# Patient Record
Sex: FEMALE | Race: WHITE | NOT HISPANIC OR LATINO | Employment: OTHER | URBAN - METROPOLITAN AREA
[De-identification: names, ages, dates, MRNs, and addresses within clinical notes are randomized per-mention and may not be internally consistent; named-entity substitution may affect disease eponyms.]

---

## 2017-03-14 ENCOUNTER — COMMUNICATION - HEALTHEAST (OUTPATIENT)
Dept: INTERNAL MEDICINE | Facility: CLINIC | Age: 77
End: 2017-03-14

## 2017-04-10 ENCOUNTER — COMMUNICATION - HEALTHEAST (OUTPATIENT)
Dept: INTERNAL MEDICINE | Facility: CLINIC | Age: 77
End: 2017-04-10

## 2017-04-10 DIAGNOSIS — Z51.81 MEDICATION MONITORING ENCOUNTER: ICD-10-CM

## 2017-05-12 ENCOUNTER — OFFICE VISIT - HEALTHEAST (OUTPATIENT)
Dept: INTERNAL MEDICINE | Facility: CLINIC | Age: 77
End: 2017-05-12

## 2017-05-12 DIAGNOSIS — Z00.00 HEALTHCARE MAINTENANCE: ICD-10-CM

## 2017-05-12 DIAGNOSIS — E11.9 TYPE 2 DIABETES MELLITUS (H): ICD-10-CM

## 2017-05-12 DIAGNOSIS — Z12.31 VISIT FOR SCREENING MAMMOGRAM: ICD-10-CM

## 2017-05-12 DIAGNOSIS — Z51.81 MEDICATION MONITORING ENCOUNTER: ICD-10-CM

## 2017-05-12 DIAGNOSIS — E78.5 HYPERLIPIDEMIA: ICD-10-CM

## 2017-05-12 LAB
CHOLEST SERPL-MCNC: 164 MG/DL
FASTING STATUS PATIENT QL REPORTED: YES
HBA1C MFR BLD: 6.6 % (ref 3.5–6)
HDLC SERPL-MCNC: 54 MG/DL
LDLC SERPL CALC-MCNC: 75 MG/DL
TRIGL SERPL-MCNC: 174 MG/DL

## 2017-05-12 ASSESSMENT — MIFFLIN-ST. JEOR: SCORE: 1328.83

## 2017-05-13 ENCOUNTER — COMMUNICATION - HEALTHEAST (OUTPATIENT)
Dept: INTERNAL MEDICINE | Facility: CLINIC | Age: 77
End: 2017-05-13

## 2017-05-15 ENCOUNTER — COMMUNICATION - HEALTHEAST (OUTPATIENT)
Dept: INTERNAL MEDICINE | Facility: CLINIC | Age: 77
End: 2017-05-15

## 2017-07-09 ENCOUNTER — COMMUNICATION - HEALTHEAST (OUTPATIENT)
Dept: INTERNAL MEDICINE | Facility: CLINIC | Age: 77
End: 2017-07-09

## 2017-07-09 DIAGNOSIS — I10 HYPERTENSION, ESSENTIAL: ICD-10-CM

## 2017-07-26 ENCOUNTER — COMMUNICATION - HEALTHEAST (OUTPATIENT)
Dept: INTERNAL MEDICINE | Facility: CLINIC | Age: 77
End: 2017-07-26

## 2017-07-26 DIAGNOSIS — I10 ESSENTIAL HYPERTENSION WITH GOAL BLOOD PRESSURE LESS THAN 140/90: ICD-10-CM

## 2017-08-31 ENCOUNTER — COMMUNICATION - HEALTHEAST (OUTPATIENT)
Dept: INTERNAL MEDICINE | Facility: CLINIC | Age: 77
End: 2017-08-31

## 2017-08-31 DIAGNOSIS — E78.5 HYPERLIPIDEMIA: ICD-10-CM

## 2017-08-31 DIAGNOSIS — I10 HYPERTENSION: ICD-10-CM

## 2017-09-15 ENCOUNTER — COMMUNICATION - HEALTHEAST (OUTPATIENT)
Dept: SCHEDULING | Facility: CLINIC | Age: 77
End: 2017-09-15

## 2017-09-18 ENCOUNTER — OFFICE VISIT - HEALTHEAST (OUTPATIENT)
Dept: INTERNAL MEDICINE | Facility: CLINIC | Age: 77
End: 2017-09-18

## 2017-09-18 DIAGNOSIS — Z23 NEED FOR INFLUENZA VACCINATION: ICD-10-CM

## 2017-09-18 DIAGNOSIS — E83.52 HYPERCALCEMIA: ICD-10-CM

## 2017-09-18 DIAGNOSIS — E11.9 TYPE 2 DIABETES MELLITUS (H): ICD-10-CM

## 2017-09-18 LAB — HBA1C MFR BLD: 6.6 % (ref 3.5–6)

## 2017-09-18 ASSESSMENT — MIFFLIN-ST. JEOR: SCORE: 1321.58

## 2017-09-24 ENCOUNTER — COMMUNICATION - HEALTHEAST (OUTPATIENT)
Dept: INTERNAL MEDICINE | Facility: CLINIC | Age: 77
End: 2017-09-24

## 2017-09-24 DIAGNOSIS — I10 ESSENTIAL HYPERTENSION WITH GOAL BLOOD PRESSURE LESS THAN 140/90: ICD-10-CM

## 2017-09-24 DIAGNOSIS — E11.9 TYPE 2 DIABETES MELLITUS (H): ICD-10-CM

## 2017-09-27 ENCOUNTER — COMMUNICATION - HEALTHEAST (OUTPATIENT)
Dept: INTERNAL MEDICINE | Facility: CLINIC | Age: 77
End: 2017-09-27

## 2017-11-30 ENCOUNTER — RECORDS - HEALTHEAST (OUTPATIENT)
Dept: ADMINISTRATIVE | Facility: OTHER | Age: 77
End: 2017-11-30

## 2018-02-15 ENCOUNTER — OFFICE VISIT - HEALTHEAST (OUTPATIENT)
Dept: INTERNAL MEDICINE | Facility: CLINIC | Age: 78
End: 2018-02-15

## 2018-02-15 DIAGNOSIS — E11.9 TYPE 2 DIABETES MELLITUS (H): ICD-10-CM

## 2018-02-15 DIAGNOSIS — I10 ESSENTIAL HYPERTENSION WITH GOAL BLOOD PRESSURE LESS THAN 140/90: ICD-10-CM

## 2018-02-15 DIAGNOSIS — E79.0 HYPERURICEMIA: ICD-10-CM

## 2018-02-15 DIAGNOSIS — R00.2 PALPITATIONS: ICD-10-CM

## 2018-02-15 DIAGNOSIS — E78.2 MIXED HYPERLIPIDEMIA: ICD-10-CM

## 2018-02-15 DIAGNOSIS — E21.0 HYPERPARATHYROIDISM, PRIMARY (H): ICD-10-CM

## 2018-02-15 DIAGNOSIS — Z51.81 ENCOUNTER FOR MEDICATION MONITORING: ICD-10-CM

## 2018-02-15 LAB
ANION GAP SERPL CALCULATED.3IONS-SCNC: 10 MMOL/L (ref 5–18)
AST SERPL W P-5'-P-CCNC: 15 U/L (ref 0–40)
BUN SERPL-MCNC: 32 MG/DL (ref 8–28)
CALCIUM SERPL-MCNC: 10.6 MG/DL (ref 8.5–10.5)
CHLORIDE BLD-SCNC: 99 MMOL/L (ref 98–107)
CHOLEST SERPL-MCNC: 139 MG/DL
CO2 SERPL-SCNC: 29 MMOL/L (ref 22–31)
CREAT SERPL-MCNC: 1.28 MG/DL (ref 0.6–1.1)
FASTING STATUS PATIENT QL REPORTED: YES
GFR SERPL CREATININE-BSD FRML MDRD: 40 ML/MIN/1.73M2
GLUCOSE BLD-MCNC: 149 MG/DL (ref 70–125)
HBA1C MFR BLD: 6.4 % (ref 3.5–6)
HDLC SERPL-MCNC: 48 MG/DL
LDLC SERPL CALC-MCNC: 62 MG/DL
POTASSIUM BLD-SCNC: 4.3 MMOL/L (ref 3.5–5)
PTH-INTACT SERPL-MCNC: 106 PG/ML (ref 10–86)
SODIUM SERPL-SCNC: 138 MMOL/L (ref 136–145)
TRIGL SERPL-MCNC: 146 MG/DL
URATE SERPL-MCNC: 3.3 MG/DL (ref 2–7.5)

## 2018-02-15 ASSESSMENT — MIFFLIN-ST. JEOR: SCORE: 1274.4

## 2018-02-16 ENCOUNTER — HOSPITAL ENCOUNTER (OUTPATIENT)
Dept: CARDIOLOGY | Facility: CLINIC | Age: 78
Discharge: HOME | End: 2018-02-16
Attending: INTERNAL MEDICINE

## 2018-02-16 DIAGNOSIS — R00.2 PALPITATIONS: ICD-10-CM

## 2018-02-16 LAB — 25(OH)D3 SERPL-MCNC: 39.9 NG/ML (ref 30–80)

## 2018-02-22 ENCOUNTER — RECORDS - HEALTHEAST (OUTPATIENT)
Dept: BONE DENSITY | Facility: CLINIC | Age: 78
End: 2018-02-22

## 2018-02-22 ENCOUNTER — RECORDS - HEALTHEAST (OUTPATIENT)
Dept: MAMMOGRAPHY | Facility: CLINIC | Age: 78
End: 2018-02-22

## 2018-02-22 ENCOUNTER — RECORDS - HEALTHEAST (OUTPATIENT)
Dept: ADMINISTRATIVE | Facility: OTHER | Age: 78
End: 2018-02-22

## 2018-02-22 DIAGNOSIS — Z12.31 ENCOUNTER FOR SCREENING MAMMOGRAM FOR MALIGNANT NEOPLASM OF BREAST: ICD-10-CM

## 2018-02-22 DIAGNOSIS — E21.0 PRIMARY HYPERPARATHYROIDISM (H): ICD-10-CM

## 2018-02-27 ENCOUNTER — RECORDS - HEALTHEAST (OUTPATIENT)
Dept: ADMINISTRATIVE | Facility: OTHER | Age: 78
End: 2018-02-27

## 2018-02-27 ENCOUNTER — AMBULATORY - HEALTHEAST (OUTPATIENT)
Dept: INTERNAL MEDICINE | Facility: CLINIC | Age: 78
End: 2018-02-27

## 2018-02-27 ENCOUNTER — COMMUNICATION - HEALTHEAST (OUTPATIENT)
Dept: INTERNAL MEDICINE | Facility: CLINIC | Age: 78
End: 2018-02-27

## 2018-02-27 DIAGNOSIS — I45.9 HEART BLOCK: ICD-10-CM

## 2018-03-01 ENCOUNTER — SURGERY - HEALTHEAST (OUTPATIENT)
Dept: CARDIOLOGY | Facility: CLINIC | Age: 78
End: 2018-03-01

## 2018-03-01 ENCOUNTER — AMBULATORY - HEALTHEAST (OUTPATIENT)
Dept: CARDIOLOGY | Facility: CLINIC | Age: 78
End: 2018-03-01

## 2018-03-01 ENCOUNTER — OFFICE VISIT - HEALTHEAST (OUTPATIENT)
Dept: CARDIOLOGY | Facility: CLINIC | Age: 78
End: 2018-03-01

## 2018-03-01 DIAGNOSIS — I48.0 PAROXYSMAL ATRIAL FIBRILLATION (H): ICD-10-CM

## 2018-03-01 DIAGNOSIS — I49.5 TACHY-BRADY SYNDROME (H): ICD-10-CM

## 2018-03-01 DIAGNOSIS — R00.1 BRADYCARDIA: ICD-10-CM

## 2018-03-01 DIAGNOSIS — I10 ESSENTIAL HYPERTENSION: ICD-10-CM

## 2018-03-01 DIAGNOSIS — E78.5 DYSLIPIDEMIA: ICD-10-CM

## 2018-03-01 LAB — TSH SERPL DL<=0.005 MIU/L-ACNC: 5.24 UIU/ML (ref 0.3–5)

## 2018-03-01 ASSESSMENT — MIFFLIN-ST. JEOR: SCORE: 1285.29

## 2018-03-02 ENCOUNTER — SURGERY - HEALTHEAST (OUTPATIENT)
Dept: CARDIOLOGY | Facility: CLINIC | Age: 78
End: 2018-03-02

## 2018-03-02 ENCOUNTER — AMBULATORY - HEALTHEAST (OUTPATIENT)
Dept: CARDIOLOGY | Facility: CLINIC | Age: 78
End: 2018-03-02

## 2018-03-02 DIAGNOSIS — I48.91 ATRIAL FIBRILLATION (H): ICD-10-CM

## 2018-03-05 ENCOUNTER — AMBULATORY - HEALTHEAST (OUTPATIENT)
Dept: CARDIOLOGY | Facility: CLINIC | Age: 78
End: 2018-03-05

## 2018-03-05 DIAGNOSIS — I48.0 PAF (PAROXYSMAL ATRIAL FIBRILLATION) (H): ICD-10-CM

## 2018-03-05 DIAGNOSIS — I49.5 TACHY-BRADY SYNDROME (H): ICD-10-CM

## 2018-03-05 LAB
ATRIAL RATE - MUSE: 70 BPM
DIASTOLIC BLOOD PRESSURE - MUSE: NORMAL MMHG
INTERNATIONAL NORMALIZATION RATIO, POC - HISTORICAL: 2.1
INTERPRETATION ECG - MUSE: NORMAL
P AXIS - MUSE: 40 DEGREES
PR INTERVAL - MUSE: 102 MS
QRS DURATION - MUSE: 80 MS
QT - MUSE: 388 MS
QTC - MUSE: 419 MS
R AXIS - MUSE: 70 DEGREES
SYSTOLIC BLOOD PRESSURE - MUSE: NORMAL MMHG
T AXIS - MUSE: -2 DEGREES
VENTRICULAR RATE- MUSE: 70 BPM

## 2018-03-06 ENCOUNTER — COMMUNICATION - HEALTHEAST (OUTPATIENT)
Dept: CARDIOLOGY | Facility: CLINIC | Age: 78
End: 2018-03-06

## 2018-03-12 ENCOUNTER — AMBULATORY - HEALTHEAST (OUTPATIENT)
Dept: CARDIOLOGY | Facility: CLINIC | Age: 78
End: 2018-03-12

## 2018-03-12 DIAGNOSIS — I48.91 ATRIAL FIBRILLATION (H): ICD-10-CM

## 2018-03-12 DIAGNOSIS — Z95.0 CARDIAC PACEMAKER IN SITU: ICD-10-CM

## 2018-03-12 LAB
HCC DEVICE COMMENTS: NORMAL
INTERNATIONAL NORMALIZATION RATIO, POC - HISTORICAL: 6.3

## 2018-03-12 ASSESSMENT — MIFFLIN-ST. JEOR: SCORE: 1262.61

## 2018-03-19 ENCOUNTER — AMBULATORY - HEALTHEAST (OUTPATIENT)
Dept: CARDIOLOGY | Facility: CLINIC | Age: 78
End: 2018-03-19

## 2018-03-19 DIAGNOSIS — I48.91 ATRIAL FIBRILLATION (H): ICD-10-CM

## 2018-03-19 LAB — INTERNATIONAL NORMALIZATION RATIO, POC - HISTORICAL: 2.4

## 2018-04-02 ENCOUNTER — AMBULATORY - HEALTHEAST (OUTPATIENT)
Dept: CARDIOLOGY | Facility: CLINIC | Age: 78
End: 2018-04-02

## 2018-04-02 DIAGNOSIS — I48.91 ATRIAL FIBRILLATION (H): ICD-10-CM

## 2018-04-02 LAB — INTERNATIONAL NORMALIZATION RATIO, POC - HISTORICAL: 2.9

## 2018-04-05 ENCOUNTER — AMBULATORY - HEALTHEAST (OUTPATIENT)
Dept: CARDIOLOGY | Facility: CLINIC | Age: 78
End: 2018-04-05

## 2018-04-05 DIAGNOSIS — I49.5 SSS (SICK SINUS SYNDROME) (H): ICD-10-CM

## 2018-04-05 DIAGNOSIS — I48.0 PAROXYSMAL ATRIAL FIBRILLATION (H): ICD-10-CM

## 2018-04-05 DIAGNOSIS — Z95.0 CARDIAC PACEMAKER IN SITU: ICD-10-CM

## 2018-04-05 DIAGNOSIS — I45.5 SINUS PAUSE: ICD-10-CM

## 2018-04-05 DIAGNOSIS — I35.0 MILD AORTIC STENOSIS: ICD-10-CM

## 2018-04-05 DIAGNOSIS — I10 ESSENTIAL HYPERTENSION WITH GOAL BLOOD PRESSURE LESS THAN 140/90: ICD-10-CM

## 2018-04-05 LAB — HCC DEVICE COMMENTS: NORMAL

## 2018-04-05 ASSESSMENT — MIFFLIN-ST. JEOR: SCORE: 1285.74

## 2018-04-13 ENCOUNTER — COMMUNICATION - HEALTHEAST (OUTPATIENT)
Dept: INTERNAL MEDICINE | Facility: CLINIC | Age: 78
End: 2018-04-13

## 2018-04-13 DIAGNOSIS — Z51.81 MEDICATION MONITORING ENCOUNTER: ICD-10-CM

## 2018-04-14 ENCOUNTER — COMMUNICATION - HEALTHEAST (OUTPATIENT)
Dept: INTERNAL MEDICINE | Facility: CLINIC | Age: 78
End: 2018-04-14

## 2018-04-14 DIAGNOSIS — I10 HYPERTENSION, ESSENTIAL: ICD-10-CM

## 2018-04-16 ENCOUNTER — AMBULATORY - HEALTHEAST (OUTPATIENT)
Dept: CARDIOLOGY | Facility: CLINIC | Age: 78
End: 2018-04-16

## 2018-04-16 DIAGNOSIS — I48.0 PAROXYSMAL ATRIAL FIBRILLATION (H): ICD-10-CM

## 2018-04-16 LAB — INTERNATIONAL NORMALIZATION RATIO, POC - HISTORICAL: 2.6

## 2018-04-22 ENCOUNTER — COMMUNICATION - HEALTHEAST (OUTPATIENT)
Dept: INTERNAL MEDICINE | Facility: CLINIC | Age: 78
End: 2018-04-22

## 2018-04-22 DIAGNOSIS — I10 ESSENTIAL HYPERTENSION WITH GOAL BLOOD PRESSURE LESS THAN 140/90: ICD-10-CM

## 2018-05-14 ENCOUNTER — AMBULATORY - HEALTHEAST (OUTPATIENT)
Dept: CARDIOLOGY | Facility: CLINIC | Age: 78
End: 2018-05-14

## 2018-05-14 DIAGNOSIS — I48.0 PAROXYSMAL ATRIAL FIBRILLATION (H): ICD-10-CM

## 2018-05-14 LAB — INTERNATIONAL NORMALIZATION RATIO, POC - HISTORICAL: 2.9

## 2018-06-11 ENCOUNTER — AMBULATORY - HEALTHEAST (OUTPATIENT)
Dept: CARDIOLOGY | Facility: CLINIC | Age: 78
End: 2018-06-11

## 2018-06-11 DIAGNOSIS — I48.0 PAROXYSMAL ATRIAL FIBRILLATION (H): ICD-10-CM

## 2018-06-11 LAB — INTERNATIONAL NORMALIZATION RATIO, POC - HISTORICAL: 2.6

## 2018-06-12 ENCOUNTER — COMMUNICATION - HEALTHEAST (OUTPATIENT)
Dept: INTERNAL MEDICINE | Facility: CLINIC | Age: 78
End: 2018-06-12

## 2018-06-12 DIAGNOSIS — E78.5 HYPERLIPIDEMIA: ICD-10-CM

## 2018-06-13 ENCOUNTER — COMMUNICATION - HEALTHEAST (OUTPATIENT)
Dept: INTERNAL MEDICINE | Facility: CLINIC | Age: 78
End: 2018-06-13

## 2018-06-13 DIAGNOSIS — I10 HYPERTENSION: ICD-10-CM

## 2018-06-20 ENCOUNTER — COMMUNICATION - HEALTHEAST (OUTPATIENT)
Dept: INTERNAL MEDICINE | Facility: CLINIC | Age: 78
End: 2018-06-20

## 2018-07-02 ENCOUNTER — AMBULATORY - HEALTHEAST (OUTPATIENT)
Dept: CARDIOLOGY | Facility: CLINIC | Age: 78
End: 2018-07-02

## 2018-07-02 DIAGNOSIS — Z95.0 CARDIAC PACEMAKER IN SITU: ICD-10-CM

## 2018-07-02 LAB
HCC DEVICE COMMENTS: NORMAL
HCC DEVICE IMPLANTING PROVIDER: NORMAL
HCC DEVICE MANUFACTURE: NORMAL
HCC DEVICE MODEL: NORMAL
HCC DEVICE SERIAL NUMBER: NORMAL
HCC DEVICE TYPE: NORMAL

## 2018-07-16 ENCOUNTER — AMBULATORY - HEALTHEAST (OUTPATIENT)
Dept: CARDIOLOGY | Facility: CLINIC | Age: 78
End: 2018-07-16

## 2018-07-16 ENCOUNTER — OFFICE VISIT - HEALTHEAST (OUTPATIENT)
Dept: INTERNAL MEDICINE | Facility: CLINIC | Age: 78
End: 2018-07-16

## 2018-07-16 ENCOUNTER — RECORDS - HEALTHEAST (OUTPATIENT)
Dept: GENERAL RADIOLOGY | Facility: CLINIC | Age: 78
End: 2018-07-16

## 2018-07-16 DIAGNOSIS — R53.83 FATIGUE: ICD-10-CM

## 2018-07-16 DIAGNOSIS — M54.50 LOW BACK PAIN: ICD-10-CM

## 2018-07-16 DIAGNOSIS — I48.0 PAROXYSMAL ATRIAL FIBRILLATION (H): ICD-10-CM

## 2018-07-16 DIAGNOSIS — E21.0 PRIMARY HYPERPARATHYROIDISM (H): ICD-10-CM

## 2018-07-16 DIAGNOSIS — M54.50 BILATERAL LOW BACK PAIN WITHOUT SCIATICA: ICD-10-CM

## 2018-07-16 LAB
ANION GAP SERPL CALCULATED.3IONS-SCNC: 12 MMOL/L (ref 5–18)
BUN SERPL-MCNC: 30 MG/DL (ref 8–28)
CALCIUM SERPL-MCNC: 10.8 MG/DL (ref 8.5–10.5)
CHLORIDE BLD-SCNC: 103 MMOL/L (ref 98–107)
CO2 SERPL-SCNC: 26 MMOL/L (ref 22–31)
CREAT SERPL-MCNC: 1.12 MG/DL (ref 0.6–1.1)
GFR SERPL CREATININE-BSD FRML MDRD: 47 ML/MIN/1.73M2
GLUCOSE BLD-MCNC: 120 MG/DL (ref 70–125)
HBA1C MFR BLD: 6.5 % (ref 3.5–6)
INTERNATIONAL NORMALIZATION RATIO, POC - HISTORICAL: 2.3
POTASSIUM BLD-SCNC: 4.2 MMOL/L (ref 3.5–5)
PTH-INTACT SERPL-MCNC: 174 PG/ML (ref 10–86)
SODIUM SERPL-SCNC: 141 MMOL/L (ref 136–145)
TSH SERPL DL<=0.005 MIU/L-ACNC: 3.57 UIU/ML (ref 0.3–5)

## 2018-07-17 LAB — 25(OH)D3 SERPL-MCNC: 37.1 NG/ML (ref 30–80)

## 2018-07-24 ENCOUNTER — COMMUNICATION - HEALTHEAST (OUTPATIENT)
Dept: INTERNAL MEDICINE | Facility: CLINIC | Age: 78
End: 2018-07-24

## 2018-07-24 ENCOUNTER — COMMUNICATION - HEALTHEAST (OUTPATIENT)
Dept: CARDIOLOGY | Facility: CLINIC | Age: 78
End: 2018-07-24

## 2018-07-31 ENCOUNTER — OFFICE VISIT - HEALTHEAST (OUTPATIENT)
Dept: PHYSICAL THERAPY | Facility: REHABILITATION | Age: 78
End: 2018-07-31

## 2018-07-31 DIAGNOSIS — I10 ESSENTIAL HYPERTENSION WITH GOAL BLOOD PRESSURE LESS THAN 140/90: ICD-10-CM

## 2018-07-31 DIAGNOSIS — E11.9 TYPE 2 DIABETES MELLITUS (H): ICD-10-CM

## 2018-07-31 DIAGNOSIS — M62.81 MUSCLE WEAKNESS (GENERALIZED): ICD-10-CM

## 2018-07-31 DIAGNOSIS — I48.0 PAROXYSMAL ATRIAL FIBRILLATION (H): ICD-10-CM

## 2018-07-31 DIAGNOSIS — G89.29 CHRONIC BILATERAL LOW BACK PAIN WITHOUT SCIATICA: ICD-10-CM

## 2018-07-31 DIAGNOSIS — M54.50 CHRONIC BILATERAL LOW BACK PAIN WITHOUT SCIATICA: ICD-10-CM

## 2018-08-13 ENCOUNTER — AMBULATORY - HEALTHEAST (OUTPATIENT)
Dept: CARDIOLOGY | Facility: CLINIC | Age: 78
End: 2018-08-13

## 2018-08-13 DIAGNOSIS — I48.0 PAROXYSMAL ATRIAL FIBRILLATION (H): ICD-10-CM

## 2018-08-13 LAB — INTERNATIONAL NORMALIZATION RATIO, POC - HISTORICAL: 3.3

## 2018-08-14 ENCOUNTER — OFFICE VISIT - HEALTHEAST (OUTPATIENT)
Dept: PHYSICAL THERAPY | Facility: REHABILITATION | Age: 78
End: 2018-08-14

## 2018-08-14 DIAGNOSIS — Z95.0 CARDIAC PACEMAKER IN SITU: ICD-10-CM

## 2018-08-14 DIAGNOSIS — I10 ESSENTIAL HYPERTENSION WITH GOAL BLOOD PRESSURE LESS THAN 140/90: ICD-10-CM

## 2018-08-14 DIAGNOSIS — I48.0 PAROXYSMAL ATRIAL FIBRILLATION (H): ICD-10-CM

## 2018-08-14 DIAGNOSIS — M54.50 CHRONIC BILATERAL LOW BACK PAIN WITHOUT SCIATICA: ICD-10-CM

## 2018-08-14 DIAGNOSIS — G89.29 CHRONIC BILATERAL LOW BACK PAIN WITHOUT SCIATICA: ICD-10-CM

## 2018-08-14 DIAGNOSIS — M62.81 MUSCLE WEAKNESS (GENERALIZED): ICD-10-CM

## 2018-08-14 DIAGNOSIS — E11.9 TYPE 2 DIABETES MELLITUS (H): ICD-10-CM

## 2018-08-21 ENCOUNTER — OFFICE VISIT - HEALTHEAST (OUTPATIENT)
Dept: PHYSICAL THERAPY | Facility: REHABILITATION | Age: 78
End: 2018-08-21

## 2018-08-21 DIAGNOSIS — I10 ESSENTIAL HYPERTENSION WITH GOAL BLOOD PRESSURE LESS THAN 140/90: ICD-10-CM

## 2018-08-21 DIAGNOSIS — G89.29 CHRONIC BILATERAL LOW BACK PAIN WITHOUT SCIATICA: ICD-10-CM

## 2018-08-21 DIAGNOSIS — I48.0 PAROXYSMAL ATRIAL FIBRILLATION (H): ICD-10-CM

## 2018-08-21 DIAGNOSIS — M54.50 CHRONIC BILATERAL LOW BACK PAIN WITHOUT SCIATICA: ICD-10-CM

## 2018-08-21 DIAGNOSIS — Z95.0 CARDIAC PACEMAKER IN SITU: ICD-10-CM

## 2018-08-21 DIAGNOSIS — M62.81 MUSCLE WEAKNESS (GENERALIZED): ICD-10-CM

## 2018-08-21 DIAGNOSIS — E11.9 TYPE 2 DIABETES MELLITUS (H): ICD-10-CM

## 2018-08-28 ENCOUNTER — OFFICE VISIT - HEALTHEAST (OUTPATIENT)
Dept: PHYSICAL THERAPY | Facility: REHABILITATION | Age: 78
End: 2018-08-28

## 2018-08-28 DIAGNOSIS — I10 ESSENTIAL HYPERTENSION WITH GOAL BLOOD PRESSURE LESS THAN 140/90: ICD-10-CM

## 2018-08-28 DIAGNOSIS — Z95.0 CARDIAC PACEMAKER IN SITU: ICD-10-CM

## 2018-08-28 DIAGNOSIS — M62.81 MUSCLE WEAKNESS (GENERALIZED): ICD-10-CM

## 2018-08-28 DIAGNOSIS — I48.0 PAROXYSMAL ATRIAL FIBRILLATION (H): ICD-10-CM

## 2018-08-28 DIAGNOSIS — G89.29 CHRONIC BILATERAL LOW BACK PAIN WITHOUT SCIATICA: ICD-10-CM

## 2018-08-28 DIAGNOSIS — M54.50 CHRONIC BILATERAL LOW BACK PAIN WITHOUT SCIATICA: ICD-10-CM

## 2018-09-10 ENCOUNTER — AMBULATORY - HEALTHEAST (OUTPATIENT)
Dept: CARDIOLOGY | Facility: CLINIC | Age: 78
End: 2018-09-10

## 2018-09-10 DIAGNOSIS — I48.0 PAROXYSMAL ATRIAL FIBRILLATION (H): ICD-10-CM

## 2018-09-10 LAB — INTERNATIONAL NORMALIZATION RATIO, POC - HISTORICAL: 2.3

## 2018-09-12 ENCOUNTER — OFFICE VISIT - HEALTHEAST (OUTPATIENT)
Dept: PHYSICAL THERAPY | Facility: REHABILITATION | Age: 78
End: 2018-09-12

## 2018-09-12 DIAGNOSIS — I48.0 PAROXYSMAL ATRIAL FIBRILLATION (H): ICD-10-CM

## 2018-09-12 DIAGNOSIS — Z95.0 CARDIAC PACEMAKER IN SITU: ICD-10-CM

## 2018-09-12 DIAGNOSIS — M54.50 CHRONIC BILATERAL LOW BACK PAIN WITHOUT SCIATICA: ICD-10-CM

## 2018-09-12 DIAGNOSIS — G89.29 CHRONIC BILATERAL LOW BACK PAIN WITHOUT SCIATICA: ICD-10-CM

## 2018-09-12 DIAGNOSIS — E11.9 TYPE 2 DIABETES MELLITUS (H): ICD-10-CM

## 2018-09-12 DIAGNOSIS — I10 ESSENTIAL HYPERTENSION WITH GOAL BLOOD PRESSURE LESS THAN 140/90: ICD-10-CM

## 2018-09-12 DIAGNOSIS — M62.81 MUSCLE WEAKNESS (GENERALIZED): ICD-10-CM

## 2018-10-08 ENCOUNTER — AMBULATORY - HEALTHEAST (OUTPATIENT)
Dept: CARDIOLOGY | Facility: CLINIC | Age: 78
End: 2018-10-08

## 2018-10-08 DIAGNOSIS — Z95.0 CARDIAC PACEMAKER IN SITU: ICD-10-CM

## 2018-10-08 DIAGNOSIS — I48.0 PAROXYSMAL ATRIAL FIBRILLATION (H): ICD-10-CM

## 2018-10-08 LAB
HCC DEVICE COMMENTS: NORMAL
HCC DEVICE IMPLANTING PROVIDER: NORMAL
HCC DEVICE MANUFACTURE: NORMAL
HCC DEVICE MODEL: NORMAL
HCC DEVICE SERIAL NUMBER: NORMAL
HCC DEVICE TYPE: NORMAL
INTERNATIONAL NORMALIZATION RATIO, POC - HISTORICAL: 2.6

## 2018-10-16 ENCOUNTER — COMMUNICATION - HEALTHEAST (OUTPATIENT)
Dept: INTERNAL MEDICINE | Facility: CLINIC | Age: 78
End: 2018-10-16

## 2018-10-16 DIAGNOSIS — I10 HYPERTENSION, ESSENTIAL: ICD-10-CM

## 2018-10-20 ENCOUNTER — COMMUNICATION - HEALTHEAST (OUTPATIENT)
Dept: INTERNAL MEDICINE | Facility: CLINIC | Age: 78
End: 2018-10-20

## 2018-10-20 DIAGNOSIS — E11.9 TYPE 2 DIABETES MELLITUS (H): ICD-10-CM

## 2018-11-05 ENCOUNTER — AMBULATORY - HEALTHEAST (OUTPATIENT)
Dept: CARDIOLOGY | Facility: CLINIC | Age: 78
End: 2018-11-05

## 2018-11-05 DIAGNOSIS — I48.0 PAROXYSMAL ATRIAL FIBRILLATION (H): ICD-10-CM

## 2018-11-05 LAB — INTERNATIONAL NORMALIZATION RATIO, POC - HISTORICAL: 4

## 2018-11-12 ENCOUNTER — AMBULATORY - HEALTHEAST (OUTPATIENT)
Dept: CARDIOLOGY | Facility: CLINIC | Age: 78
End: 2018-11-12

## 2018-11-12 DIAGNOSIS — I48.0 PAROXYSMAL ATRIAL FIBRILLATION (H): ICD-10-CM

## 2018-11-12 LAB — INTERNATIONAL NORMALIZATION RATIO, POC - HISTORICAL: 2.2

## 2018-11-16 ENCOUNTER — RECORDS - HEALTHEAST (OUTPATIENT)
Dept: ADMINISTRATIVE | Facility: OTHER | Age: 78
End: 2018-11-16

## 2018-12-10 ENCOUNTER — AMBULATORY - HEALTHEAST (OUTPATIENT)
Dept: CARDIOLOGY | Facility: CLINIC | Age: 78
End: 2018-12-10

## 2018-12-10 DIAGNOSIS — I48.0 PAROXYSMAL ATRIAL FIBRILLATION (H): ICD-10-CM

## 2018-12-10 LAB — INTERNATIONAL NORMALIZATION RATIO, POC - HISTORICAL: 1.9

## 2018-12-11 ENCOUNTER — COMMUNICATION - HEALTHEAST (OUTPATIENT)
Dept: INTERNAL MEDICINE | Facility: CLINIC | Age: 78
End: 2018-12-11

## 2018-12-11 DIAGNOSIS — I10 HYPERTENSION: ICD-10-CM

## 2018-12-11 DIAGNOSIS — E78.5 HYPERLIPIDEMIA: ICD-10-CM

## 2018-12-22 ENCOUNTER — COMMUNICATION - HEALTHEAST (OUTPATIENT)
Dept: INTERNAL MEDICINE | Facility: CLINIC | Age: 78
End: 2018-12-22

## 2018-12-22 DIAGNOSIS — I10 ESSENTIAL HYPERTENSION WITH GOAL BLOOD PRESSURE LESS THAN 140/90: ICD-10-CM

## 2019-01-07 ENCOUNTER — AMBULATORY - HEALTHEAST (OUTPATIENT)
Dept: CARDIOLOGY | Facility: CLINIC | Age: 79
End: 2019-01-07

## 2019-01-07 ENCOUNTER — OFFICE VISIT - HEALTHEAST (OUTPATIENT)
Dept: INTERNAL MEDICINE | Facility: CLINIC | Age: 79
End: 2019-01-07

## 2019-01-07 DIAGNOSIS — I48.0 PAROXYSMAL ATRIAL FIBRILLATION (H): ICD-10-CM

## 2019-01-07 DIAGNOSIS — Z23 FLU VACCINE NEED: ICD-10-CM

## 2019-01-07 DIAGNOSIS — Z51.81 MEDICATION MONITORING ENCOUNTER: ICD-10-CM

## 2019-01-07 DIAGNOSIS — M10.9 ACUTE GOUT, UNSPECIFIED CAUSE, UNSPECIFIED SITE: ICD-10-CM

## 2019-01-07 DIAGNOSIS — E11.9 TYPE 2 DIABETES MELLITUS (H): ICD-10-CM

## 2019-01-07 DIAGNOSIS — Z12.31 VISIT FOR SCREENING MAMMOGRAM: ICD-10-CM

## 2019-01-07 DIAGNOSIS — E21.0 PRIMARY HYPERPARATHYROIDISM (H): ICD-10-CM

## 2019-01-07 DIAGNOSIS — I10 ESSENTIAL HYPERTENSION WITH GOAL BLOOD PRESSURE LESS THAN 140/90: ICD-10-CM

## 2019-01-07 DIAGNOSIS — E78.00 HYPERCHOLESTEROLEMIA: ICD-10-CM

## 2019-01-07 DIAGNOSIS — E66.01 SEVERE OBESITY WITH BODY MASS INDEX (BMI) OF 35.0 TO 39.9 WITH COMORBIDITY (H): ICD-10-CM

## 2019-01-07 LAB
ALBUMIN SERPL-MCNC: 3.9 G/DL (ref 3.5–5)
ALP SERPL-CCNC: 97 U/L (ref 45–120)
ALT SERPL W P-5'-P-CCNC: 24 U/L (ref 0–45)
ANION GAP SERPL CALCULATED.3IONS-SCNC: 15 MMOL/L (ref 5–18)
AST SERPL W P-5'-P-CCNC: 26 U/L (ref 0–40)
BILIRUB SERPL-MCNC: 0.7 MG/DL (ref 0–1)
BUN SERPL-MCNC: 30 MG/DL (ref 8–28)
CALCIUM SERPL-MCNC: 10.6 MG/DL (ref 8.5–10.5)
CHLORIDE BLD-SCNC: 101 MMOL/L (ref 98–107)
CHOLEST SERPL-MCNC: 170 MG/DL
CO2 SERPL-SCNC: 23 MMOL/L (ref 22–31)
CREAT SERPL-MCNC: 1.2 MG/DL (ref 0.6–1.1)
CREAT UR-MCNC: 79.5 MG/DL
ERYTHROCYTE [DISTWIDTH] IN BLOOD BY AUTOMATED COUNT: 11.8 % (ref 11–14.5)
FASTING STATUS PATIENT QL REPORTED: NO
GFR SERPL CREATININE-BSD FRML MDRD: 43 ML/MIN/1.73M2
GLUCOSE BLD-MCNC: 106 MG/DL (ref 70–125)
HBA1C MFR BLD: 6.7 % (ref 3.5–6)
HCT VFR BLD AUTO: 42.7 % (ref 35–47)
HDLC SERPL-MCNC: 58 MG/DL
HGB BLD-MCNC: 14.2 G/DL (ref 12–16)
INTERNATIONAL NORMALIZATION RATIO, POC - HISTORICAL: 2.2
LDLC SERPL CALC-MCNC: 74 MG/DL
MCH RBC QN AUTO: 30.8 PG (ref 27–34)
MCHC RBC AUTO-ENTMCNC: 33.3 G/DL (ref 32–36)
MCV RBC AUTO: 93 FL (ref 80–100)
MICROALBUMIN UR-MCNC: 1.07 MG/DL (ref 0–1.99)
MICROALBUMIN/CREAT UR: 13.5 MG/G
PLATELET # BLD AUTO: 205 THOU/UL (ref 140–440)
PMV BLD AUTO: 8.8 FL (ref 7–10)
POTASSIUM BLD-SCNC: 4.8 MMOL/L (ref 3.5–5)
PROT SERPL-MCNC: 7.2 G/DL (ref 6–8)
PTH-INTACT SERPL-MCNC: 145 PG/ML (ref 10–86)
RBC # BLD AUTO: 4.62 MILL/UL (ref 3.8–5.4)
SODIUM SERPL-SCNC: 139 MMOL/L (ref 136–145)
TRIGL SERPL-MCNC: 192 MG/DL
URATE SERPL-MCNC: 3.6 MG/DL (ref 2–7.5)
WBC: 9.5 THOU/UL (ref 4–11)

## 2019-01-08 LAB — 25(OH)D3 SERPL-MCNC: 37.4 NG/ML (ref 30–80)

## 2019-01-15 ENCOUNTER — AMBULATORY - HEALTHEAST (OUTPATIENT)
Dept: CARDIOLOGY | Facility: CLINIC | Age: 79
End: 2019-01-15

## 2019-01-15 DIAGNOSIS — Z95.0 CARDIAC PACEMAKER IN SITU: ICD-10-CM

## 2019-01-22 ENCOUNTER — COMMUNICATION - HEALTHEAST (OUTPATIENT)
Dept: CARDIOLOGY | Facility: CLINIC | Age: 79
End: 2019-01-22

## 2019-01-22 DIAGNOSIS — I48.0 PAROXYSMAL ATRIAL FIBRILLATION (H): ICD-10-CM

## 2019-02-11 ENCOUNTER — AMBULATORY - HEALTHEAST (OUTPATIENT)
Dept: CARDIOLOGY | Facility: CLINIC | Age: 79
End: 2019-02-11

## 2019-02-11 DIAGNOSIS — I48.0 PAROXYSMAL ATRIAL FIBRILLATION (H): ICD-10-CM

## 2019-02-11 LAB — INTERNATIONAL NORMALIZATION RATIO, POC - HISTORICAL: 4.5

## 2019-02-18 ENCOUNTER — AMBULATORY - HEALTHEAST (OUTPATIENT)
Dept: CARDIOLOGY | Facility: CLINIC | Age: 79
End: 2019-02-18

## 2019-02-18 DIAGNOSIS — I48.0 PAROXYSMAL ATRIAL FIBRILLATION (H): ICD-10-CM

## 2019-02-18 LAB — INTERNATIONAL NORMALIZATION RATIO, POC - HISTORICAL: 2.2

## 2019-03-18 ENCOUNTER — AMBULATORY - HEALTHEAST (OUTPATIENT)
Dept: CARDIOLOGY | Facility: CLINIC | Age: 79
End: 2019-03-18

## 2019-03-18 DIAGNOSIS — I48.0 PAROXYSMAL ATRIAL FIBRILLATION (H): ICD-10-CM

## 2019-03-18 LAB — INTERNATIONAL NORMALIZATION RATIO, POC - HISTORICAL: 3.4

## 2019-04-01 ENCOUNTER — AMBULATORY - HEALTHEAST (OUTPATIENT)
Dept: CARDIOLOGY | Facility: CLINIC | Age: 79
End: 2019-04-01

## 2019-04-01 DIAGNOSIS — I48.0 PAROXYSMAL ATRIAL FIBRILLATION (H): ICD-10-CM

## 2019-04-01 LAB — INTERNATIONAL NORMALIZATION RATIO, POC - HISTORICAL: 2.1

## 2019-04-11 ENCOUNTER — COMMUNICATION - HEALTHEAST (OUTPATIENT)
Dept: INTERNAL MEDICINE | Facility: CLINIC | Age: 79
End: 2019-04-11

## 2019-04-11 DIAGNOSIS — Z51.81 MEDICATION MONITORING ENCOUNTER: ICD-10-CM

## 2019-04-15 ENCOUNTER — AMBULATORY - HEALTHEAST (OUTPATIENT)
Dept: CARDIOLOGY | Facility: CLINIC | Age: 79
End: 2019-04-15

## 2019-04-15 DIAGNOSIS — Z79.01 LONG TERM CURRENT USE OF ANTICOAGULANT THERAPY: ICD-10-CM

## 2019-04-15 DIAGNOSIS — I48.0 PAROXYSMAL ATRIAL FIBRILLATION (H): ICD-10-CM

## 2019-04-15 LAB — POC INR - HE - HISTORICAL: 3.9 (ref 0.9–1.1)

## 2019-04-29 ENCOUNTER — AMBULATORY - HEALTHEAST (OUTPATIENT)
Dept: CARDIOLOGY | Facility: CLINIC | Age: 79
End: 2019-04-29

## 2019-04-29 DIAGNOSIS — I48.0 PAROXYSMAL ATRIAL FIBRILLATION (H): ICD-10-CM

## 2019-04-29 LAB — INTERNATIONAL NORMALIZATION RATIO, POC - HISTORICAL: 2.8

## 2019-05-09 ENCOUNTER — COMMUNICATION - HEALTHEAST (OUTPATIENT)
Dept: INTERNAL MEDICINE | Facility: CLINIC | Age: 79
End: 2019-05-09

## 2019-05-21 ENCOUNTER — COMMUNICATION - HEALTHEAST (OUTPATIENT)
Dept: ANTICOAGULATION | Facility: CLINIC | Age: 79
End: 2019-05-21

## 2019-05-21 DIAGNOSIS — I48.0 PAROXYSMAL ATRIAL FIBRILLATION (H): ICD-10-CM

## 2019-05-22 ENCOUNTER — AMBULATORY - HEALTHEAST (OUTPATIENT)
Dept: CARDIOLOGY | Facility: CLINIC | Age: 79
End: 2019-05-22

## 2019-05-22 ENCOUNTER — OFFICE VISIT - HEALTHEAST (OUTPATIENT)
Dept: CARDIOLOGY | Facility: CLINIC | Age: 79
End: 2019-05-22

## 2019-05-22 DIAGNOSIS — I10 ESSENTIAL HYPERTENSION: ICD-10-CM

## 2019-05-22 DIAGNOSIS — I48.0 PAROXYSMAL ATRIAL FIBRILLATION (H): ICD-10-CM

## 2019-05-22 DIAGNOSIS — Z95.0 CARDIAC PACEMAKER IN SITU: ICD-10-CM

## 2019-05-22 ASSESSMENT — MIFFLIN-ST. JEOR: SCORE: 1281.43

## 2019-05-29 ENCOUNTER — COMMUNICATION - HEALTHEAST (OUTPATIENT)
Dept: ANTICOAGULATION | Facility: CLINIC | Age: 79
End: 2019-05-29

## 2019-05-29 ENCOUNTER — AMBULATORY - HEALTHEAST (OUTPATIENT)
Dept: LAB | Facility: CLINIC | Age: 79
End: 2019-05-29

## 2019-05-29 DIAGNOSIS — I48.0 PAROXYSMAL ATRIAL FIBRILLATION (H): ICD-10-CM

## 2019-05-29 LAB — INR PPP: 2.8 (ref 0.9–1.1)

## 2019-06-24 ENCOUNTER — AMBULATORY - HEALTHEAST (OUTPATIENT)
Dept: LAB | Facility: CLINIC | Age: 79
End: 2019-06-24

## 2019-06-24 ENCOUNTER — COMMUNICATION - HEALTHEAST (OUTPATIENT)
Dept: ANTICOAGULATION | Facility: CLINIC | Age: 79
End: 2019-06-24

## 2019-06-24 DIAGNOSIS — I48.0 PAROXYSMAL ATRIAL FIBRILLATION (H): ICD-10-CM

## 2019-06-24 LAB — INR PPP: 3 (ref 0.9–1.1)

## 2019-06-26 ENCOUNTER — COMMUNICATION - HEALTHEAST (OUTPATIENT)
Dept: CARDIOLOGY | Facility: CLINIC | Age: 79
End: 2019-06-26

## 2019-06-26 DIAGNOSIS — I48.0 PAROXYSMAL ATRIAL FIBRILLATION (H): ICD-10-CM

## 2019-07-22 ENCOUNTER — COMMUNICATION - HEALTHEAST (OUTPATIENT)
Dept: ANTICOAGULATION | Facility: CLINIC | Age: 79
End: 2019-07-22

## 2019-07-22 ENCOUNTER — RECORDS - HEALTHEAST (OUTPATIENT)
Dept: MAMMOGRAPHY | Facility: CLINIC | Age: 79
End: 2019-07-22

## 2019-07-22 ENCOUNTER — OFFICE VISIT - HEALTHEAST (OUTPATIENT)
Dept: INTERNAL MEDICINE | Facility: CLINIC | Age: 79
End: 2019-07-22

## 2019-07-22 DIAGNOSIS — I48.0 PAROXYSMAL ATRIAL FIBRILLATION (H): ICD-10-CM

## 2019-07-22 DIAGNOSIS — E78.5 HYPERLIPIDEMIA: ICD-10-CM

## 2019-07-22 DIAGNOSIS — I10 ESSENTIAL HYPERTENSION WITH GOAL BLOOD PRESSURE LESS THAN 140/90: ICD-10-CM

## 2019-07-22 DIAGNOSIS — Z12.31 ENCOUNTER FOR SCREENING MAMMOGRAM FOR MALIGNANT NEOPLASM OF BREAST: ICD-10-CM

## 2019-07-22 DIAGNOSIS — I10 HYPERTENSION: ICD-10-CM

## 2019-07-22 DIAGNOSIS — E78.2 MIXED HYPERLIPIDEMIA: ICD-10-CM

## 2019-07-22 DIAGNOSIS — E21.0 PRIMARY HYPERPARATHYROIDISM (H): ICD-10-CM

## 2019-07-22 DIAGNOSIS — I10 HYPERTENSION, ESSENTIAL: ICD-10-CM

## 2019-07-22 DIAGNOSIS — E11.9 TYPE 2 DIABETES MELLITUS (H): ICD-10-CM

## 2019-07-22 LAB
CREAT UR-MCNC: 147.9 MG/DL
HBA1C MFR BLD: 6.9 % (ref 3.5–6)
INR PPP: 2.4 (ref 0.9–1.1)
MICROALBUMIN UR-MCNC: 2.13 MG/DL (ref 0–1.99)
MICROALBUMIN/CREAT UR: 14.4 MG/G

## 2019-07-23 LAB
ALBUMIN SERPL-MCNC: 3.7 G/DL (ref 3.5–5)
ALP SERPL-CCNC: 109 U/L (ref 45–120)
ALT SERPL W P-5'-P-CCNC: 20 U/L (ref 0–45)
ANION GAP SERPL CALCULATED.3IONS-SCNC: 11 MMOL/L (ref 5–18)
AST SERPL W P-5'-P-CCNC: 17 U/L (ref 0–40)
BILIRUB SERPL-MCNC: 0.6 MG/DL (ref 0–1)
BUN SERPL-MCNC: 28 MG/DL (ref 8–28)
CALCIUM SERPL-MCNC: 10 MG/DL (ref 8.5–10.5)
CHLORIDE BLD-SCNC: 101 MMOL/L (ref 98–107)
CHOLEST SERPL-MCNC: 149 MG/DL
CO2 SERPL-SCNC: 26 MMOL/L (ref 22–31)
CREAT SERPL-MCNC: 1.08 MG/DL (ref 0.6–1.1)
FASTING STATUS PATIENT QL REPORTED: NO
GFR SERPL CREATININE-BSD FRML MDRD: 49 ML/MIN/1.73M2
GLUCOSE BLD-MCNC: 130 MG/DL (ref 70–125)
HDLC SERPL-MCNC: 55 MG/DL
LDLC SERPL CALC-MCNC: 70 MG/DL
POTASSIUM BLD-SCNC: 4.6 MMOL/L (ref 3.5–5)
PROT SERPL-MCNC: 6.6 G/DL (ref 6–8)
PTH-INTACT SERPL-MCNC: 182 PG/ML (ref 10–86)
SODIUM SERPL-SCNC: 138 MMOL/L (ref 136–145)
TRIGL SERPL-MCNC: 122 MG/DL

## 2019-07-24 ENCOUNTER — COMMUNICATION - HEALTHEAST (OUTPATIENT)
Dept: INTERNAL MEDICINE | Facility: CLINIC | Age: 79
End: 2019-07-24

## 2019-07-29 ENCOUNTER — COMMUNICATION - HEALTHEAST (OUTPATIENT)
Dept: CARDIOLOGY | Facility: CLINIC | Age: 79
End: 2019-07-29

## 2019-07-29 DIAGNOSIS — I48.0 PAROXYSMAL ATRIAL FIBRILLATION (H): ICD-10-CM

## 2019-08-06 ENCOUNTER — COMMUNICATION - HEALTHEAST (OUTPATIENT)
Dept: INTERNAL MEDICINE | Facility: CLINIC | Age: 79
End: 2019-08-06

## 2019-08-06 DIAGNOSIS — I10 ESSENTIAL HYPERTENSION, BENIGN: ICD-10-CM

## 2019-08-20 ENCOUNTER — AMBULATORY - HEALTHEAST (OUTPATIENT)
Dept: LAB | Facility: CLINIC | Age: 79
End: 2019-08-20

## 2019-08-20 ENCOUNTER — COMMUNICATION - HEALTHEAST (OUTPATIENT)
Dept: ANTICOAGULATION | Facility: CLINIC | Age: 79
End: 2019-08-20

## 2019-08-20 ENCOUNTER — AMBULATORY - HEALTHEAST (OUTPATIENT)
Dept: CARDIOLOGY | Facility: CLINIC | Age: 79
End: 2019-08-20

## 2019-08-20 DIAGNOSIS — I48.0 PAROXYSMAL ATRIAL FIBRILLATION (H): ICD-10-CM

## 2019-08-20 DIAGNOSIS — Z95.0 CARDIAC PACEMAKER IN SITU: ICD-10-CM

## 2019-08-20 LAB
HCC DEVICE COMMENTS: NORMAL
HCC DEVICE IMPLANTING PROVIDER: NORMAL
HCC DEVICE MANUFACTURE: NORMAL
HCC DEVICE MODEL: NORMAL
HCC DEVICE SERIAL NUMBER: NORMAL
HCC DEVICE TYPE: NORMAL
INR PPP: 3.8 (ref 0.9–1.1)

## 2019-09-09 ENCOUNTER — AMBULATORY - HEALTHEAST (OUTPATIENT)
Dept: LAB | Facility: CLINIC | Age: 79
End: 2019-09-09

## 2019-09-09 ENCOUNTER — COMMUNICATION - HEALTHEAST (OUTPATIENT)
Dept: ANTICOAGULATION | Facility: CLINIC | Age: 79
End: 2019-09-09

## 2019-09-09 DIAGNOSIS — I48.0 PAROXYSMAL ATRIAL FIBRILLATION (H): ICD-10-CM

## 2019-09-09 LAB — INR PPP: 2.7 (ref 0.9–1.1)

## 2019-10-07 ENCOUNTER — AMBULATORY - HEALTHEAST (OUTPATIENT)
Dept: LAB | Facility: CLINIC | Age: 79
End: 2019-10-07

## 2019-10-07 ENCOUNTER — COMMUNICATION - HEALTHEAST (OUTPATIENT)
Dept: ANTICOAGULATION | Facility: CLINIC | Age: 79
End: 2019-10-07

## 2019-10-07 DIAGNOSIS — I48.0 PAROXYSMAL ATRIAL FIBRILLATION (H): ICD-10-CM

## 2019-10-07 LAB — INR PPP: 2.9 (ref 0.9–1.1)

## 2019-11-04 ENCOUNTER — COMMUNICATION - HEALTHEAST (OUTPATIENT)
Dept: ANTICOAGULATION | Facility: CLINIC | Age: 79
End: 2019-11-04

## 2019-11-04 ENCOUNTER — AMBULATORY - HEALTHEAST (OUTPATIENT)
Dept: NURSING | Facility: CLINIC | Age: 79
End: 2019-11-04

## 2019-11-04 ENCOUNTER — AMBULATORY - HEALTHEAST (OUTPATIENT)
Dept: LAB | Facility: CLINIC | Age: 79
End: 2019-11-04

## 2019-11-04 DIAGNOSIS — I48.0 PAROXYSMAL ATRIAL FIBRILLATION (H): ICD-10-CM

## 2019-11-04 DIAGNOSIS — Z23 NEED FOR IMMUNIZATION AGAINST INFLUENZA: ICD-10-CM

## 2019-11-04 LAB — INR PPP: 2.8 (ref 0.9–1.1)

## 2019-11-20 ENCOUNTER — AMBULATORY - HEALTHEAST (OUTPATIENT)
Dept: CARDIOLOGY | Facility: CLINIC | Age: 79
End: 2019-11-20

## 2019-11-20 DIAGNOSIS — I48.0 PAROXYSMAL ATRIAL FIBRILLATION (H): ICD-10-CM

## 2019-11-20 DIAGNOSIS — Z95.0 CARDIAC PACEMAKER IN SITU: ICD-10-CM

## 2019-11-27 ENCOUNTER — COMMUNICATION - HEALTHEAST (OUTPATIENT)
Dept: ANTICOAGULATION | Facility: CLINIC | Age: 79
End: 2019-11-27

## 2019-11-27 DIAGNOSIS — I48.0 PAROXYSMAL ATRIAL FIBRILLATION (H): ICD-10-CM

## 2019-12-02 ENCOUNTER — AMBULATORY - HEALTHEAST (OUTPATIENT)
Dept: LAB | Facility: CLINIC | Age: 79
End: 2019-12-02

## 2019-12-02 ENCOUNTER — COMMUNICATION - HEALTHEAST (OUTPATIENT)
Dept: ANTICOAGULATION | Facility: CLINIC | Age: 79
End: 2019-12-02

## 2019-12-02 DIAGNOSIS — I48.0 PAROXYSMAL ATRIAL FIBRILLATION (H): ICD-10-CM

## 2019-12-02 LAB — INR PPP: 3.2 (ref 0.9–1.1)

## 2019-12-16 ENCOUNTER — AMBULATORY - HEALTHEAST (OUTPATIENT)
Dept: LAB | Facility: CLINIC | Age: 79
End: 2019-12-16

## 2019-12-16 ENCOUNTER — COMMUNICATION - HEALTHEAST (OUTPATIENT)
Dept: ANTICOAGULATION | Facility: CLINIC | Age: 79
End: 2019-12-16

## 2019-12-16 DIAGNOSIS — I48.0 PAROXYSMAL ATRIAL FIBRILLATION (H): ICD-10-CM

## 2019-12-16 LAB — INR PPP: 2.3 (ref 0.9–1.1)

## 2020-01-07 ENCOUNTER — COMMUNICATION - HEALTHEAST (OUTPATIENT)
Dept: CARDIOLOGY | Facility: CLINIC | Age: 80
End: 2020-01-07

## 2020-01-07 ENCOUNTER — COMMUNICATION - HEALTHEAST (OUTPATIENT)
Dept: ANTICOAGULATION | Facility: CLINIC | Age: 80
End: 2020-01-07

## 2020-01-07 ENCOUNTER — AMBULATORY - HEALTHEAST (OUTPATIENT)
Dept: LAB | Facility: CLINIC | Age: 80
End: 2020-01-07

## 2020-01-07 DIAGNOSIS — I48.0 PAROXYSMAL ATRIAL FIBRILLATION (H): ICD-10-CM

## 2020-01-07 LAB — INR PPP: 2.8 (ref 0.9–1.1)

## 2020-01-12 ENCOUNTER — COMMUNICATION - HEALTHEAST (OUTPATIENT)
Dept: INTERNAL MEDICINE | Facility: CLINIC | Age: 80
End: 2020-01-12

## 2020-01-12 DIAGNOSIS — Z51.81 MEDICATION MONITORING ENCOUNTER: ICD-10-CM

## 2020-02-03 ENCOUNTER — COMMUNICATION - HEALTHEAST (OUTPATIENT)
Dept: ANTICOAGULATION | Facility: CLINIC | Age: 80
End: 2020-02-03

## 2020-02-03 ENCOUNTER — AMBULATORY - HEALTHEAST (OUTPATIENT)
Dept: LAB | Facility: CLINIC | Age: 80
End: 2020-02-03

## 2020-02-03 DIAGNOSIS — I48.0 PAROXYSMAL ATRIAL FIBRILLATION (H): ICD-10-CM

## 2020-02-03 LAB — INR PPP: 2.7 (ref 0.9–1.1)

## 2020-02-09 ENCOUNTER — COMMUNICATION - HEALTHEAST (OUTPATIENT)
Dept: CARDIOLOGY | Facility: CLINIC | Age: 80
End: 2020-02-09

## 2020-02-09 DIAGNOSIS — I48.0 PAROXYSMAL ATRIAL FIBRILLATION (H): ICD-10-CM

## 2020-02-20 ENCOUNTER — AMBULATORY - HEALTHEAST (OUTPATIENT)
Dept: CARDIOLOGY | Facility: CLINIC | Age: 80
End: 2020-02-20

## 2020-02-20 DIAGNOSIS — I48.0 PAROXYSMAL ATRIAL FIBRILLATION (H): ICD-10-CM

## 2020-02-20 DIAGNOSIS — Z95.0 CARDIAC PACEMAKER IN SITU: ICD-10-CM

## 2020-03-02 ENCOUNTER — COMMUNICATION - HEALTHEAST (OUTPATIENT)
Dept: ANTICOAGULATION | Facility: CLINIC | Age: 80
End: 2020-03-02

## 2020-03-02 ENCOUNTER — AMBULATORY - HEALTHEAST (OUTPATIENT)
Dept: LAB | Facility: CLINIC | Age: 80
End: 2020-03-02

## 2020-03-02 DIAGNOSIS — I48.0 PAROXYSMAL ATRIAL FIBRILLATION (H): ICD-10-CM

## 2020-03-02 LAB — INR PPP: 3.9 (ref 0.9–1.1)

## 2020-03-16 ENCOUNTER — COMMUNICATION - HEALTHEAST (OUTPATIENT)
Dept: ANTICOAGULATION | Facility: CLINIC | Age: 80
End: 2020-03-16

## 2020-03-16 ENCOUNTER — AMBULATORY - HEALTHEAST (OUTPATIENT)
Dept: LAB | Facility: CLINIC | Age: 80
End: 2020-03-16

## 2020-03-16 DIAGNOSIS — I48.0 PAROXYSMAL ATRIAL FIBRILLATION (H): ICD-10-CM

## 2020-03-16 LAB — INR PPP: 3.5 (ref 0.9–1.1)

## 2020-03-30 ENCOUNTER — COMMUNICATION - HEALTHEAST (OUTPATIENT)
Dept: ANTICOAGULATION | Facility: CLINIC | Age: 80
End: 2020-03-30

## 2020-03-30 ENCOUNTER — AMBULATORY - HEALTHEAST (OUTPATIENT)
Dept: LAB | Facility: CLINIC | Age: 80
End: 2020-03-30

## 2020-03-30 DIAGNOSIS — I48.0 PAROXYSMAL ATRIAL FIBRILLATION (H): ICD-10-CM

## 2020-03-30 LAB — INR PPP: 2.2 (ref 0.9–1.1)

## 2020-04-13 ENCOUNTER — COMMUNICATION - HEALTHEAST (OUTPATIENT)
Dept: ANTICOAGULATION | Facility: CLINIC | Age: 80
End: 2020-04-13

## 2020-04-13 ENCOUNTER — AMBULATORY - HEALTHEAST (OUTPATIENT)
Dept: LAB | Facility: CLINIC | Age: 80
End: 2020-04-13

## 2020-04-13 DIAGNOSIS — I48.0 PAROXYSMAL ATRIAL FIBRILLATION (H): ICD-10-CM

## 2020-04-13 LAB — INR PPP: 3.1 (ref 0.9–1.1)

## 2020-04-21 ENCOUNTER — COMMUNICATION - HEALTHEAST (OUTPATIENT)
Dept: INTERNAL MEDICINE | Facility: CLINIC | Age: 80
End: 2020-04-21

## 2020-04-27 ENCOUNTER — OFFICE VISIT - HEALTHEAST (OUTPATIENT)
Dept: INTERNAL MEDICINE | Facility: CLINIC | Age: 80
End: 2020-04-27

## 2020-04-27 DIAGNOSIS — E11.9 TYPE 2 DIABETES MELLITUS WITHOUT COMPLICATION, WITHOUT LONG-TERM CURRENT USE OF INSULIN (H): ICD-10-CM

## 2020-04-27 DIAGNOSIS — I10 ESSENTIAL HYPERTENSION, BENIGN: ICD-10-CM

## 2020-04-27 DIAGNOSIS — E11.9 TYPE 2 DIABETES MELLITUS (H): ICD-10-CM

## 2020-04-27 DIAGNOSIS — E21.0 PRIMARY HYPERPARATHYROIDISM (H): ICD-10-CM

## 2020-04-27 DIAGNOSIS — M17.12 PRIMARY OSTEOARTHRITIS OF LEFT KNEE: ICD-10-CM

## 2020-04-27 DIAGNOSIS — R60.0 PERIPHERAL EDEMA: ICD-10-CM

## 2020-04-27 DIAGNOSIS — N95.1 MENOPAUSAL SYNDROME (HOT FLASHES): ICD-10-CM

## 2020-04-28 ENCOUNTER — OFFICE VISIT - HEALTHEAST (OUTPATIENT)
Dept: CARDIOLOGY | Facility: CLINIC | Age: 80
End: 2020-04-28

## 2020-04-28 DIAGNOSIS — I35.0 NONRHEUMATIC AORTIC VALVE STENOSIS: ICD-10-CM

## 2020-04-28 DIAGNOSIS — I48.0 PAROXYSMAL ATRIAL FIBRILLATION (H): ICD-10-CM

## 2020-04-28 DIAGNOSIS — E78.5 DYSLIPIDEMIA: ICD-10-CM

## 2020-04-28 ASSESSMENT — MIFFLIN-ST. JEOR: SCORE: 1270.08

## 2020-05-04 ENCOUNTER — COMMUNICATION - HEALTHEAST (OUTPATIENT)
Dept: ANTICOAGULATION | Facility: CLINIC | Age: 80
End: 2020-05-04

## 2020-05-04 ENCOUNTER — AMBULATORY - HEALTHEAST (OUTPATIENT)
Dept: NURSING | Facility: CLINIC | Age: 80
End: 2020-05-04

## 2020-05-04 ENCOUNTER — AMBULATORY - HEALTHEAST (OUTPATIENT)
Dept: LAB | Facility: CLINIC | Age: 80
End: 2020-05-04

## 2020-05-04 DIAGNOSIS — I10 ESSENTIAL HYPERTENSION WITH GOAL BLOOD PRESSURE LESS THAN 140/90: ICD-10-CM

## 2020-05-04 DIAGNOSIS — E21.0 PRIMARY HYPERPARATHYROIDISM (H): ICD-10-CM

## 2020-05-04 DIAGNOSIS — I48.0 PAROXYSMAL ATRIAL FIBRILLATION (H): ICD-10-CM

## 2020-05-04 DIAGNOSIS — R60.0 PERIPHERAL EDEMA: ICD-10-CM

## 2020-05-04 DIAGNOSIS — E11.9 TYPE 2 DIABETES MELLITUS WITHOUT COMPLICATION, WITHOUT LONG-TERM CURRENT USE OF INSULIN (H): ICD-10-CM

## 2020-05-04 LAB
ANION GAP SERPL CALCULATED.3IONS-SCNC: 12 MMOL/L (ref 5–18)
BUN SERPL-MCNC: 22 MG/DL (ref 8–28)
CALCIUM SERPL-MCNC: 10.6 MG/DL (ref 8.5–10.5)
CHLORIDE BLD-SCNC: 100 MMOL/L (ref 98–107)
CO2 SERPL-SCNC: 29 MMOL/L (ref 22–31)
CREAT SERPL-MCNC: 1.2 MG/DL (ref 0.6–1.1)
D DIMER PPP FEU-MCNC: 0.49 FEU UG/ML
GFR SERPL CREATININE-BSD FRML MDRD: 43 ML/MIN/1.73M2
GLUCOSE BLD-MCNC: 164 MG/DL (ref 70–125)
HBA1C MFR BLD: 7 % (ref 3.5–6)
INR PPP: 3.2 (ref 0.9–1.1)
POTASSIUM BLD-SCNC: 4.7 MMOL/L (ref 3.5–5)
PTH-INTACT SERPL-MCNC: 176 PG/ML (ref 10–86)
SODIUM SERPL-SCNC: 141 MMOL/L (ref 136–145)

## 2020-05-05 ENCOUNTER — COMMUNICATION - HEALTHEAST (OUTPATIENT)
Dept: INTERNAL MEDICINE | Facility: CLINIC | Age: 80
End: 2020-05-05

## 2020-05-05 DIAGNOSIS — E11.9 TYPE 2 DIABETES MELLITUS (H): ICD-10-CM

## 2020-05-18 ENCOUNTER — AMBULATORY - HEALTHEAST (OUTPATIENT)
Dept: LAB | Facility: CLINIC | Age: 80
End: 2020-05-18

## 2020-05-18 ENCOUNTER — COMMUNICATION - HEALTHEAST (OUTPATIENT)
Dept: ANTICOAGULATION | Facility: CLINIC | Age: 80
End: 2020-05-18

## 2020-05-18 DIAGNOSIS — I48.0 PAROXYSMAL ATRIAL FIBRILLATION (H): ICD-10-CM

## 2020-05-18 LAB — INR PPP: 2.9 (ref 0.9–1.1)

## 2020-06-01 ENCOUNTER — AMBULATORY - HEALTHEAST (OUTPATIENT)
Dept: LAB | Facility: CLINIC | Age: 80
End: 2020-06-01

## 2020-06-01 ENCOUNTER — COMMUNICATION - HEALTHEAST (OUTPATIENT)
Dept: ANTICOAGULATION | Facility: CLINIC | Age: 80
End: 2020-06-01

## 2020-06-01 DIAGNOSIS — I48.0 PAROXYSMAL ATRIAL FIBRILLATION (H): ICD-10-CM

## 2020-06-01 LAB — INR PPP: 2.5 (ref 0.9–1.1)

## 2020-06-29 ENCOUNTER — AMBULATORY - HEALTHEAST (OUTPATIENT)
Dept: NURSING | Facility: CLINIC | Age: 80
End: 2020-06-29

## 2020-06-29 ENCOUNTER — AMBULATORY - HEALTHEAST (OUTPATIENT)
Dept: LAB | Facility: CLINIC | Age: 80
End: 2020-06-29

## 2020-06-29 ENCOUNTER — COMMUNICATION - HEALTHEAST (OUTPATIENT)
Dept: ANTICOAGULATION | Facility: CLINIC | Age: 80
End: 2020-06-29

## 2020-06-29 DIAGNOSIS — I48.0 PAROXYSMAL ATRIAL FIBRILLATION (H): ICD-10-CM

## 2020-06-29 DIAGNOSIS — I10 ESSENTIAL HYPERTENSION WITH GOAL BLOOD PRESSURE LESS THAN 140/90: ICD-10-CM

## 2020-06-29 LAB — INR PPP: 1.5 (ref 0.9–1.1)

## 2020-07-02 ENCOUNTER — AMBULATORY - HEALTHEAST (OUTPATIENT)
Dept: CARDIOLOGY | Facility: CLINIC | Age: 80
End: 2020-07-02

## 2020-07-02 DIAGNOSIS — I49.5 SSS (SICK SINUS SYNDROME) (H): ICD-10-CM

## 2020-07-02 DIAGNOSIS — Z95.0 CARDIAC PACEMAKER IN SITU: ICD-10-CM

## 2020-07-08 ENCOUNTER — COMMUNICATION - HEALTHEAST (OUTPATIENT)
Dept: ANTICOAGULATION | Facility: CLINIC | Age: 80
End: 2020-07-08

## 2020-07-08 ENCOUNTER — AMBULATORY - HEALTHEAST (OUTPATIENT)
Dept: LAB | Facility: CLINIC | Age: 80
End: 2020-07-08

## 2020-07-08 DIAGNOSIS — I48.0 PAROXYSMAL ATRIAL FIBRILLATION (H): ICD-10-CM

## 2020-07-08 LAB
HCC DEVICE COMMENTS: NORMAL
HCC DEVICE IMPLANTING PROVIDER: NORMAL
HCC DEVICE MANUFACTURE: NORMAL
HCC DEVICE MODEL: NORMAL
HCC DEVICE SERIAL NUMBER: NORMAL
HCC DEVICE TYPE: NORMAL
INR PPP: 2.8 (ref 0.9–1.1)

## 2020-07-13 ENCOUNTER — COMMUNICATION - HEALTHEAST (OUTPATIENT)
Dept: INTERNAL MEDICINE | Facility: CLINIC | Age: 80
End: 2020-07-13

## 2020-07-13 DIAGNOSIS — Z51.81 MEDICATION MONITORING ENCOUNTER: ICD-10-CM

## 2020-07-14 ENCOUNTER — COMMUNICATION - HEALTHEAST (OUTPATIENT)
Dept: CARDIOLOGY | Facility: CLINIC | Age: 80
End: 2020-07-14

## 2020-07-14 DIAGNOSIS — I48.0 PAROXYSMAL ATRIAL FIBRILLATION (H): ICD-10-CM

## 2020-07-14 RX ORDER — SOTALOL HYDROCHLORIDE 80 MG/1
80 TABLET ORAL DAILY
Qty: 90 TABLET | Refills: 1 | Status: SHIPPED | OUTPATIENT
Start: 2020-07-14 | End: 2021-07-06

## 2020-07-14 RX ORDER — ALLOPURINOL 300 MG/1
TABLET ORAL
Qty: 90 TABLET | Refills: 3 | Status: SHIPPED | OUTPATIENT
Start: 2020-07-14 | End: 2021-07-20

## 2020-07-20 ENCOUNTER — COMMUNICATION - HEALTHEAST (OUTPATIENT)
Dept: ANTICOAGULATION | Facility: CLINIC | Age: 80
End: 2020-07-20

## 2020-07-20 ENCOUNTER — AMBULATORY - HEALTHEAST (OUTPATIENT)
Dept: LAB | Facility: CLINIC | Age: 80
End: 2020-07-20

## 2020-07-20 DIAGNOSIS — I48.0 PAROXYSMAL ATRIAL FIBRILLATION (H): ICD-10-CM

## 2020-07-20 LAB — INR PPP: 2.1 (ref 0.9–1.1)

## 2020-07-21 ENCOUNTER — COMMUNICATION - HEALTHEAST (OUTPATIENT)
Dept: INTERNAL MEDICINE | Facility: CLINIC | Age: 80
End: 2020-07-21

## 2020-07-21 DIAGNOSIS — I10 ESSENTIAL HYPERTENSION WITH GOAL BLOOD PRESSURE LESS THAN 140/90: ICD-10-CM

## 2020-08-04 ENCOUNTER — COMMUNICATION - HEALTHEAST (OUTPATIENT)
Dept: INTERNAL MEDICINE | Facility: CLINIC | Age: 80
End: 2020-08-04

## 2020-08-04 DIAGNOSIS — I10 ESSENTIAL HYPERTENSION, BENIGN: ICD-10-CM

## 2020-08-10 ENCOUNTER — AMBULATORY - HEALTHEAST (OUTPATIENT)
Dept: LAB | Facility: CLINIC | Age: 80
End: 2020-08-10

## 2020-08-10 ENCOUNTER — COMMUNICATION - HEALTHEAST (OUTPATIENT)
Dept: ANTICOAGULATION | Facility: CLINIC | Age: 80
End: 2020-08-10

## 2020-08-10 DIAGNOSIS — I48.0 PAROXYSMAL ATRIAL FIBRILLATION (H): ICD-10-CM

## 2020-08-10 LAB — INR PPP: 2.3 (ref 0.9–1.1)

## 2020-09-07 ENCOUNTER — COMMUNICATION - HEALTHEAST (OUTPATIENT)
Dept: INTERNAL MEDICINE | Facility: CLINIC | Age: 80
End: 2020-09-07

## 2020-09-07 DIAGNOSIS — E78.2 MIXED HYPERLIPIDEMIA: ICD-10-CM

## 2020-09-07 DIAGNOSIS — E78.5 HYPERLIPIDEMIA: ICD-10-CM

## 2020-09-08 ENCOUNTER — COMMUNICATION - HEALTHEAST (OUTPATIENT)
Dept: CARDIOLOGY | Facility: CLINIC | Age: 80
End: 2020-09-08

## 2020-09-08 DIAGNOSIS — I48.0 PAROXYSMAL ATRIAL FIBRILLATION (H): ICD-10-CM

## 2020-09-11 ENCOUNTER — COMMUNICATION - HEALTHEAST (OUTPATIENT)
Dept: ANTICOAGULATION | Facility: CLINIC | Age: 80
End: 2020-09-11

## 2020-09-11 ENCOUNTER — OFFICE VISIT - HEALTHEAST (OUTPATIENT)
Dept: INTERNAL MEDICINE | Facility: CLINIC | Age: 80
End: 2020-09-11

## 2020-09-11 DIAGNOSIS — I35.0 MILD AORTIC STENOSIS: ICD-10-CM

## 2020-09-11 DIAGNOSIS — E79.0 HYPERURICEMIA: ICD-10-CM

## 2020-09-11 DIAGNOSIS — I48.0 PAROXYSMAL ATRIAL FIBRILLATION (H): ICD-10-CM

## 2020-09-11 DIAGNOSIS — Z12.31 VISIT FOR SCREENING MAMMOGRAM: ICD-10-CM

## 2020-09-11 DIAGNOSIS — Z51.81 ENCOUNTER FOR THERAPEUTIC DRUG MONITORING: ICD-10-CM

## 2020-09-11 DIAGNOSIS — E11.9 TYPE 2 DIABETES MELLITUS (H): ICD-10-CM

## 2020-09-11 DIAGNOSIS — Z95.0 CARDIAC PACEMAKER IN SITU: ICD-10-CM

## 2020-09-11 DIAGNOSIS — E66.01 SEVERE OBESITY WITH BODY MASS INDEX (BMI) OF 35.0 TO 39.9 WITH COMORBIDITY (H): ICD-10-CM

## 2020-09-11 DIAGNOSIS — E21.0 PRIMARY HYPERPARATHYROIDISM (H): ICD-10-CM

## 2020-09-11 DIAGNOSIS — I10 HYPERTENSION: ICD-10-CM

## 2020-09-11 DIAGNOSIS — I10 ESSENTIAL HYPERTENSION WITH GOAL BLOOD PRESSURE LESS THAN 140/90: ICD-10-CM

## 2020-09-11 DIAGNOSIS — E11.65 TYPE 2 DIABETES MELLITUS WITH HYPERGLYCEMIA, WITHOUT LONG-TERM CURRENT USE OF INSULIN (H): ICD-10-CM

## 2020-09-11 DIAGNOSIS — Z00.00 MEDICARE ANNUAL WELLNESS VISIT, SUBSEQUENT: ICD-10-CM

## 2020-09-11 LAB
ALBUMIN SERPL-MCNC: 3.8 G/DL (ref 3.5–5)
ALBUMIN UR-MCNC: NEGATIVE MG/DL
ALP SERPL-CCNC: 96 U/L (ref 45–120)
ALT SERPL W P-5'-P-CCNC: 16 U/L (ref 0–45)
ANION GAP SERPL CALCULATED.3IONS-SCNC: 10 MMOL/L (ref 5–18)
APPEARANCE UR: CLEAR
AST SERPL W P-5'-P-CCNC: 15 U/L (ref 0–40)
BILIRUB SERPL-MCNC: 0.6 MG/DL (ref 0–1)
BILIRUB UR QL STRIP: NEGATIVE
BUN SERPL-MCNC: 26 MG/DL (ref 8–28)
CALCIUM SERPL-MCNC: 10.6 MG/DL (ref 8.5–10.5)
CHLORIDE BLD-SCNC: 100 MMOL/L (ref 98–107)
CHOLEST SERPL-MCNC: 163 MG/DL
CO2 SERPL-SCNC: 28 MMOL/L (ref 22–31)
COLOR UR AUTO: YELLOW
CREAT SERPL-MCNC: 0.98 MG/DL (ref 0.6–1.1)
CREAT UR-MCNC: 28.4 MG/DL
ERYTHROCYTE [DISTWIDTH] IN BLOOD BY AUTOMATED COUNT: 12.4 % (ref 11–14.5)
FASTING STATUS PATIENT QL REPORTED: NO
GFR SERPL CREATININE-BSD FRML MDRD: 54 ML/MIN/1.73M2
GLUCOSE BLD-MCNC: 130 MG/DL (ref 70–125)
GLUCOSE UR STRIP-MCNC: NEGATIVE MG/DL
HBA1C MFR BLD: 7 %
HCT VFR BLD AUTO: 43 % (ref 35–47)
HDLC SERPL-MCNC: 58 MG/DL
HGB BLD-MCNC: 14.3 G/DL (ref 12–16)
HGB UR QL STRIP: NEGATIVE
INR PPP: 3 (ref 0.9–1.1)
KETONES UR STRIP-MCNC: NEGATIVE MG/DL
LDLC SERPL CALC-MCNC: 67 MG/DL
LEUKOCYTE ESTERASE UR QL STRIP: NEGATIVE
MCH RBC QN AUTO: 31.8 PG (ref 27–34)
MCHC RBC AUTO-ENTMCNC: 33.2 G/DL (ref 32–36)
MCV RBC AUTO: 96 FL (ref 80–100)
MICROALBUMIN UR-MCNC: 1.45 MG/DL (ref 0–1.99)
MICROALBUMIN/CREAT UR: 51.1 MG/G
NITRATE UR QL: NEGATIVE
PH UR STRIP: 5 [PH] (ref 5–8)
PLATELET # BLD AUTO: 208 THOU/UL (ref 140–440)
PMV BLD AUTO: 9.2 FL (ref 7–10)
POTASSIUM BLD-SCNC: 4.4 MMOL/L (ref 3.5–5)
PROT SERPL-MCNC: 7.3 G/DL (ref 6–8)
PTH-INTACT SERPL-MCNC: 143 PG/ML (ref 10–86)
RBC # BLD AUTO: 4.49 MILL/UL (ref 3.8–5.4)
SODIUM SERPL-SCNC: 138 MMOL/L (ref 136–145)
SP GR UR STRIP: 1.01 (ref 1–1.03)
TRIGL SERPL-MCNC: 191 MG/DL
URATE SERPL-MCNC: 3.2 MG/DL (ref 2–7.5)
UROBILINOGEN UR STRIP-ACNC: NORMAL
WBC: 9.3 THOU/UL (ref 4–11)

## 2020-09-11 RX ORDER — LOSARTAN POTASSIUM 100 MG/1
100 TABLET ORAL DAILY
Qty: 90 TABLET | Refills: 3 | Status: SHIPPED | OUTPATIENT
Start: 2020-09-11 | End: 2021-09-13

## 2020-09-11 RX ORDER — GLUCOSAMINE HCL/CHONDROITIN SU 500-400 MG
1 CAPSULE ORAL DAILY
Qty: 100 STRIP | Refills: 3 | Status: SHIPPED | OUTPATIENT
Start: 2020-09-11

## 2020-09-11 ASSESSMENT — MIFFLIN-ST. JEOR: SCORE: 1263.29

## 2020-09-14 LAB
25(OH)D3 SERPL-MCNC: 37.6 NG/ML (ref 30–80)
25(OH)D3 SERPL-MCNC: 37.6 NG/ML (ref 30–80)

## 2020-10-05 ENCOUNTER — AMBULATORY - HEALTHEAST (OUTPATIENT)
Dept: CARDIOLOGY | Facility: CLINIC | Age: 80
End: 2020-10-05

## 2020-10-05 DIAGNOSIS — I49.5 SSS (SICK SINUS SYNDROME) (H): ICD-10-CM

## 2020-10-05 DIAGNOSIS — Z95.0 CARDIAC PACEMAKER IN SITU: ICD-10-CM

## 2020-10-12 ENCOUNTER — RECORDS - HEALTHEAST (OUTPATIENT)
Dept: ANTICOAGULATION | Facility: CLINIC | Age: 80
End: 2020-10-12

## 2020-10-12 ENCOUNTER — AMBULATORY - HEALTHEAST (OUTPATIENT)
Dept: LAB | Facility: CLINIC | Age: 80
End: 2020-10-12

## 2020-10-12 DIAGNOSIS — I48.0 PAROXYSMAL ATRIAL FIBRILLATION (H): ICD-10-CM

## 2020-10-12 LAB — INR PPP: 3.4 (ref 0.9–1.1)

## 2020-10-26 ENCOUNTER — AMBULATORY - HEALTHEAST (OUTPATIENT)
Dept: LAB | Facility: CLINIC | Age: 80
End: 2020-10-26

## 2020-10-26 ENCOUNTER — COMMUNICATION - HEALTHEAST (OUTPATIENT)
Dept: ANTICOAGULATION | Facility: CLINIC | Age: 80
End: 2020-10-26

## 2020-10-26 DIAGNOSIS — I48.0 PAROXYSMAL ATRIAL FIBRILLATION (H): ICD-10-CM

## 2020-10-26 LAB — INR PPP: 3.2 (ref 0.9–1.1)

## 2020-11-09 ENCOUNTER — COMMUNICATION - HEALTHEAST (OUTPATIENT)
Dept: ANTICOAGULATION | Facility: CLINIC | Age: 80
End: 2020-11-09

## 2020-11-09 ENCOUNTER — AMBULATORY - HEALTHEAST (OUTPATIENT)
Dept: LAB | Facility: CLINIC | Age: 80
End: 2020-11-09

## 2020-11-09 DIAGNOSIS — I48.0 PAROXYSMAL ATRIAL FIBRILLATION (H): ICD-10-CM

## 2020-11-09 LAB — INR PPP: 2.3 (ref 0.9–1.1)

## 2020-11-16 ENCOUNTER — COMMUNICATION - HEALTHEAST (OUTPATIENT)
Dept: ANTICOAGULATION | Facility: CLINIC | Age: 80
End: 2020-11-16

## 2020-11-23 ENCOUNTER — COMMUNICATION - HEALTHEAST (OUTPATIENT)
Dept: ANTICOAGULATION | Facility: CLINIC | Age: 80
End: 2020-11-23

## 2020-11-23 ENCOUNTER — AMBULATORY - HEALTHEAST (OUTPATIENT)
Dept: LAB | Facility: CLINIC | Age: 80
End: 2020-11-23

## 2020-11-23 DIAGNOSIS — I48.0 PAROXYSMAL ATRIAL FIBRILLATION (H): ICD-10-CM

## 2020-11-23 LAB — INR PPP: 2.1 (ref 0.9–1.1)

## 2020-12-18 ENCOUNTER — COMMUNICATION - HEALTHEAST (OUTPATIENT)
Dept: LAB | Facility: CLINIC | Age: 80
End: 2020-12-18

## 2020-12-18 DIAGNOSIS — I48.0 PAROXYSMAL ATRIAL FIBRILLATION (H): ICD-10-CM

## 2020-12-21 ENCOUNTER — AMBULATORY - HEALTHEAST (OUTPATIENT)
Dept: NURSING | Facility: CLINIC | Age: 80
End: 2020-12-21

## 2020-12-21 ENCOUNTER — COMMUNICATION - HEALTHEAST (OUTPATIENT)
Dept: ANTICOAGULATION | Facility: CLINIC | Age: 80
End: 2020-12-21

## 2020-12-21 ENCOUNTER — AMBULATORY - HEALTHEAST (OUTPATIENT)
Dept: LAB | Facility: CLINIC | Age: 80
End: 2020-12-21

## 2020-12-21 DIAGNOSIS — I10 ESSENTIAL HYPERTENSION WITH GOAL BLOOD PRESSURE LESS THAN 140/90: ICD-10-CM

## 2020-12-21 DIAGNOSIS — I48.0 PAROXYSMAL ATRIAL FIBRILLATION (H): ICD-10-CM

## 2020-12-21 LAB — INR PPP: 3.4 (ref 0.9–1.1)

## 2021-01-04 ENCOUNTER — AMBULATORY - HEALTHEAST (OUTPATIENT)
Dept: LAB | Facility: CLINIC | Age: 81
End: 2021-01-04

## 2021-01-04 ENCOUNTER — COMMUNICATION - HEALTHEAST (OUTPATIENT)
Dept: ANTICOAGULATION | Facility: CLINIC | Age: 81
End: 2021-01-04

## 2021-01-04 DIAGNOSIS — I48.0 PAROXYSMAL ATRIAL FIBRILLATION (H): ICD-10-CM

## 2021-01-04 LAB — INR PPP: 2.4 (ref 0.9–1.1)

## 2021-01-05 ENCOUNTER — AMBULATORY - HEALTHEAST (OUTPATIENT)
Dept: CARDIOLOGY | Facility: CLINIC | Age: 81
End: 2021-01-05

## 2021-01-05 DIAGNOSIS — I49.5 SSS (SICK SINUS SYNDROME) (H): ICD-10-CM

## 2021-01-05 DIAGNOSIS — Z95.0 CARDIAC PACEMAKER IN SITU: ICD-10-CM

## 2021-02-01 ENCOUNTER — AMBULATORY - HEALTHEAST (OUTPATIENT)
Dept: LAB | Facility: CLINIC | Age: 81
End: 2021-02-01

## 2021-02-01 ENCOUNTER — COMMUNICATION - HEALTHEAST (OUTPATIENT)
Dept: ANTICOAGULATION | Facility: CLINIC | Age: 81
End: 2021-02-01

## 2021-02-01 DIAGNOSIS — I48.0 PAROXYSMAL ATRIAL FIBRILLATION (H): ICD-10-CM

## 2021-02-01 LAB — INR PPP: 3.2 (ref 0.9–1.1)

## 2021-02-15 ENCOUNTER — AMBULATORY - HEALTHEAST (OUTPATIENT)
Dept: LAB | Facility: CLINIC | Age: 81
End: 2021-02-15

## 2021-02-15 ENCOUNTER — COMMUNICATION - HEALTHEAST (OUTPATIENT)
Dept: ANTICOAGULATION | Facility: CLINIC | Age: 81
End: 2021-02-15

## 2021-02-15 DIAGNOSIS — I48.0 PAROXYSMAL ATRIAL FIBRILLATION (H): ICD-10-CM

## 2021-02-15 LAB — INR PPP: 2.4 (ref 0.9–1.1)

## 2021-02-16 ENCOUNTER — AMBULATORY - HEALTHEAST (OUTPATIENT)
Dept: NURSING | Facility: CLINIC | Age: 81
End: 2021-02-16

## 2021-03-01 ENCOUNTER — AMBULATORY - HEALTHEAST (OUTPATIENT)
Dept: LAB | Facility: CLINIC | Age: 81
End: 2021-03-01

## 2021-03-01 ENCOUNTER — COMMUNICATION - HEALTHEAST (OUTPATIENT)
Dept: ANTICOAGULATION | Facility: CLINIC | Age: 81
End: 2021-03-01

## 2021-03-01 DIAGNOSIS — I48.0 PAROXYSMAL ATRIAL FIBRILLATION (H): ICD-10-CM

## 2021-03-01 LAB — INR PPP: 1.9 (ref 0.9–1.1)

## 2021-03-09 ENCOUNTER — AMBULATORY - HEALTHEAST (OUTPATIENT)
Dept: NURSING | Facility: CLINIC | Age: 81
End: 2021-03-09

## 2021-03-09 ENCOUNTER — OFFICE VISIT - HEALTHEAST (OUTPATIENT)
Dept: INTERNAL MEDICINE | Facility: CLINIC | Age: 81
End: 2021-03-09

## 2021-03-09 DIAGNOSIS — E78.5 HYPERLIPIDEMIA: ICD-10-CM

## 2021-03-09 DIAGNOSIS — R60.0 PERIPHERAL EDEMA: ICD-10-CM

## 2021-03-09 DIAGNOSIS — N18.31 STAGE 3A CHRONIC KIDNEY DISEASE (H): ICD-10-CM

## 2021-03-09 DIAGNOSIS — E66.01 SEVERE OBESITY WITH BODY MASS INDEX (BMI) OF 35.0 TO 39.9 WITH COMORBIDITY (H): ICD-10-CM

## 2021-03-09 DIAGNOSIS — E21.0 HYPERPARATHYROIDISM, PRIMARY (H): ICD-10-CM

## 2021-03-09 DIAGNOSIS — E78.2 MIXED HYPERLIPIDEMIA: ICD-10-CM

## 2021-03-09 DIAGNOSIS — E11.65 TYPE 2 DIABETES MELLITUS WITH HYPERGLYCEMIA, WITHOUT LONG-TERM CURRENT USE OF INSULIN (H): ICD-10-CM

## 2021-03-09 DIAGNOSIS — R06.02 SHORTNESS OF BREATH: ICD-10-CM

## 2021-03-09 DIAGNOSIS — I10 ESSENTIAL HYPERTENSION WITH GOAL BLOOD PRESSURE LESS THAN 140/90: ICD-10-CM

## 2021-03-09 DIAGNOSIS — I48.0 PAROXYSMAL ATRIAL FIBRILLATION (H): ICD-10-CM

## 2021-03-09 LAB
ERYTHROCYTE [DISTWIDTH] IN BLOOD BY AUTOMATED COUNT: 14.7 % (ref 11–14.5)
HBA1C MFR BLD: 6.9 %
HCT VFR BLD AUTO: 40.4 % (ref 35–47)
HGB BLD-MCNC: 13.4 G/DL (ref 12–16)
MCH RBC QN AUTO: 31.2 PG (ref 27–34)
MCHC RBC AUTO-ENTMCNC: 33.2 G/DL (ref 32–36)
MCV RBC AUTO: 94 FL (ref 80–100)
PLATELET # BLD AUTO: 230 THOU/UL (ref 140–440)
PMV BLD AUTO: 11.6 FL (ref 7–10)
RBC # BLD AUTO: 4.3 MILL/UL (ref 3.8–5.4)
WBC: 12.2 THOU/UL (ref 4–11)

## 2021-03-09 RX ORDER — ATORVASTATIN CALCIUM 10 MG/1
10 TABLET, FILM COATED ORAL DAILY
Qty: 90 TABLET | Refills: 1 | Status: SHIPPED | OUTPATIENT
Start: 2021-03-09 | End: 2021-09-13

## 2021-03-10 LAB
ALBUMIN SERPL-MCNC: 3.9 G/DL (ref 3.5–5)
ANION GAP SERPL CALCULATED.3IONS-SCNC: 12 MMOL/L (ref 5–18)
BNP SERPL-MCNC: 49 PG/ML (ref 0–163)
BUN SERPL-MCNC: 30 MG/DL (ref 8–28)
CALCIUM SERPL-MCNC: 10 MG/DL (ref 8.5–10.5)
CHLORIDE BLD-SCNC: 100 MMOL/L (ref 98–107)
CO2 SERPL-SCNC: 27 MMOL/L (ref 22–31)
CREAT SERPL-MCNC: 1.25 MG/DL (ref 0.6–1.1)
GFR SERPL CREATININE-BSD FRML MDRD: 41 ML/MIN/1.73M2
GLUCOSE BLD-MCNC: 128 MG/DL (ref 70–125)
POTASSIUM BLD-SCNC: 4.2 MMOL/L (ref 3.5–5)
PTH-INTACT SERPL-MCNC: 149 PG/ML (ref 10–86)
SODIUM SERPL-SCNC: 139 MMOL/L (ref 136–145)

## 2021-03-15 ENCOUNTER — AMBULATORY - HEALTHEAST (OUTPATIENT)
Dept: NURSING | Facility: CLINIC | Age: 81
End: 2021-03-15

## 2021-03-15 ENCOUNTER — COMMUNICATION - HEALTHEAST (OUTPATIENT)
Dept: ANTICOAGULATION | Facility: CLINIC | Age: 81
End: 2021-03-15

## 2021-03-15 ENCOUNTER — AMBULATORY - HEALTHEAST (OUTPATIENT)
Dept: LAB | Facility: CLINIC | Age: 81
End: 2021-03-15

## 2021-03-15 DIAGNOSIS — I48.0 PAROXYSMAL ATRIAL FIBRILLATION (H): ICD-10-CM

## 2021-03-15 LAB — INR PPP: 2.2 (ref 0.9–1.1)

## 2021-03-26 ENCOUNTER — COMMUNICATION - HEALTHEAST (OUTPATIENT)
Dept: CARDIOLOGY | Facility: CLINIC | Age: 81
End: 2021-03-26

## 2021-03-26 DIAGNOSIS — I48.0 PAROXYSMAL ATRIAL FIBRILLATION (H): ICD-10-CM

## 2021-03-26 RX ORDER — WARFARIN SODIUM 2.5 MG/1
TABLET ORAL
Qty: 90 TABLET | Refills: 1 | Status: SHIPPED | OUTPATIENT
Start: 2021-03-26 | End: 2021-10-29

## 2021-03-29 ENCOUNTER — AMBULATORY - HEALTHEAST (OUTPATIENT)
Dept: LAB | Facility: CLINIC | Age: 81
End: 2021-03-29

## 2021-03-29 ENCOUNTER — COMMUNICATION - HEALTHEAST (OUTPATIENT)
Dept: ANTICOAGULATION | Facility: CLINIC | Age: 81
End: 2021-03-29

## 2021-03-29 DIAGNOSIS — I48.0 PAROXYSMAL ATRIAL FIBRILLATION (H): ICD-10-CM

## 2021-03-29 LAB — INR PPP: 2.7 (ref 0.9–1.1)

## 2021-03-30 ENCOUNTER — COMMUNICATION - HEALTHEAST (OUTPATIENT)
Dept: INTERNAL MEDICINE | Facility: CLINIC | Age: 81
End: 2021-03-30

## 2021-03-30 DIAGNOSIS — I48.0 PAROXYSMAL ATRIAL FIBRILLATION (H): ICD-10-CM

## 2021-04-04 ENCOUNTER — COMMUNICATION - HEALTHEAST (OUTPATIENT)
Dept: INTERNAL MEDICINE | Facility: CLINIC | Age: 81
End: 2021-04-04

## 2021-04-04 DIAGNOSIS — I10 ESSENTIAL HYPERTENSION WITH GOAL BLOOD PRESSURE LESS THAN 140/90: ICD-10-CM

## 2021-04-12 ENCOUNTER — AMBULATORY - HEALTHEAST (OUTPATIENT)
Dept: CARDIOLOGY | Facility: CLINIC | Age: 81
End: 2021-04-12

## 2021-04-12 ENCOUNTER — OFFICE VISIT - HEALTHEAST (OUTPATIENT)
Dept: CARDIOLOGY | Facility: CLINIC | Age: 81
End: 2021-04-12

## 2021-04-12 DIAGNOSIS — I35.0 NONRHEUMATIC AORTIC VALVE STENOSIS: ICD-10-CM

## 2021-04-12 DIAGNOSIS — E78.5 DYSLIPIDEMIA: ICD-10-CM

## 2021-04-12 DIAGNOSIS — I49.5 SSS (SICK SINUS SYNDROME) (H): ICD-10-CM

## 2021-04-12 DIAGNOSIS — I48.0 PAROXYSMAL ATRIAL FIBRILLATION (H): ICD-10-CM

## 2021-04-12 DIAGNOSIS — Z95.0 CARDIAC PACEMAKER IN SITU: ICD-10-CM

## 2021-04-12 ASSESSMENT — MIFFLIN-ST. JEOR: SCORE: 1245.14

## 2021-04-22 ENCOUNTER — RECORDS - HEALTHEAST (OUTPATIENT)
Dept: MAMMOGRAPHY | Facility: CLINIC | Age: 81
End: 2021-04-22

## 2021-04-22 ENCOUNTER — COMMUNICATION - HEALTHEAST (OUTPATIENT)
Dept: INTERNAL MEDICINE | Facility: CLINIC | Age: 81
End: 2021-04-22

## 2021-04-22 ENCOUNTER — RECORDS - HEALTHEAST (OUTPATIENT)
Dept: BONE DENSITY | Facility: CLINIC | Age: 81
End: 2021-04-22

## 2021-04-22 ENCOUNTER — RECORDS - HEALTHEAST (OUTPATIENT)
Dept: ADMINISTRATIVE | Facility: OTHER | Age: 81
End: 2021-04-22

## 2021-04-22 DIAGNOSIS — N95.1 MENOPAUSAL AND FEMALE CLIMACTERIC STATES: ICD-10-CM

## 2021-04-22 DIAGNOSIS — E21.0 PRIMARY HYPERPARATHYROIDISM (H): ICD-10-CM

## 2021-04-22 DIAGNOSIS — I10 ESSENTIAL HYPERTENSION WITH GOAL BLOOD PRESSURE LESS THAN 140/90: ICD-10-CM

## 2021-04-22 DIAGNOSIS — Z12.31 ENCOUNTER FOR SCREENING MAMMOGRAM FOR MALIGNANT NEOPLASM OF BREAST: ICD-10-CM

## 2021-04-22 RX ORDER — FUROSEMIDE 20 MG
40 TABLET ORAL DAILY
Qty: 180 TABLET | Refills: 3 | Status: SHIPPED | OUTPATIENT
Start: 2021-04-22 | End: 2021-10-29 | Stop reason: DRUGHIGH

## 2021-04-26 ENCOUNTER — COMMUNICATION - HEALTHEAST (OUTPATIENT)
Dept: ANTICOAGULATION | Facility: CLINIC | Age: 81
End: 2021-04-26

## 2021-04-26 ENCOUNTER — COMMUNICATION - HEALTHEAST (OUTPATIENT)
Dept: INTERNAL MEDICINE | Facility: CLINIC | Age: 81
End: 2021-04-26

## 2021-04-26 ENCOUNTER — AMBULATORY - HEALTHEAST (OUTPATIENT)
Dept: LAB | Facility: CLINIC | Age: 81
End: 2021-04-26

## 2021-04-26 DIAGNOSIS — I48.0 PAROXYSMAL ATRIAL FIBRILLATION (H): ICD-10-CM

## 2021-04-26 LAB — INR PPP: 1.8 (ref 0.9–1.1)

## 2021-04-28 ENCOUNTER — HOSPITAL ENCOUNTER (OUTPATIENT)
Dept: MAMMOGRAPHY | Facility: CLINIC | Age: 81
Discharge: HOME | End: 2021-04-28
Attending: INTERNAL MEDICINE

## 2021-04-28 DIAGNOSIS — N64.89 BREAST ASYMMETRY: ICD-10-CM

## 2021-04-29 ENCOUNTER — HOSPITAL ENCOUNTER (OUTPATIENT)
Dept: CARDIOLOGY | Facility: CLINIC | Age: 81
Discharge: HOME | End: 2021-04-29
Attending: INTERNAL MEDICINE

## 2021-04-29 ENCOUNTER — COMMUNICATION - HEALTHEAST (OUTPATIENT)
Dept: INTERNAL MEDICINE | Facility: CLINIC | Age: 81
End: 2021-04-29

## 2021-04-29 ENCOUNTER — COMMUNICATION - HEALTHEAST (OUTPATIENT)
Dept: MAMMOGRAPHY | Facility: CLINIC | Age: 81
End: 2021-04-29

## 2021-04-29 DIAGNOSIS — I35.0 NONRHEUMATIC AORTIC VALVE STENOSIS: ICD-10-CM

## 2021-04-29 DIAGNOSIS — I48.0 PAROXYSMAL ATRIAL FIBRILLATION (H): ICD-10-CM

## 2021-04-29 LAB
AORTIC VALVE MEAN VELOCITY: 102 CM/S
ASCENDING AORTA: 3.2 CM
AV DIMENSIONLESS INDEX VTI: 0.6
AV MEAN GRADIENT: 5 MMHG
AV PEAK GRADIENT: 8 MMHG
AV VALVE AREA: 1.9 CM2
AV VELOCITY RATIO: 0.7
BSA FOR ECHO PROCEDURE: 1.91 M2
CV BLOOD PRESSURE: ABNORMAL MMHG
CV ECHO HEIGHT: 61 IN
CV ECHO WEIGHT: 188 LBS
DOP CALC AO PEAK VEL: 141 CM/S
DOP CALC AO VTI: 30.8 CM
DOP CALC LVOT AREA: 3.14 CM2
DOP CALC LVOT DIAMETER: 2 CM
DOP CALC LVOT PEAK VEL: 91.9 CM/S
DOP CALC LVOT STROKE VOLUME: 57.1 CM3
DOP CALC MV VTI: 24 CM
DOP CALCLVOT PEAK VEL VTI: 18.2 CM
EJECTION FRACTION: 65 % (ref 55–75)
FRACTIONAL SHORTENING: 30.3 % (ref 28–44)
INTERVENTRICULAR SEPTUM IN END DIASTOLE: 1.1 CM (ref 0.6–0.9)
IVS/PW RATIO: 0.7
LA AREA 1: 21.2 CM2
LA AREA 2: 18.3 CM2
LEFT ATRIUM LENGTH: 5.26 CM
LEFT ATRIUM SIZE: 3 CM
LEFT ATRIUM VOLUME INDEX: 32.8 ML/M2
LEFT ATRIUM VOLUME: 62.7 ML
LEFT VENTRICLE CARDIAC INDEX: 2.1 L/MIN/M2
LEFT VENTRICLE CARDIAC OUTPUT: 4 L/MIN
LEFT VENTRICLE DIASTOLIC VOLUME INDEX: 26.7 CM3/M2 (ref 29–61)
LEFT VENTRICLE DIASTOLIC VOLUME: 51 CM3 (ref 46–106)
LEFT VENTRICLE HEART RATE: 70 BPM
LEFT VENTRICLE MASS INDEX: 74.1 G/M2
LEFT VENTRICLE SYSTOLIC VOLUME INDEX: 9.4 CM3/M2 (ref 8–24)
LEFT VENTRICLE SYSTOLIC VOLUME: 18 CM3 (ref 14–42)
LEFT VENTRICULAR INTERNAL DIMENSION IN DIASTOLE: 3.3 CM (ref 3.8–5.2)
LEFT VENTRICULAR INTERNAL DIMENSION IN SYSTOLE: 2.3 CM (ref 2.2–3.5)
LEFT VENTRICULAR MASS: 141.6 G
LEFT VENTRICULAR OUTFLOW TRACT MEAN GRADIENT: 2 MMHG
LEFT VENTRICULAR OUTFLOW TRACT MEAN VELOCITY: 61 CM/S
LEFT VENTRICULAR OUTFLOW TRACT PEAK GRADIENT: 3 MMHG
LEFT VENTRICULAR POSTERIOR WALL IN END DIASTOLE: 1.5 CM (ref 0.6–0.9)
LV STROKE VOLUME INDEX: 29.9 ML/M2
MITRAL VALVE E/A RATIO: 0.6
MITRAL VALVE MEAN INFLOW VELOCITY: 62.8 CM/S
MITRAL VALVE PEAK VELOCITY: 111 CM/S
MV AREA VTI: 2.38 CM2
MV AVERAGE E/E' RATIO: 13.6 CM/S
MV DECELERATION TIME: 292 MS
MV E'TISSUE VEL-LAT: 5.17 CM/S
MV E'TISSUE VEL-MED: 4.09 CM/S
MV LATERAL E/E' RATIO: 12.2
MV MEAN GRADIENT: 2 MMHG
MV MEDIAL E/E' RATIO: 15.4
MV PEAK A VELOCITY: 106 CM/S
MV PEAK E VELOCITY: 63.1 CM/S
MV PEAK GRADIENT: 4.9 MMHG
MV VALVE AREA BY CONTINUITY EQUATION: 2.4 CM2
NUC REST DIASTOLIC VOLUME INDEX: 3008 LBS
NUC REST SYSTOLIC VOLUME INDEX: 61 IN
PV ACCELERATION TIME: 120 MS
TRICUSPID REGURGITATION PEAK PRESSURE GRADIENT: 15.1 MMHG
TRICUSPID VALVE ANULAR PLANE SYSTOLIC EXCURSION: 1.1 CM
TRICUSPID VALVE PEAK REGURGITANT VELOCITY: 194 CM/S

## 2021-04-29 ASSESSMENT — MIFFLIN-ST. JEOR: SCORE: 1250.14

## 2021-05-03 ENCOUNTER — AMBULATORY - HEALTHEAST (OUTPATIENT)
Dept: LAB | Facility: CLINIC | Age: 81
End: 2021-05-03

## 2021-05-03 ENCOUNTER — COMMUNICATION - HEALTHEAST (OUTPATIENT)
Dept: ANTICOAGULATION | Facility: CLINIC | Age: 81
End: 2021-05-03

## 2021-05-03 ENCOUNTER — HOSPITAL ENCOUNTER (OUTPATIENT)
Dept: MAMMOGRAPHY | Facility: CLINIC | Age: 81
Discharge: HOME | End: 2021-05-03
Attending: INTERNAL MEDICINE

## 2021-05-03 DIAGNOSIS — R92.8 OTHER ABNORMAL AND INCONCLUSIVE FINDINGS ON DIAGNOSTIC IMAGING OF BREAST: ICD-10-CM

## 2021-05-03 DIAGNOSIS — I48.0 PAROXYSMAL ATRIAL FIBRILLATION (H): ICD-10-CM

## 2021-05-03 DIAGNOSIS — N64.89 BREAST ASYMMETRY: ICD-10-CM

## 2021-05-03 DIAGNOSIS — N63.10 BREAST MASS, RIGHT: ICD-10-CM

## 2021-05-03 LAB — INR PPP: 1.5 (ref 0.9–1.1)

## 2021-05-06 ENCOUNTER — COMMUNICATION - HEALTHEAST (OUTPATIENT)
Dept: ONCOLOGY | Facility: HOSPITAL | Age: 81
End: 2021-05-06

## 2021-05-06 LAB
LAB AP CHARGES (HE HISTORICAL CONVERSION): ABNORMAL
PATH REPORT.COMMENTS IMP SPEC: ABNORMAL
PATH REPORT.COMMENTS IMP SPEC: ABNORMAL
PATH REPORT.FINAL DX SPEC: ABNORMAL
PATH REPORT.GROSS SPEC: ABNORMAL
PATH REPORT.MICROSCOPIC SPEC OTHER STN: ABNORMAL
PATH REPORT.MICROSCOPIC SPEC OTHER STN: ABNORMAL
PATH REPORT.RELEVANT HX SPEC: ABNORMAL
RESULT FLAG (HE HISTORICAL CONVERSION): ABNORMAL

## 2021-05-07 ENCOUNTER — COMMUNICATION - HEALTHEAST (OUTPATIENT)
Dept: INTERNAL MEDICINE | Facility: CLINIC | Age: 81
End: 2021-05-07

## 2021-05-07 DIAGNOSIS — I10 ESSENTIAL HYPERTENSION, BENIGN: ICD-10-CM

## 2021-05-07 RX ORDER — CLONIDINE HYDROCHLORIDE 0.1 MG/1
TABLET ORAL
Qty: 180 TABLET | Refills: 3 | Status: SHIPPED | OUTPATIENT
Start: 2021-05-07 | End: 2022-05-04

## 2021-05-10 ENCOUNTER — COMMUNICATION - HEALTHEAST (OUTPATIENT)
Dept: ANTICOAGULATION | Facility: CLINIC | Age: 81
End: 2021-05-10

## 2021-05-10 ENCOUNTER — AMBULATORY - HEALTHEAST (OUTPATIENT)
Dept: LAB | Facility: CLINIC | Age: 81
End: 2021-05-10

## 2021-05-10 DIAGNOSIS — I48.0 PAROXYSMAL ATRIAL FIBRILLATION (H): ICD-10-CM

## 2021-05-10 LAB — INR PPP: 2.2 (ref 0.9–1.1)

## 2021-05-18 ENCOUNTER — COMMUNICATION - HEALTHEAST (OUTPATIENT)
Dept: INTERNAL MEDICINE | Facility: CLINIC | Age: 81
End: 2021-05-18

## 2021-05-18 ENCOUNTER — HOSPITAL ENCOUNTER (OUTPATIENT)
Dept: SURGERY | Facility: CLINIC | Age: 81
Discharge: HOME | End: 2021-05-18
Attending: SPECIALIST

## 2021-05-18 ENCOUNTER — AMBULATORY - HEALTHEAST (OUTPATIENT)
Dept: SURGERY | Facility: AMBULATORY SURGERY CENTER | Age: 81
End: 2021-05-18

## 2021-05-18 ENCOUNTER — COMMUNICATION - HEALTHEAST (OUTPATIENT)
Dept: SURGERY | Facility: CLINIC | Age: 81
End: 2021-05-18

## 2021-05-18 DIAGNOSIS — Z11.59 ENCOUNTER FOR SCREENING FOR OTHER VIRAL DISEASES: ICD-10-CM

## 2021-05-18 DIAGNOSIS — C50.311 MALIGNANT NEOPLASM OF LOWER-INNER QUADRANT OF RIGHT BREAST OF FEMALE, ESTROGEN RECEPTOR POSITIVE (H): ICD-10-CM

## 2021-05-18 DIAGNOSIS — Z17.0 MALIGNANT NEOPLASM OF LOWER-INNER QUADRANT OF RIGHT BREAST OF FEMALE, ESTROGEN RECEPTOR POSITIVE (H): ICD-10-CM

## 2021-05-18 ASSESSMENT — MIFFLIN-ST. JEOR: SCORE: 1250.14

## 2021-05-21 ENCOUNTER — RECORDS - HEALTHEAST (OUTPATIENT)
Dept: ADMINISTRATIVE | Facility: OTHER | Age: 81
End: 2021-05-21

## 2021-05-21 ASSESSMENT — MIFFLIN-ST. JEOR: SCORE: 1245.14

## 2021-05-24 ENCOUNTER — RECORDS - HEALTHEAST (OUTPATIENT)
Dept: ADMINISTRATIVE | Facility: CLINIC | Age: 81
End: 2021-05-24

## 2021-05-24 ENCOUNTER — AMBULATORY - HEALTHEAST (OUTPATIENT)
Dept: LAB | Facility: CLINIC | Age: 81
End: 2021-05-24

## 2021-05-24 ENCOUNTER — OFFICE VISIT - HEALTHEAST (OUTPATIENT)
Dept: INTERNAL MEDICINE | Facility: CLINIC | Age: 81
End: 2021-05-24

## 2021-05-24 DIAGNOSIS — Z95.0 CARDIAC PACEMAKER IN SITU: ICD-10-CM

## 2021-05-24 DIAGNOSIS — I48.0 PAROXYSMAL ATRIAL FIBRILLATION (H): ICD-10-CM

## 2021-05-24 DIAGNOSIS — C50.311 MALIGNANT NEOPLASM OF LOWER-INNER QUADRANT OF RIGHT BREAST OF FEMALE, ESTROGEN RECEPTOR POSITIVE (H): ICD-10-CM

## 2021-05-24 DIAGNOSIS — N18.31 STAGE 3A CHRONIC KIDNEY DISEASE (H): ICD-10-CM

## 2021-05-24 DIAGNOSIS — Z17.0 MALIGNANT NEOPLASM OF LOWER-INNER QUADRANT OF RIGHT BREAST OF FEMALE, ESTROGEN RECEPTOR POSITIVE (H): ICD-10-CM

## 2021-05-24 DIAGNOSIS — I10 ESSENTIAL HYPERTENSION, BENIGN: ICD-10-CM

## 2021-05-24 DIAGNOSIS — E11.65 TYPE 2 DIABETES MELLITUS WITH HYPERGLYCEMIA, WITHOUT LONG-TERM CURRENT USE OF INSULIN (H): ICD-10-CM

## 2021-05-24 DIAGNOSIS — Z01.818 PRE-OP EXAM: ICD-10-CM

## 2021-05-24 DIAGNOSIS — Z11.59 ENCOUNTER FOR SCREENING FOR OTHER VIRAL DISEASES: ICD-10-CM

## 2021-05-24 DIAGNOSIS — E21.0 PRIMARY HYPERPARATHYROIDISM (H): ICD-10-CM

## 2021-05-24 LAB
ANION GAP SERPL CALCULATED.3IONS-SCNC: 12 MMOL/L (ref 5–18)
BUN SERPL-MCNC: 24 MG/DL (ref 8–28)
CALCIUM SERPL-MCNC: 10 MG/DL (ref 8.5–10.5)
CHLORIDE BLD-SCNC: 99 MMOL/L (ref 98–107)
CO2 SERPL-SCNC: 29 MMOL/L (ref 22–31)
CREAT SERPL-MCNC: 1.14 MG/DL (ref 0.6–1.1)
ERYTHROCYTE [DISTWIDTH] IN BLOOD BY AUTOMATED COUNT: 14.7 % (ref 11–14.5)
GFR SERPL CREATININE-BSD FRML MDRD: 46 ML/MIN/1.73M2
GLUCOSE BLD-MCNC: 228 MG/DL (ref 70–125)
HCT VFR BLD AUTO: 40.5 % (ref 35–47)
HGB BLD-MCNC: 13.2 G/DL (ref 12–16)
MCH RBC QN AUTO: 31.3 PG (ref 27–34)
MCHC RBC AUTO-ENTMCNC: 32.6 G/DL (ref 32–36)
MCV RBC AUTO: 96 FL (ref 80–100)
PLATELET # BLD AUTO: 225 THOU/UL (ref 140–440)
PMV BLD AUTO: 11.1 FL (ref 7–10)
POTASSIUM BLD-SCNC: 4.7 MMOL/L (ref 3.5–5)
RBC # BLD AUTO: 4.22 MILL/UL (ref 3.8–5.4)
SODIUM SERPL-SCNC: 140 MMOL/L (ref 136–145)
WBC: 8.7 THOU/UL (ref 4–11)

## 2021-05-24 RX ORDER — AMLODIPINE BESYLATE 2.5 MG/1
2.5 TABLET ORAL DAILY
Qty: 90 TABLET | Refills: 3 | Status: SHIPPED | OUTPATIENT
Start: 2021-05-24 | End: 2022-05-20

## 2021-05-24 ASSESSMENT — MIFFLIN-ST. JEOR: SCORE: 1255.06

## 2021-05-25 ENCOUNTER — COMMUNICATION - HEALTHEAST (OUTPATIENT)
Dept: INTERNAL MEDICINE | Facility: CLINIC | Age: 81
End: 2021-05-25

## 2021-05-25 ENCOUNTER — ANESTHESIA - HEALTHEAST (OUTPATIENT)
Dept: SURGERY | Facility: AMBULATORY SURGERY CENTER | Age: 81
End: 2021-05-25

## 2021-05-25 LAB
ATRIAL RATE - MUSE: 70 BPM
DIASTOLIC BLOOD PRESSURE - MUSE: NORMAL
INTERPRETATION ECG - MUSE: NORMAL
P AXIS - MUSE: 32 DEGREES
PR INTERVAL - MUSE: 200 MS
QRS DURATION - MUSE: 70 MS
QT - MUSE: 394 MS
QTC - MUSE: 425 MS
R AXIS - MUSE: 46 DEGREES
SARS-COV-2 PCR COMMENT: NORMAL
SARS-COV-2 RNA SPEC QL NAA+PROBE: NEGATIVE
SARS-COV-2 VIRUS SPECIMEN SOURCE: NORMAL
SYSTOLIC BLOOD PRESSURE - MUSE: NORMAL
T AXIS - MUSE: -4 DEGREES
VENTRICULAR RATE- MUSE: 70 BPM

## 2021-05-26 ENCOUNTER — HOSPITAL ENCOUNTER (OUTPATIENT)
Dept: MAMMOGRAPHY | Facility: CLINIC | Age: 81
Discharge: HOME | End: 2021-05-26
Attending: SPECIALIST

## 2021-05-26 ENCOUNTER — SURGERY - HEALTHEAST (OUTPATIENT)
Dept: SURGERY | Facility: AMBULATORY SURGERY CENTER | Age: 81
End: 2021-05-26
Payer: MEDICARE

## 2021-05-26 ENCOUNTER — RECORDS - HEALTHEAST (OUTPATIENT)
Dept: ADMINISTRATIVE | Facility: OTHER | Age: 81
End: 2021-05-26

## 2021-05-26 ENCOUNTER — COMMUNICATION - HEALTHEAST (OUTPATIENT)
Dept: SCHEDULING | Facility: CLINIC | Age: 81
End: 2021-05-26

## 2021-05-26 ENCOUNTER — RECORDS - HEALTHEAST (OUTPATIENT)
Dept: ADMINISTRATIVE | Facility: CLINIC | Age: 81
End: 2021-05-26

## 2021-05-26 DIAGNOSIS — C50.311 MALIGNANT NEOPLASM OF LOWER-INNER QUADRANT OF RIGHT BREAST OF FEMALE, ESTROGEN RECEPTOR POSITIVE (H): ICD-10-CM

## 2021-05-26 DIAGNOSIS — Z17.0 MALIGNANT NEOPLASM OF LOWER-INNER QUADRANT OF RIGHT BREAST OF FEMALE, ESTROGEN RECEPTOR POSITIVE (H): ICD-10-CM

## 2021-05-26 ASSESSMENT — MIFFLIN-ST. JEOR: SCORE: 1245.14

## 2021-05-27 VITALS — BODY MASS INDEX: 35.5 KG/M2 | HEIGHT: 61 IN | WEIGHT: 188 LBS

## 2021-05-27 NOTE — TELEPHONE ENCOUNTER
Refill Approved    Rx renewed per Medication Renewal Policy. Medication was last renewed on 4/14/18.    Alison Ely, [facility] Triage/Med Refill 4/12/2019     Requested Prescriptions   Pending Prescriptions Disp Refills     allopurinol (ZYLOPRIM) 300 MG tablet [Pharmacy Med Name: Allopurinol Oral Tablet 300 MG] 90 tablet 2     Sig: take 1 tablet by mouth once daily       Allopurinol/Febuxostat Refill Protocol  Passed - 4/11/2019  7:01 AM        Passed - LFT or AST or ALT in last 12 months     Albumin   Date Value Ref Range Status   01/07/2019 3.9 3.5 - 5.0 g/dL Final     Bilirubin, Total   Date Value Ref Range Status   01/07/2019 0.7 0.0 - 1.0 mg/dL Final     Alkaline Phosphatase   Date Value Ref Range Status   01/07/2019 97 45 - 120 U/L Final     AST   Date Value Ref Range Status   01/07/2019 26 0 - 40 U/L Final     ALT   Date Value Ref Range Status   01/07/2019 24 0 - 45 U/L Final     Protein, Total   Date Value Ref Range Status   01/07/2019 7.2 6.0 - 8.0 g/dL Final                Passed - Visit with PCP or prescribing provider visit in past 12 months     Last office visit with prescriber/PCP: 1/7/2019 Freddy Lubin MD OR same dept: 1/7/2019 Freddy Lubin MD OR same specialty: 1/7/2019 Freddy Lubin MD  Last physical: Visit date not found Last MTM visit: Visit date not found   Next visit within 3 mo: Visit date not found  Next physical within 3 mo: Visit date not found  Prescriber OR PCP: Freddy Lubin MD  Last diagnosis associated with med order: 1. Medication monitoring encounter  - allopurinol (ZYLOPRIM) 300 MG tablet [Pharmacy Med Name: Allopurinol Oral Tablet 300 MG]; take 1 tablet by mouth once daily  Dispense: 90 tablet; Refill: 2    If protocol passes may refill for 12 months if within 3 months of last provider visit (or a total of 15 months).

## 2021-05-27 NOTE — PROGRESS NOTES
Assessment:     1. Long term current use of anticoagulant therapy  POCT INR   2. Paroxysmal atrial fibrillation (H)  POCT INR     Frida is here Pre-Cardioversion? No  Past Medical History:   Diagnosis Date     Arrhythmia     Paroxysmal Atrial fibrillation     Diabetes mellitus (H)      Hypercalcemia      Hyperlipidemia      Hypertension      Obesity        Plan:     Written dosage and follow-up instructions were provided in the After Visit Summary  Anticoagulation Episode Summary     Current INR goal:   2.0-3.0   TTR:   71.6 % (1.1 y)   Next INR check:   4/29/2019   INR from last check:   3.90! (4/15/2019)   Weekly max warfarin dose:      Target end date:      INR check location:      Preferred lab:      Send INR reminders to:    [facility] SUPPORT POOL    Indications    Paroxysmal atrial fibrillation (H) [I48.0]           Comments:   PAF         Anticoagulation Care Providers     Provider Role Specialty Phone number    Nahun Mina MD Referring Cardiology [phone]          Discussed the following:  Written instructions provided   Frida displays a  good warfarin knowledge base. There were   no barriers to education  Megan Pedraza        Subjective/Objective:      ANTICOAGULATION MANAGEMENT    Frida Can is  is established on warfarin  INR Therapeutic Range: 2-3    Megan Pedraza RN

## 2021-05-27 NOTE — PROGRESS NOTES
INR 2.1 After talking with pt and discussing history of greens/salads and medication change. Pt will  continue  with current diet and dosing of Warfarin.  Continue with moderation of Vit K and green leafy vegetables. Cautioned to call with increase bruising or bleeding. Reminded to call with medication change especially antibiotic. Call with any questions or concerns or any up coming procedures. Cautioned about using Herbal medication.

## 2021-05-27 NOTE — PATIENT INSTRUCTIONS - HE
INR 2.1 Continue current management dosing of Warfarin. Continue  diet of moderate Vitamin K intake. Discussed with pt the need to call with questions or concerns or any change in medication especially herbal medication or OTC. Call with increased bleeding or bruising or any upcoming procedures.

## 2021-05-28 NOTE — PATIENT INSTRUCTIONS - HE
INR 2.8 continue 1.25 mg Monday and 2.5 mg all other days. After talking with pt and discussing history of greens/salads and medication change. Pt will  continue  with current diet and dosing of Warfarin.  Continue with moderation of Vit K and green leafy vegetables. Cautioned to call with increase bruising or bleeding. Reminded to call with medication change especially antibiotic. Call with any questions or concerns or any up coming procedures. Cautioned about using Herbal medication.

## 2021-05-29 ENCOUNTER — RECORDS - HEALTHEAST (OUTPATIENT)
Dept: ADMINISTRATIVE | Facility: CLINIC | Age: 81
End: 2021-05-29

## 2021-05-29 NOTE — PROGRESS NOTES
In clinic device check with Device RN and Dr. Fagan.  Please see link for full device report.  Patient was informed of results and next follow up during today's visit.

## 2021-05-29 NOTE — TELEPHONE ENCOUNTER
Anticoagulation Transition of Care    Frida Can was referred to transition management of warfarin from [facility] to [facility].    Warfarin indication: Atrial Fibrillation and TIA   Current INR goal 2.0-3.0   Date of last primary care office visit 1/7/19     If you are agreeable, [facility] will assume warfarin management for your patient with you as the listed referring provider.     Please confirm agreement by routing back your response. No further action is necessary (referral orders,etc).    Reyna Radford RN

## 2021-05-29 NOTE — TELEPHONE ENCOUNTER
ANTICOAGULATION  MANAGEMENT    Assessment     Today's INR result of 2.8 is Therapeutic (goal INR of 2.0-3.0)        Warfarin taken as previously instructed    No new diet changes affecting INR    No new medication/supplements affecting INR    Continues to tolerate warfarin with no reported s/s of bleeding or thromboembolism     Previous INR was Therapeutic    Plan:     Spoke with Frida regarding INR result and instructed:     Warfarin Dosing Instructions:  Continue current warfarin dose 1.25 mg daily on mon; and 2.5 mg daily rest of week  (0 % change)    Instructed patient to follow up no later than: one month      Frida verbalizes understanding and agrees to warfarin dosing plan.    Instructed to call the [facility] for any changes, questions or concerns. (#[phone])   ?   Reyna Radford RN    Subjective/Objective:      Frida Can, a 78 y.o. female is on warfarin.     Frida reports:     Home warfarin dose: as updated on anticoagulation calendar per template     Missed doses: No     Medication changes:  No     S/S of bleeding or thromboembolism:  No     New Injury or illness:  No     Changes in diet or alcohol consumption:  No     Upcoming surgery, procedure or cardioversion:  No    Anticoagulation Episode Summary     Current INR goal:   2.0-3.0   TTR:   71.9 % (1.2 y)   Next INR check:   6/26/2019   INR from last check:   2.80 (5/29/2019)   Weekly max warfarin dose:      Target end date:      INR check location:      Preferred lab:      Send INR reminders to:   N[facility]    Indications    Paroxysmal atrial fibrillation (H) [I48.0]           Comments:   PAF         Anticoagulation Care Providers     Provider Role Specialty Phone number    Freddy Lubin MD  Internal Medicine [phone]

## 2021-05-29 NOTE — TELEPHONE ENCOUNTER
ANTICOAGULATION  MANAGEMENT    Assessment     Today's INR result of 3.0 is Therapeutic (goal INR of 2.0-3.0)        Warfarin taken as previously instructed    No new diet changes affecting INR    No new medication/supplements affecting INR    Continues to tolerate warfarin with no reported s/s of bleeding or thromboembolism     Previous INR was Therapeutic    Plan:     Spoke with Frida regarding INR result and instructed:     Warfarin Dosing Instructions:  Continue current warfarin dose 1.25 mg daily on mon; and 2.5 mg daily rest of week  (0 % change)    Instructed patient to follow up no later than: one month      Frida verbalizes understanding and agrees to warfarin dosing plan.    Instructed to call the [facility] for any changes, questions or concerns. (#[phone])   ?   Reyna Radford RN    Subjective/Objective:      Frida Can, a 78 y.o. female is on warfarin.     Frida reports:     Home warfarin dose: as updated on anticoagulation calendar per template     Missed doses: No     Medication changes:  No     S/S of bleeding or thromboembolism:  No     New Injury or illness:  No     Changes in diet or alcohol consumption:  No     Upcoming surgery, procedure or cardioversion:  No    Anticoagulation Episode Summary     Current INR goal:   2.0-3.0   TTR:   73.5 % (1.3 y)   Next INR check:   7/22/2019   INR from last check:   3.00 (6/24/2019)   Weekly max warfarin dose:      Target end date:      INR check location:      Preferred lab:      Send INR reminders to:   NICK [facility]    Indications    Paroxysmal atrial fibrillation (H) [I48.0]           Comments:   PAF         Anticoagulation Care Providers     Provider Role Specialty Phone number    Freddy Lubin MD  Internal Medicine [phone]

## 2021-05-30 NOTE — TELEPHONE ENCOUNTER
ANTICOAGULATION  MANAGEMENT    Assessment     Today's INR result of 2.4 is Therapeutic (goal INR of 2.0-3.0)        Warfarin taken as previously instructed    No new diet changes affecting INR    No new medication/supplements affecting INR    Continues to tolerate warfarin with no reported s/s of bleeding or thromboembolism     Previous INR was Therapeutic    Plan:     Spoke with Frida regarding INR result and instructed:     Warfarin Dosing Instructions:  Continue current warfarin dose 1.25 mg daily on mon; and 2.5 mg daily rest of week  (0 % change)    Instructed patient to follow up no later than: one month      Frida verbalizes understanding and agrees to warfarin dosing plan.    Instructed to call the [facility] for any changes, questions or concerns. (#[phone])   ?   Reyna Radford RN    Subjective/Objective:      Frida Can, a 78 y.o. female is on warfarin.     Frida reports:     Home warfarin dose: as updated on anticoagulation calendar per template     Missed doses: No     Medication changes:  No     S/S of bleeding or thromboembolism:  No     New Injury or illness:  No     Changes in diet or alcohol consumption:  No     Upcoming surgery, procedure or cardioversion:  No    Anticoagulation Episode Summary     Current INR goal:   2.0-3.0   TTR:   75.0 % (1.4 y)   Next INR check:   8/19/2019   INR from last check:   2.40 (7/22/2019)   Weekly max warfarin dose:      Target end date:      INR check location:      Preferred lab:      Send INR reminders to:   NICK [facility]    Indications    Paroxysmal atrial fibrillation (H) [I48.0]           Comments:   PAF         Anticoagulation Care Providers     Provider Role Specialty Phone number    Freddy Lubin MD  Internal Medicine [phone]

## 2021-05-30 NOTE — PROGRESS NOTES
ASSESSMENT:  1. Type 2 diabetes mellitus (H)  , Takes metformin 500 mg daily.  Diabetic control is good with hemoglobin A1c of 6.9 and a blood sugar of 130  - Microalbumin, Random Urine  - Glycosylated Hemoglobin A1c  - metFORMIN (GLUCOPHAGE-XR) 500 MG 24 hr tablet; Take 1 tablet (500 mg total) by mouth daily with breakfast.  Dispense: 90 tablet; Refill: 3  - Comprehensive Metabolic Panel    2. Hypertension, essential  Treated with losartan 100 mg daily, clonidine 0.1 mg at bedtime, amlodipine 10 mg daily, and furosemide 20 mg daily.  Blood pressure is in a good range  - amLODIPine (NORVASC) 10 MG tablet; Take 1 tablet (10 mg total) by mouth daily.  Dispense: 90 tablet; Refill: 3    3.  Right lateral knee pain:  She has no joint effusion and no obvious ligamentous instability.  Symptoms have been present for 2 weeks and are improving.  I would continue conservative management for now.  If symptoms remain troublesome, and knee x-ray and further evaluation would be pointed.    4.  Postprandial abdominal bloating:  She notes some right upper quadrant discomfort.  Liver enzymes will be checked.  If symptoms become more troublesome, right upper quadrant ultrasound would be advised    6. Primary hyperparathyroidism (H)  She has had a mild elevation in calcium in the past along with elevated parathyroid hormone consistent with mild primary hyperparathyroidism.  Bone density from last year revealed a lowest T score of -1.4 in the hip.  She would prefer to avoid surgical intervention if possible.  Labs will be rechecked.  Bone density should be rechecked in February 2020  - Parathyroid Hormone Intact  - Comprehensive Metabolic Panel    7. Mixed hyperlipidemia  She remains on atorvastatin.  Lipid cascade will be checked  - atorvastatin (LIPITOR) 10 MG tablet; Take 1 tablet (10 mg total) by mouth daily.  Dispense: 90 tablet; Refill: 3  - Lipid Cascade    8. Paroxysmal atrial fibrillation (H)  She has a pacemaker for  tachybradycardia syndrome.  She is on sotalol and currently has a regular rhythm.  She remains on warfarin as an anticoagulant  - INR    9.  Obesity  Weight loss and regular exercise were encouraged    PLAN:  Patient Instructions   1.  I will notify you of test  results.    2.  Shingrix advised.    3.  Same medications    4.  See in 4 to 6 months    5.  Check bone density February 2020  Orders Placed This Encounter   Procedures     Microalbumin, Random Urine     Glycosylated Hemoglobin A1c     Parathyroid Hormone Intact     Comprehensive Metabolic Panel     Lipid Cascade     Order Specific Question:   Fasting is required?     Answer:   Unknown     Medications Discontinued During This Encounter   Medication Reason     amLODIPine (NORVASC) 10 MG tablet Reorder     atorvastatin (LIPITOR) 10 MG tablet Reorder     cloNIDine HCl (CATAPRES) 0.1 MG tablet Reorder     furosemide (LASIX) 20 MG tablet      losartan (COZAAR) 100 MG tablet Reorder     metFORMIN (GLUCOPHAGE-XR) 500 MG 24 hr tablet Reorder       Return in about 6 months (around 1/22/2020) for Annual physical.      ASSESSED PROBLEMS:  1. Type 2 diabetes mellitus (H)  Microalbumin, Random Urine    Glycosylated Hemoglobin A1c    metFORMIN (GLUCOPHAGE-XR) 500 MG 24 hr tablet    Comprehensive Metabolic Panel   2. Hypertension, essential  amLODIPine (NORVASC) 10 MG tablet   3. Hyperlipidemia  atorvastatin (LIPITOR) 10 MG tablet   4. Essential hypertension with goal blood pressure less than 140/90  cloNIDine HCl (CATAPRES) 0.1 MG tablet    furosemide (LASIX) 20 MG tablet   5. Hypertension  losartan (COZAAR) 100 MG tablet   6. Primary hyperparathyroidism (H)  Parathyroid Hormone Intact    Comprehensive Metabolic Panel   7. Mixed hyperlipidemia  atorvastatin (LIPITOR) 10 MG tablet    Lipid Cascade   8. Paroxysmal atrial fibrillation (H)  INR       CHIEF COMPLAINT:  Chief Complaint   Patient presents with     Diabetes     Due for A1C and micro , pend the order      Medication Refill       HISTORY OF PRESENT ILLNESS:  Frida is a 78 y.o. female presenting to the clinic today for follow-up of type 2 diabetes, essential hypertension, primary hyperparathyroidism, and other concerns.  Overall, she has done well since last clinic visit.  She has noted some discomfort in the lateral aspect of the right knee for 2 weeks.  There is no locking or buckling and she has not noted swelling.  She feels this is improving.  She also complains of episodic bloating after eating and discomfort in the right upper quadrant.  This is not troubling her currently.    She has a pacemaker for tachybradycardia syndrome.  She is on sotalol due to a history of paroxysmal atrial fibrillation.  She has not noted any symptomatic palpitations.  She remains on warfarin as an anticoagulant    Blood pressure has been under good control when checked outside of clinic.  She denies any adverse effects from current medications.    She is on metformin 500 mg daily for type 2 diabetes.  Hemoglobin A1c today is good at 6.9    REVIEW OF SYSTEMS:   All other systems are negative.    PFSH:  Retired.  Lives independently.  Fairly sedentary    TOBACCO USE:  Social History     Tobacco Use   Smoking Status Never Smoker   Smokeless Tobacco Never Used       VITALS:  Vitals:    07/22/19 1530 07/22/19 1556   BP: 126/60 136/60   Patient Site: Left Arm    Patient Position: Sitting    Cuff Size: Adult Large    Pulse: 70    SpO2: 97%      Wt Readings from Last 3 Encounters:   05/22/19 191 lb 6.4 oz (86.8 kg)   01/07/19 190 lb 3.2 oz (86.3 kg)   07/16/18 195 lb 9.6 oz (88.7 kg)     There is no height or weight on file to calculate BMI.    PHYSICAL EXAM:  Constitutional:   Reveals an alert, pleasant, obese woman with an appropriate affect. Does not appear acutely ill.  Thinning hair.   vitals: per nursing notes.  Repeat BP by MD: 136/60  HEENT: Thinning hair, atraumatic.   Oropharynx:   Mouth and throat clear, no thrush or  exudate.  Neck:  Supple, no carotid bruits or adenopathy.  Back:  No spine or CVA pain.  Thorax: Pacer left upper chest.   Lungs: Clear to A&P without rales or wheezes.  Respiratory effort normal.  Cardiac:   Regular rate and rhythm, normal S1, S2, rate about 80. I-II/VI systolic ejection murmur aortic area.   Abdomen:  Soft, obese, no mass or tenderness.  Extremities:   No peripheral edema.  Full range of motion of the right knee.  No ligamentous instability or joint effusion.  No erythema.  Minor tenderness to touch laterally  Skin: Clear.   Neuro: No gross focal deficits. Detailed exam not done  Psychiatric:  Memory intact, mood appropriate.    ADDITIONAL HISTORY SUMMARIZED (2): None.  DECISION TO OBTAIN EXTRA INFORMATION (1): None.   RADIOLOGY TESTS (1): None.  LABS (1): None.  MEDICINE TESTS (1): None.  INDEPENDENT REVIEW (2 each): None.     The visit lasted a total of 25 minutes face to face with the patient. Over 50% of the time was spent counseling and educating the patient about management of type 2 diabetes, primary hyperparathyroidism, and essential hypertension..    Dragon dictation was used for this note.  Speech recognition errors are a possibility.    MEDICATIONS:  Current Outpatient Medications   Medication Sig Dispense Refill     allopurinol (ZYLOPRIM) 300 MG tablet take 1 tablet by mouth once daily 90 tablet 2     blood glucose test strips Use 1 each As Directed daily. 100 each 11     cholecalciferol, vitamin D3, 1,000 unit tablet Take 1,000 Units by mouth daily.       magnesium oxide (MAG-OX) 400 mg tablet Take 400 mg by mouth daily.        peg 400-propylene glycol (SYSTANE) 0.4-0.3 % Drop Place 1 Drop into both eyes 2 times daily if needed for Dry Eyes. Indications: DRY EYE       sotalol (BETAPACE) 80 MG tablet Take 1 tablet (80 mg total) by mouth daily. 90 tablet 1     warfarin (COUMADIN/JANTOVEN) 2.5 MG tablet Take 2.5 mg by mouth daily. Adjust dose based on INR results as directed. 90 tablet 0      amLODIPine (NORVASC) 10 MG tablet Take 1 tablet (10 mg total) by mouth daily. 90 tablet 3     atorvastatin (LIPITOR) 10 MG tablet Take 1 tablet (10 mg total) by mouth daily. 90 tablet 3     cloNIDine HCl (CATAPRES) 0.1 MG tablet Take 1 tablet (0.1 mg total) by mouth at bedtime. 90 tablet 3     furosemide (LASIX) 20 MG tablet Take 1 tablet (20 mg total) by mouth daily. 90 tablet 3     losartan (COZAAR) 100 MG tablet Take 1 tablet (100 mg total) by mouth daily. 90 tablet 3     metFORMIN (GLUCOPHAGE-XR) 500 MG 24 hr tablet Take 1 tablet (500 mg total) by mouth daily with breakfast. 90 tablet 3     No current facility-administered medications for this visit.        Total data points:3

## 2021-05-30 NOTE — PATIENT INSTRUCTIONS - HE
1.  I will notify you of test  results.    2.  Shingrix advised.    3.  Same medications    4.  See in 4 to 6 months

## 2021-05-31 VITALS — HEIGHT: 62 IN | BODY MASS INDEX: 37.17 KG/M2 | WEIGHT: 202 LBS

## 2021-05-31 VITALS — WEIGHT: 203.6 LBS | BODY MASS INDEX: 37.47 KG/M2 | HEIGHT: 62 IN

## 2021-05-31 NOTE — TELEPHONE ENCOUNTER
ANTICOAGULATION  MANAGEMENT    Assessment     Today's INR result of 3.8 is Supratherapeutic (goal INR of 2.0-3.0)        Warfarin taken as previously instructed    Decreased greens/vitamin K intake may be affecting INR- will get back to normal greens.     No new medication/supplements affecting INR    Continues to tolerate warfarin with no reported s/s of bleeding or thromboembolism     Previous INR was Therapeutic    Plan:     Spoke with Frida regarding INR result and instructed:     Warfarin Dosing Instructions:  Take one time lower dose of 1.25 mg today only then continue current warfarin dose    1.25 mg every Mon; 2.5 mg all other days         Instructed patient to follow up no later than: 2 weeks.     Education provided: importance of consistent vitamin K intake and importance of taking warfarin as instructed    Frida verbalizes understanding and agrees to warfarin dosing plan.    Instructed to call the [facility] for any changes, questions or concerns. (#[phone])   ?   Jesús Eli RN    Subjective/Objective:      Frida Can, a 78 y.o. female is on warfarin.     Frida reports:     Home warfarin dose: verbally confirmed home dose with pt and updated on anticoagulation calendar     Missed doses: No     Medication changes:  No     S/S of bleeding or thromboembolism:  No     New Injury or illness:  No     Changes in diet or alcohol consumption:  Yes: had less greens     Upcoming surgery, procedure or cardioversion:  No    Anticoagulation Episode Summary     Current INR goal:   2.0-3.0   TTR:   73.2 % (1.4 y)   Next INR check:   9/3/2019   INR from last check:   3.80! (8/20/2019)   Weekly max warfarin dose:      Target end date:      INR check location:      Preferred lab:      Send INR reminders to:   NICK [facility]    Indications    Paroxysmal atrial fibrillation (H) [I48.0]           Comments:   PAF         Anticoagulation Care Providers     Provider Role Specialty Phone number    Amee,  Freddy JAIN MD  Internal Medicine [phone]

## 2021-05-31 NOTE — TELEPHONE ENCOUNTER
RN cannot approve Refill Request    RN can NOT refill this medication discontinued on 7/16/2018 by Freddy Lubin MD for the following reason: Dose adjustment. Last office visit: 7/22/2019 Freddy Lubin MD Last Physical: Visit date not found Last MTM visit: Visit date not found Last visit same specialty: 7/22/2019 Freddy Lubin MD.  Next visit within 3 mo: Visit date not found  Next physical within 3 mo: Visit date not found      [facility] Triage/Med Refill 8/7/2019    Requested Prescriptions   Pending Prescriptions Disp Refills     cloNIDine HCl (CATAPRES) 0.1 MG tablet [Pharmacy Med Name: cloNIDine HCl Oral Tablet 0.1 MG] 180 tablet 2     Sig: take 1 tablet by mouth twice daily.       Clonidine/Hydralazine Refill Protocol Passed - 8/6/2019 11:15 AM        Passed - PCP or prescribing provider visit in past 12 months       Last office visit with prescriber/PCP: 7/22/2019 Freddy Lubin MD OR same dept: 7/22/2019 Freddy Lubin MD OR same specialty: 7/22/2019 Freddy Lubin MD  Last physical: Visit date not found Last MTM visit: Visit date not found   Next visit within 3 mo: Visit date not found  Next physical within 3 mo: Visit date not found  Prescriber OR PCP: Freddy Lubin MD  Last diagnosis associated with med order: There are no diagnoses linked to this encounter.  If protocol passes may refill for 12 months if within 3 months of last provider visit (or a total of 15 months).             Passed - Blood pressure filed in past 12 months     BP Readings from Last 1 Encounters:   07/22/19 136/60

## 2021-06-01 ENCOUNTER — HOSPITAL ENCOUNTER (OUTPATIENT)
Dept: SURGERY | Facility: CLINIC | Age: 81
Discharge: HOME | End: 2021-06-01
Attending: SPECIALIST

## 2021-06-01 ENCOUNTER — COMMUNICATION - HEALTHEAST (OUTPATIENT)
Dept: ONCOLOGY | Facility: CLINIC | Age: 81
End: 2021-06-01

## 2021-06-01 VITALS — BODY MASS INDEX: 36.36 KG/M2 | WEIGHT: 195.6 LBS

## 2021-06-01 VITALS — WEIGHT: 194 LBS | BODY MASS INDEX: 35.7 KG/M2 | HEIGHT: 62 IN

## 2021-06-01 VITALS — BODY MASS INDEX: 35.26 KG/M2 | HEIGHT: 62 IN | WEIGHT: 191.6 LBS

## 2021-06-01 VITALS — BODY MASS INDEX: 35.72 KG/M2 | WEIGHT: 194.1 LBS | HEIGHT: 62 IN

## 2021-06-01 VITALS — HEIGHT: 62 IN | BODY MASS INDEX: 34.78 KG/M2 | WEIGHT: 189 LBS

## 2021-06-01 VITALS — HEIGHT: 61 IN | BODY MASS INDEX: 36.66 KG/M2

## 2021-06-01 DIAGNOSIS — C50.311 MALIGNANT NEOPLASM OF LOWER-INNER QUADRANT OF RIGHT BREAST OF FEMALE, ESTROGEN RECEPTOR POSITIVE (H): ICD-10-CM

## 2021-06-01 DIAGNOSIS — Z48.89 POSTOPERATIVE VISIT: ICD-10-CM

## 2021-06-01 DIAGNOSIS — Z17.0 MALIGNANT NEOPLASM OF LOWER-INNER QUADRANT OF RIGHT BREAST OF FEMALE, ESTROGEN RECEPTOR POSITIVE (H): ICD-10-CM

## 2021-06-01 NOTE — TELEPHONE ENCOUNTER
ANTICOAGULATION  MANAGEMENT    Assessment     Today's INR result of 2.7 is Therapeutic (goal INR of 2.0-3.0)        Warfarin taken as previously instructed    No new diet changes affecting INR    No new medication/supplements affecting INR    Continues to tolerate warfarin with no reported s/s of bleeding or thromboembolism     Previous INR was Therapeutic    Plan:     Spoke with Frida regarding INR result and instructed:     Warfarin Dosing Instructions:  Continue current warfarin dose 1.25 mg daily on mon; and 2.5 mg daily rest of week  (0 % change)    Instructed patient to follow up no later than: two weeks, Frida states she will come no sooner than one month this time      Instructed to call the [facility] for any changes, questions or concerns. (#[phone])   ?   Reyna Radford RN    Subjective/Objective:      Frida Can, a 78 y.o. female is on warfarin.     Frida reports:     Home warfarin dose: verbally confirmed home dose with Frida and updated on anticoagulation calendar     Missed doses: No     Medication changes:  No     S/S of bleeding or thromboembolism:  No     New Injury or illness:  No     Changes in diet or alcohol consumption:  No     Upcoming surgery, procedure or cardioversion:  No    Anticoagulation Episode Summary     Current INR goal:   2.0-3.0   TTR:   63.6 %   Next INR check:   10/7/2019   INR from last check:   2.70 (9/9/2019)   Weekly max warfarin dose:      Target end date:      INR check location:      Preferred lab:      Send INR reminders to:   NICK MIDWAY    Indications    Paroxysmal atrial fibrillation (H) [I48.0]           Comments:   PAF         Anticoagulation Care Providers     Provider Role Specialty Phone number    Freddy Lubin MD  Internal Medicine [phone]

## 2021-06-02 ENCOUNTER — AMBULATORY - HEALTHEAST (OUTPATIENT)
Dept: LAB | Facility: CLINIC | Age: 81
End: 2021-06-02

## 2021-06-02 ENCOUNTER — COMMUNICATION - HEALTHEAST (OUTPATIENT)
Dept: ANTICOAGULATION | Facility: CLINIC | Age: 81
End: 2021-06-02

## 2021-06-02 ENCOUNTER — COMMUNICATION - HEALTHEAST (OUTPATIENT)
Dept: ONCOLOGY | Facility: CLINIC | Age: 81
End: 2021-06-02

## 2021-06-02 VITALS — WEIGHT: 190.2 LBS | BODY MASS INDEX: 35.36 KG/M2

## 2021-06-02 DIAGNOSIS — I48.0 PAROXYSMAL ATRIAL FIBRILLATION (H): ICD-10-CM

## 2021-06-02 LAB
INR PPP: 2.1 (ref 0.9–1.1)
INR PPP: 2.4 (ref 0.9–1.1)

## 2021-06-02 NOTE — TELEPHONE ENCOUNTER
ANTICOAGULATION  MANAGEMENT    Assessment     Today's INR result of 2.9 is Therapeutic (goal INR of 2.0-3.0)        Warfarin taken as previously instructed    No new diet changes affecting INR    No new medication/supplements affecting INR    Continues to tolerate warfarin with no reported s/s of bleeding or thromboembolism     Previous INR was Therapeutic    Plan:     Spoke with Frida regarding INR result and instructed:     Warfarin Dosing Instructions:  Continue current warfarin dose 1.25 mg daily on mon; and 2.5 mg daily rest of week  (0 % change)    Instructed patient to follow up no later than: one month      Frida verbalizes understanding and agrees to warfarin dosing plan.    Instructed to call the [facility] for any changes, questions or concerns. (#[phone])   ?   Reyan Radford RN    Subjective/Objective:      Frida Can, a 79 y.o. female is on warfarin.     Frida reports:     Home warfarin dose: as updated on anticoagulation calendar per template     Missed doses: No     Medication changes:  No     S/S of bleeding or thromboembolism:  No     New Injury or illness:  No     Changes in diet or alcohol consumption:  No     Upcoming surgery, procedure or cardioversion:  No    Anticoagulation Episode Summary     Current INR goal:   2.0-3.0   TTR:   63.6 %   Next INR check:   11/4/2019   INR from last check:   2.90 (10/7/2019)   Weekly max warfarin dose:      Target end date:      INR check location:      Preferred lab:      Send INR reminders to:   NICK MIDWAY    Indications    Paroxysmal atrial fibrillation (H) [I48.0]           Comments:   PAF         Anticoagulation Care Providers     Provider Role Specialty Phone number    Freddy Lubin MD  Internal Medicine [phone]

## 2021-06-03 VITALS — BODY MASS INDEX: 35.22 KG/M2 | WEIGHT: 191.4 LBS | HEIGHT: 62 IN

## 2021-06-03 NOTE — TELEPHONE ENCOUNTER
ANTICOAGULATION  MANAGEMENT    Assessment     Today's INR result of 3.2 is Supratherapeutic (goal INR of 2.0-3.0)        Warfarin taken as previously instructed    No new diet changes affecting INR    No new medication/supplements affecting INR    Continues to tolerate warfarin with no reported s/s of bleeding or thromboembolism     Previous INR was Therapeutic    Plan:     Spoke with Frida regarding INR result and instructed:     Warfarin Dosing Instructions:  skip today then continue current warfarin dose 1.25 mg daily on mon; and 2.5 mg daily rest of week     Instructed patient to follow up no later than: two weeks      Frida verbalizes understanding and agrees to warfarin dosing plan.    Instructed to call the [facility] for any changes, questions or concerns. (#[phone])   ?   Reyna Radford RN    Subjective/Objective:      Frida PANTERA Pamella, a 79 y.o. female is on warfarin.     Frida reports:     Home warfarin dose: as updated on anticoagulation calendar per template     Missed doses: No     Medication changes:  No     S/S of bleeding or thromboembolism:  No     New Injury or illness:  No     Changes in diet or alcohol consumption:  No     Upcoming surgery, procedure or cardioversion:  No    Anticoagulation Episode Summary     Current INR goal:   2.0-3.0   TTR:   66.7 % (1 y)   Next INR check:   12/16/2019   INR from last check:   3.20! (12/2/2019)   Weekly max warfarin dose:      Target end date:      INR check location:      Preferred lab:      Send INR reminders to:   NICK MIDWAY    Indications    Paroxysmal atrial fibrillation (H) [I48.0]           Comments:   PAF         Anticoagulation Care Providers     Provider Role Specialty Phone number    Freddy Lubin MD  Internal Medicine [phone]

## 2021-06-03 NOTE — TELEPHONE ENCOUNTER
ANTICOAGULATION  MANAGEMENT    Assessment     Today's INR result of 2.8 is Therapeutic (goal INR of 2.0-3.0)        Warfarin taken as previously instructed    No new diet changes affecting INR    No new medication/supplements affecting INR    Continues to tolerate warfarin with no reported s/s of bleeding or thromboembolism     Previous INR was Therapeutic    Plan:     Spoke with Frida regarding INR result and instructed:     Warfarin Dosing Instructions:  Continue current warfarin dose 1.25 mg daily on mon; and 5 mg daily rest of week  (0 % change)    Instructed patient to follow up no later than: one month    Frida verbalizes understanding and agrees to warfarin dosing plan.    Instructed to call the [facility] for any changes, questions or concerns. (#[phone])   ?   Reyna Radford RN    Subjective/Objective:      Frida Can, a 79 y.o. female is on warfarin.     Frida reports:     Home warfarin dose: as updated on anticoagulation calendar per template     Missed doses: No     Medication changes:  No     S/S of bleeding or thromboembolism:  No     New Injury or illness:  No     Changes in diet or alcohol consumption:  No     Upcoming surgery, procedure or cardioversion:  No    Anticoagulation Episode Summary     Current INR goal:   2.0-3.0   TTR:   68.8 %   Next INR check:   12/2/2019   INR from last check:   2.80 (11/4/2019)   Weekly max warfarin dose:      Target end date:      INR check location:      Preferred lab:      Send INR reminders to:   NICK MIDWAY    Indications    Paroxysmal atrial fibrillation (H) [I48.0]           Comments:   PAF         Anticoagulation Care Providers     Provider Role Specialty Phone number    Freddy Lubin MD  Internal Medicine [phone]

## 2021-06-03 NOTE — TELEPHONE ENCOUNTER
Anticoagulation Annual Referral Renewal Review    Frida Can's chart reviewed for annual renewal of referral to anticoagulation monitoring.        Criteria for anticoagulation nurse and/or pharmacist renewal met   Warfarin indication: Atrial Fibrillation Yes, per indication   Current with INR monitoring/compliant Yes Yes   Date of last office visit 7/22/19 Yes, had office visit within last year   Time in Therapeutic Range (TTR) new Yes, TTR > 60%       Frida Can met all criteria for anticoagulation management program initiated renewal.  New INR standing orders and anticoagulation referral renewal placed.      Reyna Radford RN  5:28 PM

## 2021-06-04 NOTE — TELEPHONE ENCOUNTER
ANTICOAGULATION  MANAGEMENT    Assessment     Today's INR result of 2.3 is Therapeutic (goal INR of 2.0-3.0)        Warfarin taken as previously instructed    No new diet changes affecting INR    No new medication/supplements affecting INR    Continues to tolerate warfarin with no reported s/s of bleeding or thromboembolism     Previous INR was Supratherapeutic    Plan:     Spoke with Frida regarding INR result and instructed:     Warfarin Dosing Instructions:  Continue current warfarin dose 1.25 mg daily on mon; and 2.5 mg daily rest of week      Instructed patient to follow up no later than: two weeks      Frida verbalizes understanding and agrees to warfarin dosing plan.    Instructed to call the [facility] for any changes, questions or concerns. (#[phone])   ?   Reyna Radford RN    Subjective/Objective:      Frida PANTERA Pamella, a 79 y.o. female is on warfarin.     Frida reports:     Home warfarin dose: as updated on anticoagulation calendar per template     Missed doses: No     Medication changes:  No     S/S of bleeding or thromboembolism:  No     New Injury or illness:  No     Changes in diet or alcohol consumption:  No     Upcoming surgery, procedure or cardioversion:  No    Anticoagulation Episode Summary     Current INR goal:   2.0-3.0   TTR:   69.7 % (1 y)   Next INR check:   12/30/2019   INR from last check:   2.30 (12/16/2019)   Weekly max warfarin dose:      Target end date:      INR check location:      Preferred lab:      Send INR reminders to:   NICK MIDWAY    Indications    Paroxysmal atrial fibrillation (H) [I48.0]           Comments:   PAF         Anticoagulation Care Providers     Provider Role Specialty Phone number    Freddy Lubin MD  Internal Medicine [phone]

## 2021-06-05 VITALS — HEIGHT: 61 IN | WEIGHT: 188 LBS | BODY MASS INDEX: 35.5 KG/M2

## 2021-06-05 NOTE — TELEPHONE ENCOUNTER
Refill Approved    Rx renewed per Medication Renewal Policy. Medication was last renewed on 4/12/19.    Alison Ely, [facility] Triage/Med Refill 1/14/2020     Requested Prescriptions   Pending Prescriptions Disp Refills     allopurinol (ZYLOPRIM) 300 MG tablet [Pharmacy Med Name: Allopurinol Oral Tablet 300 MG] 90 tablet 1     Sig: Take 1 tablet by mouth once daily.       Allopurinol/Febuxostat Refill Protocol  Passed - 1/12/2020  5:37 PM        Passed - LFT or AST or ALT in last 12 months     Albumin   Date Value Ref Range Status   07/22/2019 3.7 3.5 - 5.0 g/dL Final     Bilirubin, Total   Date Value Ref Range Status   07/22/2019 0.6 0.0 - 1.0 mg/dL Final     Alkaline Phosphatase   Date Value Ref Range Status   07/22/2019 109 45 - 120 U/L Final     AST   Date Value Ref Range Status   07/22/2019 17 0 - 40 U/L Final     ALT   Date Value Ref Range Status   07/22/2019 20 0 - 45 U/L Final     Protein, Total   Date Value Ref Range Status   07/22/2019 6.6 6.0 - 8.0 g/dL Final                Passed - Visit with PCP or prescribing provider visit in past 12 months     Last office visit with prescriber/PCP: 7/22/2019 Freddy Lubin MD OR same dept: 7/22/2019 Freddy Lubin MD OR same specialty: 7/22/2019 Freddy Lubin MD  Last physical: Visit date not found Last MTM visit: Visit date not found   Next visit within 3 mo: Visit date not found  Next physical within 3 mo: Visit date not found  Prescriber OR PCP: Freddy Lubin MD  Last diagnosis associated with med order: 1. Medication monitoring encounter  - allopurinol (ZYLOPRIM) 300 MG tablet [Pharmacy Med Name: Allopurinol Oral Tablet 300 MG]; take 1 tablet by mouth once daily  Dispense: 90 tablet; Refill: 1    If protocol passes may refill for 12 months if within 3 months of last provider visit (or a total of 15 months).

## 2021-06-05 NOTE — TELEPHONE ENCOUNTER
ANTICOAGULATION  MANAGEMENT    Assessment     Today's INR result of 2.7 is Therapeutic (goal INR of 2.0-3.0)        Warfarin taken as previously instructed    No new diet changes affecting INR    No new medication/supplements affecting INR    Continues to tolerate warfarin with no reported s/s of bleeding or thromboembolism     Previous INR was Therapeutic    Plan:     Spoke with Frida regarding INR result and instructed:     Warfarin Dosing Instructions:  Continue current warfarin dose 1.25 mg daily on mon; and 2.5 mg daily rest of week  (0 % change)    Instructed patient to follow up no later than: one month    Frida verbalizes understanding and agrees to warfarin dosing plan.    Instructed to call the [facility] for any changes, questions or concerns. (#[phone])   ?   Reyna Radford RN    Subjective/Objective:      Frida Can, a 79 y.o. female is on warfarin.     Frida reports:     Home warfarin dose: as updated on anticoagulation calendar per template     Missed doses: No     Medication changes:  No     S/S of bleeding or thromboembolism:  No     New Injury or illness:  No     Changes in diet or alcohol consumption:  No     Upcoming surgery, procedure or cardioversion:  No`       Anticoagulation Episode Summary     Current INR goal:   2.0-3.0   TTR:   74.7 % (1 y)   Next INR check:   3/2/2020   INR from last check:   2.70 (2/3/2020)   Weekly max warfarin dose:      Target end date:      INR check location:      Preferred lab:      Send INR reminders to:   NICK MIDWAY    Indications    Paroxysmal atrial fibrillation (H) [I48.0]           Comments:   PAF         Anticoagulation Care Providers     Provider Role Specialty Phone number    Freddy Lubin MD  Internal Medicine [phone]

## 2021-06-05 NOTE — TELEPHONE ENCOUNTER
ANTICOAGULATION  MANAGEMENT    Assessment     Today's INR result of 2.8 is Therapeutic (goal INR of 2.0-3.0)        Warfarin taken as previously instructed    No new diet changes affecting INR    No new medication/supplements affecting INR    Continues to tolerate warfarin with no reported s/s of bleeding or thromboembolism     Previous INR was Therapeutic    Plan:     Spoke with Frida regarding INR result and instructed:     Warfarin Dosing Instructions:  Continue current warfarin dose 1.25 mg daily on mon; and 2.5 mg daily rest of week  (0 % change)    Instructed patient to follow up no later than: one month      Frida verbalizes understanding and agrees to warfarin dosing plan.    Instructed to call the [facility] for any changes, questions or concerns. (#[phone])   ?   Reyna Radford RN    Subjective/Objective:      Frida Can, a 79 y.o. female is on warfarin.     Frida reports:     Home warfarin dose: as updated on anticoagulation calendar per template     Missed doses: No     Medication changes:  No     S/S of bleeding or thromboembolism:  No     New Injury or illness:  No     Changes in diet or alcohol consumption:  No     Upcoming surgery, procedure or cardioversion:  No    Anticoagulation Episode Summary     Current INR goal:   2.0-3.0   TTR:   70.6 % (1 y)   Next INR check:   2/4/2020   INR from last check:   2.80 (1/7/2020)   Weekly max warfarin dose:      Target end date:      INR check location:      Preferred lab:      Send INR reminders to:   NICK MIDWAY    Indications    Paroxysmal atrial fibrillation (H) [I48.0]           Comments:   PAF         Anticoagulation Care Providers     Provider Role Specialty Phone number    Freddy Lubin MD  Internal Medicine [phone]

## 2021-06-06 NOTE — TELEPHONE ENCOUNTER
ANTICOAGULATION  MANAGEMENT    Assessment     Today's INR result of 3.9 is Supratherapeutic (goal INR of 2.0-3.0)        Warfarin taken as previously instructed    Decreased greens/vitamin K intake may be affecting INR will add back in    No new medication/supplements affecting INR    Continues to tolerate warfarin with no reported s/s of bleeding or thromboembolism     Previous INR was Therapeutic    Plan:     Spoke with Frida regarding INR result and instructed:     Warfarin Dosing Instructions:  skip today, add a salad this week, then continue current warfarin dose 1.25 mg daily on mon; and 2.5 mg daily rest of week  (0 % change)    Instructed patient to follow up no later than: two weeks      Frida verbalizes understanding and agrees to warfarin dosing plan.    Instructed to call the [facility] for any changes, questions or concerns. (#[phone])   ?   Reyna Radford RN    Subjective/Objective:      Frida Can, a 79 y.o. female is on warfarin.     Frida reports:     Home warfarin dose: as updated on anticoagulation calendar per template     Missed doses: No     Medication changes:  No     S/S of bleeding or thromboembolism:  No     New Injury or illness:  No     Changes in diet or alcohol consumption:  No     Upcoming surgery, procedure or cardioversion:  No    Anticoagulation Episode Summary     Current INR goal:   2.0-3.0   TTR:   72.3 % (1 y)   Next INR check:   3/2/2020   INR from last check:   3.90! (3/2/2020)   Weekly max warfarin dose:      Target end date:      INR check location:      Preferred lab:      Send INR reminders to:   NICK MIDWAY    Indications    Paroxysmal atrial fibrillation (H) [I48.0]           Comments:   PAF         Anticoagulation Care Providers     Provider Role Specialty Phone number    Freddy Lubin MD  Internal Medicine [phone]

## 2021-06-07 ENCOUNTER — COMMUNICATION - HEALTHEAST (OUTPATIENT)
Dept: INTERNAL MEDICINE | Facility: CLINIC | Age: 81
End: 2021-06-07

## 2021-06-07 DIAGNOSIS — E11.9 TYPE 2 DIABETES MELLITUS (H): ICD-10-CM

## 2021-06-07 NOTE — PROGRESS NOTES
I met with Frida Can at the request of Dr. Galicia to recheck her blood pressure.  Blood pressure medications on the MAR were reviewed with patient.    Patient has taken all medications as per usual regimen: Yes  Patient reports tolerating them without any issues or concerns: Yes    Vitals:    05/04/20 1114   BP: 136/66       Blood pressure was taken, previous encounter was reviewed, patient was instructed to continue current plan.

## 2021-06-07 NOTE — PROGRESS NOTES
Frida: Diabetic control is fair, but not optimal with hemoglobin A1c of 7.0 and a blood sugar of 164.  I would advise increasing the metformin to two 500 mg tabs daily with food.  The calcium and parathyroid hormone levels are elevated consistent with primary hyperparathyroidism.  I feel that this can be monitored without further treatment at the present time.  Other labs all are good.  Labs should be rechecked at the time of your annual wellness visit.    Freddy Lubin MD

## 2021-06-07 NOTE — TELEPHONE ENCOUNTER
ANTICOAGULATION  MANAGEMENT    Assessment     Today's INR result of 3.2 is Supratherapeutic (goal INR of 2.0-3.0)        Warfarin taken as previously instructed    No new diet changes affecting INR    No new medication/supplements affecting INR    Continues to tolerate warfarin with no reported s/s of bleeding or thromboembolism     Previous INR was Supratherapeutic    Plan:     Spoke with Frida regarding INR result and instructed:     Warfarin Dosing Instructions:  Change warfarin dose to 1.25 mg daily on mon/thu/sat; and 2.5 mg daily rest of week  (0 % change)    Instructed patient to follow up no later than: two weeks    Frida verbalizes understanding and agrees to warfarin dosing plan.    Instructed to call the [facility] for any changes, questions or concerns. (#[phone])   ?   eRyna Radford RN    Subjective/Objective:      Frida Can, a 79 y.o. female is on warfarin.     Frida reports:     Home warfarin dose: as updated on anticoagulation calendar per template     Missed doses: No     Medication changes:  No     S/S of bleeding or thromboembolism:  No     New Injury or illness:  No     Changes in diet or alcohol consumption:  No     Upcoming surgery, procedure or cardioversion:  No    Anticoagulation Episode Summary     Current INR goal:   2.0-3.0   TTR:   69.6 % (1 y)   Next INR check:   5/18/2020   INR from last check:   3.20! (5/4/2020)   Weekly max warfarin dose:      Target end date:      INR check location:      Preferred lab:      Send INR reminders to:   NICK MIDWAY    Indications    Paroxysmal atrial fibrillation (H) [I48.0]           Comments:   PAF         Anticoagulation Care Providers     Provider Role Specialty Phone number    Freddy Lubin MD  Internal Medicine [phone]

## 2021-06-07 NOTE — PATIENT INSTRUCTIONS - HE
1.  Check fasting labs including d-dimer, basic metabolic panel, hemoglobin A1c, and parathyroid hormone level.    2.  Decrease amlodipine to 5 mg daily.  Note effect on peripheral edema.  Report blood pressure readings in 2 weeks    3.  Schedule DEXA scan when able.  Mammogram is due after 7/22    4.  Use Tylenol for knee pain along with OTC topical measures.  If knee pain gets worse, a clinic visit with x-ray and possible steroid injection would be considered    5.  Annual wellness visit in 3 months or as needed

## 2021-06-07 NOTE — TELEPHONE ENCOUNTER
ANTICOAGULATION  MANAGEMENT    Assessment     Today's INR result of 3.1 is Supratherapeutic (goal INR of 2.0-3.0)        Warfarin taken as previously instructed    Decreased greens/vitamin K intake may be affecting INR will resume usual amount    No new medication/supplements affecting INR    Continues to tolerate warfarin with no reported s/s of bleeding or thromboembolism     Previous INR was Therapeutic    Plan:     Spoke with Frida regarding INR result and instructed:     Warfarin Dosing Instructions:  Continue current warfarin dose 1.25 mg daily on mon/thu; and 2.5 mg daily rest of week  (0 % change)    Instructed patient to follow up no later than: 2 weeks, Frida agrees to 3 weeks        Frida verbalizes understanding and agrees to warfarin dosing plan.    Instructed to call the [facility] for any changes, questions or concerns. (#[phone])   ?   Reyna Radford RN    Subjective/Objective:      Frida Can, a 79 y.o. female is on warfarin.     Frida reports:     Home warfarin dose: as updated on anticoagulation calendar per template     Missed doses: No     Medication changes:  No     S/S of bleeding or thromboembolism:  No     New Injury or illness:  No     Changes in diet or alcohol consumption:  No     Upcoming surgery, procedure or cardioversion:  No    Anticoagulation Episode Summary     Current INR goal:   2.0-3.0   TTR:   71.9 % (1 y)   Next INR check:   4/27/2020   INR from last check:   3.10! (4/13/2020)   Weekly max warfarin dose:      Target end date:      INR check location:      Preferred lab:      Send INR reminders to:   NICK MIDWAY    Indications    Paroxysmal atrial fibrillation (H) [I48.0]           Comments:   PAF         Anticoagulation Care Providers     Provider Role Specialty Phone number    Freddy Lubin MD  Internal Medicine [phone]

## 2021-06-07 NOTE — PROGRESS NOTES
Patient would like the video invitation sent by: Send to e-mail at: [email or web address]      ASSESSMENT:  1. Type 2 diabetes mellitus without complication, without long-term current use of insulin (H)  Hemoglobin A1c was 6.9 on 7/22/2019.  She is on metformin without adverse effects.  She does not check blood sugars at home.  The hemoglobin A1c should be rechecked  - Glycosylated Hemoglobin A1c; Future  - Basic Metabolic Panel; Future    2. Primary hyperparathyroidism (H)  She has had consistent elevations in the parathyroid hormone level.  Her last calcium was is actually in the normal range at 10.0.  She would prefer to avoid surgical intervention if possible.  Her lowest T score was -1.4 on bone density in 2018.  This should be rechecked  - DXA Bone Density Scan; Future  - Basic Metabolic Panel; Future  - Parathyroid Hormone Intact; Future    3. Menopausal syndrome (hot flashes)  Recheck of bone density is advised after Covid 19 concerns diminished  - DXA Bone Density Scan; Future    4.  Peripheral edema  She notes swelling of the legs left greater than right.  She is on amlodipine 10 mg daily.  I suspect edema is more in the left due to her osteoarthritis of the knee.  However, screening with a d-dimer for deep venous thrombosis would be appropriate.  Cutting back on amlodipine would be advised.  She has been taking furosemide 20 mg daily.  I would prefer not to increase the dose unless necessary    5.  Left knee pain  Presumably related to osteoarthritis.  She reports this actually has improved over the past week.  Since she is on warfarin has edema, and significant hypertension, she should not use NSAIDs.  She was advised to use try OTC topical therapy.  If discomfort remains troublesome, and evaluation in clinic would be advised.  She should have an x-ray at that time with a steroid injection considered    6.  Paroxysmal atrial fibrillation:  She is on sotalol.  She has not had any recent symptomatic  palpitations.  She does have a pacemaker.  She is on warfarin as an anticoagulant    Preventive Health Care: DEXA scan is advised.  Other issues will be reviewed at her annual wellness visit    PLAN:  1.  Decrease amlodipine to 5 mg daily.  Note effect on peripheral edema.  Report blood pressure readings in several weeks    2.  Check hemoglobin A1c, basic metabolic panel, parathyroid hormone level, and d-dimer.  She gets labs done at the [facility]    3.  If d-dimer returns abnormal, she would need a venous ultrasound of the left leg    4.  Tylenol and OTC topical treatment for left knee pain    5.  Annual wellness visit in about 3 months    6.  Screening mammogram is due after 7/22      CHIEF COMPLAINT:  Chief Complaint   Patient presents with     Knee Pain     Foot Swelling       HISTORY OF PRESENT ILLNESS:  Frida is a 79 y.o. female calling in to the clinic today due to concerns about left knee pain.  This has troubled her for a number of weeks, discomfort actually has improved since she set up the appointment knee pain that bothers her with walking and bending.  There is no erythema or marked swelling of the knee.    She also notes swelling of the feet left leg greater than right.  This also has been present for about a month with minor edema earlier.  She is on furosemide 20 mg daily.  She is on chronic anticoagulation with warfarin    She takes clonidine 0.1 mg twice daily, furosemide 20 mg daily, losartan 100 mg daily, and amlodipine 10 mg daily for hypertension.  She has a wrist blood pressure cuff but has not used it recently after developing erratic readings    She has a history of primary hyperparathyroidism.  Last calcium was in the normal range at 10.0.  She would prefer to avoid surgical intervention if possible.  T score was -1.4 on DEXA scan in 2018.    REVIEW OF SYSTEMS:   She reports that she otherwise has been feeling well.  All other systems are negative.    PFSH:  Lives independently  "in the [place] area    TOBACCO USE:  Social History     Tobacco Use   Smoking Status Never Smoker   Smokeless Tobacco Never Used       VITALS:  Stated Blood Pressure Not checked recently  Stated Pulse not check  Stated Weight not checked    PHYSICAL EXAM:  (observations via audio).  Miguel Angel was not working for the exam  Alert pleasant talkative woman who seems in good spirits      ADDITIONAL HISTORY SUMMARIZED (2): Old records reviewed  CARE EVERYWHERE/ EXTRA INFORMATION (1):   RADIOLOGY TESTS (1): Previous mammogram and DEXA scans were reviewed.  She did not have a previous left knee x-ray  LABS (1): Labs from 7/19 were reviewed.  New orders were entered  CARDIOLOGY/MEDICINE TESTS (1): Echocardiogram from 2018 was reviewed  INDEPENDENT REVIEW DEXA was reviewed    Total data points:5    Video Start time:  10:00  Video End time:  10:24    The visit lasted a total of 24 minutes     CA intake time  4:00 minutes      The patient has been notified of following:     \"This video visit will be conducted via a call between you and your physician/provider. We have found that certain health care needs can be provided without the need for an in-person physical exam.  This service lets us provide the care you need with a video conversation.  If a prescription is necessary we can send it directly to your pharmacy.  If lab work is needed we can place an order for that and you can then stop by our lab to have the test done at a later time.    Video visits are billed at different rates depending on your insurance coverage. Please reach out to your insurance provider with any questions.    If during the course of the call the physician/provider feels a video visit is not appropriate, you will not be charged for this service.\"    Patient has given verbal consent to a Video visit? Yes    Patient would like to receive their AVS by AVS Preference: Barb.        Video-Visit Details    Type of service:  Video Visit.  (But video portion of " visit did not work).    Originating Location (pt. Location): Home    Distant Location (provider location):  [facility] INTERNAL MEDICINE     Mode of Communication:  Video Conference [facility]    Freddy Lubin MD

## 2021-06-07 NOTE — PROGRESS NOTES
ROS was done with the patient over the phone and is positive for:    HEART: Bilateral foot swelling.    MUSCLES/JOINTS: Left knee joint pain.    All other ROS are WNL.

## 2021-06-08 RX ORDER — METFORMIN HCL 500 MG
1000 TABLET, EXTENDED RELEASE 24 HR ORAL
Qty: 180 TABLET | Refills: 1 | Status: SHIPPED | OUTPATIENT
Start: 2021-06-08 | End: 2022-02-07

## 2021-06-08 NOTE — TELEPHONE ENCOUNTER
ANTICOAGULATION  MANAGEMENT    Assessment     Today's INR result of 2.9 is Therapeutic (goal INR of 2.0-3.0)        Warfarin taken as previously instructed    No new diet changes affecting INR    No new medication/supplements affecting INR    Continues to tolerate warfarin with no reported s/s of bleeding or thromboembolism     Previous INR was Supratherapeutic    Plan:     Spoke with Frida regarding INR result and instructed:     Warfarin Dosing Instructions:  Continue current warfarin dose 1.25 mg daily on mon/thu/sat; and 2.5 mg daily rest of week  (0 % change)    Instructed patient to follow up no later than: June 1      Frida verbalizes understanding and agrees to warfarin dosing plan.    Instructed to call the [facility] for any changes, questions or concerns. (#[phone])   ?   Reyna Radford RN    Subjective/Objective:      Frida Can, a 79 y.o. female is on warfarin.     Frida reports:     Home warfarin dose: verbally confirmed home dose with Frida and updated on anticoagulation calendar     Missed doses: No     Medication changes:  No     S/S of bleeding or thromboembolism:  No     New Injury or illness:  No     Changes in diet or alcohol consumption:  No     Upcoming surgery, procedure or cardioversion:  No    Anticoagulation Episode Summary     Current INR goal:   2.0-3.0   TTR:   67.1 % (1 y)   Next INR check:   6/1/2020   INR from last check:   2.90 (5/18/2020)   Weekly max warfarin dose:      Target end date:      INR check location:      Preferred lab:      Send INR reminders to:   NICK MIDWAY    Indications    Paroxysmal atrial fibrillation (H) [I48.0]           Comments:   PAF         Anticoagulation Care Providers     Provider Role Specialty Phone number    Freddy Lubin MD  Internal Medicine [phone]

## 2021-06-08 NOTE — TELEPHONE ENCOUNTER
ANTICOAGULATION  MANAGEMENT    Assessment     Today's INR result of 2.5 is Therapeutic (goal INR of 2.0-3.0)        Warfarin taken as previously instructed    No new diet changes affecting INR    No new medication/supplements affecting INR    Continues to tolerate warfarin with no reported s/s of bleeding or thromboembolism     Previous INR was Therapeutic    Plan:     Spoke with Frida regarding INR result and instructed:     Warfarin Dosing Instructions:  Continue current warfarin dose 1.25 mg daily on Mondays, Thursdays and Saturdays; and 2.5 mg daily rest of week  (0 % change)    Instructed patient to follow up no later than: one month    Education provided: importance of therapeutic range, importance of following up for INR monitoring at instructed interval and importance of taking warfarin as instructed    Frida verbalizes understanding and agrees to warfarin dosing plan.    Instructed to call the [facility] for any changes, questions or concerns. (#[phone])   ?   Cherri Cisneros RN    Subjective/Objective:      Frida MOTT Pamella, a 79 y.o. female is on warfarin.     Frida reports:     Home warfarin dose: verbally confirmed home dose with Frida and updated on anticoagulation calendar     Missed doses: No     Medication changes:  No     S/S of bleeding or thromboembolism:  No     New Injury or illness:  No     Changes in diet or alcohol consumption:  No     Upcoming surgery, procedure or cardioversion:  No    Anticoagulation Episode Summary     Current INR goal:   2.0-3.0   TTR:   67.1 % (1 y)   Next INR check:   6/29/2020   INR from last check:   2.50 (6/1/2020)   Weekly max warfarin dose:      Target end date:      INR check location:      Preferred lab:      Send INR reminders to:   NICK MIDWAY    Indications    Paroxysmal atrial fibrillation (H) [I48.0]           Comments:   PAF         Anticoagulation Care Providers     Provider Role Specialty Phone number    Freddy Lubin MD  Internal  Medicine [phone]

## 2021-06-09 NOTE — PROGRESS NOTES
I met with Frida Can at the request of Self to recheck her blood pressure.  Blood pressure medications on the MAR were reviewed with patient.    Patient has taken all medications as per usual regimen: Yes  Patient reports tolerating them without any issues or concerns: Yes    Vitals:    06/29/20 1134   BP: 134/62       Blood pressure was taken, previous encounter was reviewed, patient was instructed to continue current plan.

## 2021-06-09 NOTE — TELEPHONE ENCOUNTER
ANTICOAGULATION  MANAGEMENT    Assessment     Today's INR result of 1.5 is Subtherapeutic (goal INR of 2.0-3.0)        Warfarin taken as previously instructed    No new diet changes affecting INR    No new medication/supplements affecting INR    Continues to tolerate warfarin with no reported s/s of bleeding or thromboembolism     Previous INR was Therapeutic    Plan:     Spoke with Frida regarding INR result and instructed:     Warfarin Dosing Instructions:  2.5 mg tonight to boost then change warfarin dose to 1.25 mg daily on thu; and 2.5 mg daily rest of week  (9 % change)    Instructed patient to follow up no later than: one week      Frida verbalizes understanding and agrees to warfarin dosing plan.    Instructed to call the [facility] for any changes, questions or concerns. (#[phone])   ?   Reyna Radford RN    Subjective/Objective:      Frida Can, a 79 y.o. female is on warfarin.     Frida reports:     Home warfarin dose: as updated on anticoagulation calendar per template     Missed doses: No     Medication changes:  No     S/S of bleeding or thromboembolism:  No     New Injury or illness:  No     Changes in diet or alcohol consumption:  No     Upcoming surgery, procedure or cardioversion:  No    Anticoagulation Episode Summary     Current INR goal:   2.0-3.0   TTR:   63.2 % (1 y)   Next INR check:   7/13/2020   INR from last check:   1.50! (6/29/2020)   Weekly max warfarin dose:      Target end date:      INR check location:      Preferred lab:      Send INR reminders to:   NICK MIDWAY    Indications    Paroxysmal atrial fibrillation (H) [I48.0]           Comments:   PAF         Anticoagulation Care Providers     Provider Role Specialty Phone number    Freddy Lubin MD  Internal Medicine [phone]

## 2021-06-09 NOTE — TELEPHONE ENCOUNTER
Due for labs    Rx renewed per Medication Renewal Policy. Medication was last renewed on 1/14/20.    Alison Ely, [facility] Triage/Med Refill 7/14/2020     Requested Prescriptions   Pending Prescriptions Disp Refills     allopurinoL (ZYLOPRIM) 300 MG tablet [Pharmacy Med Name: Allopurinol Oral Tablet 300 MG] 90 tablet 0     Sig: Take 1 tablet by mouth once daily.       Allopurinol/Febuxostat Refill Protocol  Passed - 7/13/2020 11:20 AM        Passed - LFT or AST or ALT in last 12 months     Albumin   Date Value Ref Range Status   07/22/2019 3.7 3.5 - 5.0 g/dL Final     Bilirubin, Total   Date Value Ref Range Status   07/22/2019 0.6 0.0 - 1.0 mg/dL Final     Alkaline Phosphatase   Date Value Ref Range Status   07/22/2019 109 45 - 120 U/L Final     AST   Date Value Ref Range Status   07/22/2019 17 0 - 40 U/L Final     ALT   Date Value Ref Range Status   07/22/2019 20 0 - 45 U/L Final     Protein, Total   Date Value Ref Range Status   07/22/2019 6.6 6.0 - 8.0 g/dL Final                Passed - Visit with PCP or prescribing provider visit in past 12 months     Last office visit with prescriber/PCP: 7/22/2019 Freddy Lubin MD OR same dept: 7/22/2019 Freddy Lubin MD OR same specialty: 7/22/2019 Freddy Lubin MD  Last physical: Visit date not found Last MTM visit: Visit date not found   Next visit within 3 mo: Visit date not found  Next physical within 3 mo: Visit date not found  Prescriber OR PCP: Freddy Lubin MD  Last diagnosis associated with med order: 1. Medication monitoring encounter  - allopurinoL (ZYLOPRIM) 300 MG tablet [Pharmacy Med Name: Allopurinol Oral Tablet 300 MG]; Take 1 tablet by mouth once daily.   Dispense: 90 tablet; Refill: 0    If protocol passes may refill for 12 months if within 3 months of last provider visit (or a total of 15 months).

## 2021-06-09 NOTE — TELEPHONE ENCOUNTER
ANTICOAGULATION  MANAGEMENT    Assessment     Today's INR result of 2.8 is Therapeutic (goal INR of 2.0-3.0)        Warfarin taken as previously instructed    No new diet changes affecting INR    No new medication/supplements affecting INR    Continues to tolerate warfarin with no reported s/s of bleeding or thromboembolism     Previous INR was Therapeutic    Plan:     Spoke with Frida regarding INR result and instructed:     Warfarin Dosing Instructions:  Continue current warfarin dose 1.25 mg daily on mon/thu; and 2.5 mg daily rest of week  (0 % change)    Instructed patient to follow up no later than: two weeks    Frida verbalizes understanding and agrees to warfarin dosing plan.    Instructed to call the [facility] for any changes, questions or concerns. (#[phone])   ?   Reyna Radford RN    Subjective/Objective:      Frida Can, a 79 y.o. female is on warfarin.     Frida reports:     Home warfarin dose: as updated on anticoagulation calendar per template     Missed doses: No     Medication changes:  No     S/S of bleeding or thromboembolism:  No     New Injury or illness:  No     Changes in diet or alcohol consumption:  No     Upcoming surgery, procedure or cardioversion:  No    Anticoagulation Episode Summary     Current INR goal:   2.0-3.0   TTR:   62.3 % (1 y)   Next INR check:   7/20/2020   INR from last check:   2.80 (7/8/2020)   Weekly max warfarin dose:      Target end date:      INR check location:      Preferred lab:      Send INR reminders to:   [facility]    Indications    Paroxysmal atrial fibrillation (H) [I48.0]           Comments:   PAF         Anticoagulation Care Providers     Provider Role Specialty Phone number    Freddy Lubin MD  Internal Medicine [phone]

## 2021-06-09 NOTE — TELEPHONE ENCOUNTER
ANTICOAGULATION  MANAGEMENT    Assessment     Today's INR result of 2.1 is Therapeutic (goal INR of 2.0-3.0)        More warfarin taken than instructed which may be affecting INR confirmed    No new diet changes affecting INR    No new medication/supplements affecting INR    Continues to tolerate warfarin with no reported s/s of bleeding or thromboembolism     Previous INR was Therapeutic    Plan:     Spoke with Frida regarding INR result and instructed:     Warfarin Dosing Instructions:  Continue current warfarin dose 1.25 mg daily on thu; and 2.5 mg daily rest of week     Instructed patient to follow up no later than: two weeks with ov      Frida verbalizes understanding and agrees to warfarin dosing plan.    Instructed to call the [facility] for any changes, questions or concerns. (#[phone])   ?   Reyna Radford RN    Subjective/Objective:      Frida PANTERA Pamella, a 79 y.o. female is on warfarin.     Frida reports:     Home warfarin dose: as updated on anticoagulation calendar per template     Missed doses: No     Medication changes:  No     S/S of bleeding or thromboembolism:  No     New Injury or illness:  No     Changes in diet or alcohol consumption:  No     Upcoming surgery, procedure or cardioversion:  No    Anticoagulation Episode Summary     Current INR goal:   2.0-3.0   TTR:   62.3 % (1 y)   Next INR check:   8/4/2020   INR from last check:   2.10 (7/20/2020)   Weekly max warfarin dose:      Target end date:      INR check location:      Preferred lab:      Send INR reminders to:   NICK MIDWAY    Indications    Paroxysmal atrial fibrillation (H) [I48.0]           Comments:   PAF         Anticoagulation Care Providers     Provider Role Specialty Phone number    Freddy Lubin MD  Internal Medicine [phone]

## 2021-06-09 NOTE — TELEPHONE ENCOUNTER
Refill Approved    Rx renewed per Medication Renewal Policy. Medication was last renewed on 7/2/19.    Alison Ely, [facility] Triage/Med Refill 7/22/2020     Requested Prescriptions   Pending Prescriptions Disp Refills     furosemide (LASIX) 20 MG tablet [Pharmacy Med Name: Furosemide Oral Tablet 20 MG] 90 tablet 0     Sig: Take 1 tablet (20 mg total) by mouth daily.       Diuretics/Combination Diuretics Refill Protocol  Passed - 7/21/2020  9:48 AM        Passed - Visit with PCP or prescribing provider visit in past 12 months     Last office visit with prescriber/PCP: 7/22/2019 Freddy Lubin MD OR same dept: 7/22/2019 Freddy Lubin MD OR same specialty: 7/22/2019 Freddy Lubin MD  Last physical: Visit date not found Last MTM visit: Visit date not found   Next visit within 3 mo: Visit date not found  Next physical within 3 mo: Visit date not found  Prescriber OR PCP: Freddy Lubin MD  Last diagnosis associated with med order: 1. Essential hypertension with goal blood pressure less than 140/90  - furosemide (LASIX) 20 MG tablet [Pharmacy Med Name: Furosemide Oral Tablet 20 MG]; Take 1 tablet (20 mg total) by mouth daily.  Dispense: 90 tablet; Refill: 0    If protocol passes may refill for 12 months if within 3 months of last provider visit (or a total of 15 months).             Passed - Serum Potassium in past 12 months      Lab Results   Component Value Date    Potassium 4.7 05/04/2020             Passed - Serum Sodium in past 12 months      Lab Results   Component Value Date    Sodium 141 05/04/2020             Passed - Blood pressure on file in past 12 months     BP Readings from Last 1 Encounters:   06/29/20 134/62             Passed - Serum Creatinine in past 12 months      Creatinine   Date Value Ref Range Status   05/04/2020 1.20 (H) 0.60 - 1.10 mg/dL Final

## 2021-06-10 NOTE — PROGRESS NOTES
ASSESSMENT:  1.  Type 2 diabetes mellitus: She reports that blood sugars have been excellent ranging slightly over 100 in the a.m.  She denies any adverse effects from metformin.  Hemoglobin A1c returned excellent at 6.6.  2.  Hypertension: Blood pressure control is good on the current regimen.  3.  Obesity: Weight loss and regular exercise were encouraged  4.  Health maintenance  5.  Hyperlipidemia: On Lipitor 10 mg daily.  Lipid profile will be checked  6.  Hyperuricemia: On allopurinol 300 mg daily.  No problems with gout.  Last uric acid level was in the low normal range.  PLAN:  1.  Labs as outlined.  She will be notified of test results.  2.  Mammogram is due.  3.  Pneumo-23 booster today.  Tdap is due.  She should check her insurance to see if it is more cost effective to get at her pharmacy than here.  4.  Clinic follow-up in 6 months for diabetic recheck    Orders Placed This Encounter   Procedures     Microalbumin, Random Urine     Glycosylated Hemoglobin A1c     JIC RED     Medications Discontinued During This Encounter   Medication Reason     triamcinolone (KENALOG) 0.1 % cream Therapy completed       No Follow-up on file.       ASSESSED PROBLEMS:  1. Type 2 diabetes mellitus  Microalbumin, Random Urine    Glycosylated Hemoglobin A1c    JIC RED       CHIEF COMPLAINT:  Chief Complaint   Patient presents with     Diabetes     needs A1C     Hypertension     review blood pressures       HISTORY OF PRESENT ILLNESS:  Frida is a 76 y.o. female presenting to the clinic today for follow up for diabetes and hypertension.     Type 2 Diabetes: She continues on metformin 500mg twice daily with meals. Her most recent A1c was 7.7 on 9/19/16. She denies numbness or tingling in her feet.     Hypertension: She continues on amlodipine 10mg, clonadine 0.2mg, losartan 100mg, and furosemide 20mg daily. She does check her blood pressure at home and reports an average of 140 systolic. She denies ankle swelling. Her BP today  "in the clinic is 132/50.     Health Maintenance: Her last mammogram was 9/22/15 and was normal. She is due for Tdap.     REVIEW OF SYSTEMS:   She complains of dry skin on her feet. She is having no problems with any of her medications. She had an eye exam in January. All other systems are negative.    PFSH:  She does not have any plans for the summer.    TOBACCO USE:  History   Smoking Status     Never Smoker   Smokeless Tobacco     Not on file       VITALS:  Vitals:    05/12/17 1010   BP: 132/50   Patient Site: Left Arm   Patient Position: Sitting   Cuff Size: Adult Large   Pulse: 60   Weight: 203 lb 9.6 oz (92.4 kg)   Height: 5' 1.5\" (1.562 m)     Wt Readings from Last 3 Encounters:   05/12/17 203 lb 9.6 oz (92.4 kg)   10/10/16 216 lb 11.2 oz (98.3 kg)   10/03/16 215 lb 14.4 oz (97.9 kg)     Body mass index is 37.85 kg/(m^2).    PHYSICAL EXAM:  Constitutional:   Reveals an alert pleasant moderately obese woman. Affect appropriate. Does not seem acutely ill. Vitals: per nursing notes.  HEENT:  Thinning hair. Atraumatic.   Neck:  Supple, no carotid bruits or adenopathy.  Back:  No spine or CVA pain.  Thorax:  No bony deformities.  Lungs: Clear to A&P without rales or wheezes.  Respiratory effort normal.  Cardiac:  Regular rhythm. Normal S1, S2 with 2/6 systolic murmur left sternal border.   Abdomen:  Obese with active bowel sounds. No bruits, mass, or tenderness.  Extremities: Trace ankle edema. Pulses in the feet intact.  No foot ulcer.   Neuro: No stocking neuropathy. Diabetic foot exam done.     ADDITIONAL HISTORY SUMMARIZED (2): None.  DECISION TO OBTAIN EXTRA INFORMATION (1): HECTOR for Care Everywhere obtained.   RADIOLOGY TESTS (1): Mammogram ordered.  LABS (1): Labs ordered. Past labs reviewed.   MEDICINE TESTS (1): None.  INDEPENDENT REVIEW (2 each): None.     The visit lasted a total of 12 minutes face to face with the patient. Over 50% of the time was spent counseling and educating the patient about " diabetes.    I, Francesco Lofton, am scribing for and in the presence of, Dr. Lubin.    I, Dr. Lubin, personally performed the services described in this documentation, as scribed by Francesco Lofton in my presence, and it is both accurate and complete.    Dragon dictation was used for this note.  Speech recognition errors are a possibility.    MEDICATIONS:  Current Outpatient Prescriptions   Medication Sig Dispense Refill     allopurinol (ZYLOPRIM) 300 MG tablet TAKE 1 TABLET BY MOUTH DAILY 90 tablet 3     amLODIPine (NORVASC) 10 MG tablet TAKE 1 TABLET (10 MG TOTAL) BY MOUTH DAILY. 90 tablet 3     aspirin 81 MG EC tablet Take 81 mg by mouth daily.       atorvastatin (LIPITOR) 10 MG tablet TAKE 1 TABLET BY MOUTH EVERY DAY 90 tablet 3     blood glucose test strips Use 1 each As Directed daily. 100 each 11     cholecalciferol, vitamin D3, 1,000 unit tablet Take 1,000 Units by mouth daily.       cloNIDine HCl (CATAPRES) 0.1 MG tablet Take 1 tablet (0.1 mg total) by mouth 2 (two) times a day. 60 tablet 11     furosemide (LASIX) 20 MG tablet Take 1 tablet (20 mg total) by mouth daily. 30 tablet 11     losartan (COZAAR) 100 MG tablet TAKE 1 TABLET (100 MG TOTAL) BY MOUTH DAILY. 90 tablet 3     magnesium oxide (MAG-OX) 400 mg tablet Take 400 mg by mouth daily.       metFORMIN (GLUCOPHAGE) 500 MG tablet Take 1 tablet (500 mg total) by mouth 2 (two) times a day with meals. 60 tablet 11     peg 400-propylene glycol (SYSTANE) 0.4-0.3 % Drop Place 1 Drop into both eyes 2 times daily if needed for Dry Eyes. Indications: DRY EYE       No current facility-administered medications for this visit.        Total data points: 3

## 2021-06-10 NOTE — TELEPHONE ENCOUNTER
Refill Approved    Rx renewed per Medication Renewal Policy. Medication was last renewed on 8/8/19  Last OV:  4/27/20    Tali Mauro, [facility] Connection Triage/Med Refill 8/4/2020     Requested Prescriptions   Pending Prescriptions Disp Refills     cloNIDine HCL (CATAPRES) 0.1 MG tablet [Pharmacy Med Name: cloNIDine HCl Oral Tablet 0.1 MG] 180 tablet 2     Sig: Take 1 tabelt by mouth twice daily.       Clonidine/Hydralazine Refill Protocol Passed - 8/4/2020 10:28 AM        Passed - PCP or prescribing provider visit in past 12 months       Last office visit with prescriber/PCP: 7/22/2019 Freddy Lubin MD OR same dept: Visit date not found OR same specialty: 7/22/2019 Freddy Lubin MD  Last physical: Visit date not found Last MTM visit: Visit date not found   Next visit within 3 mo: Visit date not found  Next physical within 3 mo: Visit date not found  Prescriber OR PCP: Freddy Lubin MD  Last diagnosis associated with med order: 1. Essential hypertension, benign  - cloNIDine HCL (CATAPRES) 0.1 MG tablet [Pharmacy Med Name: cloNIDine HCl Oral Tablet 0.1 MG]; Take 1 tabelt by mouth twice daily.   Dispense: 180 tablet; Refill: 0    If protocol passes may refill for 12 months if within 3 months of last provider visit (or a total of 15 months).             Passed - Blood pressure filed in past 12 months     BP Readings from Last 1 Encounters:   06/29/20 134/62

## 2021-06-10 NOTE — TELEPHONE ENCOUNTER
ANTICOAGULATION  MANAGEMENT    Assessment     Today's INR result of 2.3 is Therapeutic (goal INR of 2.0-3.0)        Warfarin taken as previously instructed    No new diet changes affecting INR    No new medication/supplements affecting INR    Continues to tolerate warfarin with no reported s/s of bleeding or thromboembolism     Previous INR was Therapeutic    Plan:     Spoke on phone with Frida regarding INR result and instructed:     Warfarin Dosing Instructions:  Continue current warfarin dose 1.25 mg daily on thu; and 2.5 mg daily rest of week  (0 % change)    Instructed patient to follow up no later than: one month      Frida verbalizes understanding and agrees to warfarin dosing plan.    Instructed to call the [facility] for any changes, questions or concerns. (#[phone])   ?   Reyna Radford RN    Subjective/Objective:      Frida Can, a 79 y.o. female is on warfarin.     Frida reports:     Home warfarin dose: as updated on anticoagulation calendar per template     Missed doses: No     Medication changes:  No     S/S of bleeding or thromboembolism:  No     New Injury or illness:  No     Changes in diet or alcohol consumption:  No     Upcoming surgery, procedure or cardioversion:  No    Anticoagulation Episode Summary     Current INR goal:   2.0-3.0   TTR:   64.2 % (1 y)   Next INR check:   9/11/2020   INR from last check:   2.30 (8/10/2020)   Weekly max warfarin dose:      Target end date:      INR check location:      Preferred lab:      Send INR reminders to:   [facility]    Indications    Paroxysmal atrial fibrillation (H) [I48.0]           Comments:   PAF         Anticoagulation Care Providers     Provider Role Specialty Phone number    Freddy Lubin MD  Internal Medicine [phone]

## 2021-06-11 NOTE — PROGRESS NOTES
Frida: Diabetic control is acceptable with a hemoglobin A1c of 7.0 and a blood sugar of 130.  Parathyroid hormone value remains high at 143 consistent with primary hyperparathyroidism.  The calcium is minimally elevated to 10.6.  I feel we may continue with monitoring.  Your cholesterol is good with total value of 163 and an LDL fraction is 67.  Continue the atorvastatin.  Triglycerides are only accurately checked fasting.  Your vitamin D level is in the desired range.  The potassium, hemoglobin, liver and kidney tests are good.  It was nice to see you.    Freddy Lubin MD

## 2021-06-11 NOTE — TELEPHONE ENCOUNTER
Refill Approved    Rx renewed per Medication Renewal Policy. Medication was last renewed on 7/22/19.    Virtual Visit 4/28/20    Stephanie Davidson, Care Connection Triage/Med Refill 9/7/2020     Requested Prescriptions   Pending Prescriptions Disp Refills     atorvastatin (LIPITOR) 10 MG tablet [Pharmacy Med Name: Atorvastatin Calcium Oral Tablet 10 MG] 90 tablet 0     Sig: Take 1 tablet (10 mg total) by mouth daily.       Statins Refill Protocol (Hmg CoA Reductase Inhibitors) Passed - 9/7/2020 10:52 AM        Passed - PCP or prescribing provider visit in past 12 months      Last office visit with prescriber/PCP: 7/22/2019 Freddy Lubin MD OR same dept: Visit date not found OR same specialty: 7/22/2019 Freddy Lubin MD  Last physical: Visit date not found Last MTM visit: Visit date not found   Next visit within 3 mo: Visit date not found  Next physical within 3 mo: Visit date not found  Prescriber OR PCP: Freddy Lubin MD  Last diagnosis associated with med order: 1. Hyperlipidemia  - atorvastatin (LIPITOR) 10 MG tablet [Pharmacy Med Name: Atorvastatin Calcium Oral Tablet 10 MG]; Take 1 tablet (10 mg total) by mouth daily.  Dispense: 90 tablet; Refill: 0    2. Mixed hyperlipidemia  - atorvastatin (LIPITOR) 10 MG tablet [Pharmacy Med Name: Atorvastatin Calcium Oral Tablet 10 MG]; Take 1 tablet (10 mg total) by mouth daily.  Dispense: 90 tablet; Refill: 0    If protocol passes may refill for 12 months if within 3 months of last provider visit (or a total of 15 months).

## 2021-06-11 NOTE — PATIENT INSTRUCTIONS - HE
1.  Flu shot today    2.  Mammogram    3.  DXA scan in future when [facility] machine is open    4.  I will notify you of test results    5.  See in three to six months depending on test results

## 2021-06-11 NOTE — PROGRESS NOTES
Assessment and Plan:       1. Medicare annual wellness visit, subsequent  Frida lives independently.  She has 5 adult children.  She scores 3 on mini cog, but does not have obvious cognitive deficits.  Her health risk assessment was reviewed.  She is fairly sedentary.  She is independent in activities of daily living.  She does have a healthcare directive.    2. Paroxysmal atrial fibrillation (H)  She is on warfarin as an anticoagulant, and sotalol as an antiarrhythmic.  She denies symptomatic palpitations.  - INR    3. Cardiac pacemaker, dual, in situ  Pacemaker was placed in 2018 due to sick sinus syndrome.  It has been functioning normally.    4. Type 2 diabetes mellitus with hyperglycemia, without long-term current use of insulin (H)  She has on metformin 1000 mg daily.  She tolerates this well.  Hemoglobin A1c is stable at 7.0.  - Comprehensive Metabolic Panel  - Glycosylated Hemoglobin A1c  - Microalbumin, Random Urine  - Lipid Cascade  - blood glucose test strips; Use 1 each As Directed daily.  Dispense: 100 strip; Refill: 3    5. Primary hyperparathyroidism (H)  This has been managed with monitoring rather than surgical intervention.  Lowest T score was -1.4 in the right femoral neck in 2018.  Follow-up bone density study is advised this year.  - Parathyroid Hormone Intact  - Comprehensive Metabolic Panel  - Vitamin D, Total (25-Hydroxy)    6. Essential hypertension with goal blood pressure less than 140/90  She is on a quite aggressive medical regimen including losartan 100 mg daily, clonidine 0.1 mg twice daily, furosemide 20 mg daily, and amlodipine 5 mg daily.  Amlodipine was decreased from 10 to 5 mg daily last spring due to edema.  She has minor edema on the left lower leg, but overall edema is much improved on the lower amlodipine dose.   blood pressure has been good on the current regimen  - Comprehensive Metabolic Panel  - Urinalysis-UC if Indicated    7. Severe obesity with body mass index (BMI) of  35.0 to 39.9 with comorbidity (H)  Weight loss and increased exercise were encouraged    8. Mild aortic stenosis  Echocardiogram from 3/18 revealed mild aortic stenosis.  She has a very minimal heart murmur at this time.  I feel we could wait on rechecking the echocardiogram.    9. Encounter for therapeutic drug monitoring  Monitoring labs are checked as outlined  - Comprehensive Metabolic Panel  - HM2(CBC w/o Differential)    10. Visit for screening mammogram  Screening mammogram is encouraged  - Mammo Screening Bilateral; Future      12. Hyperuricemia  On allopurinol 300 mg daily.  Uric acid level will be checked.  She has not had any symptoms suggestive of gout.  - Uric Acid    Plan:  1.  Labs as ordered.  She will be notified of test results.    2.  Schedule DEXA scan when the mid [place] site is open.     3.  Schedule mammogram.    4.  Flu shot today.    5.  Follow-up in 3 to 6 months for diabetic recheck.    6.  The patient's current medical problems were reviewed.    The following high BMI interventions were performed this visit: encouragement to exercise and weight monitoring  The following health maintenance schedule was reviewed with the patient and provided in printed form in the after visit summary:   Health Maintenance   Topic Date Due     HEPATITIS B VACCINES (2 of 3 - Risk 3-dose series) 09/14/2005     ZOSTER VACCINES (2 of 3) 04/16/2008     DIABETIC FOOT EXAM  09/18/2018     DIABETIC EYE EXAM  11/16/2019     ADVANCE CARE PLANNING  01/09/2020     DXA SCAN  02/22/2020     LIPID  07/22/2020     MICROALBUMIN  07/22/2020     MAMMOGRAM  07/22/2020     A1C  03/11/2021     BMP  05/04/2021     MEDICARE ANNUAL WELLNESS VISIT  09/11/2021     FALL RISK ASSESSMENT  09/11/2021     COLORECTAL CANCER SCREENING  08/10/2026     TD 18+ HE  09/07/2027     PNEUMOCOCCAL IMMUNIZATION 65+ LOW/MEDIUM RISK  Completed     INFLUENZA VACCINE RULE BASED  Completed        Subjective:   Chief Complaint: Frida Can is an 79  y.o. female here for an Annual Wellness visit.   HPI: 79-year-old white female seen for an annual wellness visit.  Frida lives independently in her own home in [place].  She largely stays home due to COVID-19 concerns.  She does take occasional trips shopping and to the library.  She scores 3 on mini cog, but has no obvious cognitive deficits.  Her health risk assessment was reviewed.  She is quite sedentary.  She is independent in activities of daily living and does have an advanced directive.    She has a history of type 2 diabetes treated with metformin 1000 mg daily.  She has tolerated this well without diarrhea.  Hemoglobin A1c today is stable at 7.0.    She is treated with amlodipine, losartan, clonidine, and furosemide for hypertension.  Blood pressure has been good.    She is on atorvastatin for hyper cholesterolemia, and allopurinol for hyperuricemia.    She has a history of primary hyperparathyroidism.  She is asymptomatic with this.  Lowest T score was -1.4 on bone density testing in 2018.  This has been managed with observation rather than surgical intervention    She remains on warfarin due to a history of paroxysmal atrial fibrillation.  She is on sotalol for this and has not noted recent symptomatic palpitations.    She has a pacemaker due to sick sinus syndrome.    Review of Systems:    Please see above.  The rest of the review of systems are negative for all systems.    Patient Care Team:  Freddy Lubin MD as PCP - General  Freddy Lubin MD as Assigned PCP   Nahun Mina MD, cardiology  Patient Active Problem List   Diagnosis     Hyperplastic Polyp Of The Large Intestine     Diverticulosis     Osteopenia     Hypercholesterolemia     Gout     Transient Ischemic Attack     Essential hypertension with goal blood pressure less than 140/90     Severe obesity with body mass index (BMI) of 35.0 to 39.9 with comorbidity (H)     Type 2 diabetes mellitus (H)     Primary hyperparathyroidism (H)      SSS (sick sinus syndrome) (H)     Paroxysmal atrial fibrillation (H)     Tachy-irvin syndrome (H)     Sinus pause     Cardiac pacemaker, dual, in situ     Mild aortic stenosis     Past Medical History:   Diagnosis Date     Arrhythmia     Paroxysmal Atrial fibrillation     Diabetes mellitus (H)      Hypercalcemia      Hyperlipidemia      Hypertension      Obesity       Past Surgical History:   Procedure Laterality Date     CATARACT EXTRACTION, BILATERAL       EP PACEMAKER INSERT N/A 3/2/2018    Procedure: EP Pacemaker Insertion;  Surgeon: Nahun Mina MD;  Location: [facility];  Service:      GA REMOVE TONSILS/ADENOIDS,<13 Y/O      Description: Tonsillectomy With Adenoidectomy;  Recorded: 09/16/2008;     GA REVISE MEDIAN N/CARPAL TUNNEL SURG      Description: Neuroplasty Decompression Median Nerve At Carpal Tunnel;  Recorded: 11/01/2009;  Comments: Right- 1/99; Left- 4/99      Family History   Problem Relation Age of Onset     No Medical Problems Mother      No Medical Problems Father      No Medical Problems Sister      No Medical Problems Daughter      No Medical Problems Maternal Grandmother      No Medical Problems Maternal Grandfather      No Medical Problems Paternal Grandmother      No Medical Problems Paternal Grandfather      No Medical Problems Maternal Aunt      No Medical Problems Paternal Aunt       Social History     Socioeconomic History     Marital status:      Spouse name: Not on file     Number of children: Not on file     Years of education: Not on file     Highest education level: Not on file   Occupational History     Not on file   Social Needs     Financial resource strain: Not on file     Food insecurity     Worry: Not on file     Inability: Not on file     Transportation needs     Medical: Not on file     Non-medical: Not on file   Tobacco Use     Smoking status: Never Smoker     Smokeless tobacco: Never Used   Substance and Sexual Activity     Alcohol use: No     Drug  use: No     Sexual activity: Not on file   Lifestyle     Physical activity     Days per week: Not on file     Minutes per session: Not on file     Stress: Not on file   Relationships     Social connections     Talks on phone: Not on file     Gets together: Not on file     Attends Confucianism service: Not on file     Active member of club or organization: Not on file     Attends meetings of clubs or organizations: Not on file     Relationship status: Not on file     Intimate partner violence     Fear of current or ex partner: Not on file     Emotionally abused: Not on file     Physically abused: Not on file     Forced sexual activity: Not on file   Other Topics Concern     Not on file   Social History Narrative     Not on file      Current Outpatient Medications   Medication Sig Dispense Refill     allopurinoL (ZYLOPRIM) 300 MG tablet Take 1 tablet by mouth once daily.   90 tablet 3     amLODIPine (NORVASC) 5 MG tablet Take 1 tablet (5 mg total) by mouth daily. 30 tablet 11     atorvastatin (LIPITOR) 10 MG tablet Take 1 tablet (10 mg total) by mouth daily. 90 tablet 1     blood glucose test strips Use 1 each As Directed daily. 100 strip 3     cholecalciferol, vitamin D3, 1,000 unit tablet Take 1,000 Units by mouth daily.       cloNIDine HCL (CATAPRES) 0.1 MG tablet Take 1 tabelt by mouth twice daily.  180 tablet 2     furosemide (LASIX) 20 MG tablet Take 1 tablet (20 mg total) by mouth daily. 90 tablet 2     magnesium oxide (MAG-OX) 400 mg tablet Take 400 mg by mouth daily.        metFORMIN (GLUCOPHAGE-XR) 500 MG 24 hr tablet Take 2 tablets (1,000 mg total) by mouth daily with breakfast. 180 tablet 3     peg 400-propylene glycol (SYSTANE) 0.4-0.3 % Drop Place 1 Drop into both eyes 2 times daily if needed for Dry Eyes. Indications: DRY EYE       sotaloL (BETAPACE) 80 MG tablet Take 1 tablet (80 mg total) by mouth daily. 90 tablet 1     warfarin ANTICOAGULANT (COUMADIN/JANTOVEN) 2.5 MG tablet Take 1/2-1 tablet (1.25-2.5  "mg) by mouth daily. Adjust dose based on INR results as directed.   90 tablet 1     losartan (COZAAR) 100 MG tablet Take 1 tablet (100 mg total) by mouth daily. 90 tablet 3     No current facility-administered medications for this visit.       Objective:   Vital Signs:   Visit Vitals  /66 (Patient Site: Right Arm, Patient Position: Sitting, Cuff Size: Adult Regular)   Pulse 88   Ht 5' 1\" (1.549 m)   Wt 192 lb (87.1 kg)   SpO2 96%   BMI 36.28 kg/m           VisionScreening:  No exam data present     PHYSICAL EXAM  /66 (Patient Site: Right Arm, Patient Position: Sitting, Cuff Size: Adult Regular)   Pulse 88   Ht 5' 1\" (1.549 m)   Wt 192 lb (87.1 kg)   SpO2 96%   BMI 36.28 kg/m      General Appearance:  Alert, cooperative, no distress, appears stated age   Head:  Normocephalic, without obvious abnormality, atraumatic.  Thinning hair   Eyes:  PERRL, conjunctiva/corneas clear, EOM's intact   Ears:  Normal TM's and external ear canals, both ears   Nose: Nares normal, septum midline,mucosa normal, no drainage    Throat: Lips, mucosa, and tongue normal; teeth and gums normal   Neck: Supple, symmetrical, trachea midline, no adenopathy;  thyroid: not enlarged, symmetric, no tenderness/mass/nodules; no carotid bruit or JVD   Back:   Symmetric, no curvature, ROM normal, no CVA tenderness   Lungs:   Clear to auscultation bilaterally, respirations unlabored   Breasts:  No breast masses, tenderness, asymmetry, or nipple discharge.  Thorax: Pacer left chest   Heart:  Regular rate and rhythm, S1 and S2 normal, 1/6 systolic ejection murmur at aortic area. No rub, or gallop   Abdomen:   Soft, non-tender, bowel sounds active all four quadrants,  no masses, no organomegaly.  Moderately obese   Genitalia:  Not examined   Pelvic: Deferred   Extremities:  Trace ankle edema.  No foot ulcers.  Pulses intact   Skin: Skin color, texture, turgor normal, no rashes or lesions   Lymph nodes: Cervical, supraclavicular, and axillary " nodes normal   Neurologic: No dysarthria or aphasia.  Cranial nerves, motor, sensory exams intact.  No evidence for stocking neuropathy on diabetic foot exam.         Assessment Results 9/11/2020   Activities of Daily Living No help needed   Instrumental Activities of Daily Living No help needed   Mini Cog Total Score 3   Some recent data might be hidden     A Mini-Cog score of 0-2 suggests the possibility of dementia, score of 3-5 suggests no dementia        Identified Health Risks:

## 2021-06-11 NOTE — TELEPHONE ENCOUNTER
ANTICOAGULATION  MANAGEMENT    Assessment     Today's INR result of 3.0 is Therapeutic (goal INR of 2.0-3.0)        Warfarin taken as previously instructed    No new diet changes affecting INR    No new medication/supplements affecting INR    Continues to tolerate warfarin with no reported s/s of bleeding or thromboembolism     Previous INR was Therapeutic    Plan:     Spoke on phone with Frida regarding INR result and instructed:     Warfarin Dosing Instructions:  Continue current warfarin dose 1.25 mg daily on thu; and 2.5 mg daily rest of week      Instructed patient to follow up no later than: one month    Frida verbalizes understanding and agrees to warfarin dosing plan.    Instructed to call the [facility] for any changes, questions or concerns. (#[phone])   ?   Reyna Radford RN    Subjective/Objective:      Frida Can, a 79 y.o. female is on warfarin.     Frida reports:     Home warfarin dose: as updated on anticoagulation calendar per template     Missed doses: No     Medication changes:  No     S/S of bleeding or thromboembolism:  No     New Injury or illness:  No     Changes in diet or alcohol consumption:  No     Upcoming surgery, procedure or cardioversion:  No    Anticoagulation Episode Summary     Current INR goal:   2.0-3.0   TTR:   70.8 % (1 y)   Next INR check:   10/12/2020   INR from last check:   3.00 (9/11/2020)   Weekly max warfarin dose:      Target end date:      INR check location:      Preferred lab:      Send INR reminders to:   [facility]    Indications    Paroxysmal atrial fibrillation (H) [I48.0]           Comments:   PAF         Anticoagulation Care Providers     Provider Role Specialty Phone number    Freddy Lubin MD  Internal Medicine [phone]

## 2021-06-12 NOTE — TELEPHONE ENCOUNTER
ANTICOAGULATION  MANAGEMENT    Assessment     Today's INR result of 2.3 is Therapeutic (goal INR of 2.0-3.0)        Warfarin taken as previously instructed    No new diet changes affecting INR    No new medication/supplements affecting INR    Continues to tolerate warfarin with no reported s/s of bleeding or thromboembolism     Previous INR was Supratherapeutic    Plan:     Spoke on phone with Frida regarding INR result and instructed:     Warfarin Dosing Instructions:  Change warfarin dose to 1.25 mg daily on Thur; and 2.5 mg daily rest of week  (8 % change)    (Pt requested to have her dose increased back to the old dose because she thinks INR is dropping).     Instructed patient to follow up no later than: 2 weeks.     Education provided: importance of following up for INR monitoring at instructed interval and importance of taking warfarin as instructed    Frida verbalizes understanding and agrees to warfarin dosing plan.    Instructed to call the [facility] for any changes, questions or concerns. (#[phone])   ?   Jesús Eli RN    Subjective/Objective:      Frida MOTT Pamella, a 80 y.o. female is on warfarin.     Frida reports:     Home warfarin dose: verbally confirmed home dose with pt and updated on anticoagulation calendar     Missed doses: No     Medication changes:  No     S/S of bleeding or thromboembolism:  No     New Injury or illness:  No     Changes in diet or alcohol consumption:  No     Upcoming surgery, procedure or cardioversion:  No    Anticoagulation Episode Summary     Current INR goal:  2.0-3.0   TTR:  57.6 % (1 y)   Next INR check:  11/23/2020   INR from last check:  2.30 (11/9/2020)   Weekly max warfarin dose:     Target end date:     INR check location:     Preferred lab:     Send INR reminders to:  N[facility]    Indications    Paroxysmal atrial fibrillation (H) [I48.0]           Comments:  PAF         Anticoagulation Care Providers     Provider Role Specialty Phone number     Freddy Lubin MD  Internal Medicine [phone]

## 2021-06-13 NOTE — PROGRESS NOTES
ASSESSMENT:  1.  Hypertension: Blood pressure tends to run higher in the clinic at home, but seems acceptable.  She was advised to bring in her home cuff to next visit to compare    2.  Type 2 diabetes mellitus: Hemoglobin A1c was 6.6 in May.  She sometimes forgets her second dose of metformin.  After discussion, she is interested in switching to Metformin XR which may help with compliance.    3.  Obesity: Weight has declined 14 pounds since October 2016.  She was commended for this and encouraged to continue efforts at weight loss    4.  Hypercalcemia: Calcium will be rechecked today along with a vitamin D level and parathyroid hormone level    5.  Health maintenance: Influenza vaccine was advised    PLAN:  1.  Labs as outlined.  She will be notified of test result  2.  Change Metformin to sustained release 1000 mg daily  3.  Flu shot  4.  Clinic follow-up in 3 months    Orders Placed This Encounter   Procedures     Influenza High Dose, Seasonal 65+ yrs     Glycosylated Hemoglobin A1c     [facility] RED     Basic Metabolic Panel     Medications Discontinued During This Encounter   Medication Reason     metFORMIN (GLUCOPHAGE) 500 MG tablet Alternate therapy       Return in about 3 months (around 12/18/2017) for diabetes.    ASSESSED PROBLEMS:  1. Type 2 diabetes mellitus  Glycosylated Hemoglobin A1c    [facility] RED    Basic Metabolic Panel    metFORMIN (GLUCOPHAGE XR) 500 MG 24 hr tablet   2. Need for influenza vaccination  Influenza High Dose, Seasonal 65+ yrs       CHIEF COMPLAINT:  Chief Complaint   Patient presents with     Follow-up     high blood pressure       HISTORY OF PRESENT ILLNESS:  Frida is a 76 y.o. female presenting to the clinic today for a hypertension follow up. Her blood pressures were 152/62 and 132/68 in clinic today. She has been recording her blood pressures at home. Her systolic blood pressures have been around the 120's or low 130's. She has been experiencing some dry mouth, but she does not know if  "that is necessarily from her antihypertensive medications specifically. She is currently taking amlodipine, metoprolol succinate, losartan, Lasix, and clonidine. She denies any swelling in her ankles. Her orthostatic blood pressures were checked during her visit today.     Diabetes: Her last hemoglobin A1c was 6.6% as of May 12th. Her last microalbumin was 83.06 as of May 12th as well. She has been taking metformin daily.     Health Maintenance: She received the influenza vaccination today. She received her Boostrix injection on 9/7/17.     REVIEW OF SYSTEMS:   Her exercise tolerance has been okay. She does not wheeze when exerting herself.   All other systems are negative.    PFSH:  Social: She has been documenting her ancestral history for fun recently.     TOBACCO USE:  History   Smoking Status     Never Smoker   Smokeless Tobacco     Not on file       VITALS:  Vitals:    09/18/17 1134 09/18/17 1143 09/18/17 1156 09/18/17 1157   BP: 152/62 132/68 158/60 138/58   Patient Site: Left Arm Left Arm Right Arm Right Arm   Patient Position: Sitting Sitting Lying Standing   Cuff Size: Adult Large Adult Large     Pulse: 64      Weight: 202 lb (91.6 kg)      Height: 5' 1.5\" (1.562 m)        Wt Readings from Last 3 Encounters:   09/18/17 202 lb (91.6 kg)   05/12/17 203 lb 9.6 oz (92.4 kg)   10/10/16 216 lb 11.2 oz (98.3 kg)     Body mass index is 37.55 kg/(m^2).    PHYSICAL EXAM:  Constitutional:   Reveals an alert, pleasant, moderately obese female, affect appropriate. Does not appear acutely ill.  Vitals: per nursing notes.  BP rechecked by MD: 158/60, RUE, laying  BP rechecked by MD: 138/48, RUE, standing  HEENT:  Thinning hair. Atraumatic.   Neck:  Supple, no carotid bruits or adenopathy.  Back:  No spine or CVA pain.  Thorax:  No bony deformities.  Lungs: Clear to A&P without rales or wheezes.  Respiratory effort normal.  Cardiac:   Regular rate and rhythm, normal S1, S2, no murmur or gallop.  Abdomen:  Obese, active " bowel sounds, no masses or tenderness.   Extremities: 1+ ankle edema, no skin ulcers, no stasis changes.  Skin:  Skin clear.   Psychiatric:  Memory intact, mood appropriate.      ADDITIONAL HISTORY SUMMARIZED (2): None.  DECISION TO OBTAIN EXTRA INFORMATION (1): None.   RADIOLOGY TESTS (1): None.  LABS (1): Reviewed labs from 5/12/17; A1c, CMP, HM2, microalbumin. Ordered lab work today.   MEDICINE TESTS (1): None.  INDEPENDENT REVIEW (2 each): None.     The visit lasted a total of 17 minutes face to face with the patient. Over 50% of the time was spent counseling and educating the patient about hypertension.    Gerri DORANTES, am scribing for and in the presence of, Dr. Lubin.    LYNDSAY DORANTES, personally performed the services described in this documentation, as scribed by Gerri Potter in my presence, and it is both accurate and complete.    Dragon dictation was used for this note.  Speech recognition errors are a possibility.    MEDICATIONS:  Current Outpatient Prescriptions   Medication Sig Dispense Refill     allopurinol (ZYLOPRIM) 300 MG tablet TAKE 1 TABLET BY MOUTH DAILY 90 tablet 3     amLODIPine (NORVASC) 10 MG tablet take 1 tablet by mouth daily 90 tablet 2     aspirin 81 MG EC tablet Take 81 mg by mouth daily.       atorvastatin (LIPITOR) 10 MG tablet take 1 tablet by mouth every day. 90 tablet 2     blood glucose test strips Use 1 each As Directed daily. 100 each 11     cholecalciferol, vitamin D3, 1,000 unit tablet Take 1,000 Units by mouth daily.       cloNIDine HCl (CATAPRES) 0.1 MG tablet Take 1 tablet (0.1 mg total) by mouth 2 (two) times a day. 60 tablet 11     furosemide (LASIX) 20 MG tablet Take 1 tablet (20 mg total) by mouth daily. 30 tablet 11     furosemide (LASIX) 20 MG tablet TAKE 1 TABLET BY MOUTH DAILY. 30 tablet 8     losartan (COZAAR) 100 MG tablet TAKE ONE TABLET BY MOUTH ONE TIME DAILY  90 tablet 2     magnesium oxide (MAG-OX) 400 mg tablet Take 400 mg by mouth daily.        metoprolol succinate (TOPROL-XL) 50 MG 24 hr tablet Take 1 tablet (50 mg total) by mouth daily. 90 tablet 3     peg 400-propylene glycol (SYSTANE) 0.4-0.3 % Drop Place 1 Drop into both eyes 2 times daily if needed for Dry Eyes. Indications: DRY EYE       metFORMIN (GLUCOPHAGE XR) 500 MG 24 hr tablet Take 2 tablets (1,000 mg total) by mouth daily with breakfast. 180 tablet 3     No current facility-administered medications for this visit.        Total data points: 1

## 2021-06-13 NOTE — TELEPHONE ENCOUNTER
"Anticoagulation Annual Referral Renewal Review    Frida Can's chart reviewed for annual renewal of referral to anticoagulation monitoring.        Criteria for anticoagulation nurse and/or pharmacist renewal met   Warfarin indication: Atrial Fibrillation and TIA Yes, per indication   Current with INR monitoring/compliant Yes Yes   Date of last office visit 9/11/20 Yes, had office visit within last year   Time in Therapeutic Range (TTR) 57.6 % No, TTR < 60 %       Frida Can did NOT meet all criteria for anticoagulation management program initiated renewal and requires provider review. Using dot phrase, \".acmrenewalprovider\", please advise if [facility] anticoagulation management referral should be renewed or if patient should be seen in office to review anticoagulation therapy      Reyna Radford RN  4:43 PM      "

## 2021-06-13 NOTE — TELEPHONE ENCOUNTER
ANTICOAGULATION  MANAGEMENT    Assessment     Today's INR result of 2.1 is Therapeutic (goal INR of 2.0-3.0)        Warfarin taken as previously instructed    No new diet changes affecting INR    No new medication/supplements affecting INR    Continues to tolerate warfarin with no reported s/s of bleeding or thromboembolism     Previous INR was Therapeutic    Plan:     Spoke on phone with Frida regarding INR result and instructed:     Warfarin Dosing Instructions:  Continue current warfarin dose 1.25 mg daily on thu; and 2.5 mg daily rest of week  (0 % change)    Instructed patient to follow up no later than: one month    Frida verbalizes understanding and agrees to warfarin dosing plan.    Instructed to call the [facility] for any changes, questions or concerns. (#[phone])   ?   Reyna Radford RN    Subjective/Objective:      Frida Can, a 80 y.o. female is on warfarin.     Frida reports:     Home warfarin dose: verbally confirmed home dose with Frida and updated on anticoagulation calendar     Missed doses: No     Medication changes:  No     S/S of bleeding or thromboembolism:  No     New Injury or illness:  No     Changes in diet or alcohol consumption:  No     Upcoming surgery, procedure or cardioversion:  No    Anticoagulation Episode Summary     Current INR goal:  2.0-3.0   TTR:  59.1 % (1 y)   Next INR check:  12/21/2020   INR from last check:  2.10 (11/23/2020)   Weekly max warfarin dose:     Target end date:     INR check location:     Preferred lab:     Send INR reminders to:  [facility]    Indications    Paroxysmal atrial fibrillation (H) [I48.0]           Comments:  PAF         Anticoagulation Care Providers     Provider Role Specialty Phone number    Freddy Lubin MD  Internal Medicine [phone]

## 2021-06-13 NOTE — TELEPHONE ENCOUNTER
Who is calling:  Frida   Reason for Call:  Patient returning call to Reyna.  Date of last appointment with primary care: 9/11/2020  Okay to leave a detailed message: Yes

## 2021-06-13 NOTE — TELEPHONE ENCOUNTER
Provider Review: Anticoagulation Annual Referral Renewal    ACM Renewal Decision:  Renew ACM warfarin management      INR Range:   Continue management at current INR goal   Anticipated Duration of Therapy (from today):  Long-term anticoagulation      Freddy Lubin MD  5:57 PM

## 2021-06-14 NOTE — TELEPHONE ENCOUNTER
ANTICOAGULATION  MANAGEMENT    Assessment     Today's INR result of is Therapeutic (goal INR of 2.0-3.0)        Warfarin taken as previously instructed    No new diet changes affecting INR    No new medication/supplements affecting INR    Continues to tolerate warfarin with no reported s/s of bleeding or thromboembolism     Previous INR was Therapeutic    Plan:     Spoke on phone with Frida regarding INR result and instructed:     Warfarin Dosing Instructions:  Continue current warfarin dose 1.25 mg daily on thu; and 2.5 mg daily rest of week  (0 % change)    Instructed patient to follow up no later than: one month    Frida verbalizes understanding and agrees to warfarin dosing plan.    Instructed to call the [facility] for any changes, questions or concerns. (#[phone])   ?   Reyna Radford RN    Subjective/Objective:      Frida Can, a 80 y.o. female is on warfarin.     Frida reports:     Home warfarin dose: as updated on anticoagulation calendar per template     Missed doses: No     Medication changes:  No     S/S of bleeding or thromboembolism:  No     New Injury or illness:  No     Changes in diet or alcohol consumption:  No     Upcoming surgery, procedure or cardioversion:  No    Anticoagulation Episode Summary     Current INR goal:  2.0-3.0   TTR:  58.4 % (1 y)   Next INR check:  2/1/2021   INR from last check:  2.40 (1/4/2021)   Weekly max warfarin dose:     Target end date:     INR check location:     Preferred lab:     Send INR reminders to:  NICK [facility]    Indications    Paroxysmal atrial fibrillation (H) [I48.0]           Comments:  PAF         Anticoagulation Care Providers     Provider Role Specialty Phone number    Freddy Lubin MD  Internal Medicine [phone]

## 2021-06-15 NOTE — PROGRESS NOTES
ASSESSMENT:  1.  Type 2 diabetes mellitus:  Frida is on Metformin XR 1000 mg daily.  Hemoglobin A1c currently is acceptable at 6.9.  She will continue current management.    2.  Mixed hyperlipidemia:  On atorvastatin.   lipids were good when checked in September with a total cholesterol of 163 and an LDL of 67.    3.  Peripheral edema:  She has been swelling of both legs, but is worse on the left.  I suspect this is largely due to venous insufficiency.  It undoubtedly is aggravated by obesity, and may be aggravated by amlodipine.  She will be given a trial of a lower amlodipine dose, and furosemide will be increased.  Compressive stockings to the knees are advised although she has difficulty getting them on.    4.  Essential hypertension:  She has been on furosemide 20 mg daily, losartan 100 mg daily, clonidine 0.1 mg twice daily, and amlodipine 5 mg daily.  Control has been good.  As noted above, will try lowering the amlodipine to 2.5 mg daily to see if this decreases problems with peripheral edema.  She was advised to monitor blood pressure closely and notify me of results.    5.  Primary hyperparathyroidism:  She remains asymptomatic with this.  Bone density was in the young adult normal range in 2018.  This should be rechecked    6.  Paroxysmal atrial fibrillation:  She has not had recent symptomatic palpitations.  She remains on sotalol as an antiarrhythmic, and is on warfarin as an anticoagulant.    7.  History of gout:  No recent problems.  She is on allopurinol.    8.  Chronic renal insufficiency:  Renal function will be rechecked    9.  GI distress:  She notes chronic problems with abdominal bloating with intermittent periods of constipation and loose stools.  Symptoms sound suggestive of irritable bowel    PLAN:  1.  Labs as outlined.  She will be notified of test results.  Labs are done to evaluate for various causes of peripheral edema and for follow-up of chronic renal insufficiency and type 2  diabetes    2.  Increase furosemide to 40 mg daily for edema    3.  Decrease amlodipine to 2.5 mg daily    4.  Report the effect of medication changes on edema and blood pressure in 2 weeks.    5.  Compressive stockings to the knees.  Elevate legs, low-salt diet.    6.  Clinic follow-up in 3 months    7.  Follow-up DEXA scan is advised due to the history of primary hyperparathyroidism    8.  She may need supplemental potassium if the higher furosemide dose is continued long-term.      Orders Placed This Encounter   Procedures     BNP(B-type Natriuretic Peptide)     HM2(CBC w/o Differential)     Basic Metabolic Panel     Albumin     Glycosylated Hemoglobin A1c     Parathyroid Hormone Intact     Medications Discontinued During This Encounter   Medication Reason     atorvastatin (LIPITOR) 10 MG tablet Reorder       Return in about 3 months (around 6/9/2021) for Recheck.    ASSESSED PROBLEMS:  1. Peripheral edema  atorvastatin (LIPITOR) 10 MG tablet    BNP(B-type Natriuretic Peptide)    HM2(CBC w/o Differential)    Basic Metabolic Panel    Albumin   2. Hyperlipidemia  atorvastatin (LIPITOR) 10 MG tablet   3. Mixed hyperlipidemia  atorvastatin (LIPITOR) 10 MG tablet   4. Shortness of breath  BNP(B-type Natriuretic Peptide)   5. Stage 3a chronic kidney disease     6. Type 2 diabetes mellitus with hyperglycemia, without long-term current use of insulin (H)  Basic Metabolic Panel    Glycosylated Hemoglobin A1c   7. Hyperparathyroidism, primary (H)  Basic Metabolic Panel    Parathyroid Hormone Intact   8. Paroxysmal atrial fibrillation (H)     9. Severe obesity with body mass index (BMI) of 35.0 to 39.9 with comorbidity (H)         CHIEF COMPLAINT:  Chief Complaint   Patient presents with     Follow-up     feet check       HISTORY OF PRESENT ILLNESS:  Frida is a 80 y.o. female seen with multiple concerns.  She has a history of type 2 diabetes on Metformin XR 1000 mg daily.  Hemoglobin A1c was 7.0 when last checked.    Her  major concern is peripheral edema.  She has noted swelling of the lower legs and feet.  This is severe enough where she has difficulty getting shoes on the left foot.  There is no associated orthopnea or PND.  Edema improves, but does not totally go away overnight.  She does have a history of moderate renal insufficiency.  There was a mild increase in the microalbumin/creatinine ratio when last checked.  There is no history of gross proteinuria.  She is on amlodipine as part of her regimen for hypertension.  This could contribute to edema.    She reports blood pressure has been under good control on current regimen.    She has a history of primary hyperparathyroidism.  Her calcium level did decrease with switching from HCTZ to furosemide.    She remains on atorvastatin for mixed hyperlipidemia..    She does note some GI distress.  She notes abdominal bloating with intermittent periods of constipation and loose stools.    REVIEW OF SYSTEMS:    Comprehensive review of systems is otherwise negative.    PFSH:  Lives independently in [place].  Spends most of her time in her home    TOBACCO USE:  Social History     Tobacco Use   Smoking Status Never Smoker   Smokeless Tobacco Never Used       VITALS:  Vitals:    03/09/21 1706   BP: 130/60   Pulse: 71   SpO2: 96%   Weight: 188 lb (85.3 kg)     Wt Readings from Last 3 Encounters:   03/09/21 188 lb (85.3 kg)   09/11/20 192 lb (87.1 kg)   04/28/20 190 lb (86.2 kg)       PHYSICAL EXAM:  Constitutional:   Reveals an alert pleasant obese woman with thinning hair.  She does not seem in acute distress..  Vitals: per nursing notes.  HEENT: Atraumatic  Eyes: No conjunctival hyperemia  Oropharynx:   Mouth and throat clear, no thrush or exudate.  Neck:  Supple, no carotid bruits or adenopathy.  Back:  No spine or CVA pain.  Thorax:  No bony deformities.  Lungs: Clear to A&P without rales or wheezes.  Respiratory effort normal.  Cardiac:   Regular rate and rhythm, normal S1, S2, 1/6  to 2/6 systolic murmur upper left sternal border to aortic area.  Abdomen:  Soft, active bowel sounds without bruits, mass, or tenderness.  Moderately obese.  Nontender..  Extremities: 1+ peripheral edema, right lower leg with greater puffiness left foot.  Moderate stasis changes.  pulses in the feet intact.    Skin:  No jaundice, peripheral cyanosis or lesions to suggest malignancy.  Neuro:  Alert and oriented.   No gross focal deficits.  Psychiatric:  Memory intact, mood appropriate.    DATA REVIEWED:  Additional History from Old Records Summarized (2): Chart reviewed.  Decision to Obtain Records (1): None.   Radiology Tests Summarized or Ordered (1): None.  Labs Reviewed or Ordered (1): Labs reviewed and ordered  Medicine Test Summarized or Ordered (1): Cardiac echo from 2018 reviewed  Independent Review of EKG or X-RAY(2 each): None.    The visit lasted a total of 28 minutes face to face with the patient. Over 50% of the time was spent counseling and educating the patient about evaluation and management of peripheral edema, along with follow-up of type 2 diabetes, essential hypertension, and other concerns.    Dragon dictation was used for this note. Speech recognition errors are a possibility.     MEDICATIONS:  Current Outpatient Medications   Medication Sig Dispense Refill     allopurinoL (ZYLOPRIM) 300 MG tablet Take 1 tablet by mouth once daily.   90 tablet 3     amLODIPine (NORVASC) 5 MG tablet Take 1 tablet (5 mg total) by mouth daily. 30 tablet 11     atorvastatin (LIPITOR) 10 MG tablet Take 1 tablet (10 mg total) by mouth daily. 90 tablet 1     blood glucose test strips Use 1 each As Directed daily. 100 strip 3     cholecalciferol, vitamin D3, 1,000 unit tablet Take 1,000 Units by mouth daily.       cloNIDine HCL (CATAPRES) 0.1 MG tablet Take 1 tabelt by mouth twice daily.  180 tablet 2     furosemide (LASIX) 20 MG tablet Take 1 tablet (20 mg total) by mouth daily. 90 tablet 2     losartan (COZAAR) 100  MG tablet Take 1 tablet (100 mg total) by mouth daily. 90 tablet 3     magnesium oxide (MAG-OX) 400 mg tablet Take 400 mg by mouth daily.        metFORMIN (GLUCOPHAGE-XR) 500 MG 24 hr tablet Take 2 tablets (1,000 mg total) by mouth daily with breakfast. 180 tablet 3     peg 400-propylene glycol (SYSTANE) 0.4-0.3 % Drop Place 1 Drop into both eyes 2 times daily if needed for Dry Eyes. Indications: DRY EYE       sotaloL (BETAPACE) 80 MG tablet Take 1 tablet (80 mg total) by mouth daily. 90 tablet 1     warfarin ANTICOAGULANT (COUMADIN/JANTOVEN) 2.5 MG tablet Take 1/2-1 tablet (1.25-2.5 mg) by mouth daily. Adjust dose based on INR results as directed.   90 tablet 1     No current facility-administered medications for this visit.

## 2021-06-15 NOTE — TELEPHONE ENCOUNTER
ANTICOAGULATION  MANAGEMENT    Assessment     Today's INR result of 2.4 is Therapeutic (goal INR of 2.0-3.0)        Warfarin taken as previously instructed    No new diet changes affecting INR    No new medication/supplements affecting INR    Continues to tolerate warfarin with no reported s/s of bleeding or thromboembolism     Previous INR was Supratherapeutic    Plan:     Spoke on phone with Frida regarding INR result and instructed:      Warfarin Dosing Instructions:  Continue current warfarin dose 1.25 mg daily on mon/thu; and 2.5 mg daily rest of week  (0 % change)    Instructed patient to follow up no later than: two weeks      Frida verbalizes understanding and agrees to warfarin dosing plan.    Instructed to call the [facility] for any changes, questions or concerns. (#[phone])   ?   Reyna Radford RN    Subjective/Objective:      Frida Can, a 80 y.o. female is on warfarin. Frida Valverdena reports:     Home warfarin dose: as updated on anticoagulation calendar per template     Missed doses: No     Medication changes:  No     S/S of bleeding or thromboembolism:  No     New Injury or illness:  No     Changes in diet or alcohol consumption:  No     Upcoming surgery, procedure or cardioversion:  No    Anticoagulation Episode Summary     Current INR goal:  2.0-3.0   TTR:  57.1 % (1 y)   Next INR check:  3/1/2021   INR from last check:  2.40 (2/15/2021)   Weekly max warfarin dose:     Target end date:     INR check location:     Preferred lab:     Send INR reminders to:  NICK [facility]    Indications    Paroxysmal atrial fibrillation (H) [I48.0]           Comments:  PAF         Anticoagulation Care Providers     Provider Role Specialty Phone number    Freddy Lubin MD  Internal Medicine [phone]

## 2021-06-15 NOTE — PROGRESS NOTES
I met with Frida Can at the request of Dr. Lubin to recheck her blood pressure.  Blood pressure medications on the MAR were reviewed with patient.    Patient has taken all medications as per usual regimen: Yes  Patient reports tolerating them without any issues or concerns: Yes    Vitals:    03/15/21 1151   BP: 118/68   Pulse: 70   SpO2: 93%   Weight: 190 lb 8 oz (86.4 kg)       Blood pressure was taken, previous encounter was reviewed, recorded blood pressure below 140/90.  Patient was discharged and the note will be sent to the provider for final review.

## 2021-06-15 NOTE — TELEPHONE ENCOUNTER
ANTICOAGULATION  MANAGEMENT    Assessment     Today's INR result of 2.2 is Therapeutic (goal INR of 2.0-3.0)        Warfarin taken as previously instructed    No new diet changes affecting INR    No new medication/supplements affecting INR    Continues to tolerate warfarin with no reported s/s of bleeding or thromboembolism     Previous INR was Subtherapeutic    Plan:     Spoke on phone with Frida regarding INR result and instructed:      Warfarin Dosing Instructions:  Continue current warfarin dose 1.25 mg daily on mon/thu; and 2.5 mg daily rest of week      Instructed patient to follow up no later than: two weeks        Frida verbalizes understanding and agrees to warfarin dosing plan.    Instructed to call the [facility] for any changes, questions or concerns. (#[phone])   ?   Reyna Radford RN    Subjective/Objective:      Frida Can, a 80 y.o. female is on warfarin. Frida Valverdena reports:     Home warfarin dose: as updated on anticoagulation calendar per template     Missed doses: No     Medication changes:  No     S/S of bleeding or thromboembolism:  No     New Injury or illness:  No     Changes in diet or alcohol consumption:  No     Upcoming surgery, procedure or cardioversion:  No    Anticoagulation Episode Summary     Current INR goal:  2.0-3.0   TTR:  62.7 % (1 y)   Next INR check:  3/29/2021   INR from last check:  2.20 (3/15/2021)   Weekly max warfarin dose:     Target end date:     INR check location:     Preferred lab:     Send INR reminders to:  ANTICOEB[facility]    Indications    Paroxysmal atrial fibrillation (H) [I48.0]           Comments:  PAF         Anticoagulation Care Providers     Provider Role Specialty Phone number    Freddy Lubin MD  Internal Medicine [phone]

## 2021-06-15 NOTE — PATIENT INSTRUCTIONS - HE
1.  Increase furosemide to 40 mg daily for edema.    2.  Decrease Amlodipine to 2.5 mg daily.      3.  Report effects on edema and blood pressure in two weeks    4.  Compressive stockings to knees    5.  Elevate legs, low salt diet.    6.  See in three months.    7.  May need extra potassium if the higher furosemide dose is continued.

## 2021-06-16 ENCOUNTER — COMMUNICATION - HEALTHEAST (OUTPATIENT)
Dept: ONCOLOGY | Facility: CLINIC | Age: 81
End: 2021-06-16

## 2021-06-16 ENCOUNTER — OFFICE VISIT - HEALTHEAST (OUTPATIENT)
Dept: ONCOLOGY | Facility: CLINIC | Age: 81
End: 2021-06-16

## 2021-06-16 DIAGNOSIS — C50.311 MALIGNANT NEOPLASM OF LOWER-INNER QUADRANT OF RIGHT BREAST OF FEMALE, ESTROGEN RECEPTOR POSITIVE (H): ICD-10-CM

## 2021-06-16 DIAGNOSIS — Z17.0 MALIGNANT NEOPLASM OF LOWER-INNER QUADRANT OF RIGHT BREAST OF FEMALE, ESTROGEN RECEPTOR POSITIVE (H): ICD-10-CM

## 2021-06-16 PROBLEM — E21.0 PRIMARY HYPERPARATHYROIDISM (H): Status: ACTIVE | Noted: 2018-02-16

## 2021-06-16 PROBLEM — I48.0 PAROXYSMAL ATRIAL FIBRILLATION (H): Status: ACTIVE | Noted: 2018-03-02

## 2021-06-16 PROBLEM — C50.911 INVASIVE DUCTAL CARCINOMA OF BREAST, FEMALE, RIGHT (H): Status: ACTIVE | Noted: 2021-05-07

## 2021-06-16 PROBLEM — I49.5 SSS (SICK SINUS SYNDROME) (H): Status: ACTIVE | Noted: 2018-03-02

## 2021-06-16 PROBLEM — N18.30 CHRONIC KIDNEY DISEASE, STAGE 3 (H): Status: ACTIVE | Noted: 2021-03-09

## 2021-06-16 PROBLEM — Z95.0 CARDIAC PACEMAKER IN SITU: Status: ACTIVE | Noted: 2018-03-12

## 2021-06-16 PROBLEM — I35.0 MILD AORTIC STENOSIS: Status: ACTIVE | Noted: 2018-04-05

## 2021-06-16 RX ORDER — ANASTROZOLE 1 MG/1
1 TABLET ORAL DAILY
Qty: 30 TABLET | Refills: 1 | Status: SHIPPED | OUTPATIENT
Start: 2021-06-16 | End: 2021-08-16

## 2021-06-16 ASSESSMENT — MIFFLIN-ST. JEOR: SCORE: 1270.65

## 2021-06-16 NOTE — PROGRESS NOTES
DEVICE CLINIC RN POST-OP NOTE    Reason for visit: Post-op pacemaker check and wound assessment     Device: [email or web address] L331 Accolade EL MRI  Procedure date: 3/2/2018  Implant Diagnosis: PAF, SSS  Implanting Physician: Dr. Nahun Mina    Assessment  Subjective: No concerns expressed today.  Vitals:   Vitals:    03/12/18 1303   BP: 127/60   Pulse: 68   Resp: 12     Heart Sounds: normal  Lung Sounds: clear to auscultation bilaterally  Incision/device pocket: The steri-strips were removed from the incision and it was cleansed with adhesive remover and alcohol wipes. The incision is clean, dry and intact. There are no signs of infection present.      Device Findings  Interrogation of device reveals normal sensing and capture thresholds  See the [employer] Report for a full summary of the device information.      Patient Education    [facility]'s Partnership Agreement for Device Patients and Post-operative Checklist were presented and reviewed with patient at today's appointment.    Signs and symptoms of infection, poor wound healing, and device function were reviewed. Range of motion and weight restrictions for the left side are four weeks. She was issued a [employer] NXT remote monitor and instructed on its set-up and use for remote monitoring by the [employer] representative prior to hospital discharge. These instructions were reviewed with the patient at today s visit.       Plan  Return on 4/13/18 with Device RN and Dr. Leopoldo Santiago, RN BSN  [facility]

## 2021-06-16 NOTE — PROGRESS NOTES
Warfarin inr 6.3 today and hold warfarin to day and Monday and tue and then 2.5 mg all other days. After talking with pt and discussing history of greens/salads and medication change. Pt will  continue  with current diet and dosing of Warfarin.  Continue with moderation of Vit K and green leafy vegetables. Cautioned to call with increase bruising or bleeding. Reminded to call with medication change especially antibiotic. Call with any questions or concerns or any up coming procedures. Cautioned about using Herbal medication.

## 2021-06-16 NOTE — PROGRESS NOTES
In clinic device check with Dr Fagan.  Please see link for full device report.  Patient was informed of results and next follow up during today's visit.    CECILIO DavenportN, RN, CV-BC  Device Nurse  [facility]

## 2021-06-16 NOTE — PROGRESS NOTES
1940  [phone] (home)  There is no such number on file (mobile).    +++Important patient information for CSC/Cath Lab staff : None+++    Arrival time given to pt:    [facility] EP Cath Lab Procedure Order     Device Implant/Revision:  Procedure: New Implant  Device Type: Dual Pacemaker  Device Company/Device Rep Needed for Procedure: [employer]    Diagnosis:  Tachy-Frankie Syndrome  Anticipated Case Duration:  Standard  Scheduling Needs/Timeframe:  tomorrow 3/2  Anesthesia:  None  Research Protocol:  No    [facility] EP Cath Lab Prep   Ordering Provider: Dr Mina/Dr. Fagan  Ordering Date: 3/1/2018  Orders Status: Intial order placed and Order set placed  EP NC Contact: Megha Wallace RN    H&P:  Compled by Dr. Fagan on 3/1/18  PCP: Freddy Lubin MD, [phone]    Pre-op Labs: Ordered AM of procedure    Medical Records Pertinent for Procedure:  Holter KIRTI 2/16/18, Echo 3/2/18 and EKG 3/2/18    Patient Education:    Teach with Patient: Completed via phone prior to procedure    Risks Reviewed:     Pacemaker Insertion    <1% for each of the following:  infection, bleeding, hematoma, pneumothorax, subclavian vein thrombosis, cardiac perforation, cardiac tamponade, arrhythmias, pectoral or diaphragmatic stimulation, air embolus, pocket erosion, device interactions.    <4% lead dislodgment, <1% lead fracture or generator  malfunction.    <0.5% CVA or MI.    <0.1% death.    If external defibrillation or CV is needed, 25% risk for superficial burn.    For patients on anticoagulation, the risk of bleeding, hematoma and tamponade are increased.      Consent: Obtained in clinic by NC prior to procedure    Pre-Procedure Instructions that were Reviewed with Patient:  NPO after midnight, Notified patient of time and date of procedure by  , Transportation arrangements needed s/p procedure, Post-procedure follow up process, Sedation plan/orders and Pre-procedure letter was sent to pt by       Pre-Procedure Medication Instructions:  Instructions given to pt regarding anticoagulants: Pt is not on an anticoagulant- N/A  Instructions given to pt regarding antiarrhythmic medication: N/A; N/A  Instructions for medication, other than anticoagulants/antiarrhythmics listed above, given to pt: to hold metformin the morning of procedure, and to take remaining medications with small sips of water      Allergies   Allergen Reactions     Hydrochlorothiazide      Annotation: hyperuricemia made worse by HCTZ   Causes pts gout       Current Outpatient Prescriptions:      allopurinol (ZYLOPRIM) 300 MG tablet, TAKE 1 TABLET BY MOUTH DAILY, Disp: 90 tablet, Rfl: 3     amLODIPine (NORVASC) 10 MG tablet, take 1 tablet by mouth daily, Disp: 90 tablet, Rfl: 2     aspirin 81 MG EC tablet, Take 81 mg by mouth daily., Disp: , Rfl:      atorvastatin (LIPITOR) 10 MG tablet, take 1 tablet by mouth every day., Disp: 90 tablet, Rfl: 2     blood glucose test strips, Use 1 each As Directed daily., Disp: 100 each, Rfl: 11     cholecalciferol, vitamin D3, 1,000 unit tablet, Take 1,000 Units by mouth daily., Disp: , Rfl:      cloNIDine HCl (CATAPRES) 0.1 MG tablet, Take 1 tablet (0.1 mg total) by mouth at bedtime., Disp: 90 tablet, Rfl: 3     furosemide (LASIX) 20 MG tablet, TAKE 1 TABLET BY MOUTH DAILY., Disp: 30 tablet, Rfl: 8     losartan (COZAAR) 100 MG tablet, TAKE ONE TABLET BY MOUTH ONE TIME DAILY , Disp: 90 tablet, Rfl: 2     magnesium oxide (MAG-OX) 400 mg tablet, Take 400 mg by mouth daily., Disp: , Rfl:      metFORMIN (GLUCOPHAGE XR) 500 MG 24 hr tablet, Take 2 tablets (1,000 mg total) by mouth daily with breakfast., Disp: 180 tablet, Rfl: 3     metoprolol succinate (TOPROL-XL) 50 MG 24 hr tablet, Take 1 tablet (50 mg total) by mouth daily., Disp: 90 tablet, Rfl: 3     peg 400-propylene glycol (SYSTANE) 0.4-0.3 % Drop, Place 1 Drop into both eyes 2 times daily if needed for Dry Eyes. Indications: DRY EYE, Disp: ,  Rfl:

## 2021-06-16 NOTE — PROGRESS NOTES
ASSESSMENT:  1.  Type 2 diabetes mellitus: She remains on metformin.  I expect diabetic control will be excellent given her continued weight loss.    2.  Essential hypertension: Control is good.  He complains of dry mouth which is undoubtedly aggravated by clonidine.  I would favor trying to lower the dose    3.  Hypercalcemia: Her calcium and PTH levels were high when last checked.  This could represent primary hyperparathyroidism.  Labs will be rechecked today.  If still suggestive, further evaluation would be warranted    4.  Intermittent epigastric discomfort: Symptoms sound acid peptic in nature.  Currently it does not trouble her greatly.  If more troublesome, she was advised to give a trial of omeprazole    5.  Episodic palpitations: This generally occurs at night or in the early a.m.  It can last for several hours, but many days can go by between spells.  A cardiac event monitor will be recommended    6.  Health maintenance: Screening mammogram is advised    7.  Low bone mass: Bone density study is advised.  Her last was in 2012.  If labs suggest hyperparathyroidism, the wrist should be checked    8.  Hyperlipidemia: She is on atorvastatin 10 mg daily.  Lipids will be checked.  Aggressive control is warranted given the history of type 2 diabetes    9.  Obesity: Weight is down 26 pounds over 2 years through diet.  She was commended for this.  PLAN:  1.  Labs as outlined.  She will be notified of test results.  2.  Schedule DEXA scan and mammogram  3.  Decrease clonidine to 0.1 mg the evening.  Note effect on dry mouth.  Monitor blood pressure  4.  Try Prilosec 20 mg daily for 1 month of epigastric pain becomes more troublesome  5.  2 week cardiac event monitor for evaluation of palpitations    Orders Placed This Encounter   Procedures     DXA Bone Density Scan     Standing Status:   Future     Standing Expiration Date:   2/15/2019     Order Specific Question:   Can the procedure be changed per Radiologist  protocol?     Answer:   Yes     Parathyroid Hormone Intact     Glycosylated Hemoglobin A1c     Basic Metabolic Panel     Vitamin D, Total (25-Hydroxy)     Lipid Cascade     Order Specific Question:   Fasting is required?     Answer:   Unknown     AST (SGOT)     Uric Acid     Medications Discontinued During This Encounter   Medication Reason     metFORMIN (GLUCOPHAGE) 500 MG tablet Dose adjustment     cloNIDine HCl (CATAPRES) 0.1 MG tablet Reorder       No Follow-up on file.    ASSESSED PROBLEMS:  1. Hyperparathyroidism, primary  Parathyroid Hormone Intact    Basic Metabolic Panel    Vitamin D, Total (25-Hydroxy)    DXA Bone Density Scan   2. Type 2 diabetes mellitus  Glycosylated Hemoglobin A1c    Basic Metabolic Panel    Lipid Cascade   3. Essential hypertension with goal blood pressure less than 140/90  cloNIDine HCl (CATAPRES) 0.1 MG tablet   4. Palpitations  KIRTI Hook-Up   5. Mixed hyperlipidemia  Lipid Cascade   6. Encounter for medication monitoring  AST (SGOT)   7. Hyperuricemia  Uric Acid       CHIEF COMPLAINT:  Chief Complaint   Patient presents with     Follow-up     GI Problem       HISTORY OF PRESENT ILLNESS:  Frida is a 77 y.o. female presenting to the clinic today for follow up for hypertension and diabetes. Her last visit to the clinic was 9/18/17.     GI Issues: She reports alternating constipation and diarrhea. She also has a feeling of discomfort in her upper abdomen which she is unable to describe. She is unsure if it is heartburn but notes that antacids are helpful. She also has a feeling of fullness in her throat. She does not feel a medication is necessary at this time.     Hypertension: She continues on amlodipine 10mg daily, losartan 100mg daily, metoprolol 50mg daily, furosemide 20mg daily, and clonidine 0.1mg twice daily. Her BP today is 122/68 and MD recheck is 138/60.     Type 2 Diabetes: She continues on metformin 1000mg daily. Her most recent A1c was 6.6 on 9/18/17.     Weight Loss: Her  "weight today is 191 lbs as compared to 203 lbs at her last visit and 217 lbs on 7/1/16. She has been working on weight loss.     Health Maintenance: She is due for DXA and mammogram. She had an appointment for mammogram today but it had to be rescheduled by the clinic.     REVIEW OF SYSTEMS:   She notes occasional heart racing, which occurs about every week or two early in the morning. She had a normal Holter monitor and ECHO in May of 16 when she was noticing palpitations.   She also has occasional dizziness, describing this as feeling lightheaded. Twice in the past several weeks, she has felt as if she may faint. The episodes are brief.   She continues on calcium supplement 500mg daily.    All other systems are negative.    PFSH:    TOBACCO USE:  History   Smoking Status     Never Smoker   Smokeless Tobacco     Never Used       VITALS:  Vitals:    02/15/18 1018 02/15/18 1037   BP: 122/68 138/60   Patient Site: Left Arm Right Arm   Patient Position: Sitting Sitting   Cuff Size: Adult Large    Pulse: 78    Resp: 16    Weight: 191 lb 9.6 oz (86.9 kg)    Height: 5' 1.5\" (1.562 m)      Wt Readings from Last 3 Encounters:   02/15/18 191 lb 9.6 oz (86.9 kg)   09/18/17 202 lb (91.6 kg)   05/12/17 203 lb 9.6 oz (92.4 kg)     Body mass index is 35.62 kg/(m^2).      MEDICATIONS:  Current Outpatient Prescriptions   Medication Sig Dispense Refill     allopurinol (ZYLOPRIM) 300 MG tablet TAKE 1 TABLET BY MOUTH DAILY 90 tablet 3     amLODIPine (NORVASC) 10 MG tablet take 1 tablet by mouth daily 90 tablet 2     aspirin 81 MG EC tablet Take 81 mg by mouth daily.       atorvastatin (LIPITOR) 10 MG tablet take 1 tablet by mouth every day. 90 tablet 2     blood glucose test strips Use 1 each As Directed daily. 100 each 11     cholecalciferol, vitamin D3, 1,000 unit tablet Take 1,000 Units by mouth daily.       cloNIDine HCl (CATAPRES) 0.1 MG tablet Take 1 tablet (0.1 mg total) by mouth at bedtime. 90 tablet 3     furosemide (LASIX) " 20 MG tablet TAKE 1 TABLET BY MOUTH DAILY. 30 tablet 8     losartan (COZAAR) 100 MG tablet TAKE ONE TABLET BY MOUTH ONE TIME DAILY  90 tablet 2     magnesium oxide (MAG-OX) 400 mg tablet Take 400 mg by mouth daily.       metFORMIN (GLUCOPHAGE XR) 500 MG 24 hr tablet Take 2 tablets (1,000 mg total) by mouth daily with breakfast. 180 tablet 3     metoprolol succinate (TOPROL-XL) 50 MG 24 hr tablet Take 1 tablet (50 mg total) by mouth daily. 90 tablet 3     peg 400-propylene glycol (SYSTANE) 0.4-0.3 % Drop Place 1 Drop into both eyes 2 times daily if needed for Dry Eyes. Indications: DRY EYE       No current facility-administered medications for this visit.        PHYSICAL EXAM:  Constitutional:   Reveals an alert, pleasant, moderately obese woman. Affect appropriate. Does not seem acutely ill. Vitals: per nursing notes. MD recheck of BP: 138/60, right arm, sitting.   HEENT: Atraumatic. Thinning hair.    Eyes: No conjunctival hyperemia.  Oropharynx:   Mouth and throat clear, no thrush or exudate.  Neck:  Supple, no carotid bruits or adenopathy.  Back:  No spine or CVA pain.  Thorax:  No bony deformities.  Lungs: Clear to A&P without rales or wheezes.  Respiratory effort normal.  Cardiac:   Regular rate and rhythm, normal S1, S2, no murmur.  Abdomen:  Soft, moderately obese. No bruits, mass, or tenderness.  : Not done.   Extremities:   No edema.    Skin:  Clear.   Neuro:  No gross focal deficits. Detailed exam not done.       ADDITIONAL HISTORY SUMMARIZED (2): None.  DECISION TO OBTAIN EXTRA INFORMATION (1): None.   RADIOLOGY TESTS (1): DXA ordered.   LABS (1): Labs ordered.   MEDICINE TESTS (1): None.  INDEPENDENT REVIEW (2 each): None.     The visit lasted a total of 23 minutes face to face with the patient. Over 50% of the time was spent counseling and educating the patient about diabetes.    Francesco DORANTES, am scribing for and in the presence of, Dr. Lubin.    Freddy DORANTES, personally performed the  services described in this documentation, as scribed by Francesco Lofton in my presence, and it is both accurate and complete.    Dragon dictation was used for this note.  Speech recognition errors are a possibility.      Total data points: 2

## 2021-06-16 NOTE — PROGRESS NOTES
Frida: Bone density has declined in the hip since last scan, but is still in a good range for age.  I would advise continuing current conservative management with a recheck in 2 to 3 years.    Freddy Lubin MD

## 2021-06-16 NOTE — TELEPHONE ENCOUNTER
Telephone Encounter by Reyna Radford RN at 3/16/2020  1:55 PM     Author: Reyna Radford RN Service: -- Author Type: Registered Nurse    Filed: 3/16/2020  2:02 PM Encounter Date: 3/16/2020 Status: Attested    : Reyna Radford RN (Registered Nurse) Cosigner: Freddy Lubin MD at 3/16/2020  2:20 PM    Attestation signed by Freddy Lubin MD at 3/16/2020  2:20 PM                      ANTICOAGULATION  MANAGEMENT    Assessment     Today's INR result of 3.5 is Supratherapeutic (goal INR of 2.0-3.0)        Warfarin taken as previously instructed    No new diet changes affecting INR    No new medication/supplements affecting INR    Continues to tolerate warfarin with no reported s/s of bleeding or thromboembolism     Previous INR was Therapeutic    Plan:     Spoke with Frida regarding INR result and instructed:     Warfarin Dosing Instructions:  skip today then change warfarin dose to 1.25 mg daily on mon/thu; and 2.5 mg daily rest of week  (7.7 % change)    Instructed patient to follow up no later than: two weeks    Frida verbalizes understanding and agrees to warfarin dosing plan.    Instructed to call the [facility] for any changes, questions or concerns. (#[phone])   ?   Reyna Radford RN    Subjective/Objective:      Frida Can, a 79 y.o. female is on warfarin.     Frida reports:     Home warfarin dose: as updated on anticoagulation calendar per template     Missed doses: No     Medication changes:  No     S/S of bleeding or thromboembolism:  No     New Injury or illness:  No     Changes in diet or alcohol consumption:  No     Upcoming surgery, procedure or cardioversion:  No    Anticoagulation Episode Summary     Current INR goal:   2.0-3.0   TTR:   70.8 % (1 y)   Next INR check:   3/30/2020   INR from last check:   3.50! (3/16/2020)   Weekly max warfarin dose:      Target end date:      INR check location:      Preferred lab:      Send INR reminders to:   NICK MIDWAY    Indications     Paroxysmal atrial fibrillation (H) [I48.0]           Comments:   PAF         Anticoagulation Care Providers     Provider Role Specialty Phone number    Freddy Lubin MD  Internal Medicine [phone]

## 2021-06-16 NOTE — TELEPHONE ENCOUNTER
Pt came upstairs to talk to nurse, she just wanted her medication Furosemide 20mg updated at pharmacy change direction from 1 to 2 daily, call into [facility] [phone]

## 2021-06-16 NOTE — TELEPHONE ENCOUNTER
ANTICOAGULATION  MANAGEMENT    Assessment     Today's INR result of 2.7 is Therapeutic (goal INR of 2.0-3.0)        Warfarin taken as previously instructed    No new diet changes affecting INR    No new medication/supplements affecting INR    Continues to tolerate warfarin with no reported s/s of bleeding or thromboembolism     Previous INR was Therapeutic    Plan:     Left detailed message for Frida regarding INR result and instructed:      Warfarin Dosing Instructions:  Continue current warfarin dose 1.25 mg daily on mon/thu; and 2.5 mg daily rest of week  (0 % change)    Instructed patient to follow up no later than: one month      Instructed to call the [facility] for any changes, questions or concerns. (#[phone])   ?   Reyna Radford RN    Subjective/Objective:      Frida Can, a 80 y.o. female is on warfarin. Frida Galicia reports:     Home warfarin dose: as updated on anticoagulation calendar per template     Missed doses: No     Medication changes:  No     S/S of bleeding or thromboembolism:  No     New Injury or illness:  No     Changes in diet or alcohol consumption:  No     Upcoming surgery, procedure or cardioversion:  No    Anticoagulation Episode Summary     Current INR goal:  2.0-3.0   TTR:  64.6 % (1 y)   Next INR check:  4/26/2021   INR from last check:  2.70 (3/29/2021)   Weekly max warfarin dose:     Target end date:     INR check location:     Preferred lab:     Send INR reminders to:  NICK [facility]WAY    Indications    Paroxysmal atrial fibrillation (H) [I48.0]           Comments:  PAF         Anticoagulation Care Providers     Provider Role Specialty Phone number    Freddy Lubin MD  Internal Medicine [phone]

## 2021-06-16 NOTE — PROGRESS NOTES
Assessment:     1. PAF (paroxysmal atrial fibrillation)  INR     Frida is here Pre-Cardioversion? No  Past Medical History:   Diagnosis Date     Arrhythmia     Paroxysmal Atrial fibrillation     Diabetes mellitus      Hypercalcemia      Hyperlipidemia      Hypertension      Obesity        Plan:     Written dosage and follow-up instructions were provided in the After Visit Summary    Anticoagulation Episode Summary     Current INR goal 2.0-3.0   Next INR check 3/12/2018   INR from last check 2.1 (3/5/2018)   Weekly max dose    Target end date    INR check location    Preferred lab    Send INR reminders to  [facility] SUPPORT [facility]OL    Indications   Atrial fibrillation [I48.91]           Comments PAF         Anticoagulation Care Providers     Provider Role Specialty Phone number    Nahun Mina MD Referring Cardiology [phone]          Discussed the following:  Education provided in regards to  Diet, Bleeding signs and symptoms, Medication changes and Travel safety, Importance of consistent vitamin K intake , High Vitamin K foods discussed in detail , Low Vitamin K foods discussed in detail , Importance of therapeutic range , Potential interaction between warfarin and alcohol discussed in detail , Importance of taking medication as instructed , Allowing no longer than 4-6  weeks between INR monitoring, New patient education packet reviewed in  and My Guide to Warfarin Therapy   Frida displays a  good warfarin knowledge base. There were   no barriers to education  Alison Morales        Subjective/Objective:      ANTICOAGULATION MANAGEMENT    Frida Can is  a new warfarin patient    Freddy Lubin MD

## 2021-06-16 NOTE — PROGRESS NOTES
INR 2.4 Continue current management dosing of Warfarin. Continue  diet of moderate Vitamin K intake. Discussed with pt the need to call with questions or concerns or any change in medication especially herbal medication or OTC. Call with increased bleeding or bruising or any upcoming procedures.  Retest in 2 weeks on 2.5 mg daily.

## 2021-06-16 NOTE — TELEPHONE ENCOUNTER
Telephone Encounter by Reyna Radford RN at 3/30/2020  2:28 PM     Author: Reyna Radford RN Service: -- Author Type: Registered Nurse    Filed: 3/30/2020  2:43 PM Encounter Date: 3/30/2020 Status: Attested    : Reyna Radford RN (Registered Nurse)    Related Notes: Original Note by Reyna Radford RN (Registered Nurse) filed at 3/30/2020  2:41 PM    Cosigner: Freddy Lubin MD at 3/30/2020  3:52 PM    Attestation signed by Freddy Lubin MD at 3/30/2020  3:52 PM                      ANTICOAGULATION  MANAGEMENT    Assessment     Today's INR result of 2.2 is Therapeutic (goal INR of 2.0-3.0)        Warfarin taken as previously instructed    No new diet changes affecting INR    No new medication/supplements affecting INR    Continues to tolerate warfarin with no reported s/s of bleeding or thromboembolism     Previous INR was Supratherapeutic 3.5    Plan:     Spoke with Frida regarding INR result and instructed:     Warfarin Dosing Instructions:  Continue current warfarin dose 1.25 mg daily on mon/thu; and 2.5 mg daily rest of week  (0 % change)    Instructed patient to follow up no later than: two weeks      Frida verbalizes understanding and agrees to warfarin dosing plan.    Instructed to call the [facility] for any changes, questions or concerns. (#[phone])   ?   Reyna Radford RN    Subjective/Objective:      Frida PANTERA Pamella, a 79 y.o. female is on warfarin.     Frida reports:     Home warfarin dose: as updated on anticoagulation calendar per template     Missed doses: No     Medication changes:  No     S/S of bleeding or thromboembolism:  No     New Injury or illness:  No     Changes in diet or alcohol consumption:  No     Upcoming surgery, procedure or cardioversion:  No    Anticoagulation Episode Summary     Current INR goal:   2.0-3.0   TTR:   70.8 % (1 y)   Next INR check:   4/13/2020   INR from last check:   2.20 (3/30/2020)   Weekly max warfarin dose:      Target end date:      INR  check location:      Preferred lab:      Send INR reminders to:   [facility]    Indications    Paroxysmal atrial fibrillation (H) [I48.0]           Comments:   PAF         Anticoagulation Care Providers     Provider Role Specialty Phone number    Freddy Lubin MD  Internal Medicine [phone]

## 2021-06-16 NOTE — PROGRESS NOTES
Patient in for EKG after starting sotalol 80mg daily on 3/2,  Patient denies lightheadedness, dizziness, or any other side effects related to starting sotalol.   EKG confirms paced regular rhythm at 70bpm.   QT//419    Patient encouraged to call with any questions, concerns, or issues.  JW

## 2021-06-16 NOTE — TELEPHONE ENCOUNTER
Who is calling:  Patient   Reason for Call: Yesterday's  INR   Date of last appointment with primary care:   Okay to leave a detailed message: Yes    Patient didn't get a call regarding her INR yesterday 3/29.     Please call and advise.

## 2021-06-16 NOTE — TELEPHONE ENCOUNTER
furosemide (LASIX) 20 MG tablet  40 mg, Oral, DAILY 3/10/2021 FREDDY VILLARREAL 90 tablet 2 ordered           Edit       Summary: Take 2 tablets (40 mg total) by mouth daily., Starting Wed 3/10/2021, No Print   Dose, Frequency: 40 mg, DAILY  Ord/Sold: 3/10/2021 (O)  Report  Taking:   Long-term:   Pharmacy: [id number]  [place], [place] - [address]  Med Dose History       Patient Sig: Take 2 tablets (40 mg total) by mouth daily.       Ordered on: 3/10/2021        CASE:  No print.  Refills sent to pharmacy per protocol.    Refill Approved    Rx renewed per Medication Renewal Policy. Medication was last renewed on 7/22/20.    Eduin Black, [facility] [place] Connection Triage/Med Refill 4/5/2021     Requested Prescriptions   Pending Prescriptions Disp Refills     furosemide (LASIX) 20 MG tablet [Pharmacy Med Name: Furosemide Oral Tablet 20 MG] 90 tablet 0     Sig: Take 1 tablet (20 mg total) by mouth daily.       Diuretics/Combination Diuretics Refill Protocol  Passed - 4/4/2021  4:41 PM        Passed - Visit with PCP or prescribing provider visit in past 12 months     Last office visit with prescriber/PCP: 3/9/2021 Freddy Villarreal MD OR same dept: 3/9/2021 Freddy Villarreal MD OR same specialty: 3/9/2021 Freddy Villarreal MD  Last physical: 9/11/2020 Last MTM visit: Visit date not found   Next visit within 3 mo: Visit date not found  Next physical within 3 mo: Visit date not found  Prescriber OR PCP: Freddy Villarreal MD  Last diagnosis associated with med order: 1. Essential hypertension with goal blood pressure less than 140/90  - furosemide (LASIX) 20 MG tablet [Pharmacy Med Name: Furosemide Oral Tablet 20 MG]; Take 1 tablet (20 mg total) by mouth daily.  Dispense: 90 tablet; Refill: 0    If protocol passes may refill for 12 months if within 3 months of last provider visit (or a total of 15 months).             Passed - Serum Potassium in past 12 months      Lab Results   Component Value Date    Potassium 4.2  03/09/2021             Passed - Serum Sodium in past 12 months      Lab Results   Component Value Date    Sodium 139 03/09/2021             Passed - Blood pressure on file in past 12 months     BP Readings from Last 1 Encounters:   03/15/21 118/68             Passed - Serum Creatinine in past 12 months      Creatinine   Date Value Ref Range Status   03/09/2021 1.25 (H) 0.60 - 1.10 mg/dL Final

## 2021-06-16 NOTE — PROGRESS NOTES
Heart monitor was ordered due to complaints of intermittent palpitations.  The monitor did reveal intermittent atrial fibrillation with a rapid ventricular response.  It also demonstrated a pause of up to 12 seconds.  Frida remained asymptomatic with this.  She was advised to skip her metoprolol for the moment.  A rapid access cardiology appointment will be scheduled.  I anticipate that she will require pacemaker placement.  She then will require rate control for the atrial fibrillation and also an anticoagulant.  Plans were discussed with Frida

## 2021-06-17 NOTE — TELEPHONE ENCOUNTER
Telephone Encounter by Reyna Radford RN at 2/1/2021  5:24 PM     Author: Reyna Radford RN Service: -- Author Type: Registered Nurse    Filed: 2/1/2021  5:31 PM Encounter Date: 2/1/2021 Status: Signed    : Reyna Radford RN (Registered Nurse)       ANTICOAGULATION  MANAGEMENT    Assessment     Today's INR result of 3.2 is Supratherapeutic (goal INR of 2.0-3.0)        Warfarin taken as previously instructed    No new diet changes affecting INR    No new medication/supplements affecting INR    Continues to tolerate warfarin with no reported s/s of bleeding or thromboembolism     Previous INR was Therapeutic    Plan:     Spoke on phone with Frida regarding INR result and instructed:      Warfarin Dosing Instructions:  Change warfarin dose to 1.25 mg daily on mon/thu; and 5 mg daily rest of week  (7.7 % change)    Instructed patient to follow up no later than: two weeks    Frida verbalizes understanding and agrees to warfarin dosing plan.    Instructed to call the [facility] for any changes, questions or concerns. (#[phone])   ?   Reyna Radford RN    Subjective/Objective:      Frida Skinnerpollo, a 80 y.o. female is on warfarin. Frida Galicia reports:     Home warfarin dose: as updated on anticoagulation calendar per template     Missed doses: No     Medication changes:  No     S/S of bleeding or thromboembolism:  No     New Injury or illness:  No     Changes in diet or alcohol consumption:  No     Upcoming surgery, procedure or cardioversion:  No    Anticoagulation Episode Summary     Current INR goal:  2.0-3.0   TTR:  56.5 % (1 y)   Next INR check:  2/15/2021   INR from last check:  3.20 (2/1/2021)   Weekly max warfarin dose:     Target end date:     INR check location:     Preferred lab:     Send INR reminders to:  [facility]    Indications    Paroxysmal atrial fibrillation (H) [I48.0]           Comments:  PAF         Anticoagulation Care Providers     Provider Role Specialty Phone number     Freddy Lubin MD  Internal Medicine [phone]

## 2021-06-17 NOTE — PROGRESS NOTES
NR 2.6 INR stable. Discussed continuing management of dose of Warfarin and returning in one month . No changes to diet needed at this time. Continue moderate intake of Vitamin K and call if increase bruising or unexplained bleeding. Call with medication changes or upcoming procedures.

## 2021-06-17 NOTE — TELEPHONE ENCOUNTER
Who is calling:  Frida  Reason for Call:  Patient called requesting to speak with Dexterfermin. She stated her last INR was a little high, she would like to know if she should skip her Sunday night dose. Please advise.  Date of last appointment with primary care: 3/9/21  Okay to leave a detailed message: Yes

## 2021-06-17 NOTE — TELEPHONE ENCOUNTER
Telephone Encounter by Reyna Radford RN at 5/3/2021  1:27 PM     Author: Reyna Radford RN Service: -- Author Type: Registered Nurse    Filed: 5/3/2021  2:35 PM Encounter Date: 5/3/2021 Status: Signed    : Reyna Radford RN (Registered Nurse)       ANTICOAGULATION  MANAGEMENT    Assessment     Today's INR result of 1.5 is Subtherapeutic (goal INR of 2.0-3.0)        Warfarin recently held as instructed which may be affecting INR held x 3 for breast biopsy Monday 5/3    No new diet changes affecting INR    No new medication/supplements affecting INR    Continues to tolerate warfarin with no reported s/s of bleeding or thromboembolism     Previous INR was Therapeutic    Plan:     Spoke on phone with Frida regarding INR result and instructed:      Warfarin Dosing Instructions: Resume tonight with surgeon's ok  Continue current warfarin dose 1.25 mg daily on mon/thu; and 2.5 mg daily rest of week  (0 % change)    Instructed patient to follow up no later than: one week scheduled      Frida verbalizes understanding and agrees to warfarin dosing plan.    Instructed to call the [facility] for any changes, questions or concerns. (#[phone])   ?   Reyna Radford RN    Subjective/Objective:      Frida PANTERA Pamella, a 80 y.o. female is on warfarin. Frida Galicia reports:     Home warfarin dose: as updated on anticoagulation calendar per template     Missed doses: No     Medication changes:  No     S/S of bleeding or thromboembolism:  No     New Injury or illness:  No     Changes in diet or alcohol consumption:  No     Upcoming surgery, procedure or cardioversion:  No    Anticoagulation Episode Summary     Current INR goal:  2.0-3.0   TTR:  66.7 % (1 y)   Next INR check:  5/10/2021   INR from last check:  1.50 (5/3/2021)   Weekly max warfarin dose:     Target end date:     INR check location:     Preferred lab:     Send INR reminders to:  [facility]    Indications    Paroxysmal atrial fibrillation (H)  [I48.0]           Comments:  PAF         Anticoagulation Care Providers     Provider Role Specialty Phone number    Freddy Lubin MD  Internal Medicine [phone]

## 2021-06-17 NOTE — TELEPHONE ENCOUNTER
Telephone Encounter by Reyna Radford RN at 10/12/2020  1:26 PM     Author: Reyna Radford RN Service: -- Author Type: Registered Nurse    Filed: 10/12/2020  1:27 PM Encounter Date: 10/12/2020 Status: Signed    : Reyna Radford RN (Registered Nurse)       ANTICOAGULATION  MANAGEMENT    Assessment     Today's INR result of 3.4 is Supratherapeutic (goal INR of 2.0-3.0)        Warfarin taken as previously instructed    Decreased greens/vitamin K intake may be affecting INR will reinstate    No new medication/supplements affecting INR    Continues to tolerate warfarin with no reported s/s of bleeding or thromboembolism     Previous INR was Therapeutic    Plan:     Spoke on phone with Frida regarding INR result and instructed:     Warfarin Dosing Instructions:  skip today then continue current warfarin dose 1.25 mg daily on thu; and 2.5 mg daily rest of week  (    Instructed patient to follow up no later than: two weeks    Frida verbalizes understanding and agrees to warfarin dosing plan.    Instructed to call the [facility] for any changes, questions or concerns. (#[phone])   ?   Reyna Radford RN    Subjective/Objective:      Frida MOTT Pamella, a 80 y.o. female is on warfarin.     Frida reports:     Home warfarin dose: as updated on anticoagulation calendar per template     Missed doses: No     Medication changes:  No     S/S of bleeding or thromboembolism:  No     New Injury or illness:  No     Changes in diet or alcohol consumption:  No     Upcoming surgery, procedure or cardioversion:  No    Anticoagulation Episode Summary     Current INR goal:  2.0-3.0   TTR:  62.3 % (1 y)   Next INR check:  10/26/2020   INR from last check:  3.40 (10/12/2020)   Weekly max warfarin dose:     Target end date:     INR check location:     Preferred lab:     Send INR reminders to:  [facility]    Indications    Paroxysmal atrial fibrillation (H) [I48.0]           Comments:  PAF         Anticoagulation Care Providers      Provider Role Specialty Phone number    Freddy Lubin MD  Internal Medicine [phone]

## 2021-06-17 NOTE — TELEPHONE ENCOUNTER
New Appointment Needed  What is the reason for the visit:    Pre-Op Appt Request  When is the surgery? :  05-26-21  Where is the surgery?:   [facility]  Who is the surgeon? :  Dr. Peng  What type of surgery is being done?: Breast  Provider Preference: PCP only  How soon do you need to be seen?: tomorrow  Waitlist offered?: No  Okay to leave a detailed message:  Yes

## 2021-06-17 NOTE — PATIENT INSTRUCTIONS - HE
1..  Skip Coumadin five days before procedure    2.  Morning of surgery:  Take Losartan and Sotalol.  Skip others and resume after surgery.    3.  Diabetic recheck in about July

## 2021-06-17 NOTE — TELEPHONE ENCOUNTER
Telephone Encounter by Reyna Radford RN at 3/1/2021  2:01 PM     Author: Reyna Radford RN Service: -- Author Type: Registered Nurse    Filed: 3/1/2021  3:57 PM Encounter Date: 3/1/2021 Status: Signed    : Reyna Radford RN (Registered Nurse)       ANTICOAGULATION  MANAGEMENT    Assessment     Today's INR result of 1.9 is Therapeutic (goal INR of 2.0-3.0)        Warfarin taken as previously instructed    Increased greens/vitamin K intake may be affecting INR will resume usual    No new medication/supplements affecting INR    Continues to tolerate warfarin with no reported s/s of bleeding or thromboembolism     Previous INR was Therapeutic    Plan:     Spoke on phone with Frida regarding INR result and instructed:      Warfarin Dosing Instructions:  2.5 mg today to boost then continue current warfarin dose 1.25 mg daily on mon/thu; and 2.5 mg daily rest of week  (0 % change)    Instructed patient to follow up no later than: two weeks      Frida verbalizes understanding and agrees to warfarin dosing plan.    Instructed to call the [facility] for any changes, questions or concerns. (#[phone])   ?   Reyna Radford RN    Subjective/Objective:      Frida Can, a 80 y.o. female is on warfarin. Frida      Frida reports:     Home warfarin dose: as updated on anticoagulation calendar per template     Missed doses: No     Medication changes:  No     S/S of bleeding or thromboembolism:  No     New Injury or illness:  No     Changes in diet or alcohol consumption:  No     Upcoming surgery, procedure or cardioversion:  No    Anticoagulation Episode Summary     Current INR goal:  2.0-3.0   TTR:  60.1 % (1 y)   Next INR check:  3/15/2021   INR from last check:  1.90 (3/1/2021)   Weekly max warfarin dose:     Target end date:     INR check location:     Preferred lab:     Send INR reminders to:  [facility]    Indications    Paroxysmal atrial fibrillation (H) [I48.0]           Comments:  PAF          Anticoagulation Care Providers     Provider Role Specialty Phone number    Freddy Lubin MD  Internal Medicine [phone]

## 2021-06-17 NOTE — PROGRESS NOTES
This is a 80 y.o.  female who I'm asked to see by Freddy Lubin MD for evaluation of a right breast cancer.  This was picked up  on screening mammogram.  The patient cannot feel a mass.  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  It is estrogen receptor positive, progesterone receptor positive and HER-2 negative.    She has no family history of breast cancer.      PAST MEDICAL HISTORY:  Past Medical History:   Diagnosis Date     Arrhythmia     Paroxysmal Atrial fibrillation     Diabetes mellitus (H)      Hypercalcemia      Hyperlipidemia      Hypertension      Obesity      Past Surgical History:   Procedure Laterality Date     CATARACT EXTRACTION, BILATERAL       EP PACEMAKER INSERT N/A 3/2/2018    Procedure: EP Pacemaker Insertion;  Surgeon: Nahun Mina MD;  Location: [facility];  Service:      AK REMOVE TONSILS/ADENOIDS,<11 Y/O      Description: Tonsillectomy With Adenoidectomy;  Recorded: 09/16/2008;     AK REVISE MEDIAN N/CARPAL TUNNEL SURG      Description: Neuroplasty Decompression Median Nerve At Carpal Tunnel;  Recorded: 11/01/2009;  Comments: Right- 1/99; Left- 4/99      BREAST CORE BIOPSY RIGHT Right 5/3/2021       Medications:    Current Outpatient Medications:      allopurinoL (ZYLOPRIM) 300 MG tablet, Take 1 tablet by mouth once daily.  , Disp: 90 tablet, Rfl: 3     amLODIPine (NORVASC) 5 MG tablet, Take 1 tablet (5 mg total) by mouth daily., Disp: 30 tablet, Rfl: 11     atorvastatin (LIPITOR) 10 MG tablet, Take 1 tablet (10 mg total) by mouth daily., Disp: 90 tablet, Rfl: 1     blood glucose test strips, Use 1 each As Directed daily., Disp: 100 strip, Rfl: 3     cholecalciferol, vitamin D3, 1,000 unit tablet, Take 1,000 Units by mouth daily., Disp: , Rfl:      cloNIDine HCL (CATAPRES) 0.1 MG tablet, Take 1 tablet by mouth twice daily. , Disp: 180 tablet, Rfl: 3     furosemide (LASIX) 20 MG tablet, Take 2 tablets (40 mg total) by mouth daily., Disp: 180  tablet, Rfl: 3     losartan (COZAAR) 100 MG tablet, Take 1 tablet (100 mg total) by mouth daily., Disp: 90 tablet, Rfl: 3     magnesium oxide (MAG-OX) 400 mg tablet, Take 400 mg by mouth daily. , Disp: , Rfl:      metFORMIN (GLUCOPHAGE-XR) 500 MG 24 hr tablet, Take 2 tablets (1,000 mg total) by mouth daily with breakfast., Disp: 180 tablet, Rfl: 3     peg 400-propylene glycol (SYSTANE) 0.4-0.3 % Drop, Place 1 Drop into both eyes 2 times daily if needed for Dry Eyes. Indications: DRY EYE, Disp: , Rfl:      sotaloL (BETAPACE) 80 MG tablet, Take 1 tablet (80 mg total) by mouth daily., Disp: 90 tablet, Rfl: 1     warfarin ANTICOAGULANT (COUMADIN/JANTOVEN) 2.5 MG tablet, Take 1/2-1 tablet (1.25-2.5 mg) by mouth daily. Adjust dose based on INR results as directed.   , Disp: 90 tablet, Rfl: 1    Allergies:  Allergies   Allergen Reactions     Hydrochlorothiazide      Annotation: hyperuricemia made worse by HCTZ   Causes pts gout       Social History:   reports that she has never smoked. She has never used smokeless tobacco. She reports that she does not drink alcohol or use drugs.    ROS:  A 12 point comprehensive review of systems was negative except as noted.    Physical Exam  There were no vitals taken for this visit.  General:alert, appears stated age and cooperative  Breasts: No palpable masses in either breast.  Ptotic breasts.  No skin changes.  Lymph Nodes:no axillary nodes palpated  Neuro:Grossly normal  Musculoskeletal: Normal range of motion of her upper extremities.  Skin: Normal skin turgor.    Reviewed her mammograms and ultrasound and pathology.    Impression: Right Breast Cancer. Clinically T1, N0.  Discussed the surgical options for treatment of breast cancer which generally are a lumpectomy (partial mastectomy) combined with radiation versus a mastectomy.  Explained that the survival benefit is the same for both.  The difference is in local recurrence risk.  The patient is a Excellent candidate for a  lumpectomy.   Discussed SLN biopsy.  The procedure and rationale were explained.  Because of her age, and the fact that this is a very low-grade tumor, I do not feel sentinel lymph node by CT is indicated.  This is supported by the new recommendations of the ASBS.  Given what we see so far, chemotherapy would be highly unlikely hormone therapy would be very likely recommended.  She does have some risk for surgery being on Coumadin.  Discussed the need to stop this for 5 days prior to surgery.  I do not think she will need to be bridged with Lovenox.      Plan: We'll schedule for a right  lumpectomy without sentinel lymph node biopsy.  This is typically an outpatient procedure under local MAC anesthetic.  The risks and benefits of surgery were explained.  Also talked about expected recovery time.          50 minutes spent on the date of the encounter doing chart review, history and exam, documentation and further activities per the note

## 2021-06-17 NOTE — PROGRESS NOTES
"Frida presents to [facility] today for a surgical consult with Dr. Peng  regarding her newly diagnosed right breast cancer.  She is accompanied by her daughter for consult.  RN assessment and EMR update.  Resp 16   Ht 5' 1\" (1.549 m)   Wt 188 lb (85.3 kg)   Breastfeeding No   BMI 35.52 kg/m   Patient given a Breast Cancer Packet, contents reviewed.  She met with Dr. Peng  see dictation for details of visit. She will plan a right wire loc lumpectomy without sentinel node.  Pre and post op teaching complete.  She will need to stop her coumadin for 5 days prior to her surgery.  Walked her to [facility] for surgery scheduling.  Support provided, invited calls.  RN time 20 mins.  "

## 2021-06-17 NOTE — TELEPHONE ENCOUNTER
Talked with Frida who did take her boost dose on Monday, has breast biopsy mon 5/3. Would you have her hold warfarin at all prior to this procedure? thanks

## 2021-06-17 NOTE — TELEPHONE ENCOUNTER
Refill Approved    Rx renewed per Medication Renewal Policy. Medication was last renewed on 8/4/20.    Eduin Black, [facility] Triage/Med Refill 5/7/2021     Requested Prescriptions   Pending Prescriptions Disp Refills     cloNIDine HCL (CATAPRES) 0.1 MG tablet [Pharmacy Med Name: cloNIDine HCl Oral Tablet 0.1 MG] 180 tablet 0     Sig: Take 1 tablet by mouth twice daily.       Clonidine/Hydralazine Refill Protocol Passed - 5/7/2021  8:09 AM        Passed - PCP or prescribing provider visit in past 12 months       Last office visit with prescriber/PCP: 3/9/2021 Freddy Lubin MD OR same dept: 3/9/2021 Freddy Lubin MD OR same specialty: 3/9/2021 Freddy Lubin MD  Last physical: 9/11/2020 Last MTM visit: Visit date not found   Next visit within 3 mo: Visit date not found  Next physical within 3 mo: Visit date not found  Prescriber OR PCP: Freddy Lubin MD  Last diagnosis associated with med order: 1. Essential hypertension, benign  - cloNIDine HCL (CATAPRES) 0.1 MG tablet [Pharmacy Med Name: cloNIDine HCl Oral Tablet 0.1 MG]; Take 1 tablet by mouth twice daily.   Dispense: 180 tablet; Refill: 0    If protocol passes may refill for 12 months if within 3 months of last provider visit (or a total of 15 months).             Passed - Blood pressure filed in past 12 months     BP Readings from Last 1 Encounters:   04/29/21 140/74

## 2021-06-17 NOTE — TELEPHONE ENCOUNTER
Anticoagulation Management    Unable to reach Frida today.    Today's INR result of 2.2 is Therapeutic (goal INR of 2.0-3.0).     Follow up required to confirm warfarin doses taken.    Left message to continue current dose of warfarin 1.25 mg tonight.       ACN to follow up    Cherri Cisneros RN

## 2021-06-17 NOTE — PROGRESS NOTES
In clinic device check with Dr. Mina.  Please see link for full device report.  Patient was informed of results and next follow up during today's visit.

## 2021-06-17 NOTE — TELEPHONE ENCOUNTER
Telephone Encounter by Reyna Radford RN at 4/26/2021  1:24 PM     Author: Reyna Radford RN Service: -- Author Type: Registered Nurse    Filed: 4/26/2021  2:09 PM Encounter Date: 4/26/2021 Status: Signed    : Reyna Radford RN (Registered Nurse)       ANTICOAGULATION  MANAGEMENT    Assessment     Today's INR result of 1.8 is Subtherapeutic (goal INR of 2.0-3.0)        Missed dose(s) may be affecting INR    No new diet changes affecting INR    No new medication/supplements affecting INR    Continues to tolerate warfarin with no reported s/s of bleeding or thromboembolism     Previous INR was Therapeutic    Plan:     Spoke on phone with Frida regarding INR result and instructed:      Warfarin Dosing Instructions:  2.5 mg tonight to boost then continue current warfarin dose 1.25 mg daily on mon/thu; and 2.5 mg daily rest of week  (0 % change)    Instructed patient to follow up no later than: two weeks      Frida verbalizes understanding and agrees to warfarin dosing plan.    Instructed to call the [facility] for any changes, questions or concerns. (#[phone])   ?   Reyna Radford RN    Subjective/Objective:      Frida MOTT Pamella, a 80 y.o. female is on warfarin. Frida Galicia reports:     Home warfarin dose: verbally confirmed home dose with frida and updated on anticoagulation calendar     Missed doses: No     Medication changes:  No     S/S of bleeding or thromboembolism:  No     New Injury or illness:  No     Changes in diet or alcohol consumption:  No     Upcoming surgery, procedure or cardioversion:  No    Anticoagulation Episode Summary     Current INR goal:  2.0-3.0   TTR:  66.7 % (1 y)   Next INR check:  5/10/2021   INR from last check:  1.80 (4/26/2021)   Weekly max warfarin dose:     Target end date:     INR check location:     Preferred lab:     Send INR reminders to:  [facility]    Indications    Paroxysmal atrial fibrillation (H) [I48.0]           Comments:  PAF          Anticoagulation Care Providers     Provider Role Specialty Phone number    Freddy Lubin MD  Internal Medicine [phone]

## 2021-06-17 NOTE — TELEPHONE ENCOUNTER
Telephone Encounter by Reyna Radford RN at 12/21/2020 11:55 AM     Author: Reyna Radford RN Service: -- Author Type: Registered Nurse    Filed: 12/21/2020  4:52 PM Encounter Date: 12/21/2020 Status: Signed    : Reyna Radford RN (Registered Nurse)       ANTICOAGULATION  MANAGEMENT    Assessment     Today's INR result of 3.4 is Supratherapeutic (goal INR of 2.0-3.0)        Warfarin taken as previously instructed    No new diet changes affecting INR    No new medication/supplements affecting INR    Continues to tolerate warfarin with no reported s/s of bleeding or thromboembolism     Previous INR was Therapeutic    Plan:     Left detailed message for Frida regarding INR result and instructed:     Warfarin Dosing Instructions:  skip today then continue current warfarin dose 1.25  mg daily on thu; and 2.5 mg daily rest of week  (0 % change)    Instructed patient to follow up no later than: two weeks    Frida verbalizes understanding and agrees to warfarin dosing plan.    Instructed to call the [facility] for any changes, questions or concerns. (#[phone])   ?   Reyan Radford RN    Subjective/Objective:      Frida MOTT Pamella, a 80 y.o. female is on warfarin.     Frida reports:     Home warfarin dose: as updated on anticoagulation calendar per template     Missed doses: No     Medication changes:  No     S/S of bleeding or thromboembolism:  No     New Injury or illness:  No     Changes in diet or alcohol consumption:  No     Upcoming surgery, procedure or cardioversion:  No    Anticoagulation Episode Summary     Current INR goal:  2.0-3.0   TTR:  60.0 % (1 y)   Next INR check:  1/4/2021   INR from last check:  3.40 (12/21/2020)   Weekly max warfarin dose:     Target end date:     INR check location:     Preferred lab:     Send INR reminders to:  [facility]    Indications    Paroxysmal atrial fibrillation (H) [I48.0]           Comments:  PAF         Anticoagulation Care Providers     Provider Role  Specialty Phone number    Freddy Lubin MD  Internal Medicine [phone]

## 2021-06-17 NOTE — PROGRESS NOTES
Preoperative Exam    Scheduled Procedure: Right Lumpectomy after Wire Localizaiton   Surgery Date:  5/26/21  Surgery Location: [facility], fax [phone]    Surgeon:  Dr Rosanna Peng    Assessment/Plan:     1. Pre-op exam  Frida was diagnosed with invasive ductal cell carcinoma of the right breast on 5/3/2021 after an abnormal screening mammogram.  She has been seen by Dr. Peng in surgical consultation.  Right lumpectomy without sentinel node biopsy is scheduled for 5/26.  Frida appears medically stable for the planned procedure  - Electrocardiogram Perform and Read    2. Essential hypertension, benign  She is treated with losartan 100 mg daily, furosemide 40 mg daily, clonidine 0.1 mg twice daily and amlodipine 2.5 mg daily.  She did have problems with peripheral edema on higher amlodipine doses in the past.  Blood pressure control currently is acceptable.  She will take losartan the morning of planned surgery and skip clonidine and furosemide  - amLODIPine (NORVASC) 2.5 MG tablet; Take 2 tablets (5 mg total) by mouth daily.  Dispense: 90 tablet; Refill: 3    3.  Paroxysmal atrial fibrillation  She is on sotalol as an antiarrhythmic and warfarin as an anticoagulant.  Front will be held 5 days preprocedure.  There is no need for bridging    4.  History of pacemaker placement:  Current EKG reveals an atrial paced rhythm    5.  Primary hyperparathyroidism:  This has been mild and has been monitored with observation only.    6.  Type 2 diabetes mellitus:  On Metformin XR.  Hemoglobin A1c was at goal at 6.9 in March.    7.  Renal insufficiency stage III:  Labs will be checked preoperatively    Plan:  1.  Hemogram and basic metabolic panel    2.  EKG was done and reviewed    3.  Give warfarin 5 days preoperatively    4.  Morning of surgery take sotalol and losartan.  Skip furosemide, clonidine, and others and resume postoperatively.    5.  See for diabetic recheck in July    Surgical Procedure Risk: Low  (reported cardiac risk generally < 1%)  Have you had prior anesthesia?: Yes  Have you or any family members had a previous anesthesia reaction:  No  Do you or any family members have a history of a clotting or bleeding disorder?: No  Cardiac Risk Assessment: no increased risk for major cardiac complications    APPROVAL GIVEN to proceed with proposed procedure, without further diagnostic evaluation    Functional Status: Independent  Patient plans to recover at home with family.     Subjective:      Frida Can is a 80 y.o. female who presents for a preoperative consultation.     HPI related to upcoming procedure: 80-year-old woman seen for preoperative assessment prior to undergoing lumpectomy of the right breast without sentinel node dissection.  Frida was diagnosed with invasive ductal carcinoma of the right breast by needle biopsy on 5/3/2021 after an abnormal screening mammogram.  Tumor is ER/CT positive, HER 2/URMILA negative.  Frida has met with Dr. Peng is comfortable with going ahead with the procedure.    She is on warfarin for paroxysmal atrial fibrillation.  This will be held without bridging for 5 days preprocedure.  Is on sotalol as an antiarrhythmic.  This should be taken the morning of planned surgery    She has a history of type 2 diabetes mellitus on Metformin XR.  Last hemoglobin A1c was 6.9.  Metformin will be held the morning of surgery    She is on amlodipine 2.5 mg daily, losartan 100 mg daily, furosemide 40 mg daily and clonidine 0.1 mg twice daily for hypertension.  She has not tolerated higher doses amlodipine due to edema.  She will take losartan the morning of surgery and skip clonidine and furosemide.    Other medical issues are stable      Preop Questions 5/24/2021   Have you ever had a heart attack or stroke? No   Have you ever had surgery on your heart or blood vessels, such as a stent placement, a coronary artery bypass, or surgery on an artery in your head, neck, heart, or legs?  No   Do you have chest pain with activity? No   Do you have a history of  heart failure? No   Do you currently have a cold, bronchitis or symptoms of other infection? No   Do you have a cough, shortness of breath, or wheezing? No   Do you or anyone in your family have previous history of blood clots? No   Do you or does anyone in your family have a serious bleeding problem such as prolonged bleeding following surgeries or cuts? No   Have you ever had problems with anemia or been told to take iron pills? No   Have you had any abnormal blood loss such as black, tarry or bloody stools, or abnormal vaginal bleeding? No   Have you ever had a blood transfusion? No   Are you willing to have a blood transfusion if it is medically needed before, during, or after your surgery? Yes   Have you or any of your relatives ever had problems with anesthesia? No   Do you have sleep apnea, excessive snoring or daytime drowsiness? No   Do you have any artifical heart valves or other implanted medical devices like a pacemaker, defibrillator, or continuous glucose monitor? YES - Pacemaker   What type of device do you have? Pacemaker   Name of the clinic that manages your device:  Dr F[facility]   Do you have artificial joints? No   Are you allergic to latex? No       Echocardiogram from and 4/21 revealed an ejection fraction of 55 to 60% with mild aortic stenosis      ROS:  Review of Systems - Negative except mild ankle swelling.  No recent problems with gout.      Current Outpatient Medications   Medication Sig Dispense Refill     allopurinoL (ZYLOPRIM) 300 MG tablet Take 1 tablet by mouth once daily.   90 tablet 3     amLODIPine (NORVASC) 2.5 MG tablet Take 1 tablet (2.5 mg total) by mouth daily. 30 tablet 11     atorvastatin (LIPITOR) 10 MG tablet Take 1 tablet (10 mg total) by mouth daily. 90 tablet 1     blood glucose test strips Use 1 each As Directed daily. 100 strip 3     cholecalciferol, vitamin D3, 1,000 unit  tablet Take 1,000 Units by mouth daily.       cloNIDine HCL (CATAPRES) 0.1 MG tablet Take 1 tablet by mouth twice daily.  180 tablet 3     furosemide (LASIX) 20 MG tablet Take 2 tablets (40 mg total) by mouth daily. 180 tablet 3     losartan (COZAAR) 100 MG tablet Take 1 tablet (100 mg total) by mouth daily. 90 tablet 3     magnesium oxide (MAG-OX) 400 mg tablet Take 400 mg by mouth daily.        metFORMIN (GLUCOPHAGE-XR) 500 MG 24 hr tablet Take 2 tablets (1,000 mg total) by mouth daily with breakfast. 180 tablet 3     peg 400-propylene glycol (SYSTANE) 0.4-0.3 % Drop Place 1 Drop into both eyes 2 times daily if needed for Dry Eyes. Indications: DRY EYE       sotaloL (BETAPACE) 80 MG tablet Take 1 tablet (80 mg total) by mouth daily. 90 tablet 1     warfarin ANTICOAGULANT (COUMADIN/JANTOVEN) 2.5 MG tablet Take 1/2-1 tablet (1.25-2.5 mg) by mouth daily. Adjust dose based on INR results as directed.    90 tablet 1     No current facility-administered medications for this visit.         Allergies   Allergen Reactions     Hydrochlorothiazide      Annotation: hyperuricemia made worse by HCTZ   Causes pts gout       Patient Active Problem List   Diagnosis     Hyperplastic Polyp Of The Large Intestine     Diverticulosis     Osteopenia     Hypercholesterolemia     Gout     Transient Ischemic Attack     Essential hypertension with goal blood pressure less than 140/90     Severe obesity with body mass index (BMI) of 35.0 to 39.9 with comorbidity (H)     Type 2 diabetes mellitus (H)     Primary hyperparathyroidism (H)     SSS (sick sinus syndrome) (H)     Paroxysmal atrial fibrillation (H)     Tachy-irvin syndrome (H)     Sinus pause     Cardiac pacemaker, dual, in situ     Mild aortic stenosis     Chronic kidney disease, stage 3     Invasive ductal carcinoma of breast, female, right (H)     Malignant neoplasm of lower-inner quadrant of right breast of female, estrogen receptor positive (H)       Past Medical History:    Diagnosis Date     Arrhythmia     Paroxysmal Atrial fibrillation     Arthritis      Cancer (H)      Diabetes mellitus (H)      Hypercalcemia      Hyperlipidemia      Hypertension      Obesity        Past Surgical History:   Procedure Laterality Date     CATARACT EXTRACTION, BILATERAL       EP PACEMAKER INSERT N/A 3/2/2018    Procedure: EP Pacemaker Insertion;  Surgeon: Nahun Mina MD;  Location: [facility];  Service:      MD REMOVE TONSILS/ADENOIDS,<11 Y/O      Description: Tonsillectomy With Adenoidectomy;  Recorded: 09/16/2008;     MD REVISE MEDIAN N/CARPAL TUNNEL SURG      Description: Neuroplasty Decompression Median Nerve At Carpal Tunnel;  Recorded: 11/01/2009;  Comments: Right- 1/99; Left- 4/99      BREAST CORE BIOPSY RIGHT Right 5/3/2021       Social History     Socioeconomic History     Marital status:      Spouse name: Not on file     Number of children: Not on file     Years of education: Not on file     Highest education level: Not on file   Occupational History     Not on file   Social Needs     Financial resource strain: Not on file     Food insecurity     Worry: Not on file     Inability: Not on file     Transportation needs     Medical: Not on file     Non-medical: Not on file   Tobacco Use     Smoking status: Never Smoker     Smokeless tobacco: Never Used   Substance and Sexual Activity     Alcohol use: No     Drug use: No     Sexual activity: Not on file   Lifestyle     Physical activity     Days per week: Not on file     Minutes per session: Not on file     Stress: Not on file   Relationships     Social connections     Talks on phone: Not on file     Gets together: Not on file     Attends Gnosticism service: Not on file     Active member of club or organization: Not on file     Attends meetings of clubs or organizations: Not on file     Relationship status: Not on file     Intimate partner violence     Fear of current or ex partner: Not on file     Emotionally abused: Not  "on file     Physically abused: Not on file     Forced sexual activity: Not on file   Other Topics Concern     Not on file   Social History Narrative     Not on file       Patient Care Team:  Freddy Lubin MD as PCP - General  Blue Fagan MD as Assigned Heart and Vascular Provider  Freddy Lubin MD as Assigned PCP        Objective:     Vitals:    05/24/21 1231   BP: 134/68   Pulse: 82   Resp: 20   SpO2: 96%   Weight: 190 lb 3 oz (86.3 kg)   Height: 5' 1\" (1.549 m)         Physical Exam:  /68 (Patient Site: Left Arm, Patient Position: Sitting, Cuff Size: Adult Large)   Pulse 82   Resp 20   Ht 5' 1\" (1.549 m)   Wt 190 lb 3 oz (86.3 kg)   SpO2 96%   BMI 35.94 kg/m      General Appearance:  Alert, cooperative, no distress, appears stated age   Head:  Normocephalic, without obvious abnormality, atraumatic.  Thinning hair   Eyes:  PERRL, conjunctiva/corneas clear, EOM's intact   Ears:  Normal TM's and external ear canals, both ears   Nose: Nares normal, septum midline,mucosa normal, no drainage    Throat: Lips, mucosa, and tongue normal; teeth and gums normal   Neck: Supple, symmetrical, trachea midline, no adenopathy;  thyroid: not enlarged, symmetric, no tenderness/mass/nodules; no carotid bruit or JVD   Back:   Symmetric, no curvature, ROM normal, no CVA tenderness   Lungs:   Clear to auscultation bilaterally, respirations unlabored   Breasts:  No breast masses, tenderness, asymmetry, or nipple discharge.  Resolving ecchymoses medial right breast   Heart:  Regular rate and rhythm, S1 and S2 normal, no murmur, rub, or gallop.  Pacer left upper chest   Abdomen:   Soft, non-tender, bowel sounds active all four quadrants,  no masses, no organomegaly.  Moderately obese   Genitalia:  Exam deferred   Pelvic: Deferred   Extremities:  Trace ankle edema   Skin: Skin color, texture, turgor normal, no rashes or lesions   Lymph nodes: Cervical, supraclavicular, and axillary nodes normal   Neurologic: " No dysarthria or aphasia.  Cranial nerves, motor or sensory exams intact with symmetric DTRs         EKG: Atrial paced rhythm with rate of 70.  No ischemic changes    Labs:  Recent Results (from the past 48 hour(s))   Electrocardiogram Perform and Read    Collection Time: 05/24/21 12:41 PM   Result Value Ref Range    SYSTOLIC BLOOD PRESSURE      DIASTOLIC BLOOD PRESSURE      VENTRICULAR RATE 70 BPM    ATRIAL RATE 70 BPM    P-R INTERVAL 200 ms    QRS DURATION 70 ms    Q-T INTERVAL 394 ms    QTC CALCULATION (BEZET) 425 ms    P Axis 32 degrees    R AXIS 46 degrees    T AXIS -4 degrees    MUSE DIAGNOSIS       Atrial-paced rhythm  Abnormal ECG  When compared with ECG of 05-MAR-2018 11:31,  No significant change was found     HM2(CBC w/o Differential)    Collection Time: 05/24/21  1:03 PM   Result Value Ref Range    WBC 8.7 4.0 - 11.0 thou/uL    RBC 4.22 3.80 - 5.40 mill/uL    Hemoglobin 13.2 12.0 - 16.0 g/dL    Hematocrit 40.5 35.0 - 47.0 %    MCV 96 80 - 100 fL    MCH 31.3 27.0 - 34.0 pg    MCHC 32.6 32.0 - 36.0 g/dL    RDW 14.7 (H) 11.0 - 14.5 %    Platelets 225 140 - 440 thou/uL    MPV 11.1 (H) 7.0 - 10.0 fL        Start Time: 12:30 PM  End time:  12:55 PM  Conversation plus orders:  28 minutes  Dictation time:  7 minutes    The visit lasted a total of 35 minutes     Immunization History   Administered Date(s) Administered     COVID-19,PF,Pfizer 02/16/2021, 03/09/2021     DT (pediatric) 09/19/2005     Hep B, Adult 08/17/2005     Influenza high dose,seasonal,PF, 65+ yrs 09/22/2015, 09/19/2016, 09/18/2017, 01/07/2019, 11/04/2019     Influenza, inj, historic,unspecified 02/20/2008, 10/07/2010, 01/24/2014     Influenza, seasonal,quad inj 6-35 mos 09/26/2012, 01/09/2015     Influenza,quad,high Dose,PF, 65yr + 09/11/2020     Pneumo Conj 13-V (2010&after) 01/09/2015     Pneumo Polysac 23-V 09/19/2005, 05/12/2017     Td,adult,historic,unspecified 09/19/2005     Tdap 09/07/2017     ZOSTER, LIVE 02/20/2008     ZOSTER,  RECOMBINANT, IM 02/03/2020, 06/25/2020         Electronically signed by Freddy Lubin MD 05/24/21 12:33 PM

## 2021-06-17 NOTE — TELEPHONE ENCOUNTER
Telephone Encounter by Reyna Radford RN at 10/26/2020  1:40 PM     Author: Reyna Radford RN Service: -- Author Type: Registered Nurse    Filed: 10/26/2020  1:59 PM Encounter Date: 10/26/2020 Status: Signed    : Reyna Radford RN (Registered Nurse)       ANTICOAGULATION  MANAGEMENT    Assessment     Today's INR result of 3.2 is Supratherapeutic (goal INR of 2.0-3.0)        Warfarin taken as previously instructed    No new diet changes affecting INR    No new medication/supplements affecting INR    Continues to tolerate warfarin with no reported s/s of bleeding or thromboembolism     Previous INR was Supratherapeutic    Plan:     Spoke on phone with Frida regarding INR result and instructed:     Warfarin Dosing Instructions:  hold today then change warfarin dose to 1.25 mg daily on tue/thu; and 2.5 mg daily rest of week  (7.7 % change)    Instructed patient to follow up no later than: two weeks    Frida verbalizes understanding and agrees to warfarin dosing plan.    Instructed to call the [facility] for any changes, questions or concerns. (#[phone])   ?   Reyna Radford RN    Subjective/Objective:      Frida MOTT Pamella, a 80 y.o. female is on warfarin.     Frida reports:     Home warfarin dose: as updated on anticoagulation calendar per template     Missed doses: No     Medication changes:  No     S/S of bleeding or thromboembolism:  No     New Injury or illness:  No     Changes in diet or alcohol consumption:  No     Upcoming surgery, procedure or cardioversion:  No    Anticoagulation Episode Summary     Current INR goal:  2.0-3.0   TTR:  58.5 % (1 y)   Next INR check:  11/9/2020   INR from last check:  3.20 (10/26/2020)   Weekly max warfarin dose:     Target end date:     INR check location:     Preferred lab:     Send INR reminders to:  [facility]    Indications    Paroxysmal atrial fibrillation (H) [I48.0]           Comments:  PAF         Anticoagulation Care Providers     Provider Role  Specialty Phone number    Freddy Lubin MD  Internal Medicine [phone]

## 2021-06-17 NOTE — PROGRESS NOTES
INR 2.9 increase greens. After talking with pt and discussing history of greens/salads and medication change. Pt will  continue  with current diet and dosing of Warfarin.  Continue with moderation of Vit K and green leafy vegetables. Cautioned to call with increase bruising or bleeding. Reminded to call with medication change especially antibiotic. Call with any questions or concerns or any up coming procedures. Cautioned about using Herbal medication.  Retest in 2 weeks.

## 2021-06-17 NOTE — TELEPHONE ENCOUNTER
ANTICOAGULATION  MANAGEMENT    Assessment     Today's INR result of 2.2 is Therapeutic (goal INR of 2.0-3.0)        More warfarin taken than instructed which may be affecting INR    No new diet changes affecting INR    No new medication/supplements affecting INR    Continues to tolerate warfarin with no reported s/s of bleeding or thromboembolism     Previous INR was Subtherapeutic    Plan:     Spoke on phone with Frida regarding INR result and instructed:      Warfarin Dosing Instructions:  She took 2.5 mg last night then continue current warfarin dose 1.25 mg daily on Mondays and Thursdays; and 2.5 mg daily rest of week  (0 % change)    Instructed patient to follow up no later than: 1-2 weeks. She sees surgeon next week with concerns of breast cancer.     Education provided: importance of therapeutic range and target INR goal and significance of current INR result    Frida verbalizes understanding and agrees to warfarin dosing plan.    Instructed to call the [facility] for any changes, questions or concerns. (#[phone])   ?   Cherri Cisneros RN    Subjective/Objective:      Frida D Pamella, a 80 y.o. female is on warfarin. Frida      Frida reports:     Home warfarin dose: template incorrect; verbally confirmed home dose with Frida and updated on anticoagulation calendar     Missed doses: No     Medication changes:  No     S/S of bleeding or thromboembolism:  No     New Injury or illness:  Yes: has been diagnosed with breast cancer. She will see surgeon next week.      Changes in diet or alcohol consumption:  No     Upcoming surgery, procedure or cardioversion:  No    Anticoagulation Episode Summary     Current INR goal:  2.0-3.0   TTR:  67.4 % (1 y)   Next INR check:  5/24/2021   INR from last check:  2.20 (5/10/2021)   Weekly max warfarin dose:     Target end date:     INR check location:     Preferred lab:     Send INR reminders to:  [facility]    Indications    Paroxysmal atrial fibrillation  (H) [I48.0]           Comments:  PAF         Anticoagulation Care Providers     Provider Role Specialty Phone number    Freddy Lubin MD  Internal Medicine [phone]

## 2021-06-17 NOTE — TELEPHONE ENCOUNTER
Telephoned and spoke with Frida to share pathology results from her right breast US-needle core biopsy performed on 5/3/21 at [facility].  We discussed breast anatomy, histologic type, hormone receptors, and next steps.      We have scheduled Frida to meet with Dr. Rosanna Peng on 5/18/21.  She verbalized understanding of appointment details, location, and visitor policy.      Frida gave permission for a resource e-mail about breast cancer to be sent and that has gone out.  Emotional support and encouragement provided and calls welcomed.  Informed patient writer will be out of the office 5/7 and shared phone number for Tuyet Gray RN in case she has questions or would like support in my absence.  Amanda Christiansen RN

## 2021-06-18 NOTE — LETTER
Letter by Jennifer Canchola EPS at      Author: Jennifer Canchola EPS Service: -- Author Type: --    Filed:  Encounter Date: 1/15/2019 Status: (Other)       Frida Can  [address] [place] [address]      January 15, 2019      Dear Ms. Chrisjaclyn,    RE: Remote Results    We are writing to you regarding your recent Remote Pacemaker check from home. Your transmission was received successfully. Battery status is satisfactory at this time.     Your results are within normal limits.    Your next device appointment will be a clinic visit.  Please call in February  to schedule.      To schedule or reschedule, please call [phone] and press 1.    NOTE: If you would like to do an extra transmission, please call [phone] and press 3 to speak to a nurse BEFORE transmitting. This ensures that the [facility] Clinic staff is aware of the reason you are sending a transmission, and can follow-up with you after it has been reviewed.    We will be checking your implanted device from home (remotely) every three months unless otherwise instructed. We will need to see you in the clinic at least once a year. You may need to be seen in the clinic sooner depending on the results of your check.    Please be aware:    The follow-up schedule is like a Physician prescription.    Your remote monitor is paired to your specific implanted device.      Sincerely,    [facility]

## 2021-06-18 NOTE — PROGRESS NOTES
INR 2.9 INR stable. Discussed continuing management of dose of Warfarin and returning in one month . No changes to diet needed at this time. Continue moderate intake of Vitamin K and call if increase bruising or unexplained bleeding. Call with medication changes or upcoming procedures.

## 2021-06-19 NOTE — PROGRESS NOTES
INR 3.3 pt has been on vacation. Will increase greens and retest in 4 weeks. After talking with pt and discussing history of greens/salads and medication change. Pt will  continue  with current diet and dosing of Warfarin.  Continue with moderation of Vit K and green leafy vegetables. Cautioned to call with increase bruising or bleeding. Reminded to call with medication change especially antibiotic. Call with any questions or concerns or any up coming procedures. Cautioned about using Herbal medication.

## 2021-06-19 NOTE — LETTER
Letter by Tyra Packer RDCS at      Author: Tyra Packer RDCS Service: -- Author Type: --    Filed:  Encounter Date: 8/20/2019 Status: (Other)         Frida PANTERA Chrispollo  [address] [place] [address]      August 20, 2019      Dear MsDeena Skinnerpollo,    RE: Remote Results    We are writing to you regarding your recent Remote Pacemaker check from home. Your transmission was received successfully. Battery status is satisfactory at this time.     Your results are within normal limits.    Your next device appointment will be a remote check on November 20, 2019; this will occur automatically.    To schedule or reschedule, please call [phone] and press 1.    NOTE: If you would like to do an extra transmission, please call [phone] and press 3 to speak to a nurse BEFORE transmitting. This ensures that the Device Clinic staff is aware of the reason you are sending a transmission, and can follow-up with you after it has been reviewed.    We will be checking your implanted device from home (remotely) every three months unless otherwise instructed. We will need to see you in the clinic at least once a year. You may need to be seen in the clinic sooner depending on the results of your check.    Please be aware:    The follow-up schedule is like a Physician prescription.    Your remote monitor is paired to your specific implanted device.      Sincerely,    [facility]

## 2021-06-19 NOTE — LETTER
Letter by Freddy Lubin MD at      Author: Freddy Lubin MD Service: -- Author Type: --    Filed:  Encounter Date: 7/24/2019 Status: (Other)         Frida Can  [address] [place] [address]             July 24, 2019         Dear Ms. Can,    Below are the results from your recent visit:    Resulted Orders   Microalbumin, Random Urine   Result Value Ref Range    Microalbumin, Random Urine 2.13 (H) 0.00 - 1.99 mg/dL    Creatinine, Urine 147.9 mg/dL    Microalbumin/Creatinine Ratio Random Urine 14.4 <=19.9 mg/g    Narrative    Microalbumin, Random Urine  <2.0 mg/dL . . . . . . . . Normal  3.0-30.0 mg/dL . . . . . . Microalbuminuria  >30.0 mg/dL . . . . . .  . Clinical Proteinuria    Microalbumin/Creatinine Ratio, Random Urine  <20 mg/g . . . . .. . . . Normal   mg/g . . . . . . . Microalbuminuria  >300 mg/g . . . . . . . . Clinical Proteinuria       Glycosylated Hemoglobin A1c   Result Value Ref Range    Hemoglobin A1c 6.9 (H) 3.5 - 6.0 %   Parathyroid Hormone Intact   Result Value Ref Range     (H) 10 - 86 pg/mL   Comprehensive Metabolic Panel   Result Value Ref Range    Sodium 138 136 - 145 mmol/L    Potassium 4.6 3.5 - 5.0 mmol/L    Chloride 101 98 - 107 mmol/L    CO2 26 22 - 31 mmol/L    Anion Gap, Calculation 11 5 - 18 mmol/L    Glucose 130 (H) 70 - 125 mg/dL    BUN 28 8 - 28 mg/dL    Creatinine 1.08 0.60 - 1.10 mg/dL    GFR MDRD Af Amer 59 (L) >60 mL/min/1.73m2    GFR MDRD Non Af Amer 49 (L) >60 mL/min/1.73m2    Bilirubin, Total 0.6 0.0 - 1.0 mg/dL    Calcium 10.0 8.5 - 10.5 mg/dL    Protein, Total 6.6 6.0 - 8.0 g/dL    Albumin 3.7 3.5 - 5.0 g/dL    Alkaline Phosphatase 109 45 - 120 U/L    AST 17 0 - 40 U/L    ALT 20 0 - 45 U/L    Narrative    Fasting Glucose reference range is 70-99 mg/dL per  American Diabetes Association (ADA) guidelines.   Lipid Cascade   Result Value Ref Range    Cholesterol 149 <=199 mg/dL    Triglycerides 122 <=149 mg/dL    HDL Cholesterol 55 >=50  mg/dL    LDL Calculated 70 <=129 mg/dL    Patient Fasting > 8hrs? No        Frida: Diabetic control is good with a blood sugar of 130 and a hemoglobin A1c of 6.9.  Lipids also are excellent with total cholesterol 149 and an LDL of 70.  The parathyroid hormone level is elevated to 182.  The calcium is actually in the normal range at 10.0.  I suspect you have mild primary hyperparathyroidism.  We can continue to monitor this without intervention since your calcium is normal.  Other labs including your potassium, liver and kidney tests are good.  It was nice to see you.    Please call with questions or contact us using [employer].    Sincerely,        Electronically signed by Freddy Lubin MD

## 2021-06-19 NOTE — LETTER
Letter by Zuly Chavira at      Author: Zuly Chavira Service: -- Author Type: --    Filed:  Encounter Date: 11/20/2019 Status: Signed         Frida Can  [address]   [place] [address]      November 20, 2019      Dear Ms. Can,    RE: Remote Results    We are writing to you regarding your recent Remote Pacemaker check from home. Your transmission was received successfully. Battery status is satisfactory at this time.     Your results are showing no significant changes.    Your next device appointment will be a remote check on February 20, 2020; this will occur automatically.    To schedule or reschedule, please call [phone] and press 1.    NOTE: If you would like to do an extra transmission, please call [phone] and press 3 to speak to a nurse BEFORE transmitting. This ensures that the Device Clinic staff is aware of the reason you are sending a transmission, and can follow-up with you after it has been reviewed.    We will be checking your implanted device from home (remotely) every three months unless otherwise instructed. We will need to see you in the clinic at least once a year. You may need to be seen in the clinic sooner depending on the results of your check.    Please be aware:    The follow-up schedule is like a Physician prescription.    Your remote monitor is paired to your specific implanted device.      Sincerely,    [facility]

## 2021-06-19 NOTE — PROGRESS NOTES
[facility] Certification Request    July 31, 2018      Patient: Frida Can  MR Number: [id number]  YOB: 1940  Date of Visit: 7/31/2018      Dear Dr. Lubin    Thank you for this referral.   We are seeing Frida Can for Physical Therapy of low back pain.    Medicare and/or Medicaid requires physician review and approval of the treatment plan. Please review the plan of care and verify that you agree with the therapy plan of care by co-signing this note.      Plan of Care  Authorization / Certification Start Date: 07/31/18  Authorization / Certification End Date: 10/29/18  Authorization / Certification Number of Visits: per Medicare guidelines   Communication with: Referral Source;Patient Caregiver  Patient Related Instruction: Nature of Condition;Treatment plan and rationale;Self Care instruction;Basis of treatment;Body mechanics;Posture;Expected outcome  Times per Week: 2  Number of Weeks: 6  Number of Visits: 12 - PRN   Discharge Planning: Pt will be discharged upon meeting goals or plateau of progress   Therapeutic Exercise: ROM;Stretching;Strengthening  Neuromuscular Reeducation: posture;core  Manual Therapy: soft tissue mobilization;myofascial release;joint mobilization;muscle energy;strain counterstrain  Modalities: electrical stimulation;TENS;cold pack;hot pack    Goals:  Pt. will demonstrate/verbalize independence in self-management of condition in : 6 weeks  Pt. will be able to walk : 30 minutes;for community mobility;with less pain;with less difficulty;in 6 weeks  Pt will: be able to stand and perfrom ADL's with increased ease and LBP <2/10; in 6 weeks       If you have any questions or concerns, please don't hesitate to call.    Sincerely,      Alana Butler, PT        Physician recommendation:     ___ Follow therapist's recommendation        ___ Modify therapy      *Physician co-signature indicates they certify the need for these services furnished within this plan  and while under their care.        [facility]   [facility]-P[facility] Initial Evaluation    Patient Name: Frida Can  Date of evaluation: 7/31/2018  Referral Diagnosis: Bilateral low back pain without sciatica  Referring provider: Freddy Lubin MD  Visit Diagnosis:     ICD-10-CM    1. Chronic bilateral low back pain without sciatica M54.5     G89.29    2. Muscle weakness (generalized) M62.81        Assessment:      Pt. is appropriate for skilled PT intervention as outlined in the Plan of Care (POC).    Goals:  Pt. will demonstrate/verbalize independence in self-management of condition in : 6 weeks  Pt. will be able to walk : 30 minutes;for community mobility;with less pain;with less difficulty;in 6 weeks  Pt will: be able to stand and perfrom ADL's with increased ease and LBP <2/10; in 6 weeks     Patient's expectations/goals are realistic.    Barriers to Learning or Achieving Goals:  No Barriers.       Plan / Patient Instructions:        Plan of Care:   Authorization / Certification Start Date: 07/31/18  Authorization / Certification End Date: 10/29/18  Authorization / Certification Number of Visits: per Medicare guidelines   Communication with: Referral Source;Patient Caregiver  Patient Related Instruction: Nature of Condition;Treatment plan and rationale;Self Care instruction;Basis of treatment;Body mechanics;Posture;Expected outcome  Times per Week: 2  Number of Weeks: 6  Number of Visits: 12 - PRN   Discharge Planning: Pt will be discharged upon meeting goals or plateau of progress   Therapeutic Exercise: ROM;Stretching;Strengthening  Neuromuscular Reeducation: posture;core  Manual Therapy: soft tissue mobilization;myofascial release;joint mobilization;muscle energy;strain counterstrain  Modalities: electrical stimulation;TENS;cold pack;hot pack    Plan for next visit: focus on strength and stability of the core, lumbar, LE and pelvic muscles      Subjective:         History of Present Illness:     Frida is a 77 y.o. female who presents to therapy today with complaints of low back pain. Date of onset/duration of symptoms is for several years. Onset was gradual. Symptoms are not improving. She reports  an episodic  history of similar symptoms. She describes their previous level of function as limited with walking for any distance     Pt reports that she does not normally have low back pain during the day.  However, if she walks unsupported (without a cart or something) her back pain starts after about 10 minutes of walking.  Her pain is R>L     Pain Ratin -7   Pain description: aching and dull    Functional limitations are described as occurring with:   walking for anything longer than 10 minutes     Patient reports benefit from:  rest     Pt reports that she can sit down and her pain immediately goes away.      No consistent form of exercise          Objective:      Note: Items left blank indicates the item was not performed or not indicated at the time of the evaluation.    Patient Outcome Measures :    Modified Oswestry Low Back Pain Disablity Questionnaire  in %: 6   Scores range from 0-100%, where a score of 0% represents minimal pain and maximal function. The minimal clinically important difference is a score reduction of 12%.    Examination  1. Chronic bilateral low back pain without sciatica     2. Muscle weakness (generalized)       Involved side: Bilateral  Posture Observation:      General sitting posture is  fair.    Lumbar ROM:    Date: 18     *Indicate scale AROM AROM AROM   Lumbar Flexion No loss, no pain      Lumbar Extension Min loss, no pain       Right Left Right Left Right Left   Lumbar Sidebending No loss, no pain  No loss, no pain        Lumbar Rotation No loss, no pain  No loss, no pain          Lower Extremity Strength:     Date: 18     LE strength/5 Right Left Right Left Right Left   Hip Flexion (L1-3) 4 4       Hip Extension (L5-S1)         Hip Abduction (L4-5) 4 4        Hip Adduction (L2-3) 4 4       Hip External Rotation 4+ 4+       Hip Internal Rotation 4+ 4+       Knee Extension (L3-4) 5 5       Knee Flexion 5 5       Ankle Dorsiflexion (L4-5) 5 5       Great Toe Extension (L5)         Ankle Plantar flexion (S1) 4 4       Abdominals Decreased        Sensation: WNL  Reflex Testing: NT    Palpation:  Increased tenderness over bilateral SI joints = moderate+  Increased gluteal tightness and tenderness = moderate    Lumbar Special Tests:     Lumbar Special Tests Right Left SI Tests Right  Left   Quadrant test - - SI Compression - -   Straight leg raise -, 50 deg -, 50 deg SI Distraction - -   Crossover response - - POSH Test - -   Slump - - Sacral Thrust + +   Sit-up test  FADIR + +   Trunk extensor endurance test Decreased  Resisted Abduction - -   Prone instability test  Other:     Pubic shotgun  Other:       Repeated Motion Testing:  Does not centralize  Does not peripheralize    Passive Mobility - Joint Integrity:  WNL  Hypomobile   Pain L4-L5, bilateral SI     LE Screen:  Not indicated.    Treatment Today     TREATMENT MINUTES COMMENTS   Evaluation 25    Self-care/ Home management     Manual therapy     Neuromuscular Re-education     Therapeutic Activity     Therapeutic Exercises 30 Pathology, POC, HEP, etc   See flow sheet    Gait training     Modality__________________                Total 55    Blank areas are intentional and mean the treatment did not include these items.     PT Evaluation Code: (Please list factors)  Patient History/Comorbidities: A-fib, DM,2 HLD, HTN, Osteopenia   Examination: core, lumbar and LE weakness with decreased awareness, tenderness over bilateral SI and lower lumbar muscles    Clinical Presentation: stable and uncomplicated   Clinical Decision Making: low    Patient History/  Comorbidities Examination  (body structures and functions, activity limitations, and/or participation restrictions) Clinical Presentation Clinical Decision Making  (Complexity)   No documented Comorbidities or personal factors 1-2 Elements Stable and/or uncomplicated Low   1-2 documented comorbidities or personal factor 3 Elements Evolving clinical presentation with changing characteristics Moderate   3-4 documented comorbidities or personal factors 4 or more Unstable and unpredictable High            Alana Butler  7/31/2018  11:37 AM

## 2021-06-19 NOTE — PROGRESS NOTES
"[facility] Daily Progress     Patient Name: Frida Can  Date: 2018  Visit #: 2  Referring provider: Freddy Lubin MD  Visit Diagnosis:     ICD-10-CM    1. Chronic bilateral low back pain without sciatica M54.5     G89.29    2. Muscle weakness (generalized) M62.81          Assessment:     HEP/POC compliance is  fair .  Patient demonstrates understanding/independence with home program.  Patient is benefitting from skilled physical therapy and is making steady progress toward functional goals.    Goal Status:  Pt. will demonstrate/verbalize independence in self-management of condition in : 6 weeks  Pt. will be able to walk : 30 minutes;for community mobility;with less pain;with less difficulty;in 6 weeks  Pt will: be able to stand and perfrom ADL's with increased ease and LBP <2/10; in 6 weeks     Plan / Patient Education:     Continue with initial plan of care.  Progress with home program as tolerated.    Subjective:     Pain Ratin     Pt reports that she has been feeling a little more sore since the evaluation.  She notices that her low back pain comes on sooner when she is walking.  Pt reports that if she can sit down and rest, her sx do subside.      Pt reports that she some of the exercises most days, but she did miss a week on vacation.      Objective:     Pt tolerates the exercises with moderate+ cueing for technique and reps.     Pt with decrease core, lumbar and LE strength and decreased core awareness.     Pt with a forward trunk posture that increases in walking versus standing.      Current Exercises:  Exercise #1: Nu-step or treadmill warm-up   Comment #1: x5 min RL:5 on Nu-step   Exercise #2: Supine LTR  Comment #2: Bx10   Exercise #3: SKTC + H/S stretch   Comment #3: Bx30\" each   Exercise #4: Ab set  Comment #4: x5 with 5\"  Exercise #5: Supine Bridge   Comment #5: Bx10   Exercise #6: standing SLR: flex, ABD  Comment #6: Bx15 alt -cues on technique   Exercise #7: Sit to " stand   Comment #7: x10 cues for abs       Treatment Today     TREATMENT MINUTES COMMENTS   Evaluation     Self-care/ Home management     Manual therapy     Neuromuscular Re-education     Therapeutic Activity     Therapeutic Exercises 27 See flow sheet    Gait training     Modality__________________                Total 27    Blank areas are intentional and mean the treatment did not include these items.       Alana Butler, PT   8/14/2018

## 2021-06-19 NOTE — PROGRESS NOTES
ASSESSMENT:  1.  Diarrhea: Frida has noted frequent loose stools.  This is a chronic problem that varies in severity.  I would be concerned that it could be related to metformin.   She will give a trial off of it.  Current hemoglobin A1c is excellent at 6.5.    2.  Low back pain.  This is nonradicular.  She has grade 1 anterolisthesis at L5-S1 and L4-5.  Moderate scoliosis also is seen with facet hypertrophy.  Physical therapy was advised for management.    3.  Primary hyperparathyroidism: Monitoring labs will be checked today.  She had been taking a calcium supplement and was advised to discontinue this.    4.  Essential hypertension: Systolic blood pressure is slightly above goal.  She is only been taking clonidine once daily.  She was advised to increase this to twice daily.    5.  Type 2 diabetes mellitus: Control has been good with current hemoglobin A1c of 6.5.  As noted above, I am concerned that diarrhea could be related to metformin.  She will give a trial off of it.    6.  Paroxysmal atrial fibrillation: She is on sotalol, and clinically seems in sinus rhythm.  She remains on warfarin as an anticoagulant    7.  Obesity with BMI 35-40 and comorbidity:  Weight has declined from 202 in September.  She is encouraged to continue efforts at weight loss  PLAN:  1.  Lab as outlined.  She will be notified of test result  2.  Stop Metformin.  Report effect on diarrhea in 1 week.  She may require medication adjustment for diabetes assuming diarrhea improve  3.  Stop calcium supplement.  4.  Increase clonidine to 0.1 mg twice daily  5.  Lumbar spine x-ray was done and reviewed.  6.  Physical therapy for low back pain.  7.  Clinic follow-up 3 months or as needed    Orders Placed This Encounter   Procedures     XR Lumbar Spine 2 or 3 VWS     Standing Status:   Future     Number of Occurrences:   1     Standing Expiration Date:   7/17/2019     Order Specific Question:   Reason for Exam (Describe Symptoms):     Answer:    back pain     Order Specific Question:   Can the procedure be changed per Radiologist protocol?     Answer:   Yes     Parathyroid Hormone Intact     Basic Metabolic Panel     Thyroid Stimulating Hormone (TSH)     Glycosylated Hemoglobin A1c     Vitamin D, Total (25-Hydroxy)     Medications Discontinued During This Encounter   Medication Reason     cloNIDine HCl (CATAPRES) 0.1 MG tablet Dose adjustment     metFORMIN (GLUCOPHAGE XR) 500 MG 24 hr tablet Side effects           ASSESSED PROBLEMS:  1. Type 2 DM mild nonproliferative retinopathy, macular edema, uncontrol (H)  Glycosylated Hemoglobin A1c   2. Primary hyperparathyroidism (H)  Parathyroid Hormone Intact    Basic Metabolic Panel    Glycosylated Hemoglobin A1c    Vitamin D, Total (25-Hydroxy)   3. Fatigue  Thyroid Stimulating Hormone (TSH)   4. Bilateral low back pain without sciatica  XR Lumbar Spine 2 or 3 VWS       CHIEF COMPLAINT:  Chief Complaint   Patient presents with     bowel problem     calcium testing     currently taking a capsule and unsure of dose she is taking.  ? recommended dose of medication     Medication Problem     clonidine dosing for pharmacy needs to change to once daily       HISTORY OF PRESENT ILLNESS:  Frida is a 77 y.o. female presenting to the clinic today with multiple concerns.  She complains of chronic issues with loose stools which varies in severity.  This has been present for months.  After discussion, she will be given a trial off of metformin to see if loose stools are related to the medication.    Previous labs have been suggestive of primary hyperparathyroidism.  Labs will be rechecked again today.  She would prefer to avoid surgical intervention if possible.  She had been taking a calcium supplement.  She was advised to discontinue this.    Blood pressure seems above goal.  She has only been taking clonidine once daily.  She was advised to take it twice daily for better 24-hour control.    She has noted chronic  nonradicular low back pain.  X-ray today shows anterolisthesis at L5-S1 and L4-5 moderate scoliosis is noted along with multilevel degenerative change.  Conservative management is appropriate.  She will be referred to physical therapy    REVIEW OF SYSTEMS:   All other systems are negative except as noted above.    PFSH:  Lives in [place].  Non-smoker    TOBACCO USE:  History   Smoking Status     Never Smoker   Smokeless Tobacco     Never Used       VITALS:  Vitals:    07/16/18 1451 07/16/18 1509 07/16/18 1553   BP: 144/56 134/54 150/62   Pulse: 60     Weight: 195 lb 9.6 oz (88.7 kg)       Wt Readings from Last 3 Encounters:   07/16/18 195 lb 9.6 oz (88.7 kg)   04/05/18 194 lb 1.6 oz (88 kg)   03/12/18 189 lb (85.7 kg)     Body mass index is 36.36 kg/(m^2).    PHYSICAL EXAM:  Constitutional:   Reveals an alert, pleasant, moderately obese woman with thinning hair. Does not appear acutely ill. Vitals: per nursing notes.  Repeat BP by MD: 150/62  Neck:  Supple, no carotid bruits or adenopathy.  Back:  No spine or CVA pain. No point tenderness of spine or SI joints. No obvious bony deformities.   Thorax: Pacer left upper chest.   Lungs: Clear to A&P without rales or wheezes.  Respiratory effort normal.  Cardiac:   Regular rate and rhythm, normal S1, S2, no murmur or gallop.  Abdomen:  Soft, moderately obese, active bowel sounds without bruits, mass, or tenderness.  Extremities:   No peripheral edema. Negative straight leg raise. Internal and external ROM of hips full.     Psychiatric:  Memory intact, mood appropriate.    QUALITY MEASURES:  The following high BMI interventions were performed this visit: encouragement to exercise, weight monitoring and prescribed dietary intake  Body Mass Index was not assessed due to Height not charted.    ADDITIONAL HISTORY SUMMARIZED (2): None.  DECISION TO OBTAIN EXTRA INFORMATION (1): None.   RADIOLOGY TESTS (1): None.  LABS (1): None.  MEDICINE TESTS (1): None.  INDEPENDENT REVIEW  (2 each): None.     The visit lasted a total of 35 minutes face to face with the patient. Over 50% of the time was spent counseling and educating the patient about management of diarrhea and low back pain.    Dragon dictation was used for this note.  Speech recognition errors are a possibility.    MEDICATIONS:  Current Outpatient Prescriptions   Medication Sig Dispense Refill     allopurinol (ZYLOPRIM) 300 MG tablet take 1 tablet by mouth once daily 90 tablet 3     amLODIPine (NORVASC) 10 MG tablet take 1 tablet by mouth once daily 90 tablet 1     atorvastatin (LIPITOR) 10 MG tablet Take 1 tablet by mouth daily. 90 tablet 1     blood glucose test strips Use 1 each As Directed daily. 100 each 11     cholecalciferol, vitamin D3, 1,000 unit tablet Take 1,000 Units by mouth daily.       cloNIDine HCl (CATAPRES) 0.1 MG tablet Take 1 tablet (0.1 mg total) by mouth at bedtime. 90 tablet 3     furosemide (LASIX) 20 MG tablet Take 1 tablet (20 mg total) by mouth daily. 30 tablet 7     losartan (COZAAR) 100 MG tablet Take 1 tablet by mouth daily. 90 tablet 1     magnesium oxide (MAG-OX) 400 mg tablet Take 400 mg by mouth daily.        peg 400-propylene glycol (SYSTANE) 0.4-0.3 % Drop Place 1 Drop into both eyes 2 times daily if needed for Dry Eyes. Indications: DRY EYE       sotalol (BETAPACE) 80 MG tablet Take 1 tablet (80 mg total) by mouth daily. 90 tablet 0     warfarin (COUMADIN) 5 MG tablet Take 5 mg) by mouth daily. Adjust dose based on INR results as directed. 90 tablet 0     No current facility-administered medications for this visit.        Total data points:5

## 2021-06-20 NOTE — PROGRESS NOTES
[facility] Daily Progress     Patient Name: Frida Can  Date: 2018  Visit #: 3  Referring provider: Freddy Lubin MD  Visit Diagnosis:     ICD-10-CM    1. Chronic bilateral low back pain without sciatica M54.5     G89.29    2. Muscle weakness (generalized) M62.81          Assessment:     HEP/POC compliance is  good .  Patient demonstrates understanding/independence with home program.  Patient is benefitting from skilled physical therapy and is making steady progress toward functional goals.    Goal Status:  Pt. will demonstrate/verbalize independence in self-management of condition in : 6 weeks  Pt. will be able to walk : 30 minutes;for community mobility;with less pain;with less difficulty;in 6 weeks  Pt will: be able to stand and perfrom ADL's with increased ease and LBP <2/10; in 6 weeks     Plan / Patient Education:     Continue with initial plan of care.  Progress with home program as tolerated.    Subjective:     Pain Ratin     Pt reports that her low back pain is improving with less pain and less often.  Pt reports that she is much more aware of her core and holding it tight.      Pt reports that she does not know what exactly causes LBP but it can be if she is too busy or if she is sitting to much     Pt reports that she feels better after MT usually.       Pt reports that she has been consistent with her HEP exercises.  .      Objective:     Pt tolerates the exercises with moderate+ cueing for technique and reps.     Pt with decrease core, lumbar and LE strength and decreased core awareness. This is being addressed by her HEP.                                                                                                                                                                    Pt with improving trunk posture with increased awareness.     Pt with min-mod lumbar muscle tightness and tenderness.      Current Exercises:  Exercise #1: Nu-step or treadmill warm-up  "  Comment #1: x5 min RL:5 on Nu-step   Exercise #2: Supine LTR  Comment #2: Bx10   Exercise #3: SKTC + H/S stretch   Comment #3: manual H/S Bx60\"  Exercise #4: Ab set  Comment #4: march Bx10  Exercise #5: Supine Bridge   Comment #5: Bx10   Exercise #6: standing SLR: flex, ABD  Comment #6: ABD Bx15   Exercise #7: Sit to stand   Comment #7: mini Squats with ab and glut set x10       Treatment Today     TREATMENT MINUTES COMMENTS   Evaluation     Self-care/ Home management     Manual therapy 10 STM to bilateral lumbar paraspinals and SI area, gentle PA mobility T10-sacrum, B quad stretch   Pt prone    Neuromuscular Re-education     Therapeutic Activity     Therapeutic Exercises 18 See flow sheet    Gait training     Modality__________________                Total 28    Blank areas are intentional and mean the treatment did not include these items.       Alana Butler, PT   8/28/2018  "

## 2021-06-20 NOTE — PROGRESS NOTES
INR 2.3 take 2.5 mg daily. Continue current management dosing of Warfarin. Continue  diet of moderate Vitamin K intake. Discussed with pt the need to call with questions or concerns or any change in medication especially herbal medication or OTC. Call with increased bleeding or bruising or any upcoming procedures.

## 2021-06-20 NOTE — PROGRESS NOTES
[facility] Discharge Summary    Patient Name: Frida Can  Date: 10/10/2018  Referring provider: Freddy Lubin MD  Visit Diagnosis:   1. Chronic bilateral low back pain without sciatica     2. Muscle weakness (generalized)     3. Cardiac pacemaker in situ     4. Essential hypertension with goal blood pressure less than 140/90     5. Paroxysmal atrial fibrillation (H)     6. Type 2 diabetes mellitus (H)         Goals:  Pt. will demonstrate/verbalize independence in self-management of condition in : 6 weeks  Pt. will be able to walk : 30 minutes;for community mobility;with less pain;with less difficulty;in 6 weeks  Pt will: be able to stand and perfrom ADL's with increased ease and LBP <2/10; in 6 weeks     Patient was seen for 5 visits with the last visit on 9/12/18.    The patient met goals and has demonstrated understanding of and independence in the home program for self-care, and progression to next steps.  She will initiate contact if questions or concerns arise.    Therapy will be discontinued at this time.  The patient will need a new referral to resume.    Thank you for your referral.  Alana Butler  10/10/2018  9:27 AM

## 2021-06-20 NOTE — LETTER
Letter by Zuly Chaviar at      Author: Zuly Chavira Service: -- Author Type: --    Filed:  Encounter Date: 10/5/2020 Status: (Other)         Frida PANTERA Can  [address]  [place] [address]      October 5, 2020      Dear Ms. Brodypollo,    RE: Remote Results    We are writing to you regarding your recent Remote Pacemaker check from home. Your transmission was received successfully. Battery status is satisfactory at this time.     Your results are showing no significant changes.    Your next device appointment will be a remote check on January 5, 2021; this will occur automatically.    To schedule or reschedule, please call [phone] and press 1.    NOTE: If you would like to do an extra transmission, please call [phone] and press 3 to speak to a nurse BEFORE transmitting. This ensures that the Device Clinic staff is aware of the reason you are sending a transmission, and can follow-up with you after it has been reviewed.    We will be checking your implanted device from home (remotely) every three months unless otherwise instructed. We will need to see you in the clinic at least once a year. You may need to be seen in the clinic sooner depending on the results of your check.    Please be aware:    The follow-up schedule is like a Physician prescription.    Your remote monitor is paired to your specific implanted device.      Sincerely,    [facility]

## 2021-06-20 NOTE — LETTER
Letter by Trudi Chambers RN at      Author: Trudi Chambers RN Service: -- Author Type: --    Filed:  Encounter Date: 7/2/2020 Status: (Other)         Frida PANTERA Cna  [address] [place] [address]      July 2, 2020      Dear Ms. Can,    RE: Remote Results    We are writing to you regarding your recent Remote Pacemaker check from home. Your transmission was received successfully. Battery status is satisfactory at this time.     Your results are showing no significant changes.    Your next device appointment will be a remote check on 10/05/2020; this will occur automatically.    To schedule or reschedule, please call [phone] and press 1.    NOTE: If you would like to do an extra transmission, please call [phone] and press 3 to speak to a nurse BEFORE transmitting. This ensures that the Device Clinic staff is aware of the reason you are sending a transmission, and can follow-up with you after it has been reviewed.    We will be checking your implanted device from home (remotely) every three months unless otherwise instructed. We will need to see you in the clinic at least once a year. You may need to be seen in the clinic sooner depending on the results of your check.    Please be aware:    The follow-up schedule is like a Physician prescription.    Your remote monitor is paired to your specific implanted device.      Sincerely,    [facility]

## 2021-06-20 NOTE — PROGRESS NOTES
[facility] Daily Progress     Patient Name: Frida Can  Date: 2018  Visit #: 5  Referring provider: Freddy Lubin MD  Visit Diagnosis:     ICD-10-CM    1. Chronic bilateral low back pain without sciatica M54.5     G89.29    2. Muscle weakness (generalized) M62.81          Assessment:     HEP/POC compliance is  good .  Patient demonstrates understanding/independence with home program.  Patient is benefitting from skilled physical therapy and is making steady progress toward functional goals.    Goal Status:  Pt. will demonstrate/verbalize independence in self-management of condition in : 6 weeks  Pt. will be able to walk : 30 minutes;for community mobility;with less pain;with less difficulty;in 6 weeks  Pt will: be able to stand and perfrom ADL's with increased ease and LBP <2/10; in 6 weeks     Plan / Patient Education:     Patient to try independent sx management and HEP.  PT will hold the chart for 30 days and then discharge the chart.     Subjective:     Pain Ratin     Pt reports that her low back pain is improving with less pain and less often.  Pt reports that if she is really lazy and not moving around her back will have pain, but otherwise she does not have much for pain.      No back pain currently today.      Pt reports that she feels better after MT usually.       Pt reports that she has been consistent with her HEP exercises.    Pt reports feeling at least 80% better and ready to try independent sx management and HEP.      Objective:     Pt has progressed well with PT with increased function and strength with decreased pain.     Lumbar ROM:    Date: 18    *Indicate scale AROM AROM   Lumbar Flexion No loss, no pain   No loss, no pain    Lumbar Extension Min loss, no pain   No loss, no pain      Right Left Right Left   Lumbar Sidebending No loss, no pain  No loss, no pain  No loss, no pain   No loss, no pain    Lumbar Rotation No loss, no pain  No loss, no pain   " No loss, no pain  No loss, no pain       Lower Extremity Strength:     Date: 7/31/18 9/12/18    LE strength/5 Right Left Right Left   Hip Flexion (L1-3) 4 4  5  5   Hip Extension (L5-S1)           Hip Abduction (L4-5) 4 4  5  5   Hip Adduction (L2-3) 4 4  5  5   Hip External Rotation 4+ 4+  5  5   Hip Internal Rotation 4+ 4+  5  5   Knee Extension (L3-4) 5 5  5  5   Knee Flexion 5 5  5  5   Ankle Dorsiflexion (L4-5) 5 5  5  5   Great Toe Extension (L5)           Ankle Plantar flexion (S1) 4 4  5 5    Abdominals Decreased  Increasing strength and awareness          Palpation:  Decreased tenderness over bilateral SI joints = none-minimal   Decreased gluteal tightness and tenderness = none-minimal    Current Exercises:  Exercise #1: Nu-step or treadmill warm-up   Comment #1: x6 min    Exercise #2: Supine LTR  Comment #2: Bx10   Exercise #3: SKTC + H/S stretch   Comment #3: B 2x30\"   Exercise #4: Ab set + march   Comment #4: Bx20 and discussed leg extension   Exercise #5: Supine Bridge   Comment #5: reviewed    Exercise #6: standing SLR: flex, ABD  Comment #6: reviewed   Exercise #7: Sit to stand   Comment #7: reviewed       Treatment Today     TREATMENT MINUTES COMMENTS   Evaluation     Self-care/ Home management     Manual therapy 10 STM to bilateral lumbar paraspinals and SI area, gentle PA mobility T10-sacrum, B quad stretch   Pt prone    Neuromuscular Re-education     Therapeutic Activity     Therapeutic Exercises 18 See flow sheet    Gait training     Modality__________________                Total 28    Blank areas are intentional and mean the treatment did not include these items.       Alana Butler, PT   9/12/2018  "

## 2021-06-20 NOTE — LETTER
Letter by Zuly Chavira at      Author: Zuly Chavira Service: -- Author Type: --    Filed:  Encounter Date: 2/20/2020 Status: (Other)         Frida PANTERA Can  [address]  [place] [address]      February 20, 2020      Dear MsDeena Skinnerpollo,    RE: Remote Results    We are writing to you regarding your recent Remote Pacemaker check from home. Your transmission was received successfully. Battery status is satisfactory at this time.     Your results are showing no significant changes.    Your next device appointment will be a clinic visit.  Please call in mid March to schedule.      To schedule or reschedule, please call [phone] and press 1.    NOTE: If you would like to do an extra transmission, please call [phone] and press 3 to speak to a nurse BEFORE transmitting. This ensures that the Device Clinic staff is aware of the reason you are sending a transmission, and can follow-up with you after it has been reviewed.    We will be checking your implanted device from home (remotely) every three months unless otherwise instructed. We will need to see you in the clinic at least once a year. You may need to be seen in the clinic sooner depending on the results of your check.    Please be aware:    The follow-up schedule is like a Physician prescription.    Your remote monitor is paired to your specific implanted device.      Sincerely,    [facility]

## 2021-06-20 NOTE — PROGRESS NOTES
[facility] Daily Progress     Patient Name: Frida Can  Date: 2018  Visit #: 3  Referring provider: Freddy Lubin MD  Visit Diagnosis:     ICD-10-CM    1. Chronic bilateral low back pain without sciatica M54.5     G89.29    2. Muscle weakness (generalized) M62.81          Assessment:     HEP/POC compliance is  good .  Patient demonstrates understanding/independence with home program.  Patient is benefitting from skilled physical therapy and is making steady progress toward functional goals.    Goal Status:  Pt. will demonstrate/verbalize independence in self-management of condition in : 6 weeks  Pt. will be able to walk : 30 minutes;for community mobility;with less pain;with less difficulty;in 6 weeks  Pt will: be able to stand and perfrom ADL's with increased ease and LBP <2/10; in 6 weeks     Plan / Patient Education:     Continue with initial plan of care.  Progress with home program as tolerated.    Subjective:     Pain Ratin     Pt reports that she is starting to feel better.   She notices that her low back pain comes on with increased activity and fatigue.  Pt reports that if she can sit down and rest, her sx do subside.      Pt reports that she has been consistent with her HEP exercises.  .      Objective:     Pt tolerates the exercises with moderate+ cueing for technique and reps.     Pt with decrease core, lumbar and LE strength and decreased core awareness. This is being addressed by her HEP.                                                                                                                                                                    Pt with a forward trunk posture that increases in walking versus standing.  However, pt is increasing her posture awareness.     Pt with min-mod lumbar muscle tightness and tenderness.      Current Exercises:  Exercise #1: Nu-step or treadmill warm-up   Comment #1: x5 min RL:5 on Nu-step   Exercise #2: Supine LTR  Comment #2:  "Bx10   Exercise #3: SKTC + H/S stretch   Comment #3: 2 x30\"  Exercise #4: Ab set  Comment #4: x5 with 5\"  Exercise #5: Supine Bridge   Comment #5: Bx10   Exercise #6: standing SLR: flex, ABD  Comment #6: Bx15 alt -cues on technique   Exercise #7: Sit to stand   Comment #7: x10 cues for abs       Treatment Today     TREATMENT MINUTES COMMENTS   Evaluation     Self-care/ Home management     Manual therapy 8 STM to bilateral lumbar paraspinals and SI area, gentle PA mobility T10-sacrum, B quad stretch   Pt prone    Neuromuscular Re-education     Therapeutic Activity     Therapeutic Exercises 20 See flow sheet    Gait training     Modality__________________                Total 28    Blank areas are intentional and mean the treatment did not include these items.       Alana Butler, PT   8/21/2018    "

## 2021-06-21 NOTE — PROGRESS NOTES
INR 2.2 Continue current management dosing of Warfarin. Continue  diet of moderate Vitamin K intake. Discussed with pt the need to call with questions or concerns or any change in medication especially herbal medication or OTC. Call with increased bleeding or bruising or any upcoming procedures.  2.5 mg daily.

## 2021-06-21 NOTE — PROGRESS NOTES
INR 4.0 hold today's inr and then increase greens. Keep dose at 2.5 mg After talking with pt and discussing history of greens/salads and medication change. Pt will  continue  with current diet and dosing of Warfarin.  Continue with moderation of Vit K and green leafy vegetables. Cautioned to call with increase bruising or bleeding. Reminded to call with medication change especially antibiotic. Call with any questions or concerns or any up coming procedures. Cautioned about using Herbal medication.

## 2021-06-21 NOTE — LETTER
Letter by Freddy Lubin MD at      Author: Freddy Lubin MD Service: -- Author Type: --    Filed:  Encounter Date: 4/26/2021 Status: (Other)         Frida Can  [address] [place] [address]             April 26, 2021         Dear Ms. Can,    Below are the results from your recent visit:    Resulted Orders   DXA Bone Density Scan    Narrative    4/22/2021      RE: Frida Can  YOB: 1940        Dear Freddy Lubin,    Patient Profile:  80 y.o. female, postmenopausal, is here for the follow up bone density   test.   History of fractures - None. Family history of osteoporosis - None.    Family history of hip fracture: None. Smoking history - No. Osteoporosis   treatment past -  No. Osteoporosis treatment current - No.  Chronic   medical problems - Diabetes Mellitus and Hyperparathyroidism. High risk   medications -  Blood thinner (Coumadin or Heparin);  Yes, Currently.    Assessment:    1. The spine bone density measurement is influenced by multilevel   degenerative change at L1-L4 with T-score of 2.6 and a Z score of 3.8.  2. Femoral bone densities show left femoral neck T- score of -1.1, and   right femoral neck T-score of -1.4..  3.  bone density was also checked in the wrist as an alternate site since   the spine measurement was unreliable.  In the left 33% radius, T score is   -0.7.  4.  Since the scan in 2018, bone density has declined a significant 4.8%   left total hip and 6.8% right total hip.  Bone density has not   significantly changed in the AP spine or wrist.  5.  The trabecular bone score suggests good micro architecture.    80 y.o. female with LOW BONE DENSITY (OSTEOPENIA) and MODERATE predicted   fracture risk with a TBS adjusted 10-year major osteoporotic fracture risk   of 11.0% and hip fracture risk of 3.2%.    Recommendations:  Ensure adequate calcium, vitamin D intake, and regular weightbearing   exercise with a recheck in 2 to 3 years.      Bone  densitometry was performed on your patient using our AG&P   densitometer. The results are summarized and a copy of the actual scans   are included for your review. In conformity with the International Society   of Clinical Densitometry's most recent position statement for DXA   interpretation (2015), the diagnosis will be made on the lowest measured   T-score of the lumbar spine, femoral neck, total proximal femur or 33%   radius. Note the change in terminology for diagnostic classification from   OSTEOPENIA to LOW BONE MASS. All trending for sequential exams will be   done using multiple vertebrae or the total proximal femur. Fracture risk   is based on the WHO Fracture Risk Assessment Tool (FRAX). If additional   information is needed or if you would like to discuss the results, please   do not hesitate to call me.       Thank you for referring this patient to [facility]. We are happy to be of service in support of you and your   practice. If you have any questions or suggestions to improve our service,   please call me at [phone].     Sincerely,     Freddy Lubin M.D. CDeenaCPARISA.  [facility]       Frida: Bone density has declined in the hip since last scan, but is still in a good range for age.  I would advise continuing current conservative management with a recheck in 2 to 3 years.     Please call with questions or contact us using [employer].    Sincerely,        Electronically signed by Freddy Lubin MD

## 2021-06-21 NOTE — LETTER
Letter by Tyra Packer RDCS at      Author: Tyra Packer RDCS Service: -- Author Type: --    Filed:  Encounter Date: 1/5/2021 Status: (Other)         Frida Can  [address] [place] [address]      January 5, 2021      Dear Ms. Can,    RE: Remote Results    We are writing to you regarding your recent Remote Pacemaker check from home. Your transmission was received successfully. Battery status is satisfactory at this time.     Your results are showing no significant changes.    Your next device appointment will be a clinic visit.  Please call in February to schedule.      To schedule or reschedule, please call [phone] and press 1.    NOTE: If you would like to do an extra transmission, please call [phone] and press 3 to speak to a nurse BEFORE transmitting. This ensures that the Device Clinic staff is aware of the reason you are sending a transmission, and can follow-up with you after it has been reviewed.    We will be checking your implanted device from home (remotely) every three months unless otherwise instructed. We will need to see you in the clinic at least once a year. You may need to be seen in the clinic sooner depending on the results of your check.    Please be aware:    The follow-up schedule is like a Physician prescription.    Your remote monitor is paired to your specific implanted device.      Sincerely,    [facility]

## 2021-06-21 NOTE — LETTER
Letter by Jeanna Charles CMA at      Author: Jeanna Charles CMA Service: -- Author Type: --    Filed:  Encounter Date: 5/18/2021 Status: (Other)       Frida - Surgery Details:    We've received instruction to get you scheduled for Right Lumpectomy with Dr Rosanna Peng. We have that arranged as follows:     Surgery Date: Wednesday May 26th 2021  Location: [place] [id number] [facility], [address] (3rd floor) [facility]  Arrival Time: 10:15 AM (Unless instructed differently by the pre-op call nurse)     Prep Instructions:     1. Please schedule a pre-op physical with your primary care doctor. This may be virtual or face-to-face depending on your doctors preference. Call them right away to schedule this.    2. PCR-Rated COVID19 testing is required within 4 days of surgery. We have this scheduled for you at The [address] in [place], [place] on Monday May 24th at 11:00 AM. Follow the specific instructions you receive by Barb. If your test is positive, your surgery will be canceled.     3. Nothing to eat or drink for 8 hours before surgery unless instructed differently by the pre-op nurse.    4. No blood thinners including aspirin for one week prior to surgery. Verify this is safe for you with your primary care doctor before stopping.     5. You will need an adult to drive you home and stay with you 24 hours after surgery.     6. You may have one family member wait in the lobby at the surgery center during your surgery.    7. When you arrive to the surgery center, you will be screened for COVID19 symptoms. If you screen positive, your surgery will need to be postponed for your safety.    8. If the community sees a new surge in COVID19 hospital admissions, your procedure may need to be postponed. We will contact you if this happens.    9. We always encourage you to notify your insurance any time you have something scheduled including surgery. The number is usually right on the back of  your insurance card. Please call [facility] Cost of Care at [phone] for the surgeon fees, and [facility] Cost of Care [phone] for facility fees if you need pricing information.     Call our office if you have any questions! Thank you!       Jeanna SRINIVASAN  Surgery Scheduler  [facility]  Surgery [facility]  Direct line: [phone]   Main Line: [phone]  Direct Fax: [phone]

## 2021-06-22 NOTE — PROGRESS NOTES
ASSESSMENT:  1. Type 2 diabetes mellitus (H)  Hemoglobin A1c today is acceptable at 6.7.  She is on metformin Exar 500 mg daily.  This will be continued  - Microalbumin, Random Urine  - Glycosylated Hemoglobin A1c  - Comprehensive Metabolic Panel    2. Primary hyperparathyroidism (H)  She has had a mild elevation in calcium along with elevated parathyroid hormone level consistent with primary hyperparathyroidism.  Frida would prefer to avoid surgical intervention if possible.  Bone density looked good when checked in February 2018.  This needs to be monitored closely.  She also needs to be monitored for kidney stones or worsening renal function  - Parathyroid Hormone Intact  - Vitamin D, Total (25-Hydroxy)    3. Severe obesity with body mass index (BMI) of 35.0 to 39.9 with comorbidity (H)  Weight has declined over 5 pounds since the summer and over 10 pounds since her height.  She was commended for weight loss and encouraged to continue efforts for further weight loss    4. Essential hypertension with goal blood pressure less than 140/90  Blood pressure is good on current regimen  - Comprehensive Metabolic Panel    5. Paroxysmal atrial fibrillation (H)  She has not noted any symptomatic palpitations.  She remains on sotalol.    6. Acute gout, unspecified cause, unspecified site  She is on allopurinol.  Uric acid level will be checked.  She has been on 300 mg which may be too high of a dose for her degree of renal function.  Renal function will be checked  - Uric Acid    7. Hypercholesterolemia  On atorvastatin 10 mg daily.  Nonfasting lipids will be checked  - Lipid Cascade    8. Medication monitoring encounter  Monitoring labs will be checked  - HM2(CBC w/o Differential)  - Comprehensive Metabolic Panel    9. Visit for screening mammogram  Screening mammogram was advised in late February  - Mammo Screening Bilateral; Future    10.  Chronic renal insufficiency  Monitoring labs will be checked.  As noted above, the  allopurinol dose may need to be  lowered    PLAN:  Patient Instructions   1.  Same medications    2.  Flu shot today    3.  Check insurance for Shingrix.  I would advise    4.  Mammogram after 2/22/2019    5. Recheck bone density next year    6.  See in six months or as needed    7.  Allopurinol dose may need to be lowered based on renal function    Orders Placed This Encounter   Procedures     Mammo Screening Bilateral     Standing Status:   Future     Standing Expiration Date:   1/7/2020     Order Specific Question:   Patient's previous breast density:     Answer:   Heterogeneously dense [3]     Order Specific Question:   Can the procedure be changed per Radiologist protocol?     Answer:   Yes     Microalbumin, Random Urine     Glycosylated Hemoglobin A1c     Uric Acid     Parathyroid Hormone Intact     Vitamin D, Total (25-Hydroxy)     HM2(CBC w/o Differential)     Lipid Cascade     Order Specific Question:   Fasting is required?     Answer:   Unknown     Comprehensive Metabolic Panel     There are no discontinued medications.          ASSESSED PROBLEMS:  1. Type 2 diabetes mellitus (H)  Microalbumin, Random Urine    Glycosylated Hemoglobin A1c    Comprehensive Metabolic Panel   2. Primary hyperparathyroidism (H)  Parathyroid Hormone Intact    Vitamin D, Total (25-Hydroxy)   3. Severe obesity with body mass index (BMI) of 35.0 to 39.9 with comorbidity (H)     4. Essential hypertension with goal blood pressure less than 140/90  Comprehensive Metabolic Panel   5. Paroxysmal atrial fibrillation (H)     6. Acute gout, unspecified cause, unspecified site  Uric Acid   7. Hypercholesterolemia  Lipid Cascade   8. Medication monitoring encounter  HM2(CBC w/o Differential)    Comprehensive Metabolic Panel   9. Visit for screening mammogram  Mammo Screening Bilateral       CHIEF COMPLAINT:  Chief Complaint   Patient presents with     Follow-up     Diabetes       HISTORY OF PRESENT ILLNESS:  Frida is a 78 y.o. female  presenting to the clinic today for medication monitoring.     Hyperparathyroidism: She has had a slightly elevated Calcium and parathyroid hormone levels in the past. She prefers to avoid surgery.     Vision:She last had her eyes checked last month. Now she is being sent to a cornea specialist. The problem she is having is that one minute she sees ok, and the next it is blurry, which makes getting the right prescription difficult.     HTN: controlled. 138/64 in office recheck today. Tolerates her medication well.     Weight management: She is down 5 pounds since the last visit, and that is over the holidays. It has been a struggle for her, and it is slow going progress.     DMII: controlled, 7/16/18 A1C 6.5. Tolerating medication well.     Health Maintenance: Mammogram up to date. Shingrix due. Flu shot given in office today.     REVIEW OF SYSTEMS:   Denies dyspnea, shortness of breath, palpitations, lightheadedness, GI symptoms, gout, medication side effects, peripheral numbness.   All other systems are negative.    PFSH:  Now that yousuf is over she spends most of her time doing puzzles and reading books.   She has no winter travel plans.   She is working on trying to lose weight.   Reviewed as below.     TOBACCO USE:  Social History     Tobacco Use   Smoking Status Never Smoker   Smokeless Tobacco Never Used       VITALS:  Vitals:    01/07/19 1221 01/07/19 1248   BP: 118/60 138/64   Patient Site: Left Arm    Patient Position: Sitting    Cuff Size: Adult Regular    Pulse: 72    SpO2: 98%    Weight: 190 lb 3.2 oz (86.3 kg)      Wt Readings from Last 3 Encounters:   01/07/19 190 lb 3.2 oz (86.3 kg)   07/16/18 195 lb 9.6 oz (88.7 kg)   04/05/18 194 lb 1.6 oz (88 kg)     Body mass index is 35.36 kg/m .    PHYSICAL EXAM:  Constitutional:   Reveals an alert talkative moderately obese woman, affect appropriate, does not appear acutely ill.  138/64 Vitals: per nursing notes.  HEENT: atraumatic, thinning hair  Ears:   External canals, TMs clear.    Eyes:  No conjunctival hyperemia, EOMs full, PERRL.  Oropharynx:   Mouth and throat clear, no thrush or exudate.  Neck:  Supple, no carotid bruits or adenopathy.  Back:  No spine or CVA pain.  Thorax:  No bony deformities.  Lungs: Clear to A&P without rales or wheezes.  Respiratory effort normal.  Cardiac:   Regular rate and rhythm, normal S1, S2, no murmur or gallop.  Abdomen:  Soft, active bowel sounds without bruits, mass, or tenderness.  Extremities: pulses intact, no foot ulcers, trace edema,  Skin:  No jaundice, peripheral cyanosis or lesions to suggest malignancy.  Neuro:  Alert and oriented. Cranial nerves, motor, sensory exams are intact.  No gross focal deficits. No stocking neuropathy noted on diabetic exam.   Psychiatric:  Memory intact, mood appropriate.    QUALITY MEASURES:  The following high BMI interventions were performed this visit: weight loss from baseline weight    ADDITIONAL HISTORY SUMMARIZED (2): None.  DECISION TO OBTAIN EXTRA INFORMATION (1): None.   RADIOLOGY TESTS (1): 2/22/18 Mammogram reviewed for clearance, DXA 2/22/18 reviewed showing osteopenia  LABS (1): Reviewed 7/16/18 labs and ordered labs today.   MEDICINE TESTS (1): None.  INDEPENDENT REVIEW (2 each): None.     The visit lasted a total of 21 minutes face to face with the patient. Over 50% of the time was spent counseling and educating the patient about hyperparathyroidism, weight management, DMII, and health maintenance.    IParesh, am scribing for and in the presence of, Dr. Lubin.    IFreddy, personally performed the services described in this documentation, as scribed by Paresh Dick in my presence, and it is both accurate and complete.    Dragon dictation was used for this note.  Speech recognition errors are a possibility.    MEDICATIONS:  Current Outpatient Medications   Medication Sig Dispense Refill     allopurinol (ZYLOPRIM) 300 MG tablet take 1 tablet by mouth once  daily 90 tablet 3     amLODIPine (NORVASC) 10 MG tablet take 1 tablet by mouth once daily 90 tablet 3     atorvastatin (LIPITOR) 10 MG tablet Take 1 tablet by mouth daily.  90 tablet 2     blood glucose test strips Use 1 each As Directed daily. 100 each 11     cholecalciferol, vitamin D3, 1,000 unit tablet Take 1,000 Units by mouth daily.       cloNIDine HCl (CATAPRES) 0.1 MG tablet Take 1 tablet (0.1 mg total) by mouth at bedtime. 90 tablet 3     furosemide (LASIX) 20 MG tablet Take 1 tablet (20 mg total) by mouth daily. 30 tablet 5     losartan (COZAAR) 100 MG tablet Take 1 tablet by mouth daily. 90 tablet 2     magnesium oxide (MAG-OX) 400 mg tablet Take 400 mg by mouth daily.        metFORMIN (GLUCOPHAGE-XR) 500 MG 24 hr tablet Take 1 tablet (500 mg total) by mouth daily with breakfast. 90 tablet 3     peg 400-propylene glycol (SYSTANE) 0.4-0.3 % Drop Place 1 Drop into both eyes 2 times daily if needed for Dry Eyes. Indications: DRY EYE       sotalol (BETAPACE) 80 MG tablet Take 1 tablet (80 mg total) by mouth daily. 90 tablet 2     warfarin (COUMADIN/JANTOVEN) 2.5 MG tablet Take 2.5 mg by mouth daily. Adjust dose based on INR results as directed. 90 tablet 0     No current facility-administered medications for this visit.        Total data points: 2

## 2021-06-22 NOTE — PATIENT INSTRUCTIONS - HE
1.  Same medications    2.  Flu shot today    3.  Check insurance for Shingrix.  I would advise    4.  Mammogram after 2/22/2019    5. Recheck bone density next year    6.  See in six months or as needed

## 2021-06-22 NOTE — PROGRESS NOTES
INR 1.9. Pt will decrease greens. After talking with pt and discussing history of greens/salads and medication change. Pt will  continue  with current diet and dosing of Warfarin.  Continue with moderation of Vit K and green leafy vegetables. Cautioned to call with increase bruising or bleeding. Reminded to call with medication change especially antibiotic. Call with any questions or concerns or any up coming procedures. Cautioned about using Herbal medication.

## 2021-06-23 NOTE — PROGRESS NOTES
INR 4.5 pt has taken today's Warfarin. She will hold tue and wed and then 2.5 mg daily. Pt has bad cold and not feeling well. Retest in one week. After talking with pt and discussing history of greens/salads and medication change. Pt will  continue  with current diet and dosing of Warfarin.  Continue with moderation of Vit K and green leafy vegetables. Cautioned to call with increase bruising or bleeding. Reminded to call with medication change especially antibiotic. Call with any questions or concerns or any up coming procedures. Cautioned about using Herbal medication.

## 2021-06-23 NOTE — PATIENT INSTRUCTIONS - HE
INR 4.5 After talking with pt and discussing history of greens/salads and medication change. Pt will  continue  with current diet and dosing of Warfarin.  Continue with moderation of Vit K and green leafy vegetables. Cautioned to call with increase bruising or bleeding. Reminded to call with medication change especially antibiotic. Call with any questions or concerns or any up coming procedures. Cautioned about using Herbal medication.

## 2021-06-25 NOTE — TELEPHONE ENCOUNTER
Records in Epic Chart Review / Radiology images in Nil.    Notes  6/1/2021 - Progress Notes / Surgery Post op follow up, Dr. Peng.  5/26/2021 - Op Note / Right Lumpectomy, Dr. Peng.  5/24/2021 - Progress Notes / Pre op exam, Dr. Freddy Lubin.  5/18/2021 - Progress Notes / Surgery Consult, Dr. Peng.  3/9/2021 - Progress Notes / Internal Medicine, Dr. Lubin.    Labs  5/26/2021 - Surgical Pathology Exam / Lumpectomy.  5/3/2021 - Surgical Pathology Exam / Right Breast Core Biospy.    Imaging  5/26/2021 - Mammo Right Breast Specimen & Clip placement.  5/3/2021 - US Right Breast Core Biopsy.  4/28/2021 - Mammo Right Diagnostic & US Right Breast.  4/22/2021 - Mammo Screening Bilateral.

## 2021-06-25 NOTE — PROGRESS NOTES
In for follow-up of her right lumpectomy  without sentinel lymph node biopsy.  She is feeling well.  She is having very minimal pain.      Physical exam:  Appears well.  Does not appear in any discomfort.  Breasts: Incisions are healing nicely with no signs of infection.  No swelling.    Pathology: The tumor was 1.5 cm.  The margins are negative.      Impression: Postop visit. Doing well.  Because of her age I do not think she needs radiation and I am not even ordering an Oncotype.  She does need to do hormone therapy as well.  She would like to go to [facility].    Plan:   We'll refer her onto [facility] oncology at [facility].  Follow-up with me in 1 year with bilateral mammograms.

## 2021-06-25 NOTE — TELEPHONE ENCOUNTER
Refill Approved    Rx renewed per Medication Renewal Policy. Medication was last renewed on 5/5/20.    Eduin Black, [facility] Triage/Med Refill 6/8/2021     Requested Prescriptions   Pending Prescriptions Disp Refills     metFORMIN (GLUCOPHAGE-XR) 500 MG 24 hr tablet [Pharmacy Med Name: metFORMIN HCl ER Oral Tablet Extended Release 24 Hour 500 MG] 180 tablet 0     Sig: Take 2 tablets (1,000 mg total) by mouth daily with breakfast.       Metformin Refill Protocol Passed - 6/7/2021  7:52 AM        Passed - Blood pressure in last 12 months     BP Readings from Last 1 Encounters:   05/26/21 159/70             Passed - LFT or AST or ALT in last 12 months     Albumin   Date Value Ref Range Status   03/09/2021 3.9 3.5 - 5.0 g/dL Final     Bilirubin, Total   Date Value Ref Range Status   09/11/2020 0.6 0.0 - 1.0 mg/dL Final     Alkaline Phosphatase   Date Value Ref Range Status   09/11/2020 96 45 - 120 U/L Final     AST   Date Value Ref Range Status   09/11/2020 15 0 - 40 U/L Final     ALT   Date Value Ref Range Status   09/11/2020 16 0 - 45 U/L Final     Protein, Total   Date Value Ref Range Status   09/11/2020 7.3 6.0 - 8.0 g/dL Final                Passed - GFR or Serum Creatinine in last 6 months     GFR MDRD Non Af Amer   Date Value Ref Range Status   05/24/2021 46 (L) >60 mL/min/1.73m2 Final     GFR MDRD Af Amer   Date Value Ref Range Status   05/24/2021 55 (L) >60 mL/min/1.73m2 Final             Passed - Visit with PCP or prescribing provider visit in last 6 months or next 3 months     Last office visit with prescriber/PCP: 3/9/2021 OR same dept: 3/9/2021 Freddy Lubin MD OR same specialty: 3/9/2021 Freddy Lubin MD Last physical: 5/24/2021 Last MTM visit: Visit date not found         Next appt within 3 mo: Visit date not found  Next physical within 3 mo: Visit date not found  Prescriber OR PCP: Freddy Lubin MD  Last diagnosis associated with med order: 1. Type 2 diabetes mellitus (H)  - metFORMIN  (GLUCOPHAGE-XR) 500 MG 24 hr tablet [Pharmacy Med Name: metFORMIN HCl ER Oral Tablet Extended Release 24 Hour 500 MG]; Take 2 tablets (1,000 mg total) by mouth daily with breakfast.  Dispense: 180 tablet; Refill: 0     If protocol passes may refill for 12 months if within 3 months of last provider visit (or a total of 15 months).           Passed - A1C in last 6 months     Hemoglobin A1c   Date Value Ref Range Status   03/09/2021 6.9 (H) <=5.6 % Final               Passed - Microalbumin in last year      Microalbumin, Random Urine   Date Value Ref Range Status   09/11/2020 1.45 0.00 - 1.99 mg/dL Final

## 2021-06-25 NOTE — TELEPHONE ENCOUNTER
Called Frida to follow up after her consult this morning with Dr. Merlos. She will  her AI from the pharmacy and start taking. She has her next follow up already planned. Shared about supportive resources including support groups peer support and services of [place]. She is happy to know that the resources are available but does not feel that she needs to access at this time. No financial needs. She is aware that f any future support needs arise she can reach out to Dior, nurse that works with Dr. Merlos, by calling the main clinic number or by connecting with her at clinic. Support and encouragement provided.

## 2021-06-25 NOTE — PROGRESS NOTES
Frida presents to [facility] for a post op appointment with Dr. Peng .  She is accompanied by herself for appointment.  She states she is doing well, minimal pain.  She has good ROM, reviewed ROM exercises with her.  Patient met with Dr. Peng .  See dictation for details of visit.  She will plan to be referred onto Medical Oncology  and will plan to follow up with Dr. Peng  in one year with bilateral mammograms.  Invited calls sooner if she has any questions.  RN time 12 mins.

## 2021-06-25 NOTE — TELEPHONE ENCOUNTER
ANTICOAGULATION  MANAGEMENT    Assessment     Today's INR result of 2.1 is Therapeutic (goal INR of 2.0-3.0)        Warfarin taken as previously instructed    No new diet changes affecting INR    No new medication/supplements affecting INR    Continues to tolerate warfarin with no reported s/s of bleeding or thromboembolism     Previous INR was Therapeutic    Plan:     Spoke on phone with Frida regarding INR result and instructed:      Warfarin Dosing Instructions:  Continue current warfarin dose 1.25 mg daily on mon/thu; and 2.5 mg daily rest of week  (0 % change)    Instructed patient to follow up no later than: one month       Frida verbalizes understanding and agrees to warfarin dosing plan.    Instructed to call the [facility] for any changes, questions or concerns. (#[phone])   ?   Reyna Radford RN    Subjective/Objective:      Frida Can, a 80 y.o. female is on warfarin. Frida      Frida reports:     Home warfarin dose: verbally confirmed home dose with Frida and updated on anticoagulation calendar     Missed doses: No     Medication changes:  No     S/S of bleeding or thromboembolism:  No     New Injury or illness:  No     Changes in diet or alcohol consumption:  No     Upcoming surgery, procedure or cardioversion:  No    Anticoagulation Episode Summary     Current INR goal:  2.0-3.0   TTR:  68.3 % (1 y)   Next INR check:  6/30/2021   INR from last check:  2.10 (6/2/2021)   Weekly max warfarin dose:     Target end date:     INR check location:     Preferred lab:     Send INR reminders to:  NICK MIDWAY    Indications    Paroxysmal atrial fibrillation (H) [I48.0]           Comments:  PAF         Anticoagulation Care Providers     Provider Role Specialty Phone number    Freddy Lubin MD  Internal Medicine [phone]

## 2021-06-25 NOTE — PATIENT INSTRUCTIONS - HE
INR 3.4 Continue current management dosing of Warfarin. Continue  diet of moderate Vitamin K intake. Discussed with pt the need to call with questions or concerns or any change in medication especially herbal medication or OTC. Call with increased bleeding or bruising or any upcoming procedures.

## 2021-06-25 NOTE — TELEPHONE ENCOUNTER
"New Patient Oncology Nurse Navigator Note     Referring provider: Dr. Peng     Referring Clinic/Organization: [facility]     Referred to (specialty): Medical Oncology    Requested provider (if applicable): N/A     Date Referral Received: 6/1/2021     Evaluation for : Right Breast Cancer     Clinical History (per Nurse review of records provided):    -4/22 Screening Mammogram showed,\"a focal asymmetry within the right breast at the 4:00 position, posterior depth. Recommend additional mammographic imaging,  -4/28 Diagnostic R  Mammogram confirmed the asymmetry in the 4:00 position .R breast US showed that in the 4:00 position,\"hypoechoic lesion which was wider than it was tall with microlobulated margins measuring 7 x 9 x 9 mm correlating in size, shape, and location with the mammographic abnormality. It appeared to contain some small anechoic areas.  -5/3/2021 Right Breast biopsy showed IDC, grade 1, DCIS,grade 1. 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 Peng on 5/18 and she proceeded to have a right breast lumpectomy without a SLN biopsy on 5/26/2021. Path showed IDC, grede 1, tumor size 15 mm, margins uninvolved. DCIS, low grade EIC negative, margins uninvolved. No LN submitted. See path report in Epic for full details.  -Referred to Medical Oncology for further management. Due to age, no radiation oncology consult or oncotype was ordered.  -She follows with PCP from [facility]. See H&P from 5/24/2021 for full medical and surgical history.    Clinical Assessment / Barriers to Care (Per Nurse): none     Records Location (Care Everywhere, Media, etc.): Epic, Care Everywhere, Media     Records Needed: None    -[facility] listed in [facility] but she only used this for urgent care visit, records not retrieved for this.  -She followed with cardiology at  [facility] in 2016 but no longer follows at their clinic     Additional testing needed prior to consult: none  -6/1/2021 Called Home and mobile number and LM " that writer was calling to follow up and schedule a consult as ordered by Dr. Peng. Contact info provided can requested call back.  -6/2/2021 Spoke with Frida and schedules new medical oncology consult as ordered by Dr. Peng. Appointment with Dr. Merlos was scheduled on Wednesday 6/14/.2021, check in at 10:30 a.m. at [facility]. Patient will arrive wearing a face covering and voiced understanding of the visitor restriction in place due to the pandemic. She understands that she  may have one supportive person accompany her to her consult. New patient letter/map with appointment details were sent to patient at confirmed e-mail address in Epic. Frida has writer's contact information for future correspondence.

## 2021-06-25 NOTE — PROGRESS NOTES
Frida: Your blood sugar was elevated to 228.  Other preoperative labs all are good.  I hope your surgery goes well.  It was nice to see you.    Freddy Lubin MD

## 2021-06-25 NOTE — PROGRESS NOTES
INR 3.4 decrease Warfarin to 1.25 mg Monday and Friday and 2.5 mg all other days. Retest in 2 weeks. Continue current management dosing of Warfarin. Continue  diet of moderate Vitamin K intake. Discussed with pt the need to call with questions or concerns or any change in medication especially herbal medication or OTC. Call with increased bleeding or bruising or any upcoming procedures.

## 2021-06-26 ENCOUNTER — HEALTH MAINTENANCE LETTER (OUTPATIENT)
Age: 81
End: 2021-06-26

## 2021-06-26 NOTE — ANESTHESIA CARE TRANSFER NOTE
Last vitals:   Vitals:    05/26/21 1225   BP: 111/54   Pulse: 71   Resp: 16   Temp: 36.4  C (97.6  F)   SpO2: 98%     Patient's level of consciousness is drowsy  Spontaneous respirations: yes  Maintains airway independently: yes  Dentition unchanged: yes  Oropharynx: oropharynx clear of all foreign objects    QCDR Measures:  ASA# 20 - Surgical Safety Checklist: WHO surgical safety checklist completed prior to induction    PQRS# 430 - Adult PONV Prevention: 4558F - Pt received => 2 anti-emetic agents (different classes) preop & intraop  ASA# 8 - Peds PONV Prevention: NA - Not pediatric patient, not GA or 2 or more risk factors NOT present  PQRS# 424 - Martha-op Temp Management: 4559F - At least one body temp DOCUMENTED => 35.5C or 95.9F within required timeframe  PQRS# 426 - PACU Transfer Protocol: - Transfer of care checklist used  ASA# 14 - Acute Post-op Pain: ASA14B - Patient did NOT experience pain >= 7 out of 10

## 2021-06-26 NOTE — ANESTHESIA POSTPROCEDURE EVALUATION
Patient: Frida Can  Procedure(s):  Right Lumpectomy after Wire Localizaiton (Right)  Anesthesia type: MAC    Patient location: Phase II Recovery  Last vitals:   Vitals Value Taken Time   /70 05/26/21 1301   Temp 36.4  C (97.6  F) 05/26/21 1225   Pulse 70 05/26/21 1301   Resp 16 05/26/21 1245   SpO2 98 % 05/26/21 1301   Vitals shown include unvalidated device data.  Post vital signs: stable  Level of consciousness: awake, alert and oriented  Post-anesthesia pain: pain controlled  Post-anesthesia nausea and vomiting: no  Pulmonary: unassisted, return to baseline  Cardiovascular: stable and blood pressure at baseline  Hydration: adequate  Anesthetic events: no    QCDR Measures:  ASA# 11 - Martha-op Cardiac Arrest: ASA11B - Patient did NOT experience unanticipated cardiac arrest  ASA# 12 - Martha-op Mortality Rate: ASA12B - Patient did NOT die  ASA# 13 - PACU Re-Intubation Rate: NA - No ETT / LMA used for case  ASA# 10 - Composite Anes Safety: ASA10A - No serious adverse event    Additional Notes:

## 2021-06-26 NOTE — ANESTHESIA PREPROCEDURE EVALUATION
02/09/2021OS-2.75+0.5075+2.007722/20 -&nbsp;SN &nbsp; &nbsp; bmb Anesthesia Evaluation      Patient summary reviewed   No history of anesthetic complications     Airway   Mallampati: II  Neck ROM: full   Pulmonary - negative ROS and normal exam                          Cardiovascular   Exercise tolerance: > or = 4 METS  (+) pacemaker (Pacemaker, [employer], DDDR low rate 70), hypertension, valvular problems/murmurs (mild AS) AS, dysrhythmias (SSS, Tachy-irvin syndrome, paroxysmal a.fib), , hypercholesterolemia,     ECG reviewed  Rhythm: regular  Rate: normal,      ROS comment: 4/29/21 Echo  Summary  1. Normal left ventricular size and systolic performance with a visually estimated ejection fraction of 55-60%.   2. There is mild concentric increase in left ventricular wall thickness.   3. There is very mild aortic stenosis.   4. Normal right ventricular size and systolic performance.   5. There is mild left atrial enlargement         Neuro/Psych    (+) CVA (TIA) ,     Endo/Other    (+) diabetes mellitus type 2, arthritis, obesity (BMI 36),      Comments: Gout  Malignant neoplasm of lower-inner quadrant of right breast     GI/Hepatic/Renal    (+)   chronic renal disease CRI,   (-) GERD          Dental - normal exam                        Anesthesia Plan  Planned anesthetic: MAC  Pt has a pacemaker.  Please have a magnet immediately available.      FiO2 less than 030 during cautery  Fire risk precautions    Decadron 4 mg  Zofran  ASA 3     Anesthetic plan and risks discussed with: patient  Anesthesia plan special considerations: antiemetics,   Post-op plan: routine recovery

## 2021-06-26 NOTE — PROGRESS NOTES
Progress Notes by Nahun Mina MD at 4/5/2018 10:30 AM     Author: Nahun Mina MD Service: -- Author Type: Physician    Filed: 4/5/2018  1:17 PM Encounter Date: 4/5/2018 Status: Signed    : Nahun Mina MD (Physician)           Click to link to [facility] Heart [facility] HEART [facility] ELECTROPHYSIOLOGY NOTE    Today I had the opportunity to see  Frida Can for follow-up evaluation of sick sinus syndrome and paroxysmal atrial fibrillation after pacemaker placement.      Assessment/Recommendations   Clinic Problem List:  1. SSS (sick sinus syndrome)     2. Cardiac pacemaker, dual, in situ     3. Paroxysmal atrial fibrillation     4. Essential hypertension with goal blood pressure less than 140/90     5. Mild aortic stenosis     6. Sinus pause         Assessment:    Sick sinus syndrome normally functioning dual-chamber pacemaker  Paroxysmal atrial fibrillation, only well suppressed on low-dose sotalol the patient's [id number]-QEAg Score for stroke risk is 7 so warfarin anticoagulation should be continued.  Hypertension reasonably well-controlled by history  Mild aortic stenosis    Plan:  Continue current medications including sotalol and warfarin  Follow low carbohydrate diet with moderate exercise for weight reduction  Call if troublesome palpitations  Follow up appointment:   Dr. Fagan in 1 year.  Dr. Mina if needed       History of Present Illness     Frida Can is a 77 y.o. female with a history of paroxysmal atrial fibrillation and sick sinus syndrome.  She presented with palpitations she first noted in late 2016.  These were characterized by rapid irregular heart beating lasting less than 30 minutes.  There was no ECG documentation.  Patient activated ECG monitor February 2018 showed tendency toward sinus bradycardia with episodes of paroxysmal atrial fibrillation lasting up to 2 hours with rates of up to 150 bpm.  Patient also was documented to have 2 sinus pauses of 5.3  and 12 seconds the latter occurring after termination of atrial fibrillation consistent with sinus node dysfunction.  Dual-chamber pacemaker was implanted on 3/2/2018 without complications.  Echocardiogram that today showed ejection fraction is 65-70%.  Mild aortic stenosis was noted with a 13 mm mean gradient.  He was subsequently started on sotalol 80 mg daily with dose reduced for mild renal insufficiency.  The patient's [id number]-KEXs Score for stroke risk is 7 a past history of possible TIA.  She has been maintained on warfarin anticoagulation with an INR of 2.7 recently.    She denies any episodes of fainting lightheadedness or troublesome palpitations.  She is tolerated warfarin anticoagulation without significant bleeding.  She denies exertional chest pain or dyspnea on exertion.  Personally reviewed.  12-lead ECG 3/5/2017 showed an atrial paced rhythm with normal QT interval.  Pacemaker interrogation today shows underlying junctional escape rhythm in the 30s.  He has been 100% atrially paced with rare ventricular pacing.  She has had no significant atrial fibrillation since pacemaker implantation atrial and ventricular pacing and sensing thresholds are excellent.  Rate histograms were somewhat blunted and sensor sensitivity was increased or faster rate responsive pacing.       Physical Examination Review of Systems   Vitals:    04/05/18 0950   BP: 142/52   Pulse: 76   Resp: 16     Body mass index is 36.08 kg/(m^2).  Wt Readings from Last 3 Encounters:   04/05/18 194 lb 1.6 oz (88 kg)   03/12/18 189 lb (85.7 kg)   03/01/18 194 lb (88 kg)        Appearance:   no distress,    HEENT:   no scleral icterus, normal conjunctivae    Neck: no carotid bruits or thyromegaly   Chest/Lungs:   lungs are clear to auscultation, no rales or wheezing, pacemaker is well-healed in left subclavian pocket   Cardiovascular:   Jugular venous pressure 5 cm, Apical pulse is regular. Normal S1,S2 with a 2/6 systolic ejection murmur,    Abdomen:  no  Hepatosplenomegaly., nontender,  bowel sounds are present   Extremities: no cyanosis or clubbing, No edema   Skin: no xanthelasma, warm.    Neurologic: No gross focal neurologic deficits   Mood/Affect: Alert, cooperative    General: WNL  Eyes: WNL  Ears/Nose/Throat: WNL  Lungs: WNL  Heart: WNL  Stomach: WNL  Bladder: WNL  Muscle/Joints: WNL  Skin: WNL  Nervous System: WNL  Mental Health: WNL     Blood: WNL     Medical History  Surgical History Family History Social History   Past Medical History:   Diagnosis Date   ? Arrhythmia     Paroxysmal Atrial fibrillation   ? Diabetes mellitus    ? Hypercalcemia    ? Hyperlipidemia    ? Hypertension    ? Obesity     Past Surgical History:   Procedure Laterality Date   ? CATARACT EXTRACTION, BILATERAL     ? EP PACEMAKER INSERT N/A 3/2/2018    Procedure: EP Pacemaker Insertion;  Surgeon: Nahun Mina MD;  Location: [facility];  Service:    ? VT REMOVE TONSILS/ADENOIDS,<13 Y/O      Description: Tonsillectomy With Adenoidectomy;  Recorded: 09/16/2008;   ? VT REVISE MEDIAN N/CARPAL TUNNEL SURG      Description: Neuroplasty Decompression Median Nerve At Carpal Tunnel;  Recorded: 11/01/2009;  Comments: Right- 1/99; Left- 4/99    Family History   Problem Relation Age of Onset   ? No Medical Problems Mother    ? No Medical Problems Father    ? No Medical Problems Sister    ? No Medical Problems Daughter    ? No Medical Problems Maternal Grandmother    ? No Medical Problems Maternal Grandfather    ? No Medical Problems Paternal Grandmother    ? No Medical Problems Paternal Grandfather    ? No Medical Problems Maternal Aunt    ? No Medical Problems Paternal Aunt     Social History     Social History   ? Marital status:      Spouse name: N/A   ? Number of children: N/A   ? Years of education: N/A     Occupational History   ? Not on file.     Social History Main Topics   ? Smoking status: Never Smoker   ? Smokeless tobacco: Never Used   ? Alcohol use  No   ? Drug use: No   ? Sexual activity: Not on file     Other Topics Concern   ? Not on file     Social History Narrative          Medications  Allergies   Current Outpatient Prescriptions   Medication Sig Dispense Refill   ? sotalol (BETAPACE) 80 MG tablet Take 1 tablet (80 mg total) by mouth daily. 90 tablet 0   ? warfarin (COUMADIN) 5 MG tablet Take 5 mg) by mouth daily. Adjust dose based on INR results as directed. 90 tablet 0   ? allopurinol (ZYLOPRIM) 300 MG tablet TAKE 1 TABLET BY MOUTH DAILY 90 tablet 3   ? amLODIPine (NORVASC) 10 MG tablet take 1 tablet by mouth daily 90 tablet 2   ? atorvastatin (LIPITOR) 10 MG tablet take 1 tablet by mouth every day. 90 tablet 2   ? blood glucose test strips Use 1 each As Directed daily. 100 each 11   ? cholecalciferol, vitamin D3, 1,000 unit tablet Take 1,000 Units by mouth daily.     ? cloNIDine HCl (CATAPRES) 0.1 MG tablet Take 1 tablet (0.1 mg total) by mouth at bedtime. 90 tablet 3   ? furosemide (LASIX) 20 MG tablet TAKE 1 TABLET BY MOUTH DAILY. 30 tablet 8   ? losartan (COZAAR) 100 MG tablet TAKE ONE TABLET BY MOUTH ONE TIME DAILY  90 tablet 2   ? magnesium oxide (MAG-OX) 400 mg tablet Take 400 mg by mouth daily.     ? metFORMIN (GLUCOPHAGE XR) 500 MG 24 hr tablet Take 2 tablets (1,000 mg total) by mouth daily with breakfast. 180 tablet 3   ? peg 400-propylene glycol (SYSTANE) 0.4-0.3 % Drop Place 1 Drop into both eyes 2 times daily if needed for Dry Eyes. Indications: DRY EYE       No current facility-administered medications for this visit.       Allergies   Allergen Reactions   ? Hydrochlorothiazide      Annotation: hyperuricemia made worse by HCTZ   Causes pts gout

## 2021-06-26 NOTE — PROGRESS NOTES
"Oncology Rooming Note    06/16/21 11:09 AM    Frida Can is a 80 y.o. female who presents for:    Chief Complaint   Patient presents with     HE Cancer     Breast Cancer       Initial Vitals: /68   Pulse 70   Temp 98  F (36.7  C) (Oral)   Ht 5' 1.75\" (1.568 m)   Wt 191 lb (86.6 kg)   SpO2 96%   BMI 35.22 kg/m       Estimated body mass index is 35.22 kg/m  as calculated from the following:    Height as of this encounter: 5' 1.75\" (1.568 m).    Weight as of this encounter: 191 lb (86.6 kg).     Body surface area is 1.94 meters squared.      Allergies reviewed: Yes  Medications reviewed: Yes    Refills needed: No    Pharmacy name entered into EPIC: @PrefferedPHARMACY@      Clinical concerns: no concerns      Dior Rush RN      "

## 2021-06-26 NOTE — PROGRESS NOTES
Progress Notes by Blue Fagan MD at 3/1/2018 10:30 AM     Author: Blue Fagan MD Service: -- Author Type: Physician    Filed: 3/1/2018 11:25 AM Encounter Date: 3/1/2018 Status: Signed    : Blue Fagan MD (Physician)                [email or web address]  [phone]          Thank you Dr. Lubin for asking the [facility] team to participate in the care of your patient, Frida Can.     Impression and Plan     1.  Atrial fibrillation.  Patient with newly diagnosed paroxysmal atrial fibrillation.  Specifically, patient had reported subjective palpitations.  Frida subsequently had a symptom triggered monitor placed.  This did reveal paroxysms of atrial fibrillation with rapid ventricular response.  In addition, however, patient was noted to have evidence of bradycardia as well as some intermittent pauses one of which lasted 12 seconds (rhythm strips were personally reviewed and lengthy pause occurred post spontaneous termination of atrial fibrillation).  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 Carmella [id number] Score is 5 yielding an annual embolic risk of 7.2-10.0%.      I discussed the findings with one of our Electrophysiologists, Dr. Nahun Mina, and it was jointly decided that patient would be a candidate for permanent pacemaker placement.  Indeed, there is an opening in the schedule tomorrow, 2 March 2018, and patient is amenable to proceed.    Parenthetically, patient had been on metoprolol though after the findings on her event recorder she was subsequently advised to the therapy in light of bradycardia/processes.    Recommend:    Echocardiogram (we will try to expedite and have performed either today or first thing in a.m. when she presents for her pacemaker).    Obtain TSH.    Continue aspirin for now for some degree of CVA prophylaxis with initiation of full anticoagulation post pacemaker placement.    For completeness, will  refer to Sleep Medicine for consideration of workup for sleep apnea (patients episodes of A. fib to seem to be occurring in the evening/night and early a.m. and in addition she does admit to prominent snoring at times).    2.  Hypertension.  Blood pressure is mildly elevated in the office today.  Undoubtedly, we will initiate beta-blocker therapy post pacemaker placement for problem #1 which should help with blood pressure as well.    3.  Dyslipidemia.  Lipid profile 15 February 2018 was favorable with LDL 62 mg/dL and HDL 48 mg/dL.    Continue atorvastatin.             History of Present Illness    Once again I would like to thank you again for asking me to participate in the care of your patient, Frida Can.  As you know, but to reiterate for my own records, Frida Can is a 77 y.o. female who recently had been reporting symptoms of palpitations.  Patient subsequently had a symptom triggered monitor placed.  This did reveal paroxysms of atrial fibrillation with rapid ventricular response.  In addition, however, patient was noted to have evidence of bradycardia as well as some intermittent pauses one of which lasted 12 seconds (occurred post spontaneous termination of atrial fibrillation).    Patient states that she is keenly aware of the rhythm disturbance when it is present.  She states her first episode was approximately year and   ago though has not had any significant recurrence up until most recently.  Over the past 1-2 she has noted approximately 3 episodes of sensation of rapid irregular heart rhythm.  She states that these generally lasts less than an hour.  She has not had any protracted episodes.  She has had perhaps some mild lightheadedness, but no eileen loss of consciousness and the like.  With the rhythm disturbance, she feels little fatigued, but otherwise no prominent adverse symptoms.    On further interview, patient denies chest discomfort.  She reports no worsening shortness of  "breath.  She denies any subjective decline from her baseline.    Further review of systems is otherwise negative/noncontributory (medical record and 13 point review of systems reviewed as well and pertinent positives noted).         Cardiac Diagnostics      One patch monitor as described above in history of present illness.       Physical Examination       /58 (Patient Site: Right Arm, Patient Position: Sitting, Cuff Size: Adult Large)  Pulse 68  Resp 18  Ht 5' 1.5\" (1.562 m)  Wt 194 lb (88 kg)  BMI 36.06 kg/m2        Wt Readings from Last 3 Encounters:   03/01/18 194 lb (88 kg)   02/15/18 191 lb 9.6 oz (86.9 kg)   09/18/17 202 lb (91.6 kg)     The patient is alert and oriented times three. Sclerae are anicteric. Mucosal membranes are moist. Jugular venous pressure is normal. No significant adenopathy/thyromegally appreciated. Lungs are clear with good expansion. On cardiovascular exam, the patient has a regular S1 and S2. Abdomen is soft and non-tender. Extremities reveal no clubbing, cyanosis, or edema.         Family History/Social History/Risk Factors   Patient does not smoke.    Patient states her brother had bypass surgery in his 60s.  Patient is retired.  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  In her spare time she enjoys puzzles.       Medications  Allergies   Current Outpatient Prescriptions   Medication Sig Dispense Refill   ? allopurinol (ZYLOPRIM) 300 MG tablet TAKE 1 TABLET BY MOUTH DAILY 90 tablet 3   ? amLODIPine (NORVASC) 10 MG tablet take 1 tablet by mouth daily 90 tablet 2   ? aspirin 81 MG EC tablet Take 81 mg by mouth daily.     ? atorvastatin (LIPITOR) 10 MG tablet take 1 tablet by mouth every day. 90 tablet 2   ? blood glucose test strips Use 1 each As Directed daily. 100 each 11   ? cholecalciferol, vitamin D3, 1,000 unit tablet Take 1,000 Units by mouth daily.     ? cloNIDine HCl (CATAPRES) 0.1 MG tablet Take 1 tablet (0.1 mg total) by mouth at bedtime. 90 tablet 3 "   ? furosemide (LASIX) 20 MG tablet TAKE 1 TABLET BY MOUTH DAILY. 30 tablet 8   ? losartan (COZAAR) 100 MG tablet TAKE ONE TABLET BY MOUTH ONE TIME DAILY  90 tablet 2   ? magnesium oxide (MAG-OX) 400 mg tablet Take 400 mg by mouth daily.     ? metFORMIN (GLUCOPHAGE XR) 500 MG 24 hr tablet Take 2 tablets (1,000 mg total) by mouth daily with breakfast. 180 tablet 3   ? peg 400-propylene glycol (SYSTANE) 0.4-0.3 % Drop Place 1 Drop into both eyes 2 times daily if needed for Dry Eyes. Indications: DRY EYE     ? metoprolol succinate (TOPROL-XL) 50 MG 24 hr tablet Take 1 tablet (50 mg total) by mouth daily. 90 tablet 3     No current facility-administered medications for this visit.       Allergies   Allergen Reactions   ? Hydrochlorothiazide      Annotation: hyperuricemia made worse by HCTZ   Causes pts gout          Lab Results   Lab Results   Component Value Date     02/15/2018    K 4.3 02/15/2018    CL 99 02/15/2018    CO2 29 02/15/2018    BUN 32 (H) 02/15/2018    CREATININE 1.28 (H) 02/15/2018    CALCIUM 10.6 (H) 02/15/2018     Lab Results   Component Value Date    WBC 9.2 05/12/2017    WBC 6.9 08/14/2015    HGB 13.5 05/12/2017    HCT 40.6 05/12/2017    MCV 94 05/12/2017     05/12/2017     Lab Results   Component Value Date    CHOL 139 02/15/2018    TRIG 146 02/15/2018    HDL 48 (L) 02/15/2018    LDLCALC 62 02/15/2018     Lab Results   Component Value Date    TSH 4.70 01/09/2015           Medical History  Surgical History   Diabetes mellitus  Hypertension  Dyslipidemia   Past Surgical History:   Procedure Laterality Date   ? MA AMPUTATION TOE,MT-P JT      Description: Surgery Foot Amputation Toe At MTP Joint;  Recorded: 09/16/2008;  Comments: duplicated 2nd toe   ? MA REMOVE TONSILS/ADENOIDS,<13 Y/O      Description: Tonsillectomy With Adenoidectomy;  Recorded: 09/16/2008;   ? MA REVISE MEDIAN N/CARPAL TUNNEL SURG      Description: Neuroplasty Decompression Median Nerve At Carpal Tunnel;  Recorded:  11/01/2009;  Comments: Right- 1/99; Left- 4/99

## 2021-06-26 NOTE — PROGRESS NOTES
[facility] Hematology and Oncology Consult Note    Patient: Frida Can  MRN: [id number]  Date of Service: 06/16/2021        Reason for Visit    I was consulted by Dr. Peng regarding right breast cancer    Assessment/Plan    #. 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  G1, ER +, WA +, HER-2/alvin-.   Discussed the clinical course, pathology finding.  This is consistent with early stage breast cancer although formal staging cannot be determined.  Overall prognosis from the breast cancer is favorable in terms of breast cancer recurrence, disease-free survival.  She voiced understanding.  I discussed about adjuvant treatment to decrease breast cancer recurrence and optimize breast cancer free survival.  She would not consider adjuvant chemotherapy.  Based on her pathology, the likelihood of chemotherapy benefit cannot be justified.  Therefore, Oncotype test was not requested.  Adjuvant radiation therapy can be related in patient over 70 years.  I discussed about adjuvant endocrine therapy.  I offered anastrozole, reviewed side effects and gave her handout to review.  Duration of endocrine therapy is for 5 years.  She agreed to start anastrozole.  Prescription sent today.   Follow-up clinical exam in 2 months.    #.  Low bone density.   Reviewed the DEXA scan from April 2021.  She has history of mild hypercalcemia.  She normally take good source of calcium from diet and she is a milk drinker.  She takes vitamin D 1000 mg once a day.  Discussed about weightbearing exercises.  I also discussed about negative impact on bone density from anastrozole and importance of paying attention to bone health.   Follow-up DEXA scan in about 2 years.       ECOG Performance    1  Distress Assessment  Distress Assessment Score: No distress        Problem List    1. Malignant neoplasm of lower-inner quadrant of right breast of female, estrogen receptor positive (H)              CC: Freddy Lubin MD      ______________________________________________________________________________      Staging History    Cancer Staging  Malignant neoplasm of lower-inner quadrant of right breast of female, estrogen receptor positive (H)  Staging form: Breast, AJCC 8th Edition  - Clinical: No stage assigned - Unsigned  - Pathologic stage from 6/16/2021: Stage Unknown (pT1c, pNX, cM0, G1, ER+, CO+, HER2-) - Signed by Etelvina Merlos MD on 6/16/2021      History    Ms. Frida Can is a very pleasant 80-year-old female presented herself today for follow-up management of recent diagnosis of breast cancer.  4/22/2021-screening mammogram showed focal asymmetry within the right breast at 4 o'clock position.  4/28/2021-diagnostic mammogram and ultrasound showed asymmetry with microlobulated margins and ultrasound confirmed 7 x 9 x 9 mm hypoechoic lesion at the same location.  5/3/2021-ultrasound-guided core needle biopsy of the right breast mass and it showed invasive ductal carcinoma and DCIS.  ER positive> 95%, CO positive> 95%, HER-2/alvin negative by IHC (1+).  5/26/2021-underwent right breast lumpectomy by Dr. Peng.   Invasive ductal carcinoma, grade 1, 15 mm, negative margins.  There is associated DCIS of no great, solid and focal cribriform, no necrosis and no microcalcification, negative margins. pT1c Nx.    Recovering from surgery well.  She lives by herself.  She is  and not ex- passed away.  She has 5 children, 14 grandchildren and 4 great-grandchildren.  She retired from office work at age 68.  She quit smoking 40 years ago and she smoked for about 20 years total, less than a pack a day.  She does not drink alcohol.    She denies family history of cancer.    Past History  Past Medical History:   Diagnosis Date     Arrhythmia     Paroxysmal Atrial fibrillation     Arthritis      Cancer (H)      Diabetes mellitus (H)      Hypercalcemia      Hyperlipidemia       Hypertension      Obesity      Past Surgical History:   Procedure Laterality Date     CATARACT EXTRACTION, BILATERAL       EP PACEMAKER INSERT N/A 3/2/2018    Procedure: EP Pacemaker Insertion;  Surgeon: Nahun Mina MD;  Location: [facility];  Service:      MS MASTECTOMY, PARTIAL Right 5/26/2021    Procedure: Right Lumpectomy after Wire Localizaiton;  Surgeon: Rosanna Peng MD;  Location: [facility];  Service: General     MS REMOVE TONSILS/ADENOIDS,<13 Y/O      Description: Tonsillectomy With Adenoidectomy;  Recorded: 09/16/2008;     MS REVISE MEDIAN N/CARPAL TUNNEL SURG      Description: Neuroplasty Decompression Median Nerve At Carpal Tunnel;  Recorded: 11/01/2009;  Comments: Right- 1/99; Left- 4/99      BREAST CORE BIOPSY RIGHT Right 5/3/2021     Family History   Problem Relation Age of Onset     No Medical Problems Mother      No Medical Problems Father      No Medical Problems Sister      No Medical Problems Daughter      No Medical Problems Maternal Grandmother      No Medical Problems Maternal Grandfather      No Medical Problems Paternal Grandmother      No Medical Problems Paternal Grandfather      No Medical Problems Maternal Aunt      No Medical Problems Paternal Aunt      Social History     Socioeconomic History     Marital status:      Spouse name: None     Number of children: None     Years of education: None     Highest education level: None   Occupational History     None   Social Needs     Financial resource strain: None     Food insecurity     Worry: None     Inability: None     Transportation needs     Medical: None     Non-medical: None   Tobacco Use     Smoking status: Former Smoker     Smokeless tobacco: Never Used     Tobacco comment: quit years ago   Substance and Sexual Activity     Alcohol use: No     Drug use: No     Sexual activity: Not Currently   Lifestyle     Physical activity     Days per week: None     Minutes per session: None     Stress: None  "  Relationships     Social connections     Talks on phone: None     Gets together: None     Attends Sikhism service: None     Active member of club or organization: None     Attends meetings of clubs or organizations: None     Relationship status: None     Intimate partner violence     Fear of current or ex partner: None     Emotionally abused: None     Physically abused: None     Forced sexual activity: None   Other Topics Concern     None   Social History Narrative     None     Allergies    Allergies   Allergen Reactions     Hydrochlorothiazide      Annotation: hyperuricemia made worse by HCTZ   Causes pts gout       Review of Systems    Apart from describing in history, the remainder of comprehensive ROS was negative.  Pain  Currently in Pain: No/denies    Physical Exam    Recent Vitals 6/16/2021   Height 5' 1.75\"   Weight 191 lbs   BSA (m2) 1.94 m2   /68   Pulse 70   Temp 98   Temp src 1   SpO2 96   Some recent data might be hidden       GENERAL: Alert and oriented to time place and person. Seated comfortably. In no distress.    HEAD: Atraumatic and normocephalic.    EYES: PASHA, EOMI.  No pallor.  No icterus.    Oral cavity: no mucosal lesion or tonsillar enlargement.    NECK: supple. JVP normal.  No thyroid enlargement.    LYMPH NODES: No palpable, cervical, axillary or inguinal lymphadenopathy.    CHEST: clear to auscultation bilaterally.  Resonant to percussion throughout bilaterally.  Symmetrical breath movements bilaterally.    BREAST: Glandular breasts bilaterally.  No discrete palpable masses.  Post lumpectomy changes in the right breast.    CVS: S1 and S2 are heard. Regular rate and rhythm.  No murmur or gallop or rub heard.  No peripheral edema.    ABDOMEN: Soft. Not tender. Not distended.  No palpable hepatomegaly or splenomegaly.  No other mass palpable.  Bowel sounds heard.    EXTREMITIES: Warm.    SKIN: no rash, or bruising or purpura.      Lab Results    No results found for this or any " previous visit (from the past 168 hour(s)).    Imaging Results    Mammo Post Clip Placement Right    Result Date: 5/26/2021  INDICATION: Pre-operative localization of invasive ductal carcinoma at 4 o'clock, 6 cm from the nipple. PROCEDURE: Informed consent was obtained from the patient. The breast was cleansed with ChloraPrep. Lidocaine was used for local anesthesia. A -gauge needle was then advanced to the area of abnormality. A localization wire was then deployed. Post-procedure mammograms demonstrate the localization wire in appropriate position. The previously placed marker was visualized. The patient tolerated this well.     Sonographically guided right wire localization. A specimen was sent for radiography. Specimen radiograph demonstrates the area of abnormality and the localization wire to be included in the specimen.    Mammo Breast Specimen Right    Result Date: 5/26/2021  INDICATION: Pre-operative localization of invasive ductal carcinoma at 4 o'clock, 6 cm from the nipple. PROCEDURE: Informed consent was obtained from the patient. The breast was cleansed with ChloraPrep. Lidocaine was used for local anesthesia. A -gauge needle was then advanced to the area of abnormality. A localization wire was then deployed. Post-procedure mammograms demonstrate the localization wire in appropriate position. The previously placed marker was visualized. The patient tolerated this well.     Sonographically guided right wire localization. A specimen was sent for radiography. Specimen radiograph demonstrates the area of abnormality and the localization wire to be included in the specimen.    Image Guided Breast Localization Right    Result Date: 5/26/2021  INDICATION: Pre-operative localization of invasive ductal carcinoma at 4 o'clock, 6 cm from the nipple. PROCEDURE: Informed consent was obtained from the patient. The breast was cleansed with ChloraPrep. Lidocaine was used for local anesthesia. A -gauge needle was then  advanced to the area of abnormality. A localization wire was then deployed. Post-procedure mammograms demonstrate the localization wire in appropriate position. The previously placed marker was visualized. The patient tolerated this well.     Sonographically guided right wire localization. A specimen was sent for radiography. Specimen radiograph demonstrates the area of abnormality and the localization wire to be included in the specimen.    TT: 60 minutes: time consisted of preparing to see the patient (eg. review of tests)- 10 minutes, obtaining an/or reviewing separately obtained history (40 minutes), ordering medication, test or procedure, communicating with other healthcare professionals, documenting clinical information in the electronic health record (10 minutes).    Signed by: Etelvina Merlos MD

## 2021-06-27 NOTE — PROGRESS NOTES
Progress Notes by Blue Fagan MD at 5/22/2019 10:30 AM     Author: Blue Fagan MD Service: -- Author Type: Physician    Filed: 5/22/2019 10:29 AM Encounter Date: 5/22/2019 Status: Signed    : Blue Fagan MD (Physician)                  [email or web address]  [phone]         Thank you Dr. Lubin for asking the [facility] Care team to participate in the care of your patient, Frida Can.     Impression and Plan     1.  Atrial fibrillation.  Patient with newly diagnosed paroxysmal atrial fibrillation and tachy-irvin syndrome.  Patient underwent permanent pacemaker placement 2 March 2018.  Carmella [id number] Score is 7.  Atrial fibrillation has been fairly quiescent.  She has been maintained on warfarin for CVA prophylaxis.    2.  Aortic stenosis.  This was felt mild in degree on echocardiogram 2 March 2018.  This is commensurate with exam.  More advanced aortic stenosis is not suspected.     3.  Dyslipidemia.  Lipid profile 7 January 2000 favorable with LDL 74 mg/dL and HDL 58 mg/dL.    Continue atorvastatin.     Plan on follow-up in 1 year.  Patient will be followed through device clinic per protocol as well.         History of Present Illness    Once again I would like to thank you again for asking me to participate in the care of your patient, Frida Can.  As you know, but to reiterate for my own records, Frida Can is a 78 y.o. female with history of sick sinus syndrome.  Patient underwent permanent pacemaker placement 2 March 2018.    In follow-up today, patient is without complaint from a cardiac standpoint.  She reports no chest pain or shortness of breath.  She denies any subjective decline in exercise tolerance, palpitations, or lightheadedness.    Further review of systems is otherwise negative/noncontributory (medical record and 13 point review of systems reviewed as well and pertinent positives noted).         Cardiac Diagnostics  "     1.  Atrial fibrillation.  Patient with newly diagnosed paroxysmal atrial fibrillation and tachy-irvin syndrome.  Patient underwent permanent pacemaker placement 2 March 2018.  Carmella [id number] Score is 7.  Atrial fibrillation has been fairly quiescent.  She has been maintained on warfarin for CVA prophylaxis.    2.  Aortic stenosis.  This was felt mild in degree on echocardiogram 2 March 2018.  This is commensurate with exam.  More advanced aortic stenosis is not suspected.     3.  Dyslipidemia.  Lipid profile 7 January 2000 favorable with LDL 74 mg/dL and HDL 58 mg/dL.    Continue atorvastatin.     Plan on follow-up in 1 year.  Patient will be followed intermittently through device clinic per protocol as well.         Physical Examination       /56 (Patient Site: Left Arm, Patient Position: Sitting, Cuff Size: Adult Large)   Pulse 68   Resp 16   Ht 5' 2\" (1.575 m)   Wt 191 lb 6.4 oz (86.8 kg)   BMI 35.01 kg/m          Wt Readings from Last 3 Encounters:   05/22/19 191 lb 6.4 oz (86.8 kg)   01/07/19 190 lb 3.2 oz (86.3 kg)   07/16/18 195 lb 9.6 oz (88.7 kg)     The patient is alert and oriented times three. Sclerae are anicteric. Mucosal membranes are moist. Jugular venous pressure is normal. No significant adenopathy/thyromegally appreciated. Lungs are clear with good expansion. On cardiovascular exam, the patient has a regular S1 and S2. Abdomen is soft and non-tender. Extremities reveal no clubbing, cyanosis, or edema.         Family History/Social History/Risk Factors   Patient does not smoke.  Family history of hypertension.         Medications  Allergies   Current Outpatient Medications   Medication Sig Dispense Refill   ? allopurinol (ZYLOPRIM) 300 MG tablet take 1 tablet by mouth once daily 90 tablet 2   ? amLODIPine (NORVASC) 10 MG tablet take 1 tablet by mouth once daily 90 tablet 3   ? atorvastatin (LIPITOR) 10 MG tablet Take 1 tablet by mouth daily.  90 tablet 2   ? blood glucose test " strips Use 1 each As Directed daily. 100 each 11   ? cholecalciferol, vitamin D3, 1,000 unit tablet Take 1,000 Units by mouth daily.     ? cloNIDine HCl (CATAPRES) 0.1 MG tablet Take 1 tablet (0.1 mg total) by mouth at bedtime. 90 tablet 3   ? furosemide (LASIX) 20 MG tablet Take 1 tablet (20 mg total) by mouth daily. 30 tablet 5   ? losartan (COZAAR) 100 MG tablet Take 1 tablet by mouth daily. 90 tablet 2   ? magnesium oxide (MAG-OX) 400 mg tablet Take 400 mg by mouth daily.      ? metFORMIN (GLUCOPHAGE-XR) 500 MG 24 hr tablet Take 1 tablet (500 mg total) by mouth daily with breakfast. 90 tablet 3   ? peg 400-propylene glycol (SYSTANE) 0.4-0.3 % Drop Place 1 Drop into both eyes 2 times daily if needed for Dry Eyes. Indications: DRY EYE     ? sotalol (BETAPACE) 80 MG tablet Take 1 tablet (80 mg total) by mouth daily. 90 tablet 2   ? warfarin (COUMADIN/JANTOVEN) 2.5 MG tablet Take 2.5 mg by mouth daily. Adjust dose based on INR results as directed. 90 tablet 0     No current facility-administered medications for this visit.       Allergies   Allergen Reactions   ? Hydrochlorothiazide      Annotation: hyperuricemia made worse by HCTZ   Causes pts gout          Lab Results   Lab Results   Component Value Date     01/07/2019    K 4.8 01/07/2019     01/07/2019    CO2 23 01/07/2019    BUN 30 (H) 01/07/2019    CREATININE 1.20 (H) 01/07/2019    CALCIUM 10.6 (H) 01/07/2019     Lab Results   Component Value Date    WBC 9.5 01/07/2019    WBC 6.9 08/14/2015    HGB 14.2 01/07/2019    HCT 42.7 01/07/2019    MCV 93 01/07/2019     01/07/2019     Lab Results   Component Value Date    CHOL 170 01/07/2019    TRIG 192 (H) 01/07/2019    HDL 58 01/07/2019    LDLCALC 74 01/07/2019     Lab Results   Component Value Date    INR 2.8 04/29/2019    INR 3.90 (!) 04/15/2019     Lab Results   Component Value Date    TSH 3.57 07/16/2018

## 2021-06-29 NOTE — PROGRESS NOTES
"Progress Notes by Blue Fagan MD at 4/28/2020 10:30 AM     Author: Blue Fagan MD Service: -- Author Type: Physician    Filed: 4/28/2020 10:54 AM Encounter Date: 4/28/2020 Status: Signed    : Blue Fagan MD (Physician)          The patient has been notified of following:     \"This telephone visit will be conducted via a call between you and your physician/provider. We have found that certain health care needs can be provided without the need for a physical exam.  This service lets us provide the care you need with a phone conversation.  If a prescription is necessary we can send it directly to your pharmacy.  If lab work is needed we can place an order for that and you can then stop by our lab to have the test done at a later time. If during the course of the call the physician/provider feels a telephone visit is not appropriate, you will not be charged for this service.\" Verbal consent has been obtained for this service by care team member:         HEART CARE PHONE ENCOUNTER     The patient has chosen to have the visit conducted as a telephone visit, to reduce risk of exposure given the current status of Coronavirus in our community. This telephone visit is being conducted via a call between the patient and physician/provider. Health care needs are being provided without a physical exam.      Impression and Plan     1.  Atrial fibrillation.  Patient with newly diagnosed paroxysmal atrial fibrillation and tachy-irvin syndrome.  Patient underwent permanent pacemaker placement 2 March 2018.  Donnas [id number] Score is 7.  Atrial fibrillation has been fairly quiescent.  She has been maintained on warfarin for CVA prophylaxis.     2.  Aortic stenosis.  This was felt mild in degree on echocardiogram 2 March 2018.       3.  Dyslipidemia.  Lipid profile 22 July 2019 revealed LDL 70 mg/dL and HDL 55 mg/dL.    Continue atorvastatin.     Plan on follow-up in 1 year.  Patient will " be followed through device clinic per protocol as well.      Total time of call between patient and provider was 11 minutes     Start Time: 1042    Stop Time: 1053         History of Present Illness    Frida Can is a 79 y.o. female who is being evaluated via a billable telephone visit.     Once again I would like to thank you again for asking me to participate in the care of your patient, Frida Can.  As you know, but to reiterate for my own records, Frida Can is a 79 y.o. female with history of sick sinus syndrome.  Patient underwent permanent pacemaker placement 2 March 2018.     On interview today, patient is without complaint from a cardiac standpoint.  She reports no chest pain or shortness of breath.  She denies any subjective decline in exercise tolerance, palpitations, or lightheadedness.  Patient denies any fevers, chills, or other constitutional symptoms.    Further review of systems is otherwise negative/noncontributory (medical record and 13 point review of systems reviewed as well and pertinent positives noted).         Cardiac Diagnostics     Echocardiogram 2 March 2018 (personally reviewed):  1. Normal left ventricular size and systolic performance with a visually estimated ejection fraction of 65-70%.   2. There is mild concentric increase in left ventricular wall thickness.   3. There is mild aortic stenosis.   4. Normal right ventricular size and systolic performance.   5. There is mild left atrial enlargement.     Device interrogation 20 February 2020 ([employer] L331 ACCOLADE MRI EL device implanted 2 March 2018):  1. Presenting rhythm: AP-VS 70 bpm.  2. Battery/lead status: stable.  3. Arrhythmias: since 20 November 2019; three ventricular high rate episodes, two available EGMs show PSVT. One mode switch <1min, appears to be atrial tachy  4. arrhythmia.  5. Comments: Normal magnet and pacemaker function.          Physical Examination           Wt Readings from Last  3 Encounters:   04/28/20 190 lb (86.2 kg)   04/27/20 190 lb (86.2 kg)   05/22/19 191 lb 6.4 oz (86.8 kg)     The patient has chosen to have the visit conducted as a telephone visit, to reduce risk of exposure given the current status of Coronavirus in our community. This telephone visit is being conducted via a call between the patient and physician/provider. Health care needs are being provided without a physical exam.          Family History/Social History/Risk Factors   Patient does not smoke.  Family history reviewed, and family history includes No Medical Problems in her daughter, father, maternal aunt, maternal grandfather, maternal grandmother, mother, paternal aunt, paternal grandfather, paternal grandmother, and sister.        Medications  Allergies   Current Outpatient Medications   Medication Sig Dispense Refill   ? allopurinol (ZYLOPRIM) 300 MG tablet Take 1 tablet by mouth once daily.  90 tablet 1   ? amLODIPine (NORVASC) 5 MG tablet Take 1 tablet (5 mg total) by mouth daily. 30 tablet 11   ? atorvastatin (LIPITOR) 10 MG tablet Take 1 tablet (10 mg total) by mouth daily. 90 tablet 3   ? blood glucose test strips Use 1 each As Directed daily. 100 strip 3   ? cholecalciferol, vitamin D3, 1,000 unit tablet Take 1,000 Units by mouth daily.     ? cloNIDine HCl (CATAPRES) 0.1 MG tablet take 1 tablet by mouth twice daily. 180 tablet 3   ? furosemide (LASIX) 20 MG tablet Take 1 tablet (20 mg total) by mouth daily. 90 tablet 3   ? losartan (COZAAR) 100 MG tablet Take 1 tablet (100 mg total) by mouth daily. 90 tablet 3   ? magnesium oxide (MAG-OX) 400 mg tablet Take 400 mg by mouth daily.      ? metFORMIN (GLUCOPHAGE-XR) 500 MG 24 hr tablet Take 1 tablet (500 mg total) by mouth daily with breakfast. 90 tablet 3   ? peg 400-propylene glycol (SYSTANE) 0.4-0.3 % Drop Place 1 Drop into both eyes 2 times daily if needed for Dry Eyes. Indications: DRY EYE     ? sotalol (BETAPACE) 80 MG tablet Take 1 tablet (80 mg  total) by mouth daily. 90 tablet 1   ? warfarin ANTICOAGULANT (COUMADIN/JANTOVEN) 2.5 MG tablet Take 1/2-1 tablet (1.25-2.5 mg) by mouth daily. Adjust dose based on INR results as directed.  90 tablet 1     No current facility-administered medications for this visit.       Allergies   Allergen Reactions   ? Hydrochlorothiazide      Annotation: hyperuricemia made worse by HCTZ   Causes pts gout          Lab Results   Lab Results   Component Value Date     07/22/2019    K 4.6 07/22/2019     07/22/2019    CO2 26 07/22/2019    BUN 28 07/22/2019    CREATININE 1.08 07/22/2019    CALCIUM 10.0 07/22/2019     Lab Results   Component Value Date    WBC 9.5 01/07/2019    WBC 6.9 08/14/2015    HGB 14.2 01/07/2019    HCT 42.7 01/07/2019    MCV 93 01/07/2019     01/07/2019     Lab Results   Component Value Date    CHOL 149 07/22/2019    TRIG 122 07/22/2019    HDL 55 07/22/2019    LDLCALC 70 07/22/2019     Lab Results   Component Value Date    INR 3.10 (H) 04/13/2020    INR 3.90 (!) 04/15/2019     Lab Results   Component Value Date    TSH 3.57 07/16/2018           Medical History  Surgical History   Past Medical History:   Diagnosis Date   ? Arrhythmia     Paroxysmal Atrial fibrillation   ? Diabetes mellitus (H)    ? Hypercalcemia    ? Hyperlipidemia    ? Hypertension    ? Obesity       Past Surgical History:   Procedure Laterality Date   ? CATARACT EXTRACTION, BILATERAL     ? EP PACEMAKER INSERT N/A 3/2/2018    Procedure: EP Pacemaker Insertion;  Surgeon: Nahun Mina MD;  Location: [facility];  Service:    ? PA REMOVE TONSILS/ADENOIDS,<11 Y/O      Description: Tonsillectomy With Adenoidectomy;  Recorded: 09/16/2008;   ? PA REVISE MEDIAN N/CARPAL TUNNEL SURG      Description: Neuroplasty Decompression Median Nerve At Carpal Tunnel;  Recorded: 11/01/2009;  Comments: Right- 1/99; Left- 4/99

## 2021-06-30 ENCOUNTER — COMMUNICATION - HEALTHEAST (OUTPATIENT)
Dept: ANTICOAGULATION | Facility: CLINIC | Age: 81
End: 2021-06-30

## 2021-06-30 ENCOUNTER — AMBULATORY - HEALTHEAST (OUTPATIENT)
Dept: LAB | Facility: CLINIC | Age: 81
End: 2021-06-30

## 2021-06-30 DIAGNOSIS — I48.0 PAROXYSMAL ATRIAL FIBRILLATION (H): ICD-10-CM

## 2021-06-30 LAB — INR PPP: 2.3 (ref 0.9–1.1)

## 2021-06-30 NOTE — PROGRESS NOTES
Progress Notes by Blue Fagan MD at 4/12/2021 12:50 PM     Author: Blue Fagan MD Service: -- Author Type: Physician    Filed: 4/12/2021  1:34 PM Encounter Date: 4/12/2021 Status: Signed    : Blue Fagan MD (Physician)                                       Thank you Dr. Lubin for asking the [facility] Heart Care team to participate in the care of your patient, Frida Can.     Impression and Plan     1.  Atrial fibrillation.  Patient with newly diagnosed paroxysmal atrial fibrillation and tachy-irvin syndrome.  Patient underwent permanent pacemaker placement 2 March 2018.  Carmella [id number] Score is 7.  Atrial fibrillation has been fairly quiescent as noted on recent device interrogations.  Her device interrogation today revealed very low atrial fibrillation burden.  She has been maintained on warfarin for CVA prophylaxis.     2.  Aortic stenosis.  This was felt mild in degree on echocardiogram 2 March 2018.  Significant progression is not suggested on exam, however, since it has been approximately 3 years feel reassessment reasonable and warranted.    Plan to obtain repeat echocardiogram.     3.  Dyslipidemia.  Lipid profile 11 September 2020 was favorable with LDL 67 mg/dL and HDL 58 mg/dL.    Continue atorvastatin.     Plan on follow-up in 1 year.  Patient will be followed through device clinic per protocol as well.       25 minutes spent reviewing prior records (including documentation, laboratory studies, cardiac testing/imaging), interview with patient along with physical exam, planning, and subsequent documentation/crafting of note.           History of Present Illness    Once again I would like to thank you again for asking me to participate in the care of your patient, Frida Can.  As you know, but to reiterate for my own records, Frida Can is a 80 y.o. female with history of sick sinus syndrome.  Patient underwent permanent pacemaker  placement 2 March 2018.     On interview today, patient is without complaint from a cardiac standpoint.  She reports no chest pain or shortness of breath.  She denies any subjective decline in exercise tolerance baseline, palpitations, or lightheadedness.  Patient denies any fevers, chills, or other constitutional symptoms.  She denies any fevers, chills, or other constitutional symptoms.    Further review of systems is otherwise negative/noncontributory (medical record and 13 point review of systems reviewed as well and pertinent positives noted).         Cardiac Diagnostics      Echocardiogram 2 March 2018 (personally reviewed):  1. Normal left ventricular size and systolic performance with a visually estimated ejection fraction of 65-70%.   2. There is mild concentric increase in left ventricular wall thickness.   3. There is mild aortic stenosis.   4. Normal right ventricular size and systolic performance.   5. There is mild left atrial enlargement.     Device interrogation 5 January 2021 ([employer] L331 ACCOLADE MRI EL device implanted 2 March 2018):  1. Presenting rhythm: AP-VS rate 70 bpm.  2. Battery/lead status: stable  3. Arrhythmias: since 5 October 2020, three mode switches, all <1 minute, EGMs confirm atrial tachy arrhythmias. Three nonsustained ventricular high rate  4. episodes, EGMs suggest PSVT.  5. Anticoagulant: warfarin  6. Comments: normal magnet and pacemaker function.     Device interrogation 20 February 2020 ([employer] L331 ACCOLADE MRI EL device implanted 2 March 2018):  1. Presenting rhythm: AP-VS 70 bpm.  2. Battery/lead status: stable.  3. Arrhythmias: since 20 November 2019; three ventricular high rate episodes, two available EGMs show PSVT. One mode switch <1min, appears to be atrial tachy  4. arrhythmia.  5. Comments: Normal magnet and pacemaker function.             Physical Examination       /62 (Patient Site: Right Arm, Patient Position: Sitting, Cuff Size:  "Adult Large)   Pulse 68   Resp 14   Ht 5' 1\" (1.549 m)   Wt 188 lb (85.3 kg)   BMI 35.52 kg/m          Wt Readings from Last 3 Encounters:   04/12/21 188 lb (85.3 kg)   03/15/21 190 lb 8 oz (86.4 kg)   03/09/21 188 lb (85.3 kg)     The patient is alert and oriented times three. Sclerae are anicteric. Mucosal membranes are moist. Jugular venous pressure is normal. No significant adenopathy/thyromegally appreciated. Lungs are clear with good expansion. On cardiovascular exam, the patient has a regular S1 and S2.  Heart sounds are somewhat distant.  Abdomen is soft and non-tender. Extremities reveal no clubbing, cyanosis, minimal edema at the left foot.         Family History/Social History/Risk Factors   Patient does not smoke.  Family history reviewed, and family history includes No Medical Problems in her daughter, father, maternal aunt, maternal grandfather, maternal grandmother, mother, paternal aunt, paternal grandfather, paternal grandmother, and sister.          Medications  Allergies   Current Outpatient Medications   Medication Sig Dispense Refill   ? allopurinoL (ZYLOPRIM) 300 MG tablet Take 1 tablet by mouth once daily.   90 tablet 3   ? amLODIPine (NORVASC) 5 MG tablet Take 1 tablet (5 mg total) by mouth daily. 30 tablet 11   ? atorvastatin (LIPITOR) 10 MG tablet Take 1 tablet (10 mg total) by mouth daily. 90 tablet 1   ? blood glucose test strips Use 1 each As Directed daily. 100 strip 3   ? cholecalciferol, vitamin D3, 1,000 unit tablet Take 1,000 Units by mouth daily.     ? cloNIDine HCL (CATAPRES) 0.1 MG tablet Take 1 tabelt by mouth twice daily.  180 tablet 2   ? furosemide (LASIX) 20 MG tablet Take 1 tablet (20 mg total) by mouth daily. (Patient taking differently: 40 mg. ) 90 tablet 3   ? losartan (COZAAR) 100 MG tablet Take 1 tablet (100 mg total) by mouth daily. 90 tablet 3   ? magnesium oxide (MAG-OX) 400 mg tablet Take 400 mg by mouth daily.      ? metFORMIN (GLUCOPHAGE-XR) 500 MG 24 hr " tablet Take 2 tablets (1,000 mg total) by mouth daily with breakfast. 180 tablet 3   ? peg 400-propylene glycol (SYSTANE) 0.4-0.3 % Drop Place 1 Drop into both eyes 2 times daily if needed for Dry Eyes. Indications: DRY EYE     ? sotaloL (BETAPACE) 80 MG tablet Take 1 tablet (80 mg total) by mouth daily. 90 tablet 1   ? warfarin ANTICOAGULANT (COUMADIN/JANTOVEN) 2.5 MG tablet Take 1/2-1 tablet (1.25-2.5 mg) by mouth daily. Adjust dose based on INR results as directed.    90 tablet 1     No current facility-administered medications for this visit.       Allergies   Allergen Reactions   ? Hydrochlorothiazide      Annotation: hyperuricemia made worse by HCTZ   Causes pts gout          Lab Results   Lab Results   Component Value Date     03/09/2021    K 4.2 03/09/2021     03/09/2021    CO2 27 03/09/2021    BUN 30 (H) 03/09/2021    CREATININE 1.25 (H) 03/09/2021    CALCIUM 10.0 03/09/2021     Lab Results   Component Value Date    WBC 12.2 (H) 03/09/2021    WBC 6.9 08/14/2015    HGB 13.4 03/09/2021    HCT 40.4 03/09/2021    MCV 94 03/09/2021     03/09/2021     Lab Results   Component Value Date    CHOL 163 09/11/2020    TRIG 191 (H) 09/11/2020    HDL 58 09/11/2020    LDLCALC 67 09/11/2020     Lab Results   Component Value Date    INR 2.70 (H) 03/29/2021    INR 3.90 (!) 04/15/2019     Lab Results   Component Value Date    BNP 49 03/09/2021     Lab Results   Component Value Date    TSH 3.57 07/16/2018

## 2021-07-03 NOTE — ADDENDUM NOTE
Addendum Note by Reyna Radford RN at 6/4/2019  3:44 PM     Author: Reyna Radford RN Service: -- Author Type: Registered Nurse    Filed: 6/4/2019  3:44 PM Encounter Date: 5/21/2019 Status: Signed    : Reyna Radford RN (Registered Nurse)    Addended by: REYNA RADFORD on: 6/4/2019 03:44 PM        Modules accepted: Orders

## 2021-07-04 NOTE — LETTER
Letter by Tuyet Gray RN at      Author: Tuyet Gray RN Service: -- Author Type: --    Filed:  Encounter Date: 6/2/2021 Status: (Other)       Dear Frida,    Thank you for choosing [facility] for your care.  We are committed to providing you with the highest quality and compassionate healthcare services.  The following information pertains to your first appointment with our clinic.    Date/Time of appointment:  Wednesday, June 16, 2021, check in at 10:30 a.m.    Due to the pandemic, we currently have a visitor restriction policy in place for the safety of our patients and staff members. You may bring one supportive person to your consult with you. We also ask that you wear a face covering when you enter our facility. We appreciate your understanding.    Name of your Physician:  Etelvina Merlos MD    What to bring to your appointment:      Your current insurance card(s).    Parking:    Please refer to the attached map  to direct you to [facility]    The [facility] parking lot is west of the main hospital entrance. This is free parking and is located right next to the [facility] entrance.    Come in the [facility] entrance and check in at the .      We hope these instructions are helpful to you.  If you have any questions or concerns, please call us at [phone].  It is our pleasure to assist you.    Warm Regards,  Tuyet Gray RN, OCN, CBCN  Nurse Navigator  [phone]

## 2021-07-04 NOTE — PATIENT INSTRUCTIONS - HE
Patient Instructions by Etelvina Merlos MD at 6/16/2021 10:45 AM     Author: Etelvina Merlos MD Service: -- Author Type: Physician    Filed: 6/16/2021 11:31 AM Encounter Date: 6/16/2021 Status: Signed    : Etelvina Merlos MD (Physician)       Patient Education     Anastrozole Oral Tablet 1 mg  Uses  For treating cancer.  Instructions  This medicine may be taken with or without food.  Store at room temperature in a dry place. Do not keep in the bathroom.  Keep the medicine away from heat and light.  It is important that you keep taking each dose of this medicine on time even if you are feeling well.  If you forget to take a dose on time, take it as soon as you remember. If it is almost time for the next dose, do not take the missed dose. Return to your normal dosing schedule. Do not take 2 doses of this medicine at one time.  Please tell your doctor and pharmacist about all the medicines you take. Include both prescription and over-the-counter medicines. Also tell them about any vitamins, herbal medicines, or anything else you take for your health.  Do not suddenly stop taking this medicine. Check with your doctor before stopping.  This medicine may affect the strength of your bones. If you have or are at increased risk for developing osteoporosis (weakening of the bones), your doctor may recommend adding foods containing calcium and vitamin D while on this medicine. Please talk to your doctor for more information.  It is very important that you keep all appointments for medical exams and tests while on this medicine.  Do not take the medicine more than once during 24 hours.  Cautions  This medicine should not be used in patients taking estrogen. The combination of the two medicines can increase the risk of serious side effects, including heart disease and blood clots. Speak with your doctor before taking any medicine containing estrogen.  Tell your doctor and pharmacist if you ever had an allergic  reaction to a medicine. Symptoms of an allergic reaction can include trouble breathing, skin rash, itching, swelling, or severe dizziness.  Some patients taking this medicine have experienced serious side effects. Please speak with your doctor to understand the risks and benefits associated with this medicine.  Do not use the medication any more than instructed.  Your ability to stay alert or to react quickly may be impaired by this medicine. Do not drive or operate machinery until you know how this medicine will affect you.  Please check with your doctor before drinking alcohol while on this medicine.  Avoid smoking while on this medicine. Smoking may increase your risk for bone fractures.  Tell the doctor or pharmacist if you are pregnant, planning to be pregnant, or breastfeeding.  Do not breastfeed while on this medicine. This medicine can pass through breast milk to the baby.  Do not use this medicine if you are pregnant. If you become pregnant while on this medicine, contact your doctor immediately.  Women must use reliable forms of birth control while taking this medicine and for 3 weeks after stopping to prevent pregnancy.  Ask your pharmacist if this medicine can interact with any of your other medicines. Be sure to tell them about all the medicines you take.  Do not start or stop any other medicines without first speaking to your doctor or pharmacist.  Do not share this medicine with anyone who has not been prescribed this medicine.  Side Effects  The following is a list of some common side effects from this medicine. Please speak with your doctor about what you should do if you experience these or other side effects.    agitated feeling or trouble sleeping    decreased appetite    loss of balance    constipation    diarrhea    dizziness    dry mouth    feeling of heat or flushing    hair loss    headaches    pain in the joints    muscle aches, spasms or abnormal movements    muscle  weakness    nausea    nervousness    stomach upset or abdominal pain    sweating    cramping of the uterus or bleeding from the vagina    vaginal dryness or irritation    vomiting    weight gain  Call your doctor or get medical help right away if you notice any of these more serious side effects:    increased risk for bone fractures    bone pain    coughing    depression or feeling sad    swelling of the legs, feet, and hands    flu-like symptoms    mood changes    feeling of numbness or tingling in your hands and feet    shortness of breath    stomach pain    light colored stool    symptoms of stroke (such as one-sided weakness, slurred speech, confusion)    blurring or changes of vision  A few people may have an allergic reactions to this medicine. Symptoms can include difficulty breathing, skin rash, itching, swelling, or severe dizziness. If you notice any of these symptoms, seek medical help quickly.  Extra  Please speak with your doctor, nurse, or pharmacist if you have any questions about this medicine.  [email or web address]  IMPORTANT NOTE: This document tells you briefly how to take your medicine, but it does not tell you all there is to know about it.Your doctor or pharmacist may give you other documents about your medicine. Please talk to them if you have any questions.Always follow their advice. There is a more complete description of this medicine available in English.Scan this code on your smartphone or tablet or use the web address below. You can also ask your pharmacist for a printout. If you have any questions, please ask your pharmacist.     2021 [employer].

## 2021-07-04 NOTE — ADDENDUM NOTE
Addendum Note by Lombardi, Susan L, RN at 5/18/2021  2:20 PM     Author: Lombardi, Susan L, RN Service: -- Author Type: RN, Care Manager    Filed: 5/18/2021  3:14 PM Date of Service: 5/18/2021  2:20 PM Status: Signed    : Lombardi, Susan L, RN (RN, Care Manager)    Encounter addended by: Lombardi, Susan L, RN on: 5/18/2021  3:14 PM      Actions taken: Charge Capture section accepted

## 2021-07-04 NOTE — ADDENDUM NOTE
Addendum Note by Lombardi, Susan L, RN at 6/1/2021  1:20 PM     Author: Lombardi, Susan L, RN Service: -- Author Type: RN, Care Manager    Filed: 6/1/2021  2:15 PM Date of Service: 6/1/2021  1:20 PM Status: Signed    : Lombardi, Susan L, RN (RN, Care Manager)    Encounter addended by: Lombardi, Susan L, RN on: 6/1/2021  2:15 PM      Actions taken: Clinical Note Signed, Charge Capture section accepted

## 2021-07-04 NOTE — TELEPHONE ENCOUNTER
Telephone Encounter by Alana Chauhan RN at 6/30/2021  1:22 PM     Author: Alana Chauhan RN Service: -- Author Type: Registered Nurse    Filed: 6/30/2021  1:27 PM Encounter Date: 6/30/2021 Status: Signed    : Alana Chauhan RN (Registered Nurse)       ANTICOAGULATION MANAGEMENT     Frida Can 80 y.o., female is on warfarin with Therapeutic INR result (goal range 2.0-3.0)    Recent labs: (last 7 days)     06/30/21  1120   INR 2.30*       ASSESSMENT     Source: Patient/Caregiver Call      Warfarin dosing taken: Warfarin taken as instructed    Diet: No new diet changes affecting INR    Illness, Injury or hospitalization: No    Medication changes: None    Signs or symptoms of bleeding or clotting: No    Previous INR: therapeutic last 2(+) visits    Additional findings: None     PLAN     Recommended plan for no diet, medication or health factor changes affecting INR:     Dosing instructions: Continue your current warfarin dose 1.25 mg daily on Monday/Thursday; and 2.5 mg daily rest of week (0% change)    Follow up no later than: 4 weeks     Telephone call with Frida who verbalizes understanding and agrees to plan    Lab visit scheduled    Education provided: importance of therapeutic range, target INR goal and significance of current INR result, importance of following up for INR monitoring at instructed interval and importance of taking warfarin as instructed    Plan made per [facility] anticoagulation protocol    Alana Chauhan  Anticoagulation Clinic   [phone]    Anticoagulation Episode Summary     Current INR goal:  2.0-3.0   TTR:  72.3 % (1 y)   Next INR check:  7/28/2021   INR from last check:  2.30 (6/30/2021)   Weekly max warfarin dose:     Target end date:     INR check location:     Preferred lab:     Send INR reminders to:  [facility]    Indications    Paroxysmal atrial fibrillation (H) [I48.0]           Comments:  PAF         Anticoagulation Care Providers      Provider Role Specialty Phone number    Freddy Lubin MD  Internal Medicine [phone]

## 2021-07-04 NOTE — LETTER
Letter by Freddy Lubin MD at      Author: Freddy Lubin MD Service: -- Author Type: --    Filed:  Encounter Date: 5/25/2021 Status: (Other)         Frida Can  [address] [place] [address]             May 25, 2021         Dear Ms. Can,    Below are the results from your recent visit:    Resulted Orders   Basic Metabolic Panel   Result Value Ref Range    Sodium 140 136 - 145 mmol/L    Potassium 4.7 3.5 - 5.0 mmol/L    Chloride 99 98 - 107 mmol/L    CO2 29 22 - 31 mmol/L    Anion Gap, Calculation 12 5 - 18 mmol/L    Glucose 228 (H) 70 - 125 mg/dL    Calcium 10.0 8.5 - 10.5 mg/dL    BUN 24 8 - 28 mg/dL    Creatinine 1.14 (H) 0.60 - 1.10 mg/dL    GFR MDRD Af Amer 55 (L) >60 mL/min/1.73m2    GFR MDRD Non Af Amer 46 (L) >60 mL/min/1.73m2    Narrative    Fasting Glucose reference range is 70-99 mg/dL per  American Diabetes Association (ADA) guidelines.   HM2(CBC w/o Differential)   Result Value Ref Range    WBC 8.7 4.0 - 11.0 thou/uL    RBC 4.22 3.80 - 5.40 mill/uL    Hemoglobin 13.2 12.0 - 16.0 g/dL    Hematocrit 40.5 35.0 - 47.0 %    MCV 96 80 - 100 fL    MCH 31.3 27.0 - 34.0 pg    MCHC 32.6 32.0 - 36.0 g/dL    RDW 14.7 (H) 11.0 - 14.5 %    Platelets 225 140 - 440 thou/uL    MPV 11.1 (H) 7.0 - 10.0 fL        Frida: Your blood sugar was elevated to 228.  Other preoperative labs all are good.  I hope your surgery goes well.  It was nice to see you.     Please call with questions or contact us using [email or web address].    Sincerely,        Electronically signed by Freddy Lubin MD

## 2021-07-04 NOTE — ADDENDUM NOTE
Addendum Note by Lombardi, Susan L, RN at 5/18/2021  2:20 PM     Author: Lombardi, Susan L, RN Service: -- Author Type: RN, Care Manager    Filed: 5/19/2021  2:11 PM Date of Service: 5/18/2021  2:20 PM Status: Signed    : Lombardi, Susan L, RN (RN, Care Manager)    Encounter addended by: Lombardi, Susan L, RN on: 5/19/2021  2:11 PM      Actions taken: Charge Capture section accepted, Clinical Note Signed

## 2021-07-05 ENCOUNTER — COMMUNICATION - HEALTHEAST (OUTPATIENT)
Dept: CARDIOLOGY | Facility: CLINIC | Age: 81
End: 2021-07-05

## 2021-07-05 DIAGNOSIS — I48.0 PAROXYSMAL ATRIAL FIBRILLATION (H): ICD-10-CM

## 2021-07-06 VITALS
WEIGHT: 188 LBS | HEIGHT: 61 IN | BODY MASS INDEX: 35.52 KG/M2 | BODY MASS INDEX: 35.52 KG/M2 | WEIGHT: 188 LBS | HEIGHT: 61 IN | BODY MASS INDEX: 35.52 KG/M2 | BODY MASS INDEX: 35.52 KG/M2

## 2021-07-06 RX ORDER — SOTALOL HYDROCHLORIDE 80 MG/1
80 TABLET ORAL DAILY
Qty: 90 TABLET | Refills: 1 | Status: SHIPPED | OUTPATIENT
Start: 2021-07-06 | End: 2022-01-04

## 2021-07-15 DIAGNOSIS — I48.0 PAROXYSMAL ATRIAL FIBRILLATION (H): Primary | ICD-10-CM

## 2021-07-20 ENCOUNTER — ANCILLARY PROCEDURE (OUTPATIENT)
Dept: CARDIOLOGY | Facility: CLINIC | Age: 81
End: 2021-07-20
Attending: INTERNAL MEDICINE
Payer: MEDICARE

## 2021-07-20 DIAGNOSIS — Z95.0 CARDIAC PACEMAKER IN SITU: ICD-10-CM

## 2021-07-20 DIAGNOSIS — Z51.81 MEDICATION MONITORING ENCOUNTER: ICD-10-CM

## 2021-07-20 LAB
MDC_IDC_EPISODE_DTM: NORMAL
MDC_IDC_EPISODE_DURATION: 14 S
MDC_IDC_EPISODE_DURATION: 5 S
MDC_IDC_EPISODE_ID: NORMAL
MDC_IDC_EPISODE_TYPE: NORMAL
MDC_IDC_LEAD_IMPLANT_DT: NORMAL
MDC_IDC_LEAD_IMPLANT_DT: NORMAL
MDC_IDC_LEAD_LOCATION: NORMAL
MDC_IDC_LEAD_LOCATION: NORMAL
MDC_IDC_LEAD_LOCATION_DETAIL_1: NORMAL
MDC_IDC_LEAD_LOCATION_DETAIL_1: NORMAL
MDC_IDC_LEAD_MFG: NORMAL
MDC_IDC_LEAD_MFG: NORMAL
MDC_IDC_LEAD_MODEL: NORMAL
MDC_IDC_LEAD_MODEL: NORMAL
MDC_IDC_LEAD_POLARITY_TYPE: NORMAL
MDC_IDC_LEAD_POLARITY_TYPE: NORMAL
MDC_IDC_LEAD_SERIAL: NORMAL
MDC_IDC_LEAD_SERIAL: NORMAL
MDC_IDC_MSMT_BATTERY_DTM: NORMAL
MDC_IDC_MSMT_BATTERY_REMAINING_LONGEVITY: 126 MO
MDC_IDC_MSMT_BATTERY_REMAINING_PERCENTAGE: 100 %
MDC_IDC_MSMT_BATTERY_STATUS: NORMAL
MDC_IDC_MSMT_LEADCHNL_RA_IMPEDANCE_VALUE: 625 OHM
MDC_IDC_MSMT_LEADCHNL_RA_PACING_THRESHOLD_AMPLITUDE: 0.4 V
MDC_IDC_MSMT_LEADCHNL_RA_PACING_THRESHOLD_PULSEWIDTH: 0.4 MS
MDC_IDC_MSMT_LEADCHNL_RV_IMPEDANCE_VALUE: 371 OHM
MDC_IDC_MSMT_LEADCHNL_RV_PACING_THRESHOLD_AMPLITUDE: 1.2 V
MDC_IDC_MSMT_LEADCHNL_RV_PACING_THRESHOLD_PULSEWIDTH: 0.4 MS
MDC_IDC_PG_IMPLANT_DTM: NORMAL
MDC_IDC_PG_MFG: NORMAL
MDC_IDC_PG_MODEL: NORMAL
MDC_IDC_PG_SERIAL: NORMAL
MDC_IDC_PG_TYPE: NORMAL
MDC_IDC_SESS_CLINIC_NAME: NORMAL
MDC_IDC_SESS_DTM: NORMAL
MDC_IDC_SESS_TYPE: NORMAL
MDC_IDC_SET_BRADY_AT_MODE_SWITCH_MODE: NORMAL
MDC_IDC_SET_BRADY_AT_MODE_SWITCH_RATE: 170 {BEATS}/MIN
MDC_IDC_SET_BRADY_LOWRATE: 70 {BEATS}/MIN
MDC_IDC_SET_BRADY_MAX_SENSOR_RATE: 130 {BEATS}/MIN
MDC_IDC_SET_BRADY_MAX_TRACKING_RATE: 130 {BEATS}/MIN
MDC_IDC_SET_BRADY_MODE: NORMAL
MDC_IDC_SET_BRADY_PAV_DELAY_HIGH: 200 MS
MDC_IDC_SET_BRADY_PAV_DELAY_LOW: 250 MS
MDC_IDC_SET_BRADY_SAV_DELAY_HIGH: 200 MS
MDC_IDC_SET_BRADY_SAV_DELAY_LOW: 250 MS
MDC_IDC_SET_LEADCHNL_RA_PACING_AMPLITUDE: 1.5 V
MDC_IDC_SET_LEADCHNL_RA_PACING_CAPTURE_MODE: NORMAL
MDC_IDC_SET_LEADCHNL_RA_PACING_POLARITY: NORMAL
MDC_IDC_SET_LEADCHNL_RA_PACING_PULSEWIDTH: 0.4 MS
MDC_IDC_SET_LEADCHNL_RA_SENSING_ADAPTATION_MODE: NORMAL
MDC_IDC_SET_LEADCHNL_RA_SENSING_POLARITY: NORMAL
MDC_IDC_SET_LEADCHNL_RA_SENSING_SENSITIVITY: 0.25 MV
MDC_IDC_SET_LEADCHNL_RV_PACING_AMPLITUDE: 1.5 V
MDC_IDC_SET_LEADCHNL_RV_PACING_CAPTURE_MODE: NORMAL
MDC_IDC_SET_LEADCHNL_RV_PACING_POLARITY: NORMAL
MDC_IDC_SET_LEADCHNL_RV_PACING_PULSEWIDTH: 0.4 MS
MDC_IDC_SET_LEADCHNL_RV_SENSING_ADAPTATION_MODE: NORMAL
MDC_IDC_SET_LEADCHNL_RV_SENSING_POLARITY: NORMAL
MDC_IDC_SET_LEADCHNL_RV_SENSING_SENSITIVITY: 1.5 MV
MDC_IDC_SET_ZONE_DETECTION_INTERVAL: 375 MS
MDC_IDC_SET_ZONE_TYPE: NORMAL
MDC_IDC_SET_ZONE_VENDOR_TYPE: NORMAL
MDC_IDC_STAT_AT_BURDEN_PERCENT: 1 %
MDC_IDC_STAT_AT_DTM_END: NORMAL
MDC_IDC_STAT_AT_DTM_START: NORMAL
MDC_IDC_STAT_BRADY_DTM_END: NORMAL
MDC_IDC_STAT_BRADY_DTM_START: NORMAL
MDC_IDC_STAT_BRADY_RA_PERCENT_PACED: 100 %
MDC_IDC_STAT_BRADY_RV_PERCENT_PACED: 0 %
MDC_IDC_STAT_EPISODE_RECENT_COUNT: 0
MDC_IDC_STAT_EPISODE_RECENT_COUNT: 1
MDC_IDC_STAT_EPISODE_RECENT_COUNT: 1
MDC_IDC_STAT_EPISODE_RECENT_COUNT_DTM_END: NORMAL
MDC_IDC_STAT_EPISODE_RECENT_COUNT_DTM_START: NORMAL
MDC_IDC_STAT_EPISODE_TYPE: NORMAL
MDC_IDC_STAT_EPISODE_VENDOR_TYPE: NORMAL

## 2021-07-20 PROCEDURE — 93296 REM INTERROG EVL PM/IDS: CPT | Performed by: INTERNAL MEDICINE

## 2021-07-20 PROCEDURE — 93294 REM INTERROG EVL PM/LDLS PM: CPT | Performed by: INTERNAL MEDICINE

## 2021-07-20 NOTE — TELEPHONE ENCOUNTER
Reason for Call:  Medication or medication refill:     Do you use a [facility]?  Name of the pharmacy and phone number for the current request:  [facility] in [place]     Name of the medication requested:   Allopurinol 300 mg   1 tab daily with 90 day supply    Other request: pharm faxed to [phone] 2 times and no response for 1 week.    Can we leave a detailed message on this number? YES    Phone number patient can be reached at: Other phone number:  [phone]    Best Time: any    Call taken on 7/20/2021 at 11:40 AM by Pam J. Behr

## 2021-07-21 RX ORDER — ALLOPURINOL 300 MG/1
TABLET ORAL
Qty: 90 TABLET | Refills: 3 | Status: SHIPPED | OUTPATIENT
Start: 2021-07-21 | End: 2022-07-12

## 2021-07-28 ENCOUNTER — LAB (OUTPATIENT)
Dept: LAB | Facility: CLINIC | Age: 81
End: 2021-07-28
Payer: MEDICARE

## 2021-07-28 ENCOUNTER — ANTICOAGULATION THERAPY VISIT (OUTPATIENT)
Dept: ANTICOAGULATION | Facility: CLINIC | Age: 81
End: 2021-07-28

## 2021-07-28 ENCOUNTER — DOCUMENTATION ONLY (OUTPATIENT)
Dept: ANTICOAGULATION | Facility: CLINIC | Age: 81
End: 2021-07-28

## 2021-07-28 DIAGNOSIS — I48.0 PAROXYSMAL ATRIAL FIBRILLATION (H): ICD-10-CM

## 2021-07-28 DIAGNOSIS — I48.0 PAROXYSMAL ATRIAL FIBRILLATION (H): Primary | ICD-10-CM

## 2021-07-28 LAB — INR BLD: 1.8 (ref 0.9–1.1)

## 2021-07-28 PROCEDURE — 85610 PROTHROMBIN TIME: CPT

## 2021-07-28 PROCEDURE — 36415 COLL VENOUS BLD VENIPUNCTURE: CPT

## 2021-07-28 NOTE — PROGRESS NOTES
ANTICOAGULATION MANAGEMENT      Angeles Can due for annual renewal of referral to anticoagulation monitoring. Order pended for your review and signature.      ANTICOAGULATION SUMMARY      Warfarin indication(s)     Atrial fibrillation    Heart valve present?  NO       Current goal range   INR: 2.0-3.0     Goal appropriate for indication? Yes, INR 2-3 appropriate for hx of DVT, PE, hypercoagulable state, Afib, LVAD, or bileaflet AVR without risk factors     Current duration of therapy Indefinite/long term therapy   Time in Therapeutic Range (TTR)  (Goal > 60%) 70%       Office visit with referring provider's group within last year yes on 5/24/21       Reyna Radford RN

## 2021-07-28 NOTE — PROGRESS NOTES
ANTICOAGULATION MANAGEMENT     Angeles Can 80 year old female is on warfarin with subtherapeutic INR result. (Goal INR 2.0-3.0)    Recent labs: (last 7 days)     07/28/21  1143   INR 1.8*       ASSESSMENT     Source(s): Chart Review and Patient/Caregiver Call       Warfarin doses taken: Warfarin taken as instructed    Diet: Increased greens/vitamin K in diet; ongoing change    New illness, injury, or hospitalization: No    Medication/supplement changes: None noted    Signs or symptoms of bleeding or clotting: No    Previous INR: Therapeutic last 2(+) visits    Additional findings: None     PLAN     Recommended plan for no diet, medication or health factor changes affecting INR     Dosing Instructions:  Increase your warfarin dose (8.3% change) with next INR in 2 weeks       Summary  As of 7/28/2021    Full warfarin instructions:  1.25 mg every Mon; 2.5 mg all other days   Next INR check:  8/11/2021             Telephone call with Angeles who verbalizes understanding and agrees to plan    Lab visit scheduled    Education provided: None required    Plan made per [facility] anticoagulation protocol    Reyna Radford RN  Anticoagulation Clinic  7/28/2021    _______________________________________________________________________     Anticoagulation Episode Summary     Current INR goal:  2.0-3.0   TTR:  70.0 % (1 y)   Target end date:     Send INR reminders to:  [facility]    Indications    Paroxysmal atrial fibrillation (H) [I48.0]           Comments:  PAF

## 2021-07-29 ENCOUNTER — TELEPHONE (OUTPATIENT)
Dept: ONCOLOGY | Facility: HOSPITAL | Age: 81
End: 2021-07-29

## 2021-08-11 ENCOUNTER — ANTICOAGULATION THERAPY VISIT (OUTPATIENT)
Dept: ANTICOAGULATION | Facility: CLINIC | Age: 81
End: 2021-08-11

## 2021-08-11 ENCOUNTER — LAB (OUTPATIENT)
Dept: LAB | Facility: CLINIC | Age: 81
End: 2021-08-11
Payer: MEDICARE

## 2021-08-11 DIAGNOSIS — I48.0 PAROXYSMAL ATRIAL FIBRILLATION (H): ICD-10-CM

## 2021-08-11 DIAGNOSIS — I48.0 PAROXYSMAL ATRIAL FIBRILLATION (H): Primary | ICD-10-CM

## 2021-08-11 LAB — INR BLD: 2.9 (ref 0.9–1.1)

## 2021-08-11 PROCEDURE — 36416 COLLJ CAPILLARY BLOOD SPEC: CPT

## 2021-08-11 PROCEDURE — 85610 PROTHROMBIN TIME: CPT

## 2021-08-11 NOTE — PROGRESS NOTES
ANTICOAGULATION MANAGEMENT     Angeles Can 80 year old female is on warfarin with therapeutic INR result. (Goal INR 2.0-3.0)    Recent labs: (last 7 days)     08/11/21  1122   INR 2.9*       ASSESSMENT     Source(s): Chart Review and Patient/Caregiver Call       Warfarin doses taken: Warfarin taken as instructed    Diet: No new diet changes identified    New illness, injury, or hospitalization: No    Medication/supplement changes: None noted    Signs or symptoms of bleeding or clotting: No    Previous INR: Therapeutic last 2(+) visits    Additional findings: None     PLAN     Recommended plan for no diet, medication or health factor changes affecting INR     Dosing Instructions: Continue your current warfarin dose with next INR in 4 weeks       Summary  As of 8/11/2021    Full warfarin instructions:  1.25 mg every Mon; 2.5 mg all other days   Next INR check:  9/8/2021             Telephone call with Angeles who verbalizes understanding and agrees to plan    Lab visit scheduled    Education provided: None required    Plan made per [facility] anticoagulation protocol    Reyna Radford RN  Anticoagulation Clinic  8/11/2021    _______________________________________________________________________     Anticoagulation Episode Summary     Current INR goal:  2.0-3.0   TTR:  69.3 % (1 y)   Target end date:  Indefinite   Send INR reminders to:  [facility]    Indications    Paroxysmal atrial fibrillation (H) [I48.0]           Comments:  PAF         Anticoagulation Care Providers     Provider Role Specialty Phone number    Freddy Lubin MD Referring Family Medicine [phone]

## 2021-08-16 DIAGNOSIS — Z17.0 MALIGNANT NEOPLASM OF LOWER-INNER QUADRANT OF RIGHT BREAST OF FEMALE, ESTROGEN RECEPTOR POSITIVE (H): ICD-10-CM

## 2021-08-16 DIAGNOSIS — C50.311 MALIGNANT NEOPLASM OF LOWER-INNER QUADRANT OF RIGHT BREAST OF FEMALE, ESTROGEN RECEPTOR POSITIVE (H): ICD-10-CM

## 2021-08-16 RX ORDER — ANASTROZOLE 1 MG/1
TABLET ORAL
Qty: 30 TABLET | Refills: 0 | Status: SHIPPED | OUTPATIENT
Start: 2021-08-16 | End: 2021-08-26

## 2021-08-26 ENCOUNTER — ONCOLOGY VISIT (OUTPATIENT)
Dept: ONCOLOGY | Facility: CLINIC | Age: 81
End: 2021-08-26
Attending: NURSE PRACTITIONER
Payer: MEDICARE

## 2021-08-26 VITALS
HEART RATE: 80 BPM | TEMPERATURE: 98 F | SYSTOLIC BLOOD PRESSURE: 137 MMHG | HEIGHT: 62 IN | BODY MASS INDEX: 33.64 KG/M2 | OXYGEN SATURATION: 84 % | WEIGHT: 182.8 LBS | DIASTOLIC BLOOD PRESSURE: 60 MMHG | RESPIRATION RATE: 16 BRPM

## 2021-08-26 DIAGNOSIS — Z17.0 MALIGNANT NEOPLASM OF LOWER-INNER QUADRANT OF RIGHT BREAST OF FEMALE, ESTROGEN RECEPTOR POSITIVE (H): Primary | ICD-10-CM

## 2021-08-26 DIAGNOSIS — Z79.811 AROMATASE INHIBITOR USE: ICD-10-CM

## 2021-08-26 DIAGNOSIS — C50.911 INVASIVE DUCTAL CARCINOMA OF BREAST, FEMALE, RIGHT (H): ICD-10-CM

## 2021-08-26 DIAGNOSIS — C50.311 MALIGNANT NEOPLASM OF LOWER-INNER QUADRANT OF RIGHT BREAST OF FEMALE, ESTROGEN RECEPTOR POSITIVE (H): Primary | ICD-10-CM

## 2021-08-26 PROCEDURE — G0463 HOSPITAL OUTPT CLINIC VISIT: HCPCS

## 2021-08-26 PROCEDURE — 99213 OFFICE O/P EST LOW 20 MIN: CPT | Performed by: NURSE PRACTITIONER

## 2021-08-26 RX ORDER — ANASTROZOLE 1 MG/1
TABLET ORAL
Qty: 90 TABLET | Refills: 3 | Status: SHIPPED | OUTPATIENT
Start: 2021-08-26 | End: 2022-09-19

## 2021-08-26 ASSESSMENT — MIFFLIN-ST. JEOR: SCORE: 1238.46

## 2021-08-26 ASSESSMENT — PAIN SCALES - GENERAL: PAINLEVEL: NO PAIN (0)

## 2021-08-26 NOTE — LETTER
"    8/26/2021         RE: Angeles Can  [address] [place] [address]        Dear Colleague,    Thank you for referring your patient, Angeles Can, to the [facility] [place]. Please see a copy of my visit note below.    Oncology Rooming Note    August 26, 2021 10:41 AM   Angeles Can is a 80 year old female who presents for:    Chief Complaint   Patient presents with     Oncology Clinic Visit     Initial Vitals: /60 (BP Location: Left arm, Cuff Size: Adult Regular)   Pulse 80   Temp 98  F (36.7  C) (Oral)   Resp 16   Ht 1.568 m (5' 1.75\")   Wt 82.9 kg (182 lb 12.8 oz)   SpO2 (!) 84%   BMI 33.71 kg/m   Estimated body mass index is 33.71 kg/m  as calculated from the following:    Height as of this encounter: 1.568 m (5' 1.75\").    Weight as of this encounter: 82.9 kg (182 lb 12.8 oz). Body surface area is 1.9 meters squared.  No Pain (0) Comment: Data Unavailable   No LMP recorded.  Allergies reviewed: Yes  Medications reviewed: Yes    Medications: Medication refills not needed today.  Pharmacy name entered into [email or web address]: [id number] - [place], [place] - [address]    Clinical concerns: Doing well on Anastrazole-no complaints.    Earline Lewis                [facility] Hematology and Oncology Progress Note    Patient: Angeles Can  MRN: [id number]  Date of Service: Aug 26, 2021         Reason for Visit:    Scheduled follow up    Assessment and Plan:    1. G1, ER +, MI +, HER-2/alvin-, early stage invasive ductal carcinoma LIQ (R) breast s/p lumpectomy, on adjuvant AI therapy.    80 year old denies family history of cancer. Lives by herself.   with 5 children, 14 grandchildren and 4 great-grandchildren.  Retired from office work at age 68.  Quit smoking 40+ years ago (< 20 py).  Does not drink alcohol.    Ms. Frida Can is a very pleasant lady who presented alone in scheduled follow-up.  She is tolerating adjuvant AI therapy " "without known side effects.  Specifically, she denies hot flashes, associated myalgias or arthralgias.  Her appetite is good - weight down ~10 pounds this past year.  She continues living alone and is doing well with this.  Her performance status is very stable and good for her age.  She denies cough, fever, chills, unusual headaches, visual or mentation disturbance, bowel or bladder issues, rash.     No labs or imaging.    Tolerating adjuvant AI therapy acceptably well.  Clinically [place].    Continue daily AI therapy with anastrozole.    Continue self breast exams.    3-month follow up - no labs or imaging.    2022, April - annual \"diagnostic\" mammogram.    Anticipating 5 years adjuvant endocrine therapy.    2. Low bone density.    04/24/2021 DEXA scan - osteopenia.    History of mild hypercalcemia - has a regular source of calcium from diet and is a milk drinker.      Continue daily [id number] international unit(s) vitamin D.      Encouraged weightbearing exercises.      2023 - follow-up DEXA scan, 2 years from prior.                Oncologic History:    1. Malignant neoplasm LIQ (R) breast   G1, pT1c Nx cM0 invasive ductal carcinoma.     ER +, WI +, HER-2/alvin-.    04/22/2021 - screening mammogram showed focal asymmetry within the right breast at 4 o'clock position.    04/28/2021 - diagnostic mammogram and ultrasound showed asymmetry with microlobulated margins and ultrasound confirmed 7 x 9 x 9 mm hypoechoic lesion at the same location.    05/3/2021 US-guided core needle biopsy of the right breast mass - invasive ductal carcinoma and DCIS.  ER positive> 95%, WI positive> 95%, HER-2/alvin negative by IHC (1+).    05/26/2021 - (R) lumpectomy by Dr. Peng:    Pathology:    15 mm, G1, invasive ductal carcinoma.  Negative margins.      20% low grade DCIS, solid and focal cribriform, no necrosis, no microcalcifications.  Negative margins.     06/16/2021 consult with Dr Merlos [facility]:    Discussed that pathology and clinical " course is consistent with early stage breast cancer although formal staging cannot be determined.      Overall prognosis from the breast cancer is favorable in terms of recurrence and disease-free survival.  She voiced understanding.      Discussed role of adjuvant treatment to decrease breast cancer recurrence and optimize breast cancer free survival:    She would not consider adjuvant chemotherapy and based on pathology, the likelihood of chemotherapy benefit not justified.  Therefore, Oncotype test was not requested.      Adjuvant radiation therapy was not recommended in patient in patient with early stage breast cancer at her age.      Adjuvant endocrine therapy with an aromatase inhibitor (AI), specifically anastrozole, was recommended.  Potential side effects and 5-year duration of therapy were reviewed.  She agreed to begin.    2021, June - began adjuvant AI therapy with anastrozole.    ECOG Performance    1 - Can't do physically strenuous work, but fully ambyulatory and can do light sedentary work    Pain:    Pain Score: No Pain (0)     Encounter Diagnoses:    Problem List Items Addressed This Visit        Other    Invasive ductal carcinoma of breast, female, right (H)    Malignant neoplasm of lower-inner quadrant of right breast of female, estrogen receptor positive (H) - Primary    Relevant Medications    anastrozole (ARIMIDEX) 1 MG tablet      Other Visit Diagnoses     Aromatase inhibitor use                 Total time 27 minutes.    Eduin Majano, CNP     CC: Freddy Lubin MD   _____________________________________________________________________________    Review of Systems:    No fever or night sweats.  No loss of weight.  No lumps or bumps anywhere.  No unusual headaches or eyesight issues.  No dizziness.  No bleeding from the nose.  No sores in the mouth. No problems with swallowing.  No chest pain. No shortness of breath. No cough.  No abdominal pain. No nausea or vomiting.  No diarrhea or  "constipation.  No blood in stool or black colored stools.  No problems passing urine.  No numbness or tingling in hands or feet.  No skin rashes.    A 14 point review of systems is otherwise negative.    Past History:    Past Medical History:   Diagnosis Date     Arrhythmia     Paroxysmal Atrial fibrillation     Arthritis      Cancer (H)      Diabetes mellitus (H)      Hypercalcemia      Hyperlipidemia      Hypertension      Obesity        Past Surgical History:   Procedure Laterality Date     CATARACT EXTRACTION, BILATERAL       EP PACEMAKER INSERT N/A 3/2/2018    Procedure: EP Pacemaker Insertion;  Surgeon: Nahun Mina MD;  Location: [facility];  Service:      HC REMOVE TONSILS/ADENOIDS,<13 Y/O      Description: Tonsillectomy With Adenoidectomy;  Recorded: 09/16/2008;     HC REVISE MEDIAN N/CARPAL TUNNEL SURG      Description: Neuroplasty Decompression Median Nerve At Carpal Tunnel;  Recorded: 11/01/2009;  Comments: Right- 1/99; Left- 4/99     KS MASTECTOMY, PARTIAL Right 5/26/2021    Procedure: Right Lumpectomy after Wire Localizaiton;  Surgeon: Rosanna Peng MD;  Location: [facility];  Service: General     US BREAST CORE BIOPSY RIGHT Right 5/3/2021     Physical Exam:    /60 (BP Location: Left arm, Cuff Size: Adult Regular)   Pulse 80   Temp 98  F (36.7  C) (Oral)   Resp 16   Ht 1.568 m (5' 1.75\")   Wt 82.9 kg (182 lb 12.8 oz)   SpO2 (!) 84%   BMI 33.71 kg/m      GENERAL:   Alert and oriented. Seated comfortably. In no distress.    HEENT:   Atraumatic and normocephalic.  DIVYA.  EOMI.  No pallor.  No icterus.  No mucosal lesions.    LYMPH NODES:  No palpable cervical, axillary or inguinal lymphadenopathy.    CHEST:   Lungs clear to auscultation bilaterally.    BREASTS:  (R) consistent with lumpectomy.  Nicely healed incisions.  No concerning lumps or bumps.  Skin and nipple otherwise unremarkable.  Negative axilla.     (L) No concerning lumps or bumps.  Skin and nipple " otherwise unremarkable.  Negative axilla.     Frida declined offer for female  during exam.    CVS:    S1 and S2 heard. Regular rate and rhythm.  No murmur, gallop or rub heard.  No peripheral edema.    ABDOMEN:   Soft. Not tender. Not distended.  No palpable hepatomegaly or splenomegaly.    EXTREMITIES:  Warm.    SKIN:    No rash, bruising or purpura noted.  Full head of hair.    Lab Results:    No results found for this or any previous visit (from the past 168 hour(s)).    Imaging:    No results found.        Again, thank you for allowing me to participate in the care of your patient.        Sincerely,        Eduin Majano, CNP

## 2021-08-26 NOTE — PROGRESS NOTES
"[facility] Hematology and Oncology Progress Note    Patient: Angeles Can  MRN: [id number]  Date of Service: Aug 26, 2021         Reason for Visit:    Scheduled follow up    Assessment and Plan:    1. G1, ER +, DE +, HER-2/alvin-, early stage invasive ductal carcinoma LIQ (R) breast s/p lumpectomy, on adjuvant AI therapy.    80 year old denies family history of cancer. Lives by herself.   with 5 children, 14 grandchildren and 4 great-grandchildren.  Retired from office work at age 68.  Quit smoking 40+ years ago (< 20 py).  Does not drink alcohol.    Ms. Frida Can is a very pleasant lady who presented alone in scheduled follow-up.  She is tolerating adjuvant AI therapy without known side effects.  Specifically, she denies hot flashes, associated myalgias or arthralgias.  Her appetite is good - weight down ~10 pounds this past year.  She continues living alone and is doing well with this.  Her performance status is very stable and good for her age.  She denies cough, fever, chills, unusual headaches, visual or mentation disturbance, bowel or bladder issues, rash.     No labs or imaging.    Tolerating adjuvant AI therapy acceptably well.  Clinically [place].    Continue daily AI therapy with anastrozole.    Continue self breast exams.    3-month follow up - no labs or imaging.    2022, April - annual \"diagnostic\" mammogram.    Anticipating 5 years adjuvant endocrine therapy.    2. Low bone density.    04/24/2021 DEXA scan - osteopenia.    History of mild hypercalcemia - has a regular source of calcium from diet and is a milk drinker.      Continue daily [id number] international unit(s) vitamin D.      Encouraged weightbearing exercises.      2023 - follow-up DEXA scan, 2 years from prior.                Oncologic History:    1. Malignant neoplasm LIQ (R) breast   G1, pT1c Nx cM0 invasive ductal carcinoma.     ER +, DE +, HER-2/alvin-.    04/22/2021 - screening mammogram showed focal asymmetry within " the right breast at 4 o'clock position.    04/28/2021 - diagnostic mammogram and ultrasound showed asymmetry with microlobulated margins and ultrasound confirmed 7 x 9 x 9 mm hypoechoic lesion at the same location.    05/3/2021 US-guided core needle biopsy of the right breast mass - invasive ductal carcinoma and DCIS.  ER positive> 95%, GA positive> 95%, HER-2/alvin negative by IHC (1+).    05/26/2021 - (R) lumpectomy by Dr. Peng:    Pathology:    15 mm, G1, invasive ductal carcinoma.  Negative margins.      20% low grade DCIS, solid and focal cribriform, no necrosis, no microcalcifications.  Negative margins.     06/16/2021 consult with Dr Merlos [facility]:    Discussed that pathology and clinical course is consistent with early stage breast cancer although formal staging cannot be determined.      Overall prognosis from the breast cancer is favorable in terms of recurrence and disease-free survival.  She voiced understanding.      Discussed role of adjuvant treatment to decrease breast cancer recurrence and optimize breast cancer free survival:    She would not consider adjuvant chemotherapy and based on pathology, the likelihood of chemotherapy benefit not justified.  Therefore, Oncotype test was not requested.      Adjuvant radiation therapy was not recommended in patient in patient with early stage breast cancer at her age.      Adjuvant endocrine therapy with an aromatase inhibitor (AI), specifically anastrozole, was recommended.  Potential side effects and 5-year duration of therapy were reviewed.  She agreed to begin.    2021, June - began adjuvant AI therapy with anastrozole.    ECOG Performance    1 - Can't do physically strenuous work, but fully ambyulatory and can do light sedentary work    Pain:    Pain Score: No Pain (0)     Encounter Diagnoses:    Problem List Items Addressed This Visit        Other    Invasive ductal carcinoma of breast, female, right (H)    Malignant neoplasm of lower-inner quadrant of  right breast of female, estrogen receptor positive (H) - Primary    Relevant Medications    anastrozole (ARIMIDEX) 1 MG tablet      Other Visit Diagnoses     Aromatase inhibitor use                 Total time 27 minutes.    Eduin Majano, CNP     CC: Freddy Lubin MD   _____________________________________________________________________________    Review of Systems:    No fever or night sweats.  No loss of weight.  No lumps or bumps anywhere.  No unusual headaches or eyesight issues.  No dizziness.  No bleeding from the nose.  No sores in the mouth. No problems with swallowing.  No chest pain. No shortness of breath. No cough.  No abdominal pain. No nausea or vomiting.  No diarrhea or constipation.  No blood in stool or black colored stools.  No problems passing urine.  No numbness or tingling in hands or feet.  No skin rashes.    A 14 point review of systems is otherwise negative.    Past History:    Past Medical History:   Diagnosis Date     Arrhythmia     Paroxysmal Atrial fibrillation     Arthritis      Cancer (H)      Diabetes mellitus (H)      Hypercalcemia      Hyperlipidemia      Hypertension      Obesity        Past Surgical History:   Procedure Laterality Date     CATARACT EXTRACTION, BILATERAL       EP PACEMAKER INSERT N/A 3/2/2018    Procedure: EP Pacemaker Insertion;  Surgeon: Nahun Mina MD;  Location: [facility];  Service:       REMOVE TONSILS/ADENOIDS,<13 Y/O      Description: Tonsillectomy With Adenoidectomy;  Recorded: 09/16/2008;     HC REVISE MEDIAN N/CARPAL TUNNEL SURG      Description: Neuroplasty Decompression Median Nerve At Carpal Tunnel;  Recorded: 11/01/2009;  Comments: Right- 1/99; Left- 4/99     MA MASTECTOMY, PARTIAL Right 5/26/2021    Procedure: Right Lumpectomy after Wire Localizaiton;  Surgeon: Rosanna Peng MD;  Location: [facility];  Service: General     US BREAST CORE BIOPSY RIGHT Right 5/3/2021     Physical Exam:    /60 (BP Location: Left arm,  "Cuff Size: Adult Regular)   Pulse 80   Temp 98  F (36.7  C) (Oral)   Resp 16   Ht 1.568 m (5' 1.75\")   Wt 82.9 kg (182 lb 12.8 oz)   SpO2 (!) 84%   BMI 33.71 kg/m      GENERAL:   Alert and oriented. Seated comfortably. In no distress.    HEENT:   Atraumatic and normocephalic.  DIVYA.  EOMI.  No pallor.  No icterus.  No mucosal lesions.    LYMPH NODES:  No palpable cervical, axillary or inguinal lymphadenopathy.    CHEST:   Lungs clear to auscultation bilaterally.    BREASTS:  (R) consistent with lumpectomy.  Nicely healed incisions.  No concerning lumps or bumps.  Skin and nipple otherwise unremarkable.  Negative axilla.     (L) No concerning lumps or bumps.  Skin and nipple otherwise unremarkable.  Negative axilla.     Frida declined offer for female  during exam.    CVS:    S1 and S2 heard. Regular rate and rhythm.  No murmur, gallop or rub heard.  No peripheral edema.    ABDOMEN:   Soft. Not tender. Not distended.  No palpable hepatomegaly or splenomegaly.    EXTREMITIES:  Warm.    SKIN:    No rash, bruising or purpura noted.  Full head of hair.    Lab Results:    No results found for this or any previous visit (from the past 168 hour(s)).    Imaging:    No results found.    "

## 2021-08-26 NOTE — PROGRESS NOTES
"Oncology Rooming Note    August 26, 2021 10:41 AM   Angeles Can is a 80 year old female who presents for:    Chief Complaint   Patient presents with     Oncology Clinic Visit     Initial Vitals: /60 (BP Location: Left arm, Cuff Size: Adult Regular)   Pulse 80   Temp 98  F (36.7  C) (Oral)   Resp 16   Ht 1.568 m (5' 1.75\")   Wt 82.9 kg (182 lb 12.8 oz)   SpO2 (!) 84%   BMI 33.71 kg/m   Estimated body mass index is 33.71 kg/m  as calculated from the following:    Height as of this encounter: 1.568 m (5' 1.75\").    Weight as of this encounter: 82.9 kg (182 lb 12.8 oz). Body surface area is 1.9 meters squared.  No Pain (0) Comment: Data Unavailable   No LMP recorded.  Allergies reviewed: Yes  Medications reviewed: Yes    Medications: Medication refills not needed today.  Pharmacy name entered into [employer]: [id number] - [place], [place] - [address]    Clinical concerns: Doing well on Anastrazole-no complaints.    Earline Lewis              "

## 2021-09-08 ENCOUNTER — ANTICOAGULATION THERAPY VISIT (OUTPATIENT)
Dept: ANTICOAGULATION | Facility: CLINIC | Age: 81
End: 2021-09-08

## 2021-09-08 ENCOUNTER — LAB (OUTPATIENT)
Dept: LAB | Facility: CLINIC | Age: 81
End: 2021-09-08
Payer: MEDICARE

## 2021-09-08 DIAGNOSIS — I48.0 PAROXYSMAL ATRIAL FIBRILLATION (H): ICD-10-CM

## 2021-09-08 DIAGNOSIS — I48.0 PAROXYSMAL ATRIAL FIBRILLATION (H): Primary | ICD-10-CM

## 2021-09-08 LAB — INR BLD: 2.5 (ref 0.9–1.1)

## 2021-09-08 PROCEDURE — 36415 COLL VENOUS BLD VENIPUNCTURE: CPT | Performed by: FAMILY MEDICINE

## 2021-09-08 PROCEDURE — 85610 PROTHROMBIN TIME: CPT | Performed by: FAMILY MEDICINE

## 2021-09-08 NOTE — PROGRESS NOTES
ANTICOAGULATION MANAGEMENT     Angeles Can 80 year old female is on warfarin with therapeutic INR result. (Goal INR 2.0-3.0)    Recent labs: (last 7 days)     09/08/21  1148   INR 2.5*       ASSESSMENT     Source(s): Chart Review and Patient/Caregiver Call       Warfarin doses taken: Warfarin taken as instructed    Diet: No new diet changes identified    New illness, injury, or hospitalization: No    Medication/supplement changes: None noted    Signs or symptoms of bleeding or clotting: No    Previous INR: Therapeutic last visit; previously outside of goal range    Additional findings: None     PLAN     Recommended plan for no diet, medication or health factor changes affecting INR     Dosing Instructions: Continue your current warfarin dose with next INR in 4 weeks       Summary  As of 9/8/2021    Full warfarin instructions:  1.25 mg every Mon; 2.5 mg all other days   Next INR check:  10/6/2021             Telephone call with Angeles who verbalizes understanding and agrees to plan    Lab visit scheduled    Education provided: None required    Plan made per [facility] anticoagulation protocol    Reyna Radford RN  Anticoagulation Clinic  9/8/2021    _______________________________________________________________________     Anticoagulation Episode Summary     Current INR goal:  2.0-3.0   TTR:  69.3 % (1 y)   Target end date:  Indefinite   Send INR reminders to:  [facility]    Indications    Paroxysmal atrial fibrillation (H) [I48.0]           Comments:  PAF         Anticoagulation Care Providers     Provider Role Specialty Phone number    Freddy Lubin MD Referring Family Medicine [phone]

## 2021-09-13 ENCOUNTER — TELEPHONE (OUTPATIENT)
Dept: INTERNAL MEDICINE | Facility: CLINIC | Age: 81
End: 2021-09-13

## 2021-09-13 DIAGNOSIS — E78.2 MIXED HYPERLIPIDEMIA: ICD-10-CM

## 2021-09-13 DIAGNOSIS — I10 ESSENTIAL HYPERTENSION: Primary | ICD-10-CM

## 2021-09-13 DIAGNOSIS — R60.0 PERIPHERAL EDEMA: ICD-10-CM

## 2021-09-13 RX ORDER — LOSARTAN POTASSIUM 100 MG/1
100 TABLET ORAL DAILY
Qty: 90 TABLET | Refills: 3 | Status: SHIPPED | OUTPATIENT
Start: 2021-09-13 | End: 2022-02-07

## 2021-09-13 RX ORDER — ATORVASTATIN CALCIUM 10 MG/1
10 TABLET, FILM COATED ORAL DAILY
Qty: 90 TABLET | Refills: 3 | Status: ON HOLD | OUTPATIENT
Start: 2021-09-13 | End: 2022-08-18

## 2021-09-13 NOTE — TELEPHONE ENCOUNTER
Reason for Call:  Other call back    Detailed comments: pt out of medication for:  Losartan   Atorvastatin    Pharm:  [facility]    Phone Number Patient can be reached at: Home number on file [phone] (home)    Best Time: anything    Can we leave a detailed message on this number? YES    Call taken on 9/13/2021 at 8:28 AM by Emma Snell

## 2021-09-13 NOTE — TELEPHONE ENCOUNTER
Pending Prescriptions:                       Disp   Refills    atorvastatin (LIPITOR) 10 MG tablet       90 tab*1            Sig: Take 1 tablet (10 mg) by mouth daily    losartan (COZAAR) 100 MG tablet           90 tab*3            Sig: Take 1 tablet (100 mg) by mouth daily    Patient's last office visit with Dr. Lubin was 5/24/21.  Chart note indicates patient has acceptable blood pressure control.    Patient's last Lipid Profile done 9/20/21.   Cholesterol <=199 mg/dL 163  149     Triglycerides <=149 mg/dL 191High   122     Direct Measure HDL >=50 mg/dL 58  55     LDL Cholesterol Calculated <=129 mg/dL 67  70     Patient Fasting > 8hrs?  No  No    Resulting Agency   [facility]         Specimen Collected: 09/11/20

## 2021-10-06 ENCOUNTER — LAB (OUTPATIENT)
Dept: LAB | Facility: CLINIC | Age: 81
End: 2021-10-06
Payer: MEDICARE

## 2021-10-06 ENCOUNTER — ANTICOAGULATION THERAPY VISIT (OUTPATIENT)
Dept: ANTICOAGULATION | Facility: CLINIC | Age: 81
End: 2021-10-06

## 2021-10-06 DIAGNOSIS — I48.0 PAROXYSMAL ATRIAL FIBRILLATION (H): Primary | ICD-10-CM

## 2021-10-06 DIAGNOSIS — I48.0 PAROXYSMAL ATRIAL FIBRILLATION (H): ICD-10-CM

## 2021-10-06 LAB — INR BLD: 3.3 (ref 0.9–1.1)

## 2021-10-06 PROCEDURE — 85610 PROTHROMBIN TIME: CPT | Performed by: FAMILY MEDICINE

## 2021-10-06 NOTE — PROGRESS NOTES
ANTICOAGULATION MANAGEMENT     Angeles Can 81 year old female is on warfarin with supratherapeutic INR result. (Goal INR 2.0-3.0)    Recent labs: (last 7 days)     10/06/21  1132   INR 3.3*       ASSESSMENT     Source(s): Chart Review and Patient/Caregiver Call       Warfarin doses taken: Warfarin taken as instructed    Diet: Decreased greens/vitamin K in diet; ongoing change perhaps    New illness, injury, or hospitalization: No    Medication/supplement changes: None noted    Signs or symptoms of bleeding or clotting: No    Previous INR: Therapeutic last 2(+) visits    Additional findings: None     PLAN     Recommended plan for ongoing change(s) affecting INR     Dosing Instructions:  Decrease your warfarin dose (7.7% change) with next INR in 2 weeks       Summary  As of 10/6/2021    Full warfarin instructions:  1.25 mg every Mon, Thu; 2.5 mg all other days   Next INR check:  10/20/2021             Telephone call with Frida who verbalizes understanding and agrees to plan    Lab visit scheduled    Education provided: None required    Plan made per [facility]agulation protocol    Reyna Radford RN  Anticoagulation Clinic  10/6/2021    _______________________________________________________________________     Anticoagulation Episode Summary     Current INR goal:  2.0-3.0   TTR:  73.1 % (1 y)   Target end date:  Indefinite   Send INR reminders to:  [facility]    Indications    Paroxysmal atrial fibrillation (H) [I48.0]           Comments:  PAF         Anticoagulation Care Providers     Provider Role Specialty Phone number    Freddy Lubin MD Referring Family Medicine [phone]

## 2021-10-16 ENCOUNTER — HEALTH MAINTENANCE LETTER (OUTPATIENT)
Age: 81
End: 2021-10-16

## 2021-10-19 ENCOUNTER — ANCILLARY PROCEDURE (OUTPATIENT)
Dept: CARDIOLOGY | Facility: CLINIC | Age: 81
End: 2021-10-19
Attending: INTERNAL MEDICINE
Payer: MEDICARE

## 2021-10-19 DIAGNOSIS — Z95.0 CARDIAC PACEMAKER IN SITU: ICD-10-CM

## 2021-10-19 DIAGNOSIS — I49.5 SSS (SICK SINUS SYNDROME) (H): ICD-10-CM

## 2021-10-19 PROCEDURE — 93294 REM INTERROG EVL PM/LDLS PM: CPT | Performed by: INTERNAL MEDICINE

## 2021-10-19 PROCEDURE — 93296 REM INTERROG EVL PM/IDS: CPT | Performed by: INTERNAL MEDICINE

## 2021-10-20 ENCOUNTER — LAB (OUTPATIENT)
Dept: LAB | Facility: CLINIC | Age: 81
End: 2021-10-20
Payer: MEDICARE

## 2021-10-20 ENCOUNTER — ANTICOAGULATION THERAPY VISIT (OUTPATIENT)
Dept: ANTICOAGULATION | Facility: CLINIC | Age: 81
End: 2021-10-20

## 2021-10-20 DIAGNOSIS — I48.0 PAROXYSMAL ATRIAL FIBRILLATION (H): ICD-10-CM

## 2021-10-20 DIAGNOSIS — I48.0 PAROXYSMAL ATRIAL FIBRILLATION (H): Primary | ICD-10-CM

## 2021-10-20 LAB
INR BLD: 2 (ref 0.9–1.1)
MDC_IDC_EPISODE_DTM: NORMAL
MDC_IDC_EPISODE_DURATION: 1 S
MDC_IDC_EPISODE_DURATION: 4 S
MDC_IDC_EPISODE_DURATION: 5 S
MDC_IDC_EPISODE_ID: NORMAL
MDC_IDC_EPISODE_TYPE: NORMAL
MDC_IDC_LEAD_IMPLANT_DT: NORMAL
MDC_IDC_LEAD_IMPLANT_DT: NORMAL
MDC_IDC_LEAD_LOCATION: NORMAL
MDC_IDC_LEAD_LOCATION: NORMAL
MDC_IDC_LEAD_LOCATION_DETAIL_1: NORMAL
MDC_IDC_LEAD_LOCATION_DETAIL_1: NORMAL
MDC_IDC_LEAD_MFG: NORMAL
MDC_IDC_LEAD_MFG: NORMAL
MDC_IDC_LEAD_MODEL: NORMAL
MDC_IDC_LEAD_MODEL: NORMAL
MDC_IDC_LEAD_POLARITY_TYPE: NORMAL
MDC_IDC_LEAD_POLARITY_TYPE: NORMAL
MDC_IDC_LEAD_SERIAL: NORMAL
MDC_IDC_LEAD_SERIAL: NORMAL
MDC_IDC_MSMT_BATTERY_DTM: NORMAL
MDC_IDC_MSMT_BATTERY_REMAINING_LONGEVITY: 120 MO
MDC_IDC_MSMT_BATTERY_REMAINING_PERCENTAGE: 100 %
MDC_IDC_MSMT_BATTERY_STATUS: NORMAL
MDC_IDC_MSMT_LEADCHNL_RA_IMPEDANCE_VALUE: 594 OHM
MDC_IDC_MSMT_LEADCHNL_RA_PACING_THRESHOLD_AMPLITUDE: 0.4 V
MDC_IDC_MSMT_LEADCHNL_RA_PACING_THRESHOLD_PULSEWIDTH: 0.4 MS
MDC_IDC_MSMT_LEADCHNL_RV_IMPEDANCE_VALUE: 384 OHM
MDC_IDC_MSMT_LEADCHNL_RV_PACING_THRESHOLD_AMPLITUDE: 1.1 V
MDC_IDC_MSMT_LEADCHNL_RV_PACING_THRESHOLD_PULSEWIDTH: 0.4 MS
MDC_IDC_PG_IMPLANT_DTM: NORMAL
MDC_IDC_PG_MFG: NORMAL
MDC_IDC_PG_MODEL: NORMAL
MDC_IDC_PG_SERIAL: NORMAL
MDC_IDC_PG_TYPE: NORMAL
MDC_IDC_SESS_CLINIC_NAME: NORMAL
MDC_IDC_SESS_DTM: NORMAL
MDC_IDC_SESS_TYPE: NORMAL
MDC_IDC_SET_BRADY_AT_MODE_SWITCH_MODE: NORMAL
MDC_IDC_SET_BRADY_AT_MODE_SWITCH_RATE: 170 {BEATS}/MIN
MDC_IDC_SET_BRADY_LOWRATE: 70 {BEATS}/MIN
MDC_IDC_SET_BRADY_MAX_SENSOR_RATE: 130 {BEATS}/MIN
MDC_IDC_SET_BRADY_MAX_TRACKING_RATE: 130 {BEATS}/MIN
MDC_IDC_SET_BRADY_MODE: NORMAL
MDC_IDC_SET_BRADY_PAV_DELAY_HIGH: 200 MS
MDC_IDC_SET_BRADY_PAV_DELAY_LOW: 250 MS
MDC_IDC_SET_BRADY_SAV_DELAY_HIGH: 200 MS
MDC_IDC_SET_BRADY_SAV_DELAY_LOW: 250 MS
MDC_IDC_SET_LEADCHNL_RA_PACING_AMPLITUDE: 1.5 V
MDC_IDC_SET_LEADCHNL_RA_PACING_CAPTURE_MODE: NORMAL
MDC_IDC_SET_LEADCHNL_RA_PACING_POLARITY: NORMAL
MDC_IDC_SET_LEADCHNL_RA_PACING_PULSEWIDTH: 0.4 MS
MDC_IDC_SET_LEADCHNL_RA_SENSING_ADAPTATION_MODE: NORMAL
MDC_IDC_SET_LEADCHNL_RA_SENSING_POLARITY: NORMAL
MDC_IDC_SET_LEADCHNL_RA_SENSING_SENSITIVITY: 0.25 MV
MDC_IDC_SET_LEADCHNL_RV_PACING_AMPLITUDE: 1.9 V
MDC_IDC_SET_LEADCHNL_RV_PACING_CAPTURE_MODE: NORMAL
MDC_IDC_SET_LEADCHNL_RV_PACING_POLARITY: NORMAL
MDC_IDC_SET_LEADCHNL_RV_PACING_PULSEWIDTH: 0.4 MS
MDC_IDC_SET_LEADCHNL_RV_SENSING_ADAPTATION_MODE: NORMAL
MDC_IDC_SET_LEADCHNL_RV_SENSING_POLARITY: NORMAL
MDC_IDC_SET_LEADCHNL_RV_SENSING_SENSITIVITY: 1.5 MV
MDC_IDC_SET_ZONE_DETECTION_INTERVAL: 375 MS
MDC_IDC_SET_ZONE_TYPE: NORMAL
MDC_IDC_SET_ZONE_VENDOR_TYPE: NORMAL
MDC_IDC_STAT_AT_BURDEN_PERCENT: 1 %
MDC_IDC_STAT_AT_DTM_END: NORMAL
MDC_IDC_STAT_AT_DTM_START: NORMAL
MDC_IDC_STAT_BRADY_DTM_END: NORMAL
MDC_IDC_STAT_BRADY_DTM_START: NORMAL
MDC_IDC_STAT_BRADY_RA_PERCENT_PACED: 100 %
MDC_IDC_STAT_BRADY_RV_PERCENT_PACED: 0 %
MDC_IDC_STAT_EPISODE_RECENT_COUNT: 0
MDC_IDC_STAT_EPISODE_RECENT_COUNT: 3
MDC_IDC_STAT_EPISODE_RECENT_COUNT_DTM_END: NORMAL
MDC_IDC_STAT_EPISODE_RECENT_COUNT_DTM_START: NORMAL
MDC_IDC_STAT_EPISODE_TYPE: NORMAL
MDC_IDC_STAT_EPISODE_VENDOR_TYPE: NORMAL

## 2021-10-20 PROCEDURE — 36415 COLL VENOUS BLD VENIPUNCTURE: CPT

## 2021-10-20 PROCEDURE — 85610 PROTHROMBIN TIME: CPT

## 2021-10-20 NOTE — PROGRESS NOTES
ANTICOAGULATION MANAGEMENT     Angeles Can 81 year old female is on warfarin with therapeutic INR result. (Goal INR 2.0-3.0)    Recent labs: (last 7 days)     10/20/21  1141   INR 2.0*       ASSESSMENT     Source(s): Chart Review and Patient/Caregiver Call       Warfarin doses taken: Warfarin taken as instructed    Diet: No new diet changes identified    New illness, injury, or hospitalization: No    Medication/supplement changes: None noted    Signs or symptoms of bleeding or clotting: No    Previous INR: Therapeutic last visit; previously outside of goal range    Additional findings: None     PLAN     Recommended plan for no diet, medication or health factor changes affecting INR     Dosing Instructions: Continue your current warfarin dose with next INR in 1 week       Summary  As of 10/20/2021    Full warfarin instructions:  1.25 mg every Mon, Thu; 2.5 mg all other days   Next INR check:  10/29/2021             Telephone call with Angeles who verbalizes understanding and agrees to plan    Check at provider office visit 10/29    Education provided: None required    Plan made per [facility]agulation protocol    Reyna Radford RN  Anticoagulation Clinic  10/20/2021    _______________________________________________________________________     Anticoagulation Episode Summary     Current INR goal:  2.0-3.0   TTR:  76.1 % (1 y)   Target end date:  Indefinite   Send INR reminders to:  [facility]    Indications    Paroxysmal atrial fibrillation (H) [I48.0]           Comments:  [facility]         Anticoagulation Care Providers     Provider Role Specialty Phone number    Freddy Lubin MD Referring Family Medicine [phone]

## 2021-10-29 ENCOUNTER — ANTICOAGULATION THERAPY VISIT (OUTPATIENT)
Dept: ANTICOAGULATION | Facility: CLINIC | Age: 81
End: 2021-10-29

## 2021-10-29 ENCOUNTER — OFFICE VISIT (OUTPATIENT)
Dept: INTERNAL MEDICINE | Facility: CLINIC | Age: 81
End: 2021-10-29
Payer: MEDICARE

## 2021-10-29 VITALS
OXYGEN SATURATION: 99 % | SYSTOLIC BLOOD PRESSURE: 138 MMHG | HEART RATE: 88 BPM | WEIGHT: 179 LBS | BODY MASS INDEX: 33.01 KG/M2 | DIASTOLIC BLOOD PRESSURE: 72 MMHG

## 2021-10-29 DIAGNOSIS — I10 ESSENTIAL HYPERTENSION WITH GOAL BLOOD PRESSURE LESS THAN 140/90: ICD-10-CM

## 2021-10-29 DIAGNOSIS — E21.0 PRIMARY HYPERPARATHYROIDISM (H): Primary | ICD-10-CM

## 2021-10-29 DIAGNOSIS — R60.0 PERIPHERAL EDEMA: ICD-10-CM

## 2021-10-29 DIAGNOSIS — C50.911 INVASIVE DUCTAL CARCINOMA OF BREAST, FEMALE, RIGHT (H): ICD-10-CM

## 2021-10-29 DIAGNOSIS — M1A.00X0 IDIOPATHIC CHRONIC GOUT WITHOUT TOPHUS, UNSPECIFIED SITE: ICD-10-CM

## 2021-10-29 DIAGNOSIS — E11.65 TYPE 2 DIABETES MELLITUS WITH HYPERGLYCEMIA, WITHOUT LONG-TERM CURRENT USE OF INSULIN (H): ICD-10-CM

## 2021-10-29 DIAGNOSIS — I48.0 PAROXYSMAL ATRIAL FIBRILLATION (H): Primary | ICD-10-CM

## 2021-10-29 DIAGNOSIS — I48.0 PAROXYSMAL ATRIAL FIBRILLATION (H): ICD-10-CM

## 2021-10-29 DIAGNOSIS — E78.00 HYPERCHOLESTEROLEMIA: ICD-10-CM

## 2021-10-29 LAB
ALBUMIN SERPL-MCNC: 3.7 G/DL (ref 3.5–5)
ALP SERPL-CCNC: 93 U/L (ref 45–120)
ALT SERPL W P-5'-P-CCNC: 15 U/L (ref 0–45)
ANION GAP SERPL CALCULATED.3IONS-SCNC: 13 MMOL/L (ref 5–18)
AST SERPL W P-5'-P-CCNC: 15 U/L (ref 0–40)
BILIRUB SERPL-MCNC: 0.7 MG/DL (ref 0–1)
BUN SERPL-MCNC: 34 MG/DL (ref 8–28)
CALCIUM SERPL-MCNC: 10.9 MG/DL (ref 8.5–10.5)
CHLORIDE BLD-SCNC: 100 MMOL/L (ref 98–107)
CHOLEST SERPL-MCNC: 163 MG/DL
CO2 SERPL-SCNC: 27 MMOL/L (ref 22–31)
CREAT SERPL-MCNC: 0.94 MG/DL (ref 0.6–1.1)
CREAT UR-MCNC: 42 MG/DL
FASTING STATUS PATIENT QL REPORTED: ABNORMAL
GFR SERPL CREATININE-BSD FRML MDRD: 56 ML/MIN/1.73M2
GLUCOSE BLD-MCNC: 120 MG/DL (ref 70–125)
HBA1C MFR BLD: 7.1 % (ref 0–5.6)
HDLC SERPL-MCNC: 55 MG/DL
INR BLD: 2.6 (ref 0.9–1.1)
LDLC SERPL CALC-MCNC: 73 MG/DL
MICROALBUMIN UR-MCNC: 2.15 MG/DL (ref 0–1.99)
MICROALBUMIN/CREAT UR: 51.2 MG/G CR
POTASSIUM BLD-SCNC: 3.9 MMOL/L (ref 3.5–5)
PROT SERPL-MCNC: 6.6 G/DL (ref 6–8)
PTH-INTACT SERPL-MCNC: 163 PG/ML (ref 10–86)
SODIUM SERPL-SCNC: 140 MMOL/L (ref 136–145)
TRIGL SERPL-MCNC: 174 MG/DL
URATE SERPL-MCNC: 3.2 MG/DL (ref 2–7.5)

## 2021-10-29 PROCEDURE — 83970 ASSAY OF PARATHORMONE: CPT | Performed by: INTERNAL MEDICINE

## 2021-10-29 PROCEDURE — 82043 UR ALBUMIN QUANTITATIVE: CPT | Performed by: INTERNAL MEDICINE

## 2021-10-29 PROCEDURE — G0008 ADMIN INFLUENZA VIRUS VAC: HCPCS | Performed by: INTERNAL MEDICINE

## 2021-10-29 PROCEDURE — 84550 ASSAY OF BLOOD/URIC ACID: CPT | Performed by: INTERNAL MEDICINE

## 2021-10-29 PROCEDURE — 36415 COLL VENOUS BLD VENIPUNCTURE: CPT | Performed by: INTERNAL MEDICINE

## 2021-10-29 PROCEDURE — 90662 IIV NO PRSV INCREASED AG IM: CPT | Performed by: INTERNAL MEDICINE

## 2021-10-29 PROCEDURE — 85610 PROTHROMBIN TIME: CPT | Performed by: INTERNAL MEDICINE

## 2021-10-29 PROCEDURE — 83036 HEMOGLOBIN GLYCOSYLATED A1C: CPT | Performed by: INTERNAL MEDICINE

## 2021-10-29 PROCEDURE — 99214 OFFICE O/P EST MOD 30 MIN: CPT | Mod: 25 | Performed by: INTERNAL MEDICINE

## 2021-10-29 PROCEDURE — 80053 COMPREHEN METABOLIC PANEL: CPT | Performed by: INTERNAL MEDICINE

## 2021-10-29 PROCEDURE — 80061 LIPID PANEL: CPT | Performed by: INTERNAL MEDICINE

## 2021-10-29 RX ORDER — FUROSEMIDE 40 MG
40 TABLET ORAL DAILY
Qty: 90 TABLET | Refills: 3 | Status: SHIPPED | OUTPATIENT
Start: 2021-10-29 | End: 2022-11-16

## 2021-10-29 RX ORDER — WARFARIN SODIUM 2.5 MG/1
TABLET ORAL
Qty: 90 TABLET | Refills: 3 | Status: SHIPPED | OUTPATIENT
Start: 2021-10-29 | End: 2022-12-08

## 2021-10-29 NOTE — PATIENT INSTRUCTIONS
1.  Stop metformin.  Report effect on loose stools in two weeks.    2.  Magnesium also can cause loose stools.    3.  Flu shot today    4.  covid booster in the future    5.  See in three months or as needed

## 2021-10-29 NOTE — PROGRESS NOTES
ANTICOAGULATION MANAGEMENT     Angeles Can 81 year old female is on warfarin with therapeutic INR result. (Goal INR 2.0-3.0)    Recent labs: (last 7 days)     10/29/21  1448   INR 2.6*       ASSESSMENT     Source(s): Chart Review and Template       Warfarin doses taken: Warfarin taken as instructed    Diet: No new diet changes identified    New illness, injury, or hospitalization: No    Medication/supplement changes: None noted to interact    Signs or symptoms of bleeding or clotting: No    Previous INR: Therapeutic last visit; previously outside of goal range    Additional findings: None     PLAN     Recommended plan for no diet, medication or health factor changes affecting INR     Dosing Instructions: Continue your current warfarin dose with next INR in 2-4 weeks       Summary  As of 10/29/2021    Full warfarin instructions:  1.25 mg every Mon, Thu; 2.5 mg all other days   Next INR check:  11/26/2021             Detailed voice message left for Angeles with dosing instructions and follow up date.     Contact [phone] to schedule and with any changes, questions or concerns.     Education provided: None required    Plan made per [facility]agulation protocol    Reyna Radford RN  Anticoagulation Clinic  10/29/2021    _______________________________________________________________________     Anticoagulation Episode Summary     Current INR goal:  2.0-3.0   TTR:  78.5 % (1 y)   Target end date:  Indefinite   Send INR reminders to:  [facility]    Indications    Paroxysmal atrial fibrillation (H) [I48.0]           Comments:  PAF         Anticoagulation Care Providers     Provider Role Specialty Phone number    Freddy Lubin MD Referring Family Medicine [phone]

## 2021-10-31 NOTE — PROGRESS NOTES
ASSESSMENT:  1. Paroxysmal atrial fibrillation (H)  Frida remains on warfarin as an anticoagulant.  She does have a pacemaker.   She is on sotalol as an anticoagulant, and has not noted symptomatic palpitations.  - warfarin ANTICOAGULANT (COUMADIN) 2.5 MG tablet; [WARFARIN ANTICOAGULANT (COUMADIN/JANTOVEN) 2.5 MG TABLET] Take 1/2-1 tablet (1.25-2.5 mg) by mouth daily. Adjust dose based on INR results as directed.  Dispense: 90 tablet; Refill: 3  - INR point of care    2. Primary hyperparathyroidism (H)  She has had a mild elevation intermittently in the calcium in the past with elevated parathyroid hormone level.  Most recent bone density study showed a lowest T score of -1.4 in the right femoral neck.  I would advise continued monitoring as long as calcium does not become markedly elevated, and if there is not a major decline in bone density or development of nephrolithiasis  - Parathyroid Hormone Intact; Future  - Parathyroid Hormone Intact    3. Type 2 diabetes mellitus with hyperglycemia, without long-term current use of insulin (H)  She is on Metformin XR 1000 mg daily.  She has noted irregular bowel habits with tendency toward loose stools.  This possibly could be related to the Metformin.  Hemoglobin A1c will be checked today.  She will be given a trial of a lower Metformin dose to see if GI symptoms improve  - Hemoglobin A1c; Future  - Comprehensive metabolic panel; Future  - Albumin Random Urine Quantitative with Creat Ratio; Future  - Hemoglobin A1c  - Comprehensive metabolic panel  - Albumin Random Urine Quantitative with Creat Ratio    4. Hypercholesterolemia  On atorvastatin 10 mg daily.  Lipid panel will be checked  - Lipid panel reflex to direct LDL Fasting; Future  - Lipid panel reflex to direct LDL Fasting    5. Idiopathic chronic gout without tophus, unspecified site  She is on allopurinol 300 mg daily.  She has not had any recent problems with symptomatic gout.  Uric acid level will be checked  -  Uric acid; Future  - Uric acid    6. Essential hypertension with goal blood pressure less than 140/90  On amlodipine 2.5 mg daily, clonidine 0.1 mg, furosemide 40 mg daily and losartan 100 mg daily.  Blood pressure is good on this regimen.  Peripheral edema has not been an issue lately on the furosemide  - Comprehensive metabolic panel; Future  - Comprehensive metabolic panel    7. Invasive ductal carcinoma of breast, female, right (H)  This was diagnosed by needle biopsy on 5/3/2021 after an abnormal screening mammogram.  Tumor is ER positive, CT positive, HER-2/alvin negative.  She underwent lumpectomy on 5/26/2021.  She is on Arimidex with no evidence for metastatic disease  - Comprehensive metabolic panel; Future  - Comprehensive metabolic panel    8. Peripheral edema  Under good control with furosemide.  - furosemide (LASIX) 40 MG tablet; Take 1 tablet (40 mg) by mouth daily  Dispense: 90 tablet; Refill: 3    PLAN:  Patient Instructions   1.  Stop metformin.  Report effect on loose stools in two weeks.    2.  Magnesium also can cause loose stools.    3.  Flu shot today    4.  covid booster in the future    5.  See in three months or as needed    Orders Placed This Encounter   Procedures     CT FLU VACCINE, INCREASED ANTIGEN, PRESV FREE     Parathyroid Hormone Intact     Lipid panel reflex to direct LDL Fasting     Hemoglobin A1c     Uric acid     Comprehensive metabolic panel     Albumin Random Urine Quantitative with Creat Ratio     Medications Discontinued During This Encounter   Medication Reason     furosemide (LASIX) 20 MG tablet Dose adjustment     warfarin ANTICOAGULANT (COUMADIN/JANTOVEN) 2.5 MG tablet Reorder       Return in about 3 months (around 1/29/2022) for Follow up.    ASSESSED PROBLEMS:  See above      CHIEF COMPLAINT:  Follow-up type 2 diabetes, essential hypertension, primary hyperparathyroidism, and other concerns    HISTORY OF PRESENT ILLNESS:  Frida is a 81 year old female seen for follow-up  of type 2 diabetes, essential hypertension, primary hyperparathyroidism, and other concerns.  He does note some problems with irregular bowel habits.  She had cut Metformin to 500 mg daily from 1000 mg daily recently due to concern about bowel habits.  Diabetic control traditionally has been fairly good in the past.    She remains on warfarin due to history of paroxysmal atrial fibrillation.  She is on sotalol as an antiarrhythmic and does have a pacemaker.  She has not noted symptomatic palpitations.    She has had an elevated parathyroid hormone value and intermittent elevations in calcium in the past consistent with primary hyperparathyroidism.  Lowest T score on last DEXA scan was -1.4 in the right femoral neck.  Observation and monitor training has been advised.  She underwent lumpectomy for invasive ductal carcinoma last May.  She is now on Arimidex with no evidence for metastatic disease,    She is on amlodipine, losartan, furosemide, and clonidine for essential hypertension.  Peripheral edema improved with lowering amlodipine dose and beginning furosemide.  Blood pressure overall has been good.        REVIEW OF SYSTEMS:    Comprehensive review of systems is otherwise negative.    PFSH:   and retired.  5 children and multiple grandchildren    TOBACCO USE:  History   Smoking Status     Former Smoker   Smokeless Tobacco     Never Used     Comment: quit years ago       VITALS:  Vitals:    10/29/21 1338   BP: 138/72   BP Location: Left arm   Patient Position: Sitting   Cuff Size: Adult Large   Pulse: 88   SpO2: 99%   Weight: 81.2 kg (179 lb)     Wt Readings from Last 3 Encounters:   10/29/21 81.2 kg (179 lb)   08/26/21 82.9 kg (182 lb 12.8 oz)   06/16/21 86.6 kg (191 lb)       PHYSICAL EXAM:  Constitutional:   Reveals an alert pleasant talkative woman with appropriate affect who does not seem in acute distress.    Vitals: per nursing notes.  HEENT: Thinning hair  Eyes: No conjunctival  hyperemia  Oropharynx:   Mouth and throat clear, no thrush or exudate.  Neck:  Supple, no carotid bruits or adenopathy.  Back:  No spine or CVA pain.  Thorax:  No bony deformities.  Pacer left chest  Lungs: Clear to A&P without rales or wheezes.  Respiratory effort normal.  Cardiac:   Regular rate and rhythm, normal S1, S2, no murmur or gallop.  Breasts:   Not examined  Abdomen:  Soft, active bowel sounds without bruits, mass, or tenderness.  Extremities:   No peripheral edema, pulses in the feet intact.    Skin:  No jaundice, peripheral cyanosis or lesions to suggest malignancy.  Neuro:  Alert and oriented. .  No gross focal deficits.  Psychiatric:  Memory intact, mood appropriate.    DATA REVIEWED:  Additional History from Old Records Summarized (2): Oncology notes reviewed  Decision to Obtain Records (1): None.   Radiology Tests Summarized or Ordered (1): DEXA scan and mammogram reviewed  Labs Reviewed or Ordered (1): Labs reviewed and ordered  Medicine Test Summarized or Ordered (1): None.   Independent Review of EKG or X-RAY(2 each): None.    The visit lasted a total of 30 minutes face to face with the patient. Over 50% of the time was spent counseling and educating the patient about management of type 2 diabetes and multiple other concerns.    Dragon dictation was used for this note. Speech recognition errors are a possibility.     MEDICATIONS:  Current Outpatient Medications   Medication Sig Dispense Refill     allopurinol (ZYLOPRIM) 300 MG tablet [ALLOPURINOL (ZYLOPRIM) 300 MG TABLET] Take 1 tablet by mouth once daily. 90 tablet 3     amLODIPine (NORVASC) 2.5 MG tablet [AMLODIPINE (NORVASC) 2.5 MG TABLET] Take 1 tablet (2.5 mg total) by mouth daily. 90 tablet 3     anastrozole (ARIMIDEX) 1 MG tablet Take 1 tablet (1 mg total) by mouth daily. 90 tablet 3     atorvastatin (LIPITOR) 10 MG tablet Take 1 tablet (10 mg) by mouth daily 90 tablet 3     blood glucose test strips [BLOOD GLUCOSE TEST STRIPS] Use 1 each  As Directed daily. 100 strip 3     cholecalciferol, vitamin D3, 1,000 unit tablet [CHOLECALCIFEROL, VITAMIN D3, 1,000 UNIT TABLET] Take 1,000 Units by mouth daily.       cloNIDine HCL (CATAPRES) 0.1 MG tablet [CLONIDINE HCL (CATAPRES) 0.1 MG TABLET] Take 1 tablet by mouth twice daily.  180 tablet 3     furosemide (LASIX) 40 MG tablet Take 1 tablet (40 mg) by mouth daily 90 tablet 3     losartan (COZAAR) 100 MG tablet Take 1 tablet (100 mg) by mouth daily 90 tablet 3     magnesium oxide (MAG-OX) 400 mg tablet [MAGNESIUM OXIDE (MAG-OX) 400 MG TABLET] Take 400 mg by mouth daily.        metFORMIN (GLUCOPHAGE-XR) 500 MG 24 hr tablet [METFORMIN (GLUCOPHAGE-XR) 500 MG 24 HR TABLET] Take 2 tablets (1,000 mg total) by mouth daily with breakfast.  180 tablet 1     peg 400-propylene glycol (SYSTANE) 0.4-0.3 % Drop [-PROPYLENE GLYCOL (SYSTANE) 0.4-0.3 % DROP] Place 1 Drop into both eyes 2 times daily if needed for Dry Eyes. Indications: DRY EYE       sotaloL (BETAPACE) 80 MG tablet [SOTALOL (BETAPACE) 80 MG TABLET] Take 1 tablet (80 mg total) by mouth daily.  90 tablet 1     warfarin ANTICOAGULANT (COUMADIN) 2.5 MG tablet [WARFARIN ANTICOAGULANT (COUMADIN/JANTOVEN) 2.5 MG TABLET] Take 1/2-1 tablet (1.25-2.5 mg) by mouth daily. Adjust dose based on INR results as directed. 90 tablet 3

## 2021-11-02 ENCOUNTER — TRANSFERRED RECORDS (OUTPATIENT)
Dept: HEALTH INFORMATION MANAGEMENT | Facility: CLINIC | Age: 81
End: 2021-11-02
Payer: MEDICARE

## 2021-11-04 ENCOUNTER — NURSE TRIAGE (OUTPATIENT)
Dept: NURSING | Facility: CLINIC | Age: 81
End: 2021-11-04

## 2021-11-04 NOTE — TELEPHONE ENCOUNTER
Having face swelling, red, itchy and was seen in ER.  On prednisone.    Was told to seen allergy clinic when she was seen at [facility] in [place] on Tuesday.    Not feeling better.    Wants to be seen by pcp tomorrow (Friday)    Guerline Stewart RN  [facility] Nurse Advisor

## 2021-11-04 NOTE — TELEPHONE ENCOUNTER
Emergency room note from November 2 and primary office visit from October 29 reviewed    Agree with prednisone for possible allergic reaction    Losartan could be a trigger despite long-term use    Recommend holding losartan    Recommend continuing prednisone    There is no reason for her to be seen by her primary today or tomorrow.  A phone or video follow-up will be appropriate tomorrow for the weekend

## 2021-11-04 NOTE — TELEPHONE ENCOUNTER
Writer spoke to patient, giving her the information from Dr. Mariano.       Patient wishes to speak to Dr. Lubin sometime between 11/5 and 11/9/21 but there are no current openings.      Could provider fit her in.  She has been dealing with a rash, possible allergic reaction.

## 2021-11-24 ENCOUNTER — ANTICOAGULATION THERAPY VISIT (OUTPATIENT)
Dept: ANTICOAGULATION | Facility: CLINIC | Age: 81
End: 2021-11-24

## 2021-11-24 ENCOUNTER — ONCOLOGY VISIT (OUTPATIENT)
Dept: ONCOLOGY | Facility: CLINIC | Age: 81
End: 2021-11-24
Attending: NURSE PRACTITIONER
Payer: MEDICARE

## 2021-11-24 ENCOUNTER — LAB (OUTPATIENT)
Dept: LAB | Facility: CLINIC | Age: 81
End: 2021-11-24
Payer: MEDICARE

## 2021-11-24 VITALS
DIASTOLIC BLOOD PRESSURE: 65 MMHG | OXYGEN SATURATION: 96 % | RESPIRATION RATE: 16 BRPM | HEART RATE: 71 BPM | WEIGHT: 178 LBS | HEIGHT: 62 IN | BODY MASS INDEX: 32.76 KG/M2 | SYSTOLIC BLOOD PRESSURE: 138 MMHG

## 2021-11-24 DIAGNOSIS — I48.0 PAROXYSMAL ATRIAL FIBRILLATION (H): Primary | ICD-10-CM

## 2021-11-24 DIAGNOSIS — C50.311 MALIGNANT NEOPLASM OF LOWER-INNER QUADRANT OF RIGHT BREAST OF FEMALE, ESTROGEN RECEPTOR POSITIVE (H): ICD-10-CM

## 2021-11-24 DIAGNOSIS — I48.0 PAROXYSMAL ATRIAL FIBRILLATION (H): ICD-10-CM

## 2021-11-24 DIAGNOSIS — Z17.0 MALIGNANT NEOPLASM OF LOWER-INNER QUADRANT OF RIGHT BREAST OF FEMALE, ESTROGEN RECEPTOR POSITIVE (H): ICD-10-CM

## 2021-11-24 LAB — INR BLD: 2 (ref 0.9–1.1)

## 2021-11-24 PROCEDURE — 36416 COLLJ CAPILLARY BLOOD SPEC: CPT

## 2021-11-24 PROCEDURE — 99214 OFFICE O/P EST MOD 30 MIN: CPT | Performed by: INTERNAL MEDICINE

## 2021-11-24 PROCEDURE — 85610 PROTHROMBIN TIME: CPT

## 2021-11-24 PROCEDURE — G0463 HOSPITAL OUTPT CLINIC VISIT: HCPCS

## 2021-11-24 ASSESSMENT — MIFFLIN-ST. JEOR: SCORE: 1211.68

## 2021-11-24 ASSESSMENT — PAIN SCALES - GENERAL: PAINLEVEL: NO PAIN (0)

## 2021-11-24 NOTE — LETTER
"    11/24/2021         RE: Angeles Can  [address]   [place] [address]        Dear Colleague,    Thank you for referring your patient, Angeles Can, to the [facility]. Please see a copy of my visit note below.    Oncology Rooming Note    November 24, 2021 10:37 AM   Angeles Can is a 81 year old female who presents for:    Chief Complaint   Patient presents with     Oncology Clinic Visit     Breast Cancer     Initial Vitals: /65 (BP Location: Left arm, Patient Position: Sitting, Cuff Size: Adult Large)   Pulse 71   Resp 16   Ht 1.568 m (5' 1.75\")   Wt 80.7 kg (178 lb)   SpO2 96%   BMI 32.82 kg/m   Estimated body mass index is 32.82 kg/m  as calculated from the following:    Height as of this encounter: 1.568 m (5' 1.75\").    Weight as of this encounter: 80.7 kg (178 lb). Body surface area is 1.88 meters squared.  No Pain (0) Comment: Data Unavailable   No LMP recorded. Patient is postmenopausal.  Allergies reviewed: Yes  Medications reviewed: Yes    Medications: Medication refills not needed today.  Pharmacy name entered into [employer]: [id number] - [place], [place] - [address]    Clinical concerns:  3 month     Cally Sanchez              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 pT1c Nx cM0 invasive ductal " carcinoma of the lower inner quadrant of the right breast.  G1, ER +, MA +, HER-2/alvin-.   Clinically well exam is unremarkable.     She tolerates anastrozole well.  She will continue as current to complete 5 years of therapy.   Screening mammogram due in May 2022.     Follow-up clinical exam in 6 months.  No labs needed.      #.  Low bone density with moderate fracture risk.   DEXA scan from April 2021 showed L-spine T score of 2.6, left femoral neck T score of-1.1, right femoral neck T score-1.4.     She has history of mild hypercalcemia.  She normally take good source of calcium from diet and she is a milk drinker.  She takes vitamin D 1000 mg once a day.  Encouraged weightbearing exercises.    Follow-up DEXA scan in about 2 years.      Encounter Diagnoses:    Problem List Items Addressed This Visit        Oncology Diagnoses    Malignant neoplasm of lower-inner quadrant of right breast of female, estrogen receptor positive (H)           CC: Freddy Lubin MD   ______________________________________________________________________________  Diagnosis  4/22/2021-screening mammogram showed focal asymmetry within the right breast at 4 o'clock position.  4/28/2021-diagnostic mammogram and ultrasound showed asymmetry with microlobulated margins and ultrasound confirmed 7 x 9 x 9 mm hypoechoic lesion at the same location.  5/3/2021-ultrasound-guided core needle biopsy of the right breast mass and it showed invasive ductal carcinoma and DCIS.  ER positive> 95%, MA positive> 95%, HER-2/alvin negative by IHC (1+).  5/26/201- Final path: Invasive ductal carcinoma, grade 1, 15 mm, negative margins.  There is associated DCIS, solid and focal cribriform, no necrosis and no microcalcification, negative margins. pT1c Nx.    Treatment to date  5/26/2021-underwent right breast lumpectomy by Dr. Peng.  6/2021-started adjuvant anastrozole.    History of Present Illness    Ms. Angeles Skinnerer here for follow-up after starting  "anastrozole.  She reported she tolerates anastrozole well without major intolerable side effects.    Review of systems  Apart from describing in HPI, the remainder of comprehensive ROS was negative.    Past History    Past Medical History:   Diagnosis Date     Arrhythmia     Paroxysmal Atrial fibrillation     Arthritis      Cancer (H)      Diabetes mellitus (H)      Hypercalcemia      Hyperlipidemia      Hypertension      Obesity        Past Surgical History:   Procedure Laterality Date     CATARACT EXTRACTION, BILATERAL       EP PACEMAKER INSERT N/A 3/2/2018    Procedure: EP Pacemaker Insertion;  Surgeon: Nahun Mina MD;  Location: [facility];  Service:      HC REMOVE TONSILS/ADENOIDS,<11 Y/O      Description: Tonsillectomy With Adenoidectomy;  Recorded: 09/16/2008;     HC REVISE MEDIAN N/CARPAL TUNNEL SURG      Description: Neuroplasty Decompression Median Nerve At Carpal Tunnel;  Recorded: 11/01/2009;  Comments: Right- 1/99; Left- 4/99     IN MASTECTOMY, PARTIAL Right 5/26/2021    Procedure: Right Lumpectomy after Wire Localizaiton;  Surgeon: Rosanna Peng MD;  Location: [facility];  Service: General     US BREAST CORE BIOPSY RIGHT Right 5/3/2021         Physical Exam    /65 (BP Location: Left arm, Patient Position: Sitting, Cuff Size: Adult Large)   Pulse 71   Resp 16   Ht 1.568 m (5' 1.75\")   Wt 80.7 kg (178 lb)   SpO2 96%   BMI 32.82 kg/m        General: alert, awake, not in acute distress  HEENT: Head: Normal, normocephalic, atraumatic.  Eye: Normal external eye, conjunctiva, lids cornea, DIVYA.  Nose: Normal external nose, mucus membranes and septum.  Pharynx: Normal buccal mucosa. Normal pharynx.  Neck / Thyroid: Supple, no masses, nodes, nodules or enlargement.  Lymphatics: No abnormally enlarged lymph nodes.  Chest: Normal chest wall and respirations. Clear to auscultation.  Breasts: No palpable abnormalities.  Heart: S1 S2 RRR, no murmur.   Abdomen: abdomen is soft " without significant tenderness, masses, organomegaly or guarding  Extremities: normal strength, tone, and muscle mass  Skin: normal. no rash or abnormalities  CNS: non focal.    Lab Results    Recent Results (from the past 168 hour(s))   INR point of care   Result Value Ref Range    INR 2.0 (H) 0.9 - 1.1       Imaging    No results found.    I spent 30 minutes on the date of service on coordination and counseling of care.    Signed by: Etelvina Merlos MD        Again, thank you for allowing me to participate in the care of your patient.        Sincerely,        Etelvina Merlos MD

## 2021-11-24 NOTE — PROGRESS NOTES
ANTICOAGULATION MANAGEMENT     Angeles Can 81 year old female is on warfarin with therapeutic INR result. (Goal INR 2.0-3.0)    Recent labs: (last 7 days)     11/24/21  1135   INR 2.0*       ASSESSMENT     Source(s): Chart Review and Template       Warfarin doses taken: Warfarin taken as instructed    Diet: No new diet changes identified    New illness, injury, or hospitalization: No    Medication/supplement changes: None noted    Signs or symptoms of bleeding or clotting: No    Previous INR: Therapeutic last 2(+) visits    Additional findings: None     PLAN     Recommended plan for no diet, medication or health factor changes affecting INR     Dosing Instructions: Continue your current warfarin dose with next INR in 4 weeks       Summary  As of 11/24/2021    Full warfarin instructions:  1.25 mg every Mon, Thu; 2.5 mg all other days   Next INR check:  12/22/2021             Detailed voice message left for Angeles with dosing instructions and follow up date.     Contact [phone] to schedule and with any changes, questions or concerns.     Education provided: None required    Plan made per [facility] anticoagulation protocol    Reyna Radford RN  Anticoagulation Clinic  11/24/2021    _______________________________________________________________________     Anticoagulation Episode Summary     Current INR goal:  2.0-3.0   TTR:  78.7 % (1 y)   Target end date:  Indefinite   Send INR reminders to:  [facility]    Indications    Paroxysmal atrial fibrillation (H) [I48.0]           Comments:  PAF         Anticoagulation Care Providers     Provider Role Specialty Phone number    Freddy Lubin MD Referring Internal Medicine [phone]

## 2021-11-24 NOTE — PROGRESS NOTES
"Oncology Rooming Note    November 24, 2021 10:37 AM   Angeles Can is a 81 year old female who presents for:    Chief Complaint   Patient presents with     Oncology Clinic Visit     Breast Cancer     Initial Vitals: /65 (BP Location: Left arm, Patient Position: Sitting, Cuff Size: Adult Large)   Pulse 71   Resp 16   Ht 1.568 m (5' 1.75\")   Wt 80.7 kg (178 lb)   SpO2 96%   BMI 32.82 kg/m   Estimated body mass index is 32.82 kg/m  as calculated from the following:    Height as of this encounter: 1.568 m (5' 1.75\").    Weight as of this encounter: 80.7 kg (178 lb). Body surface area is 1.88 meters squared.  No Pain (0) Comment: Data Unavailable   No LMP recorded. Patient is postmenopausal.  Allergies reviewed: Yes  Medications reviewed: Yes    Medications: Medication refills not needed today.  Pharmacy name entered into [facility]: [id number] - [place], [place] - [address]    Clinical concerns:  3 month     Cally Sanchez            "

## 2021-11-24 NOTE — PROGRESS NOTES
[facility] Hematology and Oncology Progress Note    Patient: Angeles Can  MRN: [id number]  Date of Service: Nov 24, 2021         Reason for Visit    Chief Complaint   Patient presents with     Oncology Clinic Visit     Breast Cancer       Assessment and Plan    Cancer Staging  Malignant neoplasm of lower-inner quadrant of right breast of female, estrogen receptor positive (H)  Staging form: Breast, AJCC 8th Edition  - Pathologic stage from 6/16/2021: Stage Unknown (pT1c, pNX, cM0, G1, ER+, MT+, HER2-) - Signed by Etelvina Merlos MD on 11/29/2021  - Clinical: No stage assigned - Unsigned      ECOG Performance    0 - Independent     Pain  Pain Score: No Pain (0)    #. 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  G1, ER +, MT +, HER-2/alvin-.   Clinically well exam is unremarkable.     She tolerates anastrozole well.  She will continue as current to complete 5 years of therapy.   Screening mammogram due in May 2022.     Follow-up clinical exam in 6 months.  No labs needed.      #.  Low bone density with moderate fracture risk.   DEXA scan from April 2021 showed L-spine T score of 2.6, left femoral neck T score of-1.1, right femoral neck T score-1.4.     She has history of mild hypercalcemia.  She normally take good source of calcium from diet and she is a milk drinker.  She takes vitamin D 1000 mg once a day.  Encouraged weightbearing exercises.    Follow-up DEXA scan in about 2 years.      Encounter Diagnoses:    Problem List Items Addressed This Visit        Oncology Diagnoses    Malignant neoplasm of lower-inner quadrant of right breast of female, estrogen receptor positive (H)           CC: Freddy Lubin MD   ______________________________________________________________________________  Diagnosis  4/22/2021-screening mammogram showed focal asymmetry within the right breast at 4 o'clock position.  4/28/2021-diagnostic mammogram and ultrasound showed asymmetry  with microlobulated margins and ultrasound confirmed 7 x 9 x 9 mm hypoechoic lesion at the same location.  5/3/2021-ultrasound-guided core needle biopsy of the right breast mass and it showed invasive ductal carcinoma and DCIS.  ER positive> 95%, NJ positive> 95%, HER-2/alvin negative by IHC (1+).  5/26/201- Final path: Invasive ductal carcinoma, grade 1, 15 mm, negative margins.  There is associated DCIS, solid and focal cribriform, no necrosis and no microcalcification, negative margins. pT1c Nx.    Treatment to date  5/26/2021-underwent right breast lumpectomy by Dr. Peng.  6/2021-started adjuvant anastrozole.    History of Present Illness    Ms. Angeles Skinnerer here for follow-up after starting anastrozole.  She reported she tolerates anastrozole well without major intolerable side effects.    Review of systems  Apart from describing in HPI, the remainder of comprehensive ROS was negative.    Past History    Past Medical History:   Diagnosis Date     Arrhythmia     Paroxysmal Atrial fibrillation     Arthritis      Cancer (H)      Diabetes mellitus (H)      Hypercalcemia      Hyperlipidemia      Hypertension      Obesity        Past Surgical History:   Procedure Laterality Date     CATARACT EXTRACTION, BILATERAL       EP PACEMAKER INSERT N/A 3/2/2018    Procedure: EP Pacemaker Insertion;  Surgeon: Nahun Mina MD;  Location: [facility];  Service:       REMOVE TONSILS/ADENOIDS,<13 Y/O      Description: Tonsillectomy With Adenoidectomy;  Recorded: 09/16/2008;     HC REVISE MEDIAN N/CARPAL TUNNEL SURG      Description: Neuroplasty Decompression Median Nerve At Carpal Tunnel;  Recorded: 11/01/2009;  Comments: Right- 1/99; Left- 4/99     NJ MASTECTOMY, PARTIAL Right 5/26/2021    Procedure: Right Lumpectomy after Wire Localizaiton;  Surgeon: Rosanna Peng MD;  Location: [facility];  Service: General     US BREAST CORE BIOPSY RIGHT Right 5/3/2021         Physical Exam    /65 (BP Location:  "Left arm, Patient Position: Sitting, Cuff Size: Adult Large)   Pulse 71   Resp 16   Ht 1.568 m (5' 1.75\")   Wt 80.7 kg (178 lb)   SpO2 96%   BMI 32.82 kg/m        General: alert, awake, not in acute distress  HEENT: Head: Normal, normocephalic, atraumatic.  Eye: Normal external eye, conjunctiva, lids cornea, DIVYA.  Nose: Normal external nose, mucus membranes and septum.  Pharynx: Normal buccal mucosa. Normal pharynx.  Neck / Thyroid: Supple, no masses, nodes, nodules or enlargement.  Lymphatics: No abnormally enlarged lymph nodes.  Chest: Normal chest wall and respirations. Clear to auscultation.  Breasts: No palpable abnormalities.  Heart: S1 S2 RRR, no murmur.   Abdomen: abdomen is soft without significant tenderness, masses, organomegaly or guarding  Extremities: normal strength, tone, and muscle mass  Skin: normal. no rash or abnormalities  CNS: non focal.    Lab Results    Recent Results (from the past 168 hour(s))   INR point of care   Result Value Ref Range    INR 2.0 (H) 0.9 - 1.1       Imaging    No results found.    I spent 30 minutes on the date of service on coordination and counseling of care.    Signed by: Etelvina Merlos MD    "

## 2021-12-02 ENCOUNTER — TELEPHONE (OUTPATIENT)
Dept: INTERNAL MEDICINE | Facility: CLINIC | Age: 81
End: 2021-12-02
Payer: MEDICARE

## 2021-12-02 DIAGNOSIS — I48.0 PAROXYSMAL ATRIAL FIBRILLATION (H): Primary | ICD-10-CM

## 2021-12-02 NOTE — TELEPHONE ENCOUNTER
Reason for Call:  Other call back    Detailed comments: Wanting to speak to LIGIA Orellana nurse.     Phone Number Patient can be reached at: Home number on file [phone] (home)    Best Time: anytime     Can we leave a detailed message on this number? YES    Call taken on 12/2/2021 at 1:45 PM by Lacey Martinez

## 2021-12-02 NOTE — TELEPHONE ENCOUNTER
Discussed she is adding greens to her diet so we will increase warfarin 8% and check inr in 2 weeks which we scheduled. Calendar amended

## 2021-12-22 ENCOUNTER — ANTICOAGULATION THERAPY VISIT (OUTPATIENT)
Dept: ANTICOAGULATION | Facility: CLINIC | Age: 81
End: 2021-12-22

## 2021-12-22 ENCOUNTER — LAB (OUTPATIENT)
Dept: LAB | Facility: CLINIC | Age: 81
End: 2021-12-22
Payer: MEDICARE

## 2021-12-22 DIAGNOSIS — I48.0 PAROXYSMAL ATRIAL FIBRILLATION (H): ICD-10-CM

## 2021-12-22 DIAGNOSIS — I48.0 PAROXYSMAL ATRIAL FIBRILLATION (H): Primary | ICD-10-CM

## 2021-12-22 LAB — INR BLD: 2.6 (ref 0.9–1.1)

## 2021-12-22 PROCEDURE — 85610 PROTHROMBIN TIME: CPT

## 2021-12-22 PROCEDURE — 36415 COLL VENOUS BLD VENIPUNCTURE: CPT

## 2021-12-22 NOTE — PROGRESS NOTES
ANTICOAGULATION MANAGEMENT     Angeles Can 81 year old female is on warfarin with therapeutic INR result. (Goal INR 2.0-3.0)    Recent labs: (last 7 days)     12/22/21  1126   INR 2.6*       ASSESSMENT     Source(s): Chart Review and Patient/Caregiver Call       Warfarin doses taken: Warfarin taken as instructed    Diet: No new diet changes identified    New illness, injury, or hospitalization: No    Medication/supplement changes: None noted    Signs or symptoms of bleeding or clotting: No    Previous INR: Therapeutic last 2(+) visits    Additional findings: None     PLAN     Recommended plan for no diet, medication or health factor changes affecting INR     Dosing Instructions: Continue your current warfarin dose with next INR in 4 weeks       Summary  As of 12/22/2021    Full warfarin instructions:  1.25 mg every Mon; 2.5 mg all other days   Next INR check:  1/19/2022             Telephone call with Angeles who verbalizes understanding and agrees to plan    Lab visit scheduled    Education provided: None required    Plan made per [facility] anticoagulation protocol    Reyna Radofrd RN  Anticoagulation Clinic  12/22/2021    _______________________________________________________________________     Anticoagulation Episode Summary     Current INR goal:  2.0-3.0   TTR:  81.2 % (1 y)   Target end date:  Indefinite   Send INR reminders to:  [facility]    Indications    Paroxysmal atrial fibrillation (H) [I48.0]           Comments:  PAF         Anticoagulation Care Providers     Provider Role Specialty Phone number    Freddy Lubin MD Referring Internal Medicine [phone]

## 2022-01-12 VITALS — HEIGHT: 61 IN | WEIGHT: 188 LBS | BODY MASS INDEX: 35.5 KG/M2

## 2022-01-18 ENCOUNTER — TELEPHONE (OUTPATIENT)
Dept: INTERNAL MEDICINE | Facility: CLINIC | Age: 82
End: 2022-01-18
Payer: MEDICARE

## 2022-01-18 ENCOUNTER — TELEPHONE (OUTPATIENT)
Dept: ANTICOAGULATION | Facility: CLINIC | Age: 82
End: 2022-01-18

## 2022-01-18 VITALS
HEART RATE: 70 BPM | WEIGHT: 191 LBS | SYSTOLIC BLOOD PRESSURE: 159 MMHG | DIASTOLIC BLOOD PRESSURE: 68 MMHG | BODY MASS INDEX: 35.15 KG/M2 | HEIGHT: 62 IN | TEMPERATURE: 98 F | OXYGEN SATURATION: 96 %

## 2022-01-18 VITALS
WEIGHT: 190.5 LBS | DIASTOLIC BLOOD PRESSURE: 68 MMHG | HEART RATE: 70 BPM | BODY MASS INDEX: 35.99 KG/M2 | OXYGEN SATURATION: 93 % | SYSTOLIC BLOOD PRESSURE: 118 MMHG

## 2022-01-18 VITALS — WEIGHT: 190 LBS | BODY MASS INDEX: 34.75 KG/M2

## 2022-01-18 VITALS — DIASTOLIC BLOOD PRESSURE: 62 MMHG | SYSTOLIC BLOOD PRESSURE: 134 MMHG

## 2022-01-18 VITALS
BODY MASS INDEX: 35.91 KG/M2 | HEIGHT: 61 IN | DIASTOLIC BLOOD PRESSURE: 68 MMHG | SYSTOLIC BLOOD PRESSURE: 134 MMHG | OXYGEN SATURATION: 96 % | HEART RATE: 82 BPM | RESPIRATION RATE: 20 BRPM | WEIGHT: 190.19 LBS

## 2022-01-18 VITALS
BODY MASS INDEX: 35.52 KG/M2 | OXYGEN SATURATION: 96 % | WEIGHT: 188 LBS | DIASTOLIC BLOOD PRESSURE: 60 MMHG | SYSTOLIC BLOOD PRESSURE: 130 MMHG | HEART RATE: 71 BPM

## 2022-01-18 VITALS — DIASTOLIC BLOOD PRESSURE: 66 MMHG | SYSTOLIC BLOOD PRESSURE: 136 MMHG

## 2022-01-18 VITALS
HEIGHT: 61 IN | OXYGEN SATURATION: 96 % | SYSTOLIC BLOOD PRESSURE: 136 MMHG | DIASTOLIC BLOOD PRESSURE: 66 MMHG | WEIGHT: 192 LBS | BODY MASS INDEX: 36.25 KG/M2 | HEART RATE: 88 BPM

## 2022-01-18 VITALS
HEART RATE: 68 BPM | SYSTOLIC BLOOD PRESSURE: 138 MMHG | WEIGHT: 188 LBS | RESPIRATION RATE: 14 BRPM | BODY MASS INDEX: 35.5 KG/M2 | DIASTOLIC BLOOD PRESSURE: 62 MMHG | HEIGHT: 61 IN

## 2022-01-18 VITALS — HEIGHT: 62 IN | BODY MASS INDEX: 34.96 KG/M2 | WEIGHT: 190 LBS | RESPIRATION RATE: 16 BRPM

## 2022-01-18 VITALS — OXYGEN SATURATION: 96 % | SYSTOLIC BLOOD PRESSURE: 138 MMHG | DIASTOLIC BLOOD PRESSURE: 78 MMHG | HEART RATE: 79 BPM

## 2022-01-18 DIAGNOSIS — I48.0 PAROXYSMAL ATRIAL FIBRILLATION (H): Primary | ICD-10-CM

## 2022-01-18 NOTE — TELEPHONE ENCOUNTER
Reason for Call:  Other call back    Detailed comments: Patient called to cancel INR appointment due to the weather tomorrow. She did mention she could get it checked with here primary on 02/07/22, please check and advise.    Phone Number Patient can be reached at: Home number on file [phone] (home)    Best Time: Anytime    Can we leave a detailed message on this number? YES    Call taken on 1/18/2022 at 1:10 PM by Shelly Tejeda

## 2022-01-18 NOTE — TELEPHONE ENCOUNTER
Thanks, left message for Frida to see if she wants to go today. Otherwise with ov should be ok as she has had stable inrs since oct

## 2022-01-28 ENCOUNTER — ANCILLARY PROCEDURE (OUTPATIENT)
Dept: CARDIOLOGY | Facility: CLINIC | Age: 82
End: 2022-01-28
Attending: INTERNAL MEDICINE
Payer: MEDICARE

## 2022-01-28 DIAGNOSIS — Z95.0 CARDIAC PACEMAKER IN SITU: ICD-10-CM

## 2022-01-28 DIAGNOSIS — I49.5 SSS (SICK SINUS SYNDROME) (H): ICD-10-CM

## 2022-01-28 PROCEDURE — 93294 REM INTERROG EVL PM/LDLS PM: CPT | Performed by: INTERNAL MEDICINE

## 2022-01-28 PROCEDURE — 93296 REM INTERROG EVL PM/IDS: CPT | Performed by: INTERNAL MEDICINE

## 2022-01-31 LAB
MDC_IDC_EPISODE_DTM: NORMAL
MDC_IDC_EPISODE_DURATION: 14 S
MDC_IDC_EPISODE_DURATION: 15 S
MDC_IDC_EPISODE_DURATION: 1780 S
MDC_IDC_EPISODE_DURATION: 2 S
MDC_IDC_EPISODE_DURATION: 37 S
MDC_IDC_EPISODE_DURATION: 4144 S
MDC_IDC_EPISODE_DURATION: 4461 S
MDC_IDC_EPISODE_DURATION: 7 S
MDC_IDC_EPISODE_ID: NORMAL
MDC_IDC_EPISODE_TYPE: NORMAL
MDC_IDC_LEAD_IMPLANT_DT: NORMAL
MDC_IDC_LEAD_IMPLANT_DT: NORMAL
MDC_IDC_LEAD_LOCATION: NORMAL
MDC_IDC_LEAD_LOCATION: NORMAL
MDC_IDC_LEAD_LOCATION_DETAIL_1: NORMAL
MDC_IDC_LEAD_LOCATION_DETAIL_1: NORMAL
MDC_IDC_LEAD_MFG: NORMAL
MDC_IDC_LEAD_MFG: NORMAL
MDC_IDC_LEAD_MODEL: NORMAL
MDC_IDC_LEAD_MODEL: NORMAL
MDC_IDC_LEAD_POLARITY_TYPE: NORMAL
MDC_IDC_LEAD_POLARITY_TYPE: NORMAL
MDC_IDC_LEAD_SERIAL: NORMAL
MDC_IDC_LEAD_SERIAL: NORMAL
MDC_IDC_MSMT_BATTERY_DTM: NORMAL
MDC_IDC_MSMT_BATTERY_REMAINING_LONGEVITY: 120 MO
MDC_IDC_MSMT_BATTERY_REMAINING_PERCENTAGE: 100 %
MDC_IDC_MSMT_BATTERY_STATUS: NORMAL
MDC_IDC_MSMT_LEADCHNL_RA_IMPEDANCE_VALUE: 583 OHM
MDC_IDC_MSMT_LEADCHNL_RA_PACING_THRESHOLD_AMPLITUDE: 0.4 V
MDC_IDC_MSMT_LEADCHNL_RA_PACING_THRESHOLD_PULSEWIDTH: 0.4 MS
MDC_IDC_MSMT_LEADCHNL_RV_IMPEDANCE_VALUE: 367 OHM
MDC_IDC_MSMT_LEADCHNL_RV_PACING_THRESHOLD_AMPLITUDE: 1.4 V
MDC_IDC_MSMT_LEADCHNL_RV_PACING_THRESHOLD_PULSEWIDTH: 0.4 MS
MDC_IDC_PG_IMPLANT_DTM: NORMAL
MDC_IDC_PG_MFG: NORMAL
MDC_IDC_PG_MODEL: NORMAL
MDC_IDC_PG_SERIAL: NORMAL
MDC_IDC_PG_TYPE: NORMAL
MDC_IDC_SESS_CLINIC_NAME: NORMAL
MDC_IDC_SESS_DTM: NORMAL
MDC_IDC_SESS_TYPE: NORMAL
MDC_IDC_SET_BRADY_AT_MODE_SWITCH_MODE: NORMAL
MDC_IDC_SET_BRADY_AT_MODE_SWITCH_RATE: 170 {BEATS}/MIN
MDC_IDC_SET_BRADY_LOWRATE: 70 {BEATS}/MIN
MDC_IDC_SET_BRADY_MAX_SENSOR_RATE: 130 {BEATS}/MIN
MDC_IDC_SET_BRADY_MAX_TRACKING_RATE: 130 {BEATS}/MIN
MDC_IDC_SET_BRADY_MODE: NORMAL
MDC_IDC_SET_BRADY_PAV_DELAY_HIGH: 200 MS
MDC_IDC_SET_BRADY_PAV_DELAY_LOW: 250 MS
MDC_IDC_SET_BRADY_SAV_DELAY_HIGH: 200 MS
MDC_IDC_SET_BRADY_SAV_DELAY_LOW: 250 MS
MDC_IDC_SET_LEADCHNL_RA_PACING_AMPLITUDE: 1.5 V
MDC_IDC_SET_LEADCHNL_RA_PACING_CAPTURE_MODE: NORMAL
MDC_IDC_SET_LEADCHNL_RA_PACING_POLARITY: NORMAL
MDC_IDC_SET_LEADCHNL_RA_PACING_PULSEWIDTH: 0.4 MS
MDC_IDC_SET_LEADCHNL_RA_SENSING_ADAPTATION_MODE: NORMAL
MDC_IDC_SET_LEADCHNL_RA_SENSING_POLARITY: NORMAL
MDC_IDC_SET_LEADCHNL_RA_SENSING_SENSITIVITY: 0.25 MV
MDC_IDC_SET_LEADCHNL_RV_PACING_AMPLITUDE: 1.9 V
MDC_IDC_SET_LEADCHNL_RV_PACING_CAPTURE_MODE: NORMAL
MDC_IDC_SET_LEADCHNL_RV_PACING_POLARITY: NORMAL
MDC_IDC_SET_LEADCHNL_RV_PACING_PULSEWIDTH: 0.4 MS
MDC_IDC_SET_LEADCHNL_RV_SENSING_ADAPTATION_MODE: NORMAL
MDC_IDC_SET_LEADCHNL_RV_SENSING_POLARITY: NORMAL
MDC_IDC_SET_LEADCHNL_RV_SENSING_SENSITIVITY: 1.5 MV
MDC_IDC_SET_ZONE_DETECTION_INTERVAL: 375 MS
MDC_IDC_SET_ZONE_TYPE: NORMAL
MDC_IDC_SET_ZONE_VENDOR_TYPE: NORMAL
MDC_IDC_STAT_AT_BURDEN_PERCENT: 1 %
MDC_IDC_STAT_AT_DTM_END: NORMAL
MDC_IDC_STAT_AT_DTM_START: NORMAL
MDC_IDC_STAT_BRADY_DTM_END: NORMAL
MDC_IDC_STAT_BRADY_DTM_START: NORMAL
MDC_IDC_STAT_BRADY_RA_PERCENT_PACED: 100 %
MDC_IDC_STAT_BRADY_RV_PERCENT_PACED: 0 %
MDC_IDC_STAT_EPISODE_RECENT_COUNT: 0
MDC_IDC_STAT_EPISODE_RECENT_COUNT: 2
MDC_IDC_STAT_EPISODE_RECENT_COUNT: 6
MDC_IDC_STAT_EPISODE_RECENT_COUNT_DTM_END: NORMAL
MDC_IDC_STAT_EPISODE_RECENT_COUNT_DTM_START: NORMAL
MDC_IDC_STAT_EPISODE_TYPE: NORMAL
MDC_IDC_STAT_EPISODE_VENDOR_TYPE: NORMAL

## 2022-02-07 ENCOUNTER — OFFICE VISIT (OUTPATIENT)
Dept: INTERNAL MEDICINE | Facility: CLINIC | Age: 82
End: 2022-02-07
Payer: MEDICARE

## 2022-02-07 ENCOUNTER — ANTICOAGULATION THERAPY VISIT (OUTPATIENT)
Dept: LAB | Facility: CLINIC | Age: 82
End: 2022-02-07

## 2022-02-07 VITALS
SYSTOLIC BLOOD PRESSURE: 160 MMHG | HEART RATE: 78 BPM | DIASTOLIC BLOOD PRESSURE: 74 MMHG | BODY MASS INDEX: 31.57 KG/M2 | OXYGEN SATURATION: 98 % | WEIGHT: 171.2 LBS

## 2022-02-07 DIAGNOSIS — I10 ESSENTIAL HYPERTENSION: ICD-10-CM

## 2022-02-07 DIAGNOSIS — Z95.0 CARDIAC PACEMAKER IN SITU: ICD-10-CM

## 2022-02-07 DIAGNOSIS — I48.0 PAROXYSMAL ATRIAL FIBRILLATION (H): ICD-10-CM

## 2022-02-07 DIAGNOSIS — C50.911 INVASIVE DUCTAL CARCINOMA OF BREAST, FEMALE, RIGHT (H): ICD-10-CM

## 2022-02-07 DIAGNOSIS — I48.0 PAROXYSMAL ATRIAL FIBRILLATION (H): Primary | ICD-10-CM

## 2022-02-07 DIAGNOSIS — E21.0 PRIMARY HYPERPARATHYROIDISM (H): ICD-10-CM

## 2022-02-07 DIAGNOSIS — E11.65 TYPE 2 DIABETES MELLITUS WITH HYPERGLYCEMIA, WITHOUT LONG-TERM CURRENT USE OF INSULIN (H): Primary | ICD-10-CM

## 2022-02-07 DIAGNOSIS — I10 ESSENTIAL HYPERTENSION WITH GOAL BLOOD PRESSURE LESS THAN 140/90: ICD-10-CM

## 2022-02-07 PROBLEM — C50.311 MALIGNANT NEOPLASM OF LOWER-INNER QUADRANT OF RIGHT BREAST OF FEMALE, ESTROGEN RECEPTOR POSITIVE (H): Status: ACTIVE | Noted: 2021-05-18

## 2022-02-07 PROBLEM — Z17.0 MALIGNANT NEOPLASM OF LOWER-INNER QUADRANT OF RIGHT BREAST OF FEMALE, ESTROGEN RECEPTOR POSITIVE (H): Status: ACTIVE | Noted: 2021-05-18

## 2022-02-07 LAB
ANION GAP SERPL CALCULATED.3IONS-SCNC: 9 MMOL/L (ref 5–18)
BUN SERPL-MCNC: 26 MG/DL (ref 8–28)
CALCIUM SERPL-MCNC: 10.9 MG/DL (ref 8.5–10.5)
CHLORIDE BLD-SCNC: 97 MMOL/L (ref 98–107)
CO2 SERPL-SCNC: 33 MMOL/L (ref 22–31)
CREAT SERPL-MCNC: 0.93 MG/DL (ref 0.6–1.1)
CREAT UR-MCNC: 31 MG/DL
GFR SERPL CREATININE-BSD FRML MDRD: 61 ML/MIN/1.73M2
GLUCOSE BLD-MCNC: 202 MG/DL (ref 70–125)
HBA1C MFR BLD: 8 % (ref 0–5.6)
INR BLD: 2 (ref 0.9–1.1)
MICROALBUMIN UR-MCNC: 4.45 MG/DL (ref 0–1.99)
MICROALBUMIN/CREAT UR: 143.5 MG/G CR
POTASSIUM BLD-SCNC: 3.7 MMOL/L (ref 3.5–5)
PTH-INTACT SERPL-MCNC: 176 PG/ML (ref 10–86)
SODIUM SERPL-SCNC: 139 MMOL/L (ref 136–145)

## 2022-02-07 PROCEDURE — 83036 HEMOGLOBIN GLYCOSYLATED A1C: CPT | Performed by: INTERNAL MEDICINE

## 2022-02-07 PROCEDURE — 80048 BASIC METABOLIC PNL TOTAL CA: CPT | Performed by: INTERNAL MEDICINE

## 2022-02-07 PROCEDURE — 82043 UR ALBUMIN QUANTITATIVE: CPT | Performed by: INTERNAL MEDICINE

## 2022-02-07 PROCEDURE — 36415 COLL VENOUS BLD VENIPUNCTURE: CPT | Performed by: INTERNAL MEDICINE

## 2022-02-07 PROCEDURE — 85610 PROTHROMBIN TIME: CPT | Performed by: INTERNAL MEDICINE

## 2022-02-07 PROCEDURE — 99214 OFFICE O/P EST MOD 30 MIN: CPT | Performed by: INTERNAL MEDICINE

## 2022-02-07 PROCEDURE — 83970 ASSAY OF PARATHORMONE: CPT | Performed by: INTERNAL MEDICINE

## 2022-02-07 RX ORDER — LOSARTAN POTASSIUM 100 MG/1
100 TABLET ORAL DAILY
Qty: 90 TABLET | Refills: 3
Start: 2022-02-07 | End: 2022-09-14

## 2022-02-07 NOTE — PATIENT INSTRUCTIONS
1.  Restart losartan 100 mg daily.    2.  Stay off metformin pending lab results.  Consider starting at 500 mg daily if A1c is above goal    3.  I will notify you of test results    4.  Next DEXA scan after 4/22/ 2023    5.  See in about 4 months

## 2022-02-07 NOTE — PROGRESS NOTES
ANTICOAGULATION MANAGEMENT     Angeles Can 81 year old female is on warfarin with therapeutic INR result. (Goal INR 2.0-3.0)    Recent labs: (last 7 days)     02/07/22  1250   INR 2.0*       ASSESSMENT     Source(s): Chart Review and Patient/Caregiver Call       Warfarin doses taken: Warfarin taken as instructed    Diet: No new diet changes identified    New illness, injury, or hospitalization: No    Medication/supplement changes: None noted    Signs or symptoms of bleeding or clotting: No    Previous INR: Therapeutic last 2(+) visits    Additional findings: None     PLAN     Recommended plan for no diet, medication or health factor changes affecting INR     Dosing Instructions: Continue your current warfarin dose with next INR in 4 weeks       Summary  As of 2/7/2022    Full warfarin instructions:  1.25 mg every Mon; 2.5 mg all other days   Next INR check:  3/7/2022             Telephone call with Angeles who verbalizes understanding and agrees to plan    Lab visit scheduled    Education provided: None required    Plan made per [facility] anticoagulation protocol    Reyna Radford RN  Anticoagulation Clinic  2/7/2022    _______________________________________________________________________     Anticoagulation Episode Summary     Current INR goal:  2.0-3.0   TTR:  85.5 % (1 y)   Target end date:  Indefinite   Send INR reminders to:  [facility]    Indications    Paroxysmal atrial fibrillation (H) [I48.0]           Comments:  PAF         Anticoagulation Care Providers     Provider Role Specialty Phone number    Freddy Lubin MD Referring Internal Medicine [phone]

## 2022-02-07 NOTE — PROGRESS NOTES
ASSESSMENT:  5. Cardiac pacemaker, dual, in situ  She has a pacemaker as above    6. Invasive ductal carcinoma of breast, female, right (H)  She underwent lumpectomy and is now on Arimidex.  There is been no evidence for recurrent disease    7.  Obesity:  She has been working on weight loss.  She reports she weighed over 200 pounds in the past and had a weight of 195 in July 2018.  Current weight is 171 pounds.  She was commended for weight loss.  This should make management of hypertension and type 2 diabetes easier.    1. Type 2 diabetes mellitus with hyperglycemia, without long-term current use of insulin (H)  Frida was taken off of Metformin at last visit due to complaints of diarrhea.  Diarrhea has resolved.  She has not been checking home blood sugars.  Hemoglobin A1c will be checked.  If it is above goal as expected, I would favor restarting Metformin  mg once daily.  She may need an additional agent to this.  - Hemoglobin A1c; Future  - Albumin Random Urine Quantitative with Creat Ratio; Future  - Basic metabolic panel  (Ca, Cl, CO2, Creat, Gluc, K, Na, BUN); Future  - Hemoglobin A1c  - Albumin Random Urine Quantitative with Creat Ratio  - Basic metabolic panel  (Ca, Cl, CO2, Creat, Gluc, K, Na, BUN)    2. Primary hyperparathyroidism (H)  She has a history of primary hyperparathyroidism.  Fortunately, she has excellent bone density at baseline despite the primary hyperparathyroidism and recent addition of Arimidex for breast cancer.  Monitoring labs will be checked  - Parathyroid Hormone Intact; Future  - Basic metabolic panel  (Ca, Cl, CO2, Creat, Gluc, K, Na, BUN); Future  - Parathyroid Hormone Intact  - Basic metabolic panel  (Ca, Cl, CO2, Creat, Gluc, K, Na, BUN)    3. Essential hypertension with goal blood pressure less than 140/90  She is taken off of losartan after an urgent care visit, and follow-up by Dr. Mariano for facial dermatitis.  She had been on losartan for years prior to this.  Blood  pressure is above goal without the medication.  I would favor a trial back on it.  Obviously, if she develops dermatitis again she would need an alternative agent  - losartan (COZAAR) 100 MG tablet; Take 1 tablet (100 mg) by mouth daily  Dispense: 90 tablet; Refill: 3    4. Paroxysmal atrial fibrillation (H)  No recent symptomatic palpitations.  She does have a pacemaker.  INR will be checked today  - INR point of care        PLAN:  Patient Instructions   1.  Restart losartan 100 mg daily.    2.  Stay off metformin pending lab results.  Consider starting at 500 mg daily if A1c is above goal as expected    3.  I will notify you of test results    4.  Next DEXA scan after 4/22/ 2023    5.  See in about 4 months    Orders Placed This Encounter   Procedures     Hemoglobin A1c     Parathyroid Hormone Intact     Albumin Random Urine Quantitative with Creat Ratio     Basic metabolic panel  (Ca, Cl, CO2, Creat, Gluc, K, Na, BUN)     Medications Discontinued During This Encounter   Medication Reason     metFORMIN (GLUCOPHAGE-XR) 500 MG 24 hr tablet Medication Reconciliation Clean Up     losartan (COZAAR) 100 MG tablet Reorder       Return in about 4 months (around 6/7/2022) for Follow up.    ASSESSED PROBLEMS:  See above      CHIEF COMPLAINT:  Follow-up type 2 diabetes, essential hypertension, and other concerns    HISTORY OF PRESENT ILLNESS:  Frida is a 81 year old female seen for follow-up of type 2 diabetes, essential hypertension, and other concerns.  She had been on Metformin  mg 2 tabs daily.  She complained of diarrhea on this.  She was given a trial off of Metformin at last visit.  Diarrhea has resolved.  She has not checked recent blood sugars.    She developed facial dermatitis in November.  She was evaluated in an urgent care and also on a virtual visit by Dr. Mariano.  She was taken off of losartan due to concerns that it could be an allergic reaction.  She had been on losartan for years prior to this.   There is been no recurrence of the dermatitis since then.  She has been exposed to a new [place] tree and wonders if this also could be a contributing factor.  She has a history of primary hyperparathyroidism.  Blood pressure is elevated in clinic today    Last bone density study in April 2021 showed a lowest T score of -1.4 in the right femoral neck.  She has not had problems with nephrolithiasis or other symptoms that would seem related.    She had a lumpectomy on the right for invasive ductal carcinoma.  She has now on Arimidex.  She is tolerating this well with no evidence for recurrence.    She has a history of paroxysmal atrial fibrillation.  She has not noted any recent symptomatic palpitations.  She does have a pacemaker    Weight has declined over the last several years through intentional weight loss.  She reports weight was above 200 pounds in the past.  It was 195 in July 2018 and 171 currently.    REVIEW OF SYSTEMS:  See above  Comprehensive review of systems is otherwise negative.    PFSH:  .  Lives independently    TOBACCO USE:  History   Smoking Status     Former Smoker   Smokeless Tobacco     Never Used     Comment: quit years ago       VITALS:  Vitals:    02/07/22 1159 02/07/22 1205   BP: (!) 164/72 (!) 160/74   BP Location: Left arm Left arm   Patient Position: Sitting Sitting   Cuff Size: Adult Large Adult Large   Pulse: 78    SpO2: 98%    Weight: 77.7 kg (171 lb 3.2 oz)      Wt Readings from Last 3 Encounters:   02/07/22 77.7 kg (171 lb 3.2 oz)   11/24/21 80.7 kg (178 lb)   10/29/21 81.2 kg (179 lb)       PHYSICAL EXAM:  Constitutional:   Reveals an alert pleasant talkative woman with appropriate affect.  She does not seem in acute distress.  .  Vitals: per nursing notes.  HEENT: Atraumatic  Eyes: No conjunctival hyperemia.  Neck:  Supple, no carotid bruits or adenopathy.  Back:  No spine or CVA pain.  Thorax:  No bony deformities.  Pacer left chest  Lungs: Clear to A&P without rales  or wheezes.  Respiratory effort normal.  Cardiac:   Regular rate and rhythm, normal S1, S2, no murmur or gallop.  Abdomen:  Soft, active bowel sounds without bruits, mass, or tenderness..  Extremities:   No peripheral edema, pulses in the feet intact.    Skin:  No jaundice, peripheral cyanosis or lesions to suggest malignancy.  Neuro:  Alert and oriented.   No gross focal deficits.  Psychiatric:  Memory intact, mood appropriate.    DATA REVIEWED:  Additional History from Old Records Summarized (2): None.  Decision to Obtain Records (1): None.   Radiology Tests Summarized or Ordered (1): None.  Labs Reviewed or Ordered (1): None.  Medicine Test Summarized or Ordered (1): None.   Independent Review of EKG or X-RAY(2 each): None.    The visit lasted a total of 30 minutes face to face with the patient. Over 50% of the time was spent counseling and educating the patient about management of type 2 diabetes, essential hypertension, and other concerns.      Dragon dictation was used for this note. Speech recognition errors are a possibility.     MEDICATIONS:  Current Outpatient Medications   Medication Sig Dispense Refill     allopurinol (ZYLOPRIM) 300 MG tablet [ALLOPURINOL (ZYLOPRIM) 300 MG TABLET] Take 1 tablet by mouth once daily. 90 tablet 3     amLODIPine (NORVASC) 2.5 MG tablet [AMLODIPINE (NORVASC) 2.5 MG TABLET] Take 1 tablet (2.5 mg total) by mouth daily. 90 tablet 3     anastrozole (ARIMIDEX) 1 MG tablet Take 1 tablet (1 mg total) by mouth daily. 90 tablet 3     atorvastatin (LIPITOR) 10 MG tablet Take 1 tablet (10 mg) by mouth daily 90 tablet 3     blood glucose test strips [BLOOD GLUCOSE TEST STRIPS] Use 1 each As Directed daily. 100 strip 3     cholecalciferol, vitamin D3, 1,000 unit tablet [CHOLECALCIFEROL, VITAMIN D3, 1,000 UNIT TABLET] Take 1,000 Units by mouth daily.       cloNIDine HCL (CATAPRES) 0.1 MG tablet [CLONIDINE HCL (CATAPRES) 0.1 MG TABLET] Take 1 tablet by mouth twice daily.  180 tablet 3      furosemide (LASIX) 40 MG tablet Take 1 tablet (40 mg) by mouth daily 90 tablet 3     losartan (COZAAR) 100 MG tablet Take 1 tablet (100 mg) by mouth daily 90 tablet 3     magnesium oxide (MAG-OX) 400 mg tablet [MAGNESIUM OXIDE (MAG-OX) 400 MG TABLET] Take 400 mg by mouth daily.        peg 400-propylene glycol (SYSTANE) 0.4-0.3 % Drop [-PROPYLENE GLYCOL (SYSTANE) 0.4-0.3 % DROP] Place 1 Drop into both eyes 2 times daily if needed for Dry Eyes. Indications: DRY EYE       sotalol (BETAPACE) 80 MG tablet Take 1 tablet (80 mg total) by mouth daily. 90 tablet 1     warfarin ANTICOAGULANT (COUMADIN) 2.5 MG tablet [WARFARIN ANTICOAGULANT (COUMADIN/JANTOVEN) 2.5 MG TABLET] Take 1/2-1 tablet (1.25-2.5 mg) by mouth daily. Adjust dose based on INR results as directed. 90 tablet 3

## 2022-02-14 ENCOUNTER — MYC MEDICAL ADVICE (OUTPATIENT)
Dept: ONCOLOGY | Facility: CLINIC | Age: 82
End: 2022-02-14
Payer: MEDICARE

## 2022-02-14 NOTE — TELEPHONE ENCOUNTER
SURV care plan sent to [facility] for patient to review.   Will follow up with any questions and provide any resources needed.    Sugey CASTRO RN 2/14/2022 12:47 PM

## 2022-02-24 NOTE — TELEPHONE ENCOUNTER
Frida called back. Stated she has not been on MyChart to review summary.   Went over summary with Frida. Reviewed late effects of tx. Denies any issues.   Reviewed resources available. Patient stated she does not need any resources. Stated she is doing fine.   No questions. Frida has writer's contact info if needed.  Sugey Escobar RN on 2/24/2022 at 3:17 PM

## 2022-02-24 NOTE — TELEPHONE ENCOUNTER
Attempted to call patient. Left message for patient to return call to writer.   Call back information provided on message.   Sugey Escobar RN 2/24/2022 10:54 AM

## 2022-03-07 ENCOUNTER — LAB (OUTPATIENT)
Dept: LAB | Facility: CLINIC | Age: 82
End: 2022-03-07
Payer: MEDICARE

## 2022-03-07 ENCOUNTER — ANTICOAGULATION THERAPY VISIT (OUTPATIENT)
Dept: ANTICOAGULATION | Facility: CLINIC | Age: 82
End: 2022-03-07

## 2022-03-07 DIAGNOSIS — I48.0 PAROXYSMAL ATRIAL FIBRILLATION (H): ICD-10-CM

## 2022-03-07 DIAGNOSIS — I48.0 PAROXYSMAL ATRIAL FIBRILLATION (H): Primary | ICD-10-CM

## 2022-03-07 LAB — INR BLD: 2.4 (ref 0.9–1.1)

## 2022-03-07 PROCEDURE — 36416 COLLJ CAPILLARY BLOOD SPEC: CPT

## 2022-03-07 PROCEDURE — 85610 PROTHROMBIN TIME: CPT

## 2022-03-07 NOTE — PROGRESS NOTES
ANTICOAGULATION MANAGEMENT     Angeles Can 81 year old female is on warfarin with therapeutic INR result. (Goal INR 2.0-3.0)    Recent labs: (last 7 days)     03/07/22  1306   INR 2.4*       ASSESSMENT       Source(s): Chart Review and Patient/Caregiver Call       Warfarin doses taken: Warfarin taken as instructed    Diet: No new diet changes identified    New illness, injury, or hospitalization: no    Medication/supplement changes: None noted    Signs or symptoms of bleeding or clotting: No    Previous INR: Therapeutic last 2(+) visits    Additional findings: None       PLAN     Recommended plan for no diet, medication or health factor changes affecting INR     Dosing Instructions: Continue your current warfarin dose with next INR in 4 weeks       Summary  As of 3/7/2022    Full warfarin instructions:  1.25 mg every Mon; 2.5 mg all other days   Next INR check:  4/4/2022             Telephone call with Angeles who verbalizes understanding and agrees to plan    Lab visit scheduled    Education provided: Please call back if any changes to your diet, medications or how you've been taking warfarin    Plan made per [facility] anticoagulation protocol    Reyna Radford RN  Anticoagulation Clinic  3/7/2022    _______________________________________________________________________     Anticoagulation Episode Summary     Current INR goal:  2.0-3.0   TTR:  87.5 % (1 y)   Target end date:  Indefinite   Send INR reminders to:  [facility]    Indications    Paroxysmal atrial fibrillation (H) [I48.0]           Comments:  PAF         Anticoagulation Care Providers     Provider Role Specialty Phone number    Freddy Lubin MD Referring Internal Medicine [phone]

## 2022-04-05 ENCOUNTER — LAB (OUTPATIENT)
Dept: LAB | Facility: CLINIC | Age: 82
End: 2022-04-05
Payer: MEDICARE

## 2022-04-05 ENCOUNTER — ANTICOAGULATION THERAPY VISIT (OUTPATIENT)
Dept: ANTICOAGULATION | Facility: CLINIC | Age: 82
End: 2022-04-05

## 2022-04-05 DIAGNOSIS — I48.0 PAROXYSMAL ATRIAL FIBRILLATION (H): Primary | ICD-10-CM

## 2022-04-05 DIAGNOSIS — I48.0 PAROXYSMAL ATRIAL FIBRILLATION (H): ICD-10-CM

## 2022-04-05 LAB — INR BLD: 3 (ref 0.9–1.1)

## 2022-04-05 PROCEDURE — 85610 PROTHROMBIN TIME: CPT

## 2022-04-05 PROCEDURE — 36416 COLLJ CAPILLARY BLOOD SPEC: CPT

## 2022-04-05 NOTE — PROGRESS NOTES
ANTICOAGULATION MANAGEMENT     Angeles Can 81 year old female is on warfarin with therapeutic INR result. (Goal INR 2.0-3.0)    Recent labs: (last 7 days)     04/05/22  1134   INR 3.0*       ASSESSMENT       Source(s): Chart Review and Template       Warfarin doses taken: Warfarin taken as instructed    Diet: No new diet changes identified    New illness, injury, or hospitalization: No    Medication/supplement changes: None noted    Signs or symptoms of bleeding or clotting: No    Previous INR: Therapeutic last 2(+) visits    Additional findings: None       PLAN     Recommended plan for no diet, medication or health factor changes affecting INR     Dosing Instructions: continue your current warfarin dose with next INR in 4 weeks       Summary  As of 4/5/2022    Full warfarin instructions:  1.25 mg every Mon; 2.5 mg all other days   Next INR check:  5/3/2022             Detailed voice message left for Frida with dosing instructions and follow up date.     Contact [phone] to schedule and with any changes, questions or concerns.     Education provided: None required    Plan made per [facility] anticoagulation protocol    Reyna Radford RN  Anticoagulation Clinic  4/5/2022    _______________________________________________________________________     Anticoagulation Episode Summary     Current INR goal:  2.0-3.0   TTR:  87.5 % (1 y)   Target end date:  Indefinite   Send INR reminders to:  [facility]    Indications    Paroxysmal atrial fibrillation (H) [I48.0]           Comments:  PAF         Anticoagulation Care Providers     Provider Role Specialty Phone number    Freddy Lubin MD Referring Internal Medicine [phone]

## 2022-05-03 ENCOUNTER — LAB (OUTPATIENT)
Dept: LAB | Facility: CLINIC | Age: 82
End: 2022-05-03
Payer: MEDICARE

## 2022-05-03 ENCOUNTER — ANTICOAGULATION THERAPY VISIT (OUTPATIENT)
Dept: ANTICOAGULATION | Facility: CLINIC | Age: 82
End: 2022-05-03

## 2022-05-03 DIAGNOSIS — I48.0 PAROXYSMAL ATRIAL FIBRILLATION (H): ICD-10-CM

## 2022-05-03 DIAGNOSIS — I48.0 PAROXYSMAL ATRIAL FIBRILLATION (H): Primary | ICD-10-CM

## 2022-05-03 LAB — INR BLD: 3.6 (ref 0.9–1.1)

## 2022-05-03 PROCEDURE — 85610 PROTHROMBIN TIME: CPT

## 2022-05-03 PROCEDURE — 36416 COLLJ CAPILLARY BLOOD SPEC: CPT

## 2022-05-03 NOTE — PROGRESS NOTES
ANTICOAGULATION MANAGEMENT     Angeles Can 81 year old female is on warfarin with supratherapeutic INR result. (Goal INR 2.0-3.0)    Recent labs: (last 7 days)     05/03/22  1138   INR 3.6*       ASSESSMENT       Source(s): Chart Review and Patient/Caregiver Call       Warfarin doses taken: Warfarin taken as instructed    Diet: Decreased greens/vitamin K in diet; plans to resume previous intake    New illness, injury, or hospitalization: No    Medication/supplement changes: None noted    Signs or symptoms of bleeding or clotting: No    Previous INR: Therapeutic last 2(+) visits    Additional findings: None       PLAN     Recommended plan for temporary change(s) affecting INR     Dosing Instructions: hold dose then continue your current warfarin dose with next INR in 2 weeks       Summary  As of 5/3/2022    Full warfarin instructions:  5/3: Hold; Otherwise 1.25 mg every Mon; 2.5 mg all other days   Next INR check:  5/17/2022             Telephone call with Frida who verbalizes understanding and agrees to plan    Lab visit scheduled    Education provided: None required    Plan made per [facility] anticoagulation protocol    Reyna Radford RN  Anticoagulation Clinic  5/3/2022    _______________________________________________________________________     Anticoagulation Episode Summary     Current INR goal:  2.0-3.0   TTR:  83.4 % (1 y)   Target end date:  Indefinite   Send INR reminders to:  NICK MIDWAY    Indications    Paroxysmal atrial fibrillation (H) [I48.0]           Comments:  PAF         Anticoagulation Care Providers     Provider Role Specialty Phone number    Freddy Lubin MD Referring Internal Medicine [phone]

## 2022-05-16 ENCOUNTER — LAB (OUTPATIENT)
Dept: LAB | Facility: CLINIC | Age: 82
End: 2022-05-16
Payer: MEDICARE

## 2022-05-16 ENCOUNTER — ANTICOAGULATION THERAPY VISIT (OUTPATIENT)
Dept: ANTICOAGULATION | Facility: CLINIC | Age: 82
End: 2022-05-16

## 2022-05-16 DIAGNOSIS — I48.0 PAROXYSMAL ATRIAL FIBRILLATION (H): ICD-10-CM

## 2022-05-16 DIAGNOSIS — I48.0 PAROXYSMAL ATRIAL FIBRILLATION (H): Primary | ICD-10-CM

## 2022-05-16 LAB — INR BLD: 3.3 (ref 0.9–1.1)

## 2022-05-16 PROCEDURE — 85610 PROTHROMBIN TIME: CPT

## 2022-05-16 PROCEDURE — 36416 COLLJ CAPILLARY BLOOD SPEC: CPT

## 2022-05-16 NOTE — PROGRESS NOTES
ANTICOAGULATION MANAGEMENT     Angeles Can 81 year old female is on warfarin with supratherapeutic INR result. (Goal INR 2.0-3.0)    Recent labs: (last 7 days)     05/16/22  1406   INR 3.3*       ASSESSMENT       Source(s): Chart Review and Patient/Caregiver Call       Warfarin doses taken: Warfarin taken as instructed    Diet: No new diet changes identified    New illness, injury, or hospitalization: No    Medication/supplement changes: None noted    Signs or symptoms of bleeding or clotting: No    Previous INR: Therapeutic last 2(+) visits    Additional findings: None       PLAN     Recommended plan for no diet, medication or health factor changes affecting INR     Dosing Instructions: hold dose then decrease your warfarin dose (7.7% change) with next INR in 1 week       Summary  As of 5/16/2022    Full warfarin instructions:  5/16: Hold; Otherwise 1.25 mg every Mon, Thu; 2.5 mg all other days   Next INR check:  5/31/2022             Telephone call with Frida who verbalizes understanding and agrees to plan    Lab visit scheduled    Education provided: None required    Plan made per [facility] anticoagulation protocol    Reyna Radford RN  Anticoagulation Clinic  5/16/2022    _______________________________________________________________________     Anticoagulation Episode Summary     Current INR goal:  2.0-3.0   TTR:  81.2 % (1 y)   Target end date:  Indefinite   Send INR reminders to:  NICK MIDWAY    Indications    Paroxysmal atrial fibrillation (H) [I48.0]           Comments:  PAF         Anticoagulation Care Providers     Provider Role Specialty Phone number    Freddy Lubin MD Referring Internal Medicine [phone]

## 2022-05-17 ENCOUNTER — ANCILLARY PROCEDURE (OUTPATIENT)
Dept: MAMMOGRAPHY | Facility: CLINIC | Age: 82
End: 2022-05-17
Attending: SPECIALIST
Payer: MEDICARE

## 2022-05-17 ENCOUNTER — OFFICE VISIT (OUTPATIENT)
Dept: SURGERY | Facility: CLINIC | Age: 82
End: 2022-05-17
Attending: SPECIALIST
Payer: MEDICARE

## 2022-05-17 DIAGNOSIS — Z85.3 PERSONAL HISTORY OF BREAST CANCER: Primary | ICD-10-CM

## 2022-05-17 DIAGNOSIS — C50.311 MALIGNANT NEOPLASM OF LOWER-INNER QUADRANT OF RIGHT BREAST OF FEMALE, ESTROGEN RECEPTOR POSITIVE (H): ICD-10-CM

## 2022-05-17 DIAGNOSIS — Z17.0 MALIGNANT NEOPLASM OF LOWER-INNER QUADRANT OF RIGHT BREAST OF FEMALE, ESTROGEN RECEPTOR POSITIVE (H): ICD-10-CM

## 2022-05-17 PROCEDURE — 99212 OFFICE O/P EST SF 10 MIN: CPT | Performed by: SPECIALIST

## 2022-05-17 PROCEDURE — 77062 BREAST TOMOSYNTHESIS BI: CPT

## 2022-05-17 NOTE — NURSING NOTE
Frida presents to [facility] today for a follow up appointment with Dr. Peng .Patient notes no new breast concerns.  Patient had mammogram done today, see report for details.  She is following with Dr. Merlos  for medical oncology and is taking endocrine therapy, tolerating it well.  RN assessment and EMRupdate.  Patient met with Dr. Peng .  See dictation for details of visit and follow up plan.  Support provided, invited calls.  RN time 12 mins.

## 2022-05-17 NOTE — LETTER
5/17/2022         RE: Angeles Can  [address] [facility] [address]        Dear Colleague,    Thank you for referring your patient, Angeles Can, to the [facility] [place]. Please see a copy of my visit note below.    This is a 81 year old woman who comes in for  continued follow-up of her right breast cancer.  She is now 12 years  status post right  lumpectomy  without radiation.  She is feeling well.  She has no new complaints today.      Please see the chart review for PMH, Meds, allergies, FH and SH.    ROS:  Pertinent items are noted in HPI.      Physical Exam:  General appearance: alert, appears stated age and cooperative  Breasts: There are no palpable masses. -. The scar(s) has(ve) healed up well.  Lymph nodes: Cervical, supraclavicular, and axillary nodes normal.  Neurologic: Grossly normal    Data Review: Reviewed her current mammograms. No evidence of disease.      Impression: Personal History of breast cancer. NOD.  Is overall doing very well.  Discussed with the patient that follow-up with myself can now be as needed.  She will be continuing with yearly mammograms and following up with her primary care physician and oncologist.    Plan: Follow up as needed.  Continue with yearly mammograms and continue to follow-up with oncology.            Again, thank you for allowing me to participate in the care of your patient.        Sincerely,        Rosanna Peng MD

## 2022-05-17 NOTE — PROGRESS NOTES
This is a 81 year old woman who comes in for  continued follow-up of her right breast cancer.  She is now 12 years  status post right  lumpectomy  without radiation.  She is feeling well.  She has no new complaints today.      Please see the chart review for PMH, Meds, allergies, FH and SH.    ROS:  Pertinent items are noted in HPI.      Physical Exam:  General appearance: alert, appears stated age and cooperative  Breasts: There are no palpable masses. -. The scar(s) has(ve) healed up well.  Lymph nodes: Cervical, supraclavicular, and axillary nodes normal.  Neurologic: Grossly normal    Data Review: Reviewed her current mammograms. No evidence of disease.      Impression: Personal History of breast cancer. NOD.  Is overall doing very well.  Discussed with the patient that follow-up with myself can now be as needed.  She will be continuing with yearly mammograms and following up with her primary care physician and oncologist.    Plan: Follow up as needed.  Continue with yearly mammograms and continue to follow-up with oncology.

## 2022-05-24 ENCOUNTER — ANCILLARY PROCEDURE (OUTPATIENT)
Dept: CARDIOLOGY | Facility: CLINIC | Age: 82
End: 2022-05-24
Attending: INTERNAL MEDICINE
Payer: MEDICARE

## 2022-05-24 VITALS
WEIGHT: 166 LBS | SYSTOLIC BLOOD PRESSURE: 110 MMHG | DIASTOLIC BLOOD PRESSURE: 50 MMHG | BODY MASS INDEX: 30.61 KG/M2 | HEART RATE: 72 BPM | OXYGEN SATURATION: 96 % | RESPIRATION RATE: 16 BRPM

## 2022-05-24 DIAGNOSIS — I48.0 PAROXYSMAL ATRIAL FIBRILLATION (H): Primary | ICD-10-CM

## 2022-05-24 DIAGNOSIS — I49.5 SICK SINUS SYNDROME (H): ICD-10-CM

## 2022-05-24 DIAGNOSIS — E78.5 DYSLIPIDEMIA: ICD-10-CM

## 2022-05-24 DIAGNOSIS — Z95.0 PACEMAKER: ICD-10-CM

## 2022-05-24 DIAGNOSIS — I35.0 NONRHEUMATIC AORTIC VALVE STENOSIS: ICD-10-CM

## 2022-05-24 LAB
ATRIAL RATE - MUSE: 70 BPM
DIASTOLIC BLOOD PRESSURE - MUSE: NORMAL MMHG
INTERPRETATION ECG - MUSE: NORMAL
MDC_IDC_EPISODE_DTM: NORMAL
MDC_IDC_EPISODE_ID: NORMAL
MDC_IDC_EPISODE_TYPE: NORMAL
MDC_IDC_LEAD_IMPLANT_DT: NORMAL
MDC_IDC_LEAD_IMPLANT_DT: NORMAL
MDC_IDC_LEAD_LOCATION: NORMAL
MDC_IDC_LEAD_LOCATION: NORMAL
MDC_IDC_LEAD_LOCATION_DETAIL_1: NORMAL
MDC_IDC_LEAD_LOCATION_DETAIL_1: NORMAL
MDC_IDC_LEAD_MFG: NORMAL
MDC_IDC_LEAD_MFG: NORMAL
MDC_IDC_LEAD_MODEL: NORMAL
MDC_IDC_LEAD_MODEL: NORMAL
MDC_IDC_LEAD_POLARITY_TYPE: NORMAL
MDC_IDC_LEAD_POLARITY_TYPE: NORMAL
MDC_IDC_LEAD_SERIAL: NORMAL
MDC_IDC_LEAD_SERIAL: NORMAL
MDC_IDC_MSMT_BATTERY_DTM: NORMAL
MDC_IDC_MSMT_BATTERY_REMAINING_LONGEVITY: 114 MO
MDC_IDC_MSMT_BATTERY_REMAINING_PERCENTAGE: 100 %
MDC_IDC_MSMT_BATTERY_STATUS: NORMAL
MDC_IDC_MSMT_LEADCHNL_RA_IMPEDANCE_VALUE: 569 OHM
MDC_IDC_MSMT_LEADCHNL_RA_PACING_THRESHOLD_AMPLITUDE: 0.7 V
MDC_IDC_MSMT_LEADCHNL_RA_PACING_THRESHOLD_PULSEWIDTH: 0.4 MS
MDC_IDC_MSMT_LEADCHNL_RV_IMPEDANCE_VALUE: 365 OHM
MDC_IDC_MSMT_LEADCHNL_RV_PACING_THRESHOLD_AMPLITUDE: 1 V
MDC_IDC_MSMT_LEADCHNL_RV_PACING_THRESHOLD_AMPLITUDE: 1.1 V
MDC_IDC_MSMT_LEADCHNL_RV_PACING_THRESHOLD_PULSEWIDTH: 0.4 MS
MDC_IDC_MSMT_LEADCHNL_RV_PACING_THRESHOLD_PULSEWIDTH: 0.4 MS
MDC_IDC_PG_IMPLANT_DTM: NORMAL
MDC_IDC_PG_MFG: NORMAL
MDC_IDC_PG_MODEL: NORMAL
MDC_IDC_PG_SERIAL: NORMAL
MDC_IDC_PG_TYPE: NORMAL
MDC_IDC_SESS_CLINIC_NAME: NORMAL
MDC_IDC_SESS_DTM: NORMAL
MDC_IDC_SESS_TYPE: NORMAL
MDC_IDC_SET_BRADY_AT_MODE_SWITCH_MODE: NORMAL
MDC_IDC_SET_BRADY_AT_MODE_SWITCH_RATE: 170 {BEATS}/MIN
MDC_IDC_SET_BRADY_LOWRATE: 70 {BEATS}/MIN
MDC_IDC_SET_BRADY_MAX_SENSOR_RATE: 130 {BEATS}/MIN
MDC_IDC_SET_BRADY_MAX_TRACKING_RATE: 130 {BEATS}/MIN
MDC_IDC_SET_BRADY_MODE: NORMAL
MDC_IDC_SET_BRADY_PAV_DELAY_HIGH: 200 MS
MDC_IDC_SET_BRADY_PAV_DELAY_LOW: 250 MS
MDC_IDC_SET_BRADY_SAV_DELAY_HIGH: 200 MS
MDC_IDC_SET_BRADY_SAV_DELAY_LOW: 250 MS
MDC_IDC_SET_LEADCHNL_RA_PACING_AMPLITUDE: NORMAL
MDC_IDC_SET_LEADCHNL_RA_PACING_CAPTURE_MODE: NORMAL
MDC_IDC_SET_LEADCHNL_RA_PACING_POLARITY: NORMAL
MDC_IDC_SET_LEADCHNL_RA_PACING_PULSEWIDTH: 0.4 MS
MDC_IDC_SET_LEADCHNL_RA_SENSING_ADAPTATION_MODE: NORMAL
MDC_IDC_SET_LEADCHNL_RA_SENSING_POLARITY: NORMAL
MDC_IDC_SET_LEADCHNL_RA_SENSING_SENSITIVITY: 0.25 MV
MDC_IDC_SET_LEADCHNL_RV_PACING_AMPLITUDE: NORMAL
MDC_IDC_SET_LEADCHNL_RV_PACING_CAPTURE_MODE: NORMAL
MDC_IDC_SET_LEADCHNL_RV_PACING_POLARITY: NORMAL
MDC_IDC_SET_LEADCHNL_RV_PACING_PULSEWIDTH: 0.4 MS
MDC_IDC_SET_LEADCHNL_RV_SENSING_ADAPTATION_MODE: NORMAL
MDC_IDC_SET_LEADCHNL_RV_SENSING_POLARITY: NORMAL
MDC_IDC_SET_LEADCHNL_RV_SENSING_SENSITIVITY: 1.5 MV
MDC_IDC_SET_ZONE_DETECTION_INTERVAL: 375 MS
MDC_IDC_SET_ZONE_TYPE: NORMAL
MDC_IDC_SET_ZONE_VENDOR_TYPE: NORMAL
MDC_IDC_STAT_AT_BURDEN_PERCENT: 1 %
MDC_IDC_STAT_AT_DTM_END: NORMAL
MDC_IDC_STAT_AT_DTM_START: NORMAL
MDC_IDC_STAT_AT_MODE_SW_COUNT: 11
MDC_IDC_STAT_BRADY_DTM_END: NORMAL
MDC_IDC_STAT_BRADY_DTM_START: NORMAL
MDC_IDC_STAT_BRADY_RA_PERCENT_PACED: 100 %
MDC_IDC_STAT_BRADY_RV_PERCENT_PACED: 0 %
MDC_IDC_STAT_EPISODE_RECENT_COUNT: 0
MDC_IDC_STAT_EPISODE_RECENT_COUNT: 0
MDC_IDC_STAT_EPISODE_RECENT_COUNT: 11
MDC_IDC_STAT_EPISODE_RECENT_COUNT: 4
MDC_IDC_STAT_EPISODE_RECENT_COUNT_DTM_END: NORMAL
MDC_IDC_STAT_EPISODE_RECENT_COUNT_DTM_START: NORMAL
MDC_IDC_STAT_EPISODE_TYPE: NORMAL
MDC_IDC_STAT_EPISODE_VENDOR_TYPE: NORMAL
P AXIS - MUSE: 20 DEGREES
PR INTERVAL - MUSE: 156 MS
QRS DURATION - MUSE: 80 MS
QT - MUSE: 404 MS
QTC - MUSE: 436 MS
R AXIS - MUSE: 37 DEGREES
SYSTOLIC BLOOD PRESSURE - MUSE: NORMAL MMHG
T AXIS - MUSE: -4 DEGREES
VENTRICULAR RATE- MUSE: 70 BPM

## 2022-05-24 PROCEDURE — 99214 OFFICE O/P EST MOD 30 MIN: CPT | Performed by: INTERNAL MEDICINE

## 2022-05-24 PROCEDURE — 93000 ELECTROCARDIOGRAM COMPLETE: CPT | Performed by: INTERNAL MEDICINE

## 2022-05-24 PROCEDURE — 93280 PM DEVICE PROGR EVAL DUAL: CPT | Performed by: INTERNAL MEDICINE

## 2022-05-24 RX ORDER — METFORMIN HCL 500 MG
500 TABLET, EXTENDED RELEASE 24 HR ORAL
COMMUNITY
Start: 2022-04-05 | End: 2022-10-31

## 2022-05-24 NOTE — LETTER
5/24/2022    Freddy Lubin MD  [address] [facility] [address]    RE: Angeles Can       Dear Colleague,     I had the pleasure of seeing Angeles Can in the [facility].         [facility]   [address], [place] [address]   [email or web address]   OFFICE: [phone]          Thank you Freddy Perez for asking the [facility] team to participate in the care of your patient, Angeles Can.     Impression and Plan     1.  Atrial fibrillation.  Frida in 2018 he had presented with atrial fibrillation and findings consistent with tachycardia/bradycardia syndrome.  She underwent permanent pacemaker placement 2 March 2018.  Frida's [id number] Score is 7.  Most recent device interrogation continues to reveal low atrial fibrillation burden of less than 1%.  QTc on twelve-lead ECG today is reasonable.    Continue warfarin.    Continue sotalol 80 mg twice daily.     2.  Aortic stenosis.  This was felt very mild in degree on echocardiogram 29 April 2021.    3.  Dyslipidemia.  Lipid profile 29 October 2021 revealed LDL 73 mg/dL and HDL 55 mg/dL.    Continue atorvastatin.     Plan on follow-up in 1 year.  Patient will be followed through Device Clinic per protocol as well.    30 minutes spent reviewing prior records (including documentation, laboratory studies, cardiac testing/imaging), interview with patient along with physical exam, planning, and subsequent documentation/crafting of note).           History of Present Illness    Once again I would like to thank you again for asking me to participate in the care of your patient, Angeles Can.  As you know, but to reiterate for my own records, Angeles Can is a 81 year old female with history of sick sinus syndrome.  Patient underwent permanent pacemaker placement 2 March 2018 ([employer] L331 ACCOLADE MRI EL device).     On interview today, patient is  without complaint from a cardiac standpoint.  She reports no chest pain or shortness of breath.  She denies any subjective decline in exercise tolerance baseline, palpitations, or lightheadedness.  Patient denies any fevers, chills, or other constitutional symptoms.  She denies any fevers, chills, or other constitutional symptoms.     Further review of systems is otherwise negative/noncontributory (medical record and 13 point review of systems reviewed as well and pertinent positives noted).         Cardiac Diagnostics      Echocardiogram 29 April 2021 (personally reviewed):  1. Normal left ventricular size and systolic performance with a visually estimated ejection fraction of 55-60%.   2. There is mild concentric increase in left ventricular wall thickness.   3. There is very mild aortic stenosis.   4. Normal right ventricular size and systolic performance.   5. There is mild left atrial enlargement.   o When compared to the prior real-time echocardiogram dated 2 March 2018, the pacing electrodes appear to be new.  Otherwise, there has been little appreciable interval change.     Echocardiogram 2 March 2018 (personally reviewed):  1. Normal left ventricular size and systolic performance with a visually estimated ejection fraction of 65-70%.   2. There is mild concentric increase in left ventricular wall thickness.   3. There is mild aortic stenosis.   4. Normal right ventricular size and systolic performance.   5. There is mild left atrial enlargement.     Device interrogation 5 January 2021 ([employer] L331 ACCOLADE MRI EL device implanted 2 March 2018):  1. Type: routine remote pacemaker transmission.  2. Presenting rhythm: AP-VS  pm.  3. Battery/lead status: stable.  4. Arrhythmias: since 19 October 2021; 6 mode switch episodes, longest lasting 1hr 14 minutes, available EGMs confirm AF, AF burden <1%. Two ventricular high rate episodes, one episode appears to be RVR during AF, the other appears to  be PSVT.  5. Anticoagulant: warfarin.  6. Comments: Normal magnet and pacemaker function.     Device interrogation 5 January 2021 ([employer] L331 ACCOLADE MRI EL device implanted 2 March 2018):  1. Presenting rhythm: AP-VS rate 70 bpm.  2. Battery/lead status: stable  3. Arrhythmias: since 5 October 2020, three mode switches, all <1 minute, EGMs confirm atrial tachy arrhythmias. Three nonsustained ventricular high rate  4. episodes, EGMs suggest PSVT.  5. Anticoagulant: warfarin  6. Comments: normal magnet and pacemaker function.     Device interrogation 20 February 2020 ([employer] L331 ACCOLADE MRI EL device implanted 2 March 2018):  1. Presenting rhythm: AP-VS 70 bpm.  2. Battery/lead status: stable.  3. Arrhythmias: since 20 November 2019; three ventricular high rate episodes, two available EGMs show PSVT. One mode switch <1min, appears to be atrial tachy  4. arrhythmia.  5. Comments: Normal magnet and pacemaker function.                Physical Examination       /50 (BP Location: Right arm, Patient Position: Sitting, Cuff Size: Adult Large)   Pulse 72   Resp 16   Wt 75.3 kg (166 lb)   SpO2 96%   BMI 30.61 kg/m          Wt Readings from Last 3 Encounters:   05/24/22 75.3 kg (166 lb)   02/07/22 77.7 kg (171 lb 3.2 oz)   11/24/21 80.7 kg (178 lb)     The patient is alert and oriented times three. Sclerae are anicteric. Mucosal membranes are moist. Jugular venous pressure is normal. No significant adenopathy/thyromegally appreciated. Lungs are clear with good expansion. On cardiovascular exam, the patient has a regular S1 and S2. Abdomen is soft and non-tender. Extremities reveal no clubbing, cyanosis, or edema.         Medications  Allergies   Current Outpatient Medications   Medication Sig Dispense Refill     allopurinol (ZYLOPRIM) 300 MG tablet [ALLOPURINOL (ZYLOPRIM) 300 MG TABLET] Take 1 tablet by mouth once daily. 90 tablet 3     amLODIPine (NORVASC) 2.5 MG tablet Take 1 tablet (2.5  mg) by mouth daily 90 tablet 3     anastrozole (ARIMIDEX) 1 MG tablet Take 1 tablet (1 mg total) by mouth daily. 90 tablet 3     atorvastatin (LIPITOR) 10 MG tablet Take 1 tablet (10 mg) by mouth daily 90 tablet 3     blood glucose test strips [BLOOD GLUCOSE TEST STRIPS] Use 1 each As Directed daily. 100 strip 3     cholecalciferol, vitamin D3, 1,000 unit tablet [CHOLECALCIFEROL, VITAMIN D3, 1,000 UNIT TABLET] Take 1,000 Units by mouth daily.       cloNIDine (CATAPRES) 0.1 MG tablet Take 1 tablet (0.1 mg) by mouth 2 times daily 180 tablet 3     furosemide (LASIX) 40 MG tablet Take 1 tablet (40 mg) by mouth daily 90 tablet 3     losartan (COZAAR) 100 MG tablet Take 1 tablet (100 mg) by mouth daily 90 tablet 3     magnesium oxide (MAG-OX) 400 mg tablet [MAGNESIUM OXIDE (MAG-OX) 400 MG TABLET] Take 400 mg by mouth daily.        metFORMIN (GLUCOPHAGE XR) 500 MG 24 hr tablet 500 mg daily (with dinner)       peg 400-propylene glycol (SYSTANE) 0.4-0.3 % Drop [-PROPYLENE GLYCOL (SYSTANE) 0.4-0.3 % DROP] Place 1 Drop into both eyes 2 times daily if needed for Dry Eyes. Indications: DRY EYE       sotalol (BETAPACE) 80 MG tablet Take 1 tablet (80 mg total) by mouth daily. 90 tablet 1     warfarin ANTICOAGULANT (COUMADIN) 2.5 MG tablet [WARFARIN ANTICOAGULANT (COUMADIN/JANTOVEN) 2.5 MG TABLET] Take 1/2-1 tablet (1.25-2.5 mg) by mouth daily. Adjust dose based on INR results as directed. (Patient taking differently: 0.5mg on mondays, the other days 2.5mg) 90 tablet 3       Allergies   Allergen Reactions     Metformin Diarrhea     Hydrochlorothiazide Unknown     Annotation: hyperuricemia made worse by HCTZ   Causes pts gout          Lab Results    Chemistry/lipid CBC Cardiac Enzymes/BNP/TSH/INR   Recent Labs   Lab Test 10/29/21  1448   CHOL 163   HDL 55   LDL 73   TRIG 174*     Recent Labs   Lab Test 10/29/21  1448 09/11/20  1354 07/22/19  1554   LDL 73 67 70     Recent Labs   Lab Test 02/07/22  1250      POTASSIUM  3.7   CHLORIDE 97*   CO2 33*   *   BUN 26   CR 0.93   GFRESTIMATED 61   GUILLERMINA 10.9*     Recent Labs   Lab Test 02/07/22  1250 10/29/21  1448 05/24/21  1303   CR 0.93 0.94 1.14*     Recent Labs   Lab Test 02/07/22  1250 10/29/21  1448 03/09/21  1742   A1C 8.0* 7.1* 6.9*          Recent Labs   Lab Test 05/24/21  1303   WBC 8.7   HGB 13.2   HCT 40.5   MCV 96        Recent Labs   Lab Test 05/24/21  1303 03/09/21  1742 09/11/20  1354   HGB 13.2 13.4 14.3    No results for input(s): TROPONINI in the last 97917 hours.  Recent Labs   Lab Test 03/09/21  1742   BNP 49     Recent Labs   Lab Test 07/16/18  1538   TSH 3.57     Recent Labs   Lab Test 05/16/22  1406 05/03/22  1138 04/05/22  1134   INR 3.3* 3.6* 3.0*        Medical History  Surgical History Family History Social History   Past Medical History:   Diagnosis Date     Arrhythmia     Paroxysmal Atrial fibrillation     Arthritis      Cancer (H)      Diabetes mellitus (H)      Hypercalcemia      Hyperlipidemia      Hypertension      Obesity      Past Surgical History:   Procedure Laterality Date     CATARACT EXTRACTION, BILATERAL       EP PACEMAKER INSERT N/A 3/2/2018    Procedure: EP Pacemaker Insertion;  Surgeon: Nahun Mina MD;  Location: [facility];  Service:       REMOVE TONSILS/ADENOIDS,<11 Y/O      Description: Tonsillectomy With Adenoidectomy;  Recorded: 09/16/2008;     HC REVISE MEDIAN N/CARPAL TUNNEL SURG      Description: Neuroplasty Decompression Median Nerve At Carpal Tunnel;  Recorded: 11/01/2009;  Comments: Right- 1/99; Left- 4/99     SC MASTECTOMY, PARTIAL Right 5/26/2021    Procedure: Right Lumpectomy after Wire Localizaiton;  Surgeon: Rosanna Peng MD;  Location: [facility];  Service: General     US BREAST CORE BIOPSY RIGHT Right 5/3/2021     Family History   Problem Relation Age of Onset     No Known Problems Mother      No Known Problems Father      No Known Problems Sister      No Known Problems Daughter      No  Known Problems Maternal Grandmother      No Known Problems Maternal Grandfather      No Known Problems Paternal Grandmother      No Known Problems Paternal Grandfather      No Known Problems Maternal Aunt      No Known Problems Paternal Aunt         Social History     Socioeconomic History     Marital status:      Spouse name: Not on file     Number of children: Not on file     Years of education: Not on file     Highest education level: Not on file   Occupational History     Not on file   Tobacco Use     Smoking status: Former Smoker     Smokeless tobacco: Never Used     Tobacco comment: quit years ago   Substance and Sexual Activity     Alcohol use: No     Drug use: No     Sexual activity: Not Currently   Other Topics Concern     Not on file   Social History Narrative     Not on file     Social Determinants of Health     Financial Resource Strain: Not on file   Food Insecurity: Not on file   Transportation Needs: Not on file   Physical Activity: Not on file   Stress: Not on file   Social Connections: Not on file   Intimate Partner Violence: Not on file   Housing Stability: Not on file                    Thank you for allowing me to participate in the care of your patient.      Sincerely,     Blue Fagan MD     [facility]  cc:   William Olivo MD  [address] [place] [id number]

## 2022-05-24 NOTE — PROGRESS NOTES
[facility] HEART CARE   [address], [place] [address]   [email or web address]   OFFICE: [phone]          Thank you Freddy Perez for asking the [facility] Heart Care team to participate in the care of your patient, Angeles Can.     Impression and Plan     1.  Atrial fibrillation.  Frida in 2018 he had presented with atrial fibrillation and findings consistent with tachycardia/bradycardia syndrome.  She underwent permanent pacemaker placement 2 March 2018.  Frida's [id number] Score is 7.  Most recent device interrogation continues to reveal low atrial fibrillation burden of less than 1%.  QTc on twelve-lead ECG today is reasonable.    Continue warfarin.    Continue sotalol 80 mg twice daily.     2.  Aortic stenosis.  This was felt very mild in degree on echocardiogram 29 April 2021.    3.  Dyslipidemia.  Lipid profile 29 October 2021 revealed LDL 73 mg/dL and HDL 55 mg/dL.    Continue atorvastatin.     Plan on follow-up in 1 year.  Patient will be followed through Device Clinic per protocol as well.    30 minutes spent reviewing prior records (including documentation, laboratory studies, cardiac testing/imaging), interview with patient along with physical exam, planning, and subsequent documentation/crafting of note).           History of Present Illness    Once again I would like to thank you again for asking me to participate in the care of your patient, Angeles Can.  As you know, but to reiterate for my own records, Angeles Can is a 81 year old female with history of sick sinus syndrome.  Patient underwent permanent pacemaker placement 2 March 2018 ([employer] L331 ACCOLADE MRI EL device).     On interview today, patient is without complaint from a cardiac standpoint.  She reports no chest pain or shortness of breath.  She denies any subjective decline in exercise tolerance baseline, palpitations, or lightheadedness.  Patient denies  any fevers, chills, or other constitutional symptoms.  She denies any fevers, chills, or other constitutional symptoms.     Further review of systems is otherwise negative/noncontributory (medical record and 13 point review of systems reviewed as well and pertinent positives noted).         Cardiac Diagnostics      Echocardiogram 29 April 2021 (personally reviewed):  1. Normal left ventricular size and systolic performance with a visually estimated ejection fraction of 55-60%.   2. There is mild concentric increase in left ventricular wall thickness.   3. There is very mild aortic stenosis.   4. Normal right ventricular size and systolic performance.   5. There is mild left atrial enlargement.   o When compared to the prior real-time echocardiogram dated 2 March 2018, the pacing electrodes appear to be new.  Otherwise, there has been little appreciable interval change.     Echocardiogram 2 March 2018 (personally reviewed):  1. Normal left ventricular size and systolic performance with a visually estimated ejection fraction of 65-70%.   2. There is mild concentric increase in left ventricular wall thickness.   3. There is mild aortic stenosis.   4. Normal right ventricular size and systolic performance.   5. There is mild left atrial enlargement.     Device interrogation 5 January 2021 ([employer] L331 ACCOLADE MRI EL device implanted 2 March 2018):  1. Type: routine remote pacemaker transmission.  2. Presenting rhythm: AP-VS  pm.  3. Battery/lead status: stable.  4. Arrhythmias: since 19 October 2021; 6 mode switch episodes, longest lasting 1hr 14 minutes, available EGMs confirm AF, AF burden <1%. Two ventricular high rate episodes, one episode appears to be RVR during AF, the other appears to be PSVT.  5. Anticoagulant: warfarin.  6. Comments: Normal magnet and pacemaker function.     Device interrogation 5 January 2021 ([employer] L331 ACCOLADE MRI EL device implanted 2 March  2018):  1. Presenting rhythm: AP-VS rate 70 bpm.  2. Battery/lead status: stable  3. Arrhythmias: since 5 October 2020, three mode switches, all <1 minute, EGMs confirm atrial tachy arrhythmias. Three nonsustained ventricular high rate  4. episodes, EGMs suggest PSVT.  5. Anticoagulant: warfarin  6. Comments: normal magnet and pacemaker function.     Device interrogation 20 February 2020 ([employer] L331 ACCOLADE MRI EL device implanted 2 March 2018):  1. Presenting rhythm: AP-VS 70 bpm.  2. Battery/lead status: stable.  3. Arrhythmias: since 20 November 2019; three ventricular high rate episodes, two available EGMs show PSVT. One mode switch <1min, appears to be atrial tachy  4. arrhythmia.  5. Comments: Normal magnet and pacemaker function.                Physical Examination       /50 (BP Location: Right arm, Patient Position: Sitting, Cuff Size: Adult Large)   Pulse 72   Resp 16   Wt 75.3 kg (166 lb)   SpO2 96%   BMI 30.61 kg/m          Wt Readings from Last 3 Encounters:   05/24/22 75.3 kg (166 lb)   02/07/22 77.7 kg (171 lb 3.2 oz)   11/24/21 80.7 kg (178 lb)     The patient is alert and oriented times three. Sclerae are anicteric. Mucosal membranes are moist. Jugular venous pressure is normal. No significant adenopathy/thyromegally appreciated. Lungs are clear with good expansion. On cardiovascular exam, the patient has a regular S1 and S2. Abdomen is soft and non-tender. Extremities reveal no clubbing, cyanosis, or edema.         Medications  Allergies   Current Outpatient Medications   Medication Sig Dispense Refill     allopurinol (ZYLOPRIM) 300 MG tablet [ALLOPURINOL (ZYLOPRIM) 300 MG TABLET] Take 1 tablet by mouth once daily. 90 tablet 3     amLODIPine (NORVASC) 2.5 MG tablet Take 1 tablet (2.5 mg) by mouth daily 90 tablet 3     anastrozole (ARIMIDEX) 1 MG tablet Take 1 tablet (1 mg total) by mouth daily. 90 tablet 3     atorvastatin (LIPITOR) 10 MG tablet Take 1 tablet (10 mg) by mouth  daily 90 tablet 3     blood glucose test strips [BLOOD GLUCOSE TEST STRIPS] Use 1 each As Directed daily. 100 strip 3     cholecalciferol, vitamin D3, 1,000 unit tablet [CHOLECALCIFEROL, VITAMIN D3, 1,000 UNIT TABLET] Take 1,000 Units by mouth daily.       cloNIDine (CATAPRES) 0.1 MG tablet Take 1 tablet (0.1 mg) by mouth 2 times daily 180 tablet 3     furosemide (LASIX) 40 MG tablet Take 1 tablet (40 mg) by mouth daily 90 tablet 3     losartan (COZAAR) 100 MG tablet Take 1 tablet (100 mg) by mouth daily 90 tablet 3     magnesium oxide (MAG-OX) 400 mg tablet [MAGNESIUM OXIDE (MAG-OX) 400 MG TABLET] Take 400 mg by mouth daily.        metFORMIN (GLUCOPHAGE XR) 500 MG 24 hr tablet 500 mg daily (with dinner)       peg 400-propylene glycol (SYSTANE) 0.4-0.3 % Drop [-PROPYLENE GLYCOL (SYSTANE) 0.4-0.3 % DROP] Place 1 Drop into both eyes 2 times daily if needed for Dry Eyes. Indications: DRY EYE       sotalol (BETAPACE) 80 MG tablet Take 1 tablet (80 mg total) by mouth daily. 90 tablet 1     warfarin ANTICOAGULANT (COUMADIN) 2.5 MG tablet [WARFARIN ANTICOAGULANT (COUMADIN/JANTOVEN) 2.5 MG TABLET] Take 1/2-1 tablet (1.25-2.5 mg) by mouth daily. Adjust dose based on INR results as directed. (Patient taking differently: 0.5mg on mondays, the other days 2.5mg) 90 tablet 3       Allergies   Allergen Reactions     Metformin Diarrhea     Hydrochlorothiazide Unknown     Annotation: hyperuricemia made worse by HCTZ   Causes pts gout          Lab Results    Chemistry/lipid CBC Cardiac Enzymes/BNP/TSH/INR   Recent Labs   Lab Test 10/29/21  1448   CHOL 163   HDL 55   LDL 73   TRIG 174*     Recent Labs   Lab Test 10/29/21  1448 09/11/20  1354 07/22/19  1554   LDL 73 67 70     Recent Labs   Lab Test 02/07/22  1250      POTASSIUM 3.7   CHLORIDE 97*   CO2 33*   *   BUN 26   CR 0.93   GFRESTIMATED 61   GUILLERMINA 10.9*     Recent Labs   Lab Test 02/07/22  1250 10/29/21  1448 05/24/21  1303   CR 0.93 0.94 1.14*     Recent Labs    Lab Test 02/07/22  1250 10/29/21  1448 03/09/21  1742   A1C 8.0* 7.1* 6.9*          Recent Labs   Lab Test 05/24/21  1303   WBC 8.7   HGB 13.2   HCT 40.5   MCV 96        Recent Labs   Lab Test 05/24/21  1303 03/09/21  1742 09/11/20  1354   HGB 13.2 13.4 14.3    No results for input(s): TROPONINI in the last 11295 hours.  Recent Labs   Lab Test 03/09/21  1742   BNP 49     Recent Labs   Lab Test 07/16/18  1538   TSH 3.57     Recent Labs   Lab Test 05/16/22  1406 05/03/22  1138 04/05/22  1134   INR 3.3* 3.6* 3.0*        Medical History  Surgical History Family History Social History   Past Medical History:   Diagnosis Date     Arrhythmia     Paroxysmal Atrial fibrillation     Arthritis      Cancer (H)      Diabetes mellitus (H)      Hypercalcemia      Hyperlipidemia      Hypertension      Obesity      Past Surgical History:   Procedure Laterality Date     CATARACT EXTRACTION, BILATERAL       EP PACEMAKER INSERT N/A 3/2/2018    Procedure: EP Pacemaker Insertion;  Surgeon: Nahun Mina MD;  Location: [facility];  Service:       REMOVE TONSILS/ADENOIDS,<11 Y/O      Description: Tonsillectomy With Adenoidectomy;  Recorded: 09/16/2008;     HC REVISE MEDIAN N/CARPAL TUNNEL SURG      Description: Neuroplasty Decompression Median Nerve At Carpal Tunnel;  Recorded: 11/01/2009;  Comments: Right- 1/99; Left- 4/99     CA MASTECTOMY, PARTIAL Right 5/26/2021    Procedure: Right Lumpectomy after Wire Localizaiton;  Surgeon: Rosanna Peng MD;  Location: [facility];  Service: General      BREAST CORE BIOPSY RIGHT Right 5/3/2021     Family History   Problem Relation Age of Onset     No Known Problems Mother      No Known Problems Father      No Known Problems Sister      No Known Problems Daughter      No Known Problems Maternal Grandmother      No Known Problems Maternal Grandfather      No Known Problems Paternal Grandmother      No Known Problems Paternal Grandfather      No Known Problems Maternal  Aunt      No Known Problems Paternal Aunt         Social History     Socioeconomic History     Marital status:      Spouse name: Not on file     Number of children: Not on file     Years of education: Not on file     Highest education level: Not on file   Occupational History     Not on file   Tobacco Use     Smoking status: Former Smoker     Smokeless tobacco: Never Used     Tobacco comment: quit years ago   Substance and Sexual Activity     Alcohol use: No     Drug use: No     Sexual activity: Not Currently   Other Topics Concern     Not on file   Social History Narrative     Not on file     Social Determinants of Health     Financial Resource Strain: Not on file   Food Insecurity: Not on file   Transportation Needs: Not on file   Physical Activity: Not on file   Stress: Not on file   Social Connections: Not on file   Intimate Partner Violence: Not on file   Housing Stability: Not on file

## 2022-05-25 ENCOUNTER — TRANSFERRED RECORDS (OUTPATIENT)
Dept: HEALTH INFORMATION MANAGEMENT | Facility: CLINIC | Age: 82
End: 2022-05-25
Payer: MEDICARE

## 2022-05-25 LAB — RETINOPATHY: NEGATIVE

## 2022-05-31 ENCOUNTER — ANTICOAGULATION THERAPY VISIT (OUTPATIENT)
Dept: ANTICOAGULATION | Facility: CLINIC | Age: 82
End: 2022-05-31

## 2022-05-31 ENCOUNTER — LAB (OUTPATIENT)
Dept: LAB | Facility: CLINIC | Age: 82
End: 2022-05-31
Payer: MEDICARE

## 2022-05-31 DIAGNOSIS — I48.0 PAROXYSMAL ATRIAL FIBRILLATION (H): ICD-10-CM

## 2022-05-31 DIAGNOSIS — I48.0 PAROXYSMAL ATRIAL FIBRILLATION (H): Primary | ICD-10-CM

## 2022-05-31 LAB — INR BLD: 2.6 (ref 0.9–1.1)

## 2022-05-31 PROCEDURE — 85610 PROTHROMBIN TIME: CPT

## 2022-05-31 PROCEDURE — 36415 COLL VENOUS BLD VENIPUNCTURE: CPT

## 2022-05-31 NOTE — PROGRESS NOTES
ANTICOAGULATION MANAGEMENT     Angeles Can 81 year old female is on warfarin with therapeutic INR result. (Goal INR 2.0-3.0)    Recent labs: (last 7 days)     05/31/22  1355   INR 2.6*       ASSESSMENT       Source(s): Chart Review and Template       Warfarin doses taken: Warfarin taken as instructed    Diet: No new diet changes identified    New illness, injury, or hospitalization: No    Medication/supplement changes: None noted    Signs or symptoms of bleeding or clotting: No    Previous INR: Supratherapeutic    Additional findings: None       PLAN     Recommended plan for no diet, medication or health factor changes affecting INR     Dosing Instructions: continue your current warfarin dose with next INR in 2 weeks       Summary  As of 5/31/2022    Full warfarin instructions:  1.25 mg every Mon, Thu; 2.5 mg all other days   Next INR check:  6/14/2022             Detailed voice message left for Frida with dosing instructions and follow up date.     Contact [phone] to schedule and with any changes, questions or concerns.     Education provided: None required    Plan made per [facility] anticoagulation protocol    Reyna Radford RN  Anticoagulation Clinic  5/31/2022    _______________________________________________________________________     Anticoagulation Episode Summary     Current INR goal:  2.0-3.0   TTR:  79.4 % (1 y)   Target end date:  Indefinite   Send INR reminders to:  [facility]    Indications    Paroxysmal atrial fibrillation (H) [I48.0]           Comments:  PAF         Anticoagulation Care Providers     Provider Role Specialty Phone number    Freddy Lubin MD Referring Internal Medicine [phone]

## 2022-06-07 ENCOUNTER — OFFICE VISIT (OUTPATIENT)
Dept: INTERNAL MEDICINE | Facility: CLINIC | Age: 82
End: 2022-06-07
Payer: MEDICARE

## 2022-06-07 VITALS
BODY MASS INDEX: 31.11 KG/M2 | WEIGHT: 168.7 LBS | DIASTOLIC BLOOD PRESSURE: 65 MMHG | HEART RATE: 72 BPM | OXYGEN SATURATION: 98 % | SYSTOLIC BLOOD PRESSURE: 143 MMHG

## 2022-06-07 DIAGNOSIS — E78.2 MIXED HYPERLIPIDEMIA: ICD-10-CM

## 2022-06-07 DIAGNOSIS — I10 ESSENTIAL HYPERTENSION WITH GOAL BLOOD PRESSURE LESS THAN 140/90: ICD-10-CM

## 2022-06-07 DIAGNOSIS — E66.01 SEVERE OBESITY WITH BODY MASS INDEX (BMI) OF 35.0 TO 39.9 WITH COMORBIDITY (H): ICD-10-CM

## 2022-06-07 DIAGNOSIS — E11.65 TYPE 2 DIABETES MELLITUS WITH HYPERGLYCEMIA, WITHOUT LONG-TERM CURRENT USE OF INSULIN (H): ICD-10-CM

## 2022-06-07 DIAGNOSIS — Z51.81 ENCOUNTER FOR THERAPEUTIC DRUG MONITORING: ICD-10-CM

## 2022-06-07 DIAGNOSIS — I48.0 PAROXYSMAL ATRIAL FIBRILLATION (H): ICD-10-CM

## 2022-06-07 DIAGNOSIS — C50.311 MALIGNANT NEOPLASM OF LOWER-INNER QUADRANT OF RIGHT BREAST OF FEMALE, ESTROGEN RECEPTOR POSITIVE (H): ICD-10-CM

## 2022-06-07 DIAGNOSIS — E21.0 PRIMARY HYPERPARATHYROIDISM (H): Primary | ICD-10-CM

## 2022-06-07 DIAGNOSIS — Z17.0 MALIGNANT NEOPLASM OF LOWER-INNER QUADRANT OF RIGHT BREAST OF FEMALE, ESTROGEN RECEPTOR POSITIVE (H): ICD-10-CM

## 2022-06-07 LAB
ANION GAP SERPL CALCULATED.3IONS-SCNC: 10 MMOL/L (ref 5–18)
BUN SERPL-MCNC: 34 MG/DL (ref 8–28)
CALCIUM SERPL-MCNC: 10.7 MG/DL (ref 8.5–10.5)
CHLORIDE BLD-SCNC: 100 MMOL/L (ref 98–107)
CHOLEST SERPL-MCNC: 162 MG/DL
CO2 SERPL-SCNC: 29 MMOL/L (ref 22–31)
CREAT SERPL-MCNC: 1.16 MG/DL (ref 0.6–1.1)
ERYTHROCYTE [DISTWIDTH] IN BLOOD BY AUTOMATED COUNT: 15.3 % (ref 10–15)
FASTING STATUS PATIENT QL REPORTED: YES
GFR SERPL CREATININE-BSD FRML MDRD: 47 ML/MIN/1.73M2
GLUCOSE BLD-MCNC: 159 MG/DL (ref 70–125)
HBA1C MFR BLD: 6.8 % (ref 0–5.6)
HCT VFR BLD AUTO: 39.4 % (ref 35–47)
HDLC SERPL-MCNC: 55 MG/DL
HGB BLD-MCNC: 13 G/DL (ref 11.7–15.7)
LDLC SERPL CALC-MCNC: 82 MG/DL
MCH RBC QN AUTO: 30.5 PG (ref 26.5–33)
MCHC RBC AUTO-ENTMCNC: 33 G/DL (ref 31.5–36.5)
MCV RBC AUTO: 93 FL (ref 78–100)
PLATELET # BLD AUTO: 225 10E3/UL (ref 150–450)
POTASSIUM BLD-SCNC: 3.8 MMOL/L (ref 3.5–5)
PTH-INTACT SERPL-MCNC: 128 PG/ML (ref 10–86)
RBC # BLD AUTO: 4.26 10E6/UL (ref 3.8–5.2)
SODIUM SERPL-SCNC: 139 MMOL/L (ref 136–145)
TRIGL SERPL-MCNC: 123 MG/DL
WBC # BLD AUTO: 8.4 10E3/UL (ref 4–11)

## 2022-06-07 PROCEDURE — 85027 COMPLETE CBC AUTOMATED: CPT | Mod: QW | Performed by: INTERNAL MEDICINE

## 2022-06-07 PROCEDURE — 80061 LIPID PANEL: CPT | Performed by: INTERNAL MEDICINE

## 2022-06-07 PROCEDURE — 80048 BASIC METABOLIC PNL TOTAL CA: CPT | Performed by: INTERNAL MEDICINE

## 2022-06-07 PROCEDURE — 83036 HEMOGLOBIN GLYCOSYLATED A1C: CPT | Mod: QW | Performed by: INTERNAL MEDICINE

## 2022-06-07 PROCEDURE — 36415 COLL VENOUS BLD VENIPUNCTURE: CPT | Performed by: INTERNAL MEDICINE

## 2022-06-07 PROCEDURE — 83970 ASSAY OF PARATHORMONE: CPT | Performed by: INTERNAL MEDICINE

## 2022-06-07 PROCEDURE — 99214 OFFICE O/P EST MOD 30 MIN: CPT | Performed by: INTERNAL MEDICINE

## 2022-06-07 NOTE — PATIENT INSTRUCTIONS
I will notify you of test results.    2.  Continue current medications    3.  Recheck bone density in one year.      4.  See in 3 to 4 monrths or as needed.

## 2022-06-07 NOTE — PROGRESS NOTES
ASSESSMENT:  1. Type 2 diabetes mellitus with hyperglycemia, without long-term current use of insulin (H)  Frida has restarted metformin 500 mg daily.  She is tolerating this well.  She previously had diarrhea on higher doses.  Hemoglobin A1c was up to 8.0 when checked in February.  This now has declined to 6.8.  She will continue the current metformin dose  - Hemoglobin A1c; Future  - Basic metabolic panel  (Ca, Cl, CO2, Creat, Gluc, K, Na, BUN); Future  - Hemoglobin A1c  - Basic metabolic panel  (Ca, Cl, CO2, Creat, Gluc, K, Na, BUN)    2. Primary hyperparathyroidism (H)  She has a history of hyperparathyroidism which has been managed with observation.  Bone density study from April 2021 showed a lowest T score of -1.4.  Labs will be checked again today.  Bone density should be reassessed next year  - Basic metabolic panel  (Ca, Cl, CO2, Creat, Gluc, K, Na, BUN); Future  - Parathyroid Hormone Intact; Future  - Basic metabolic panel  (Ca, Cl, CO2, Creat, Gluc, K, Na, BUN)  - Parathyroid Hormone Intact    3. Mixed hyperlipidemia  On atorvastatin 10 mg daily.  Lipids will be checked  - Lipid panel reflex to direct LDL Fasting; Future  - Lipid panel reflex to direct LDL Fasting    4. Encounter for therapeutic drug monitoring  Monitoring labs are checked as outlined  - CBC with platelets; Future  - CBC with platelets    5. Severe obesity with body mass index (BMI) of 35.0 to 39.9 with comorbidity (H)  Weight has declined from over 200 pounds to 168 currently.  This is through intentional weight loss.  She was commended for this    6. Paroxysmal atrial fibrillation (H)  Last monitor in her of her pacemaker showed less than 1% time in atrial fibrillation.  She remains on sotalol    7. Essential hypertension with goal blood pressure less than 140/90  On amlodipine 2.5 mg daily, clonidine 0.1 mg, losartan 100 mg daily and furosemide 40 mg daily.  Blood pressure is acceptable on this regimen    8.  Breast  cancer:  Tolerating Arimidex well.    PLAN:  Patient Instructions   1.  I will notify you of test results.    2.  Continue current medications    3.  Recheck bone density in one year.      4.  See in 3 to 4 monrths or as needed.    Orders Placed This Encounter   Procedures     REVIEW OF HEALTH MAINTENANCE PROTOCOL ORDERS     Lipid panel reflex to direct LDL Fasting     CBC with platelets     Hemoglobin A1c     Basic metabolic panel  (Ca, Cl, CO2, Creat, Gluc, K, Na, BUN)     Parathyroid Hormone Intact     There are no discontinued medications.    Return in about 3 months (around 9/7/2022) for Follow up.    ASSESSED PROBLEMS:    See above      CHIEF COMPLAINT:  Follow-up type 2 diabetes, primary hyperparathyroidism, and other concerns    HISTORY OF PRESENT ILLNESS:  Frida is a 81 year old female seen for follow-up of type 2 diabetes, primary hyperparathyroidism, and other concerns.  She reports that overall she has felt well since last visit.  Weight has declined to 168 pounds.  She is 172 at last visit and had a height of over 200.  She has been intentionally working on diet.    She restarted metformin after a hemoglobin A1c of 8.0 at last visit.  She tolerates 500 mg daily acceptably.  She previously had problems with diarrhea on higher doses.    She reports blood pressure has been acceptable when checked outside the clinic    She remains on atorvastatin for hyperlipidemia.    She has on Arimidex for breast cancer.  Her recent visit with Dr. Peng and oncology visits were reviewed    She has a pacemaker.  She is on sotalol for paroxysmal atrial fibs.  Monitoring of the pacemaker revealed atrial fibs less than 1% of the time.  Her previous echocardiogram was reviewed.  Aortic stenosis is regarded as mild    She has a history of primary hyperparathyroidism.  She has not had any surgical intervention for this.  Bone density study from 4/21 revealed a lowest T score of -1.4.  She would prefer to avoid surgical  intervention if possible.    REVIEW OF SYSTEMS:  See above  Comprehensive review of systems is otherwise negative.    PFSH:  Lives independently.  Has been making [employer]agged[employer] dolls    TOBACCO USE:  History   Smoking Status     Former Smoker   Smokeless Tobacco     Never Used     Comment: quit years ago       VITALS:  Vitals:    06/07/22 1004   BP: (!) 143/65       132/62   BP Location: Left arm        left arm   Patient Position: Sitting            sitting, by MD   Cuff Size: Adult Large   Pulse: 72   SpO2: 98%   Weight: 76.5 kg (168 lb 11.2 oz)     Wt Readings from Last 3 Encounters:   06/07/22 76.5 kg (168 lb 11.2 oz)   05/24/22 75.3 kg (166 lb)   02/07/22 77.7 kg (171 lb 3.2 oz)       PHYSICAL EXAM:  Constitutional:   Reveals an alert pleasant healthy-appearing woman with thinning hair.  She does not seem in acute distress.  Vitals: per nursing notes.  HEENT: Atraumatic  Eyes: No conjunctival hyperemia  Neck:  Supple, no carotid bruits or adenopathy.  Back:  No spine or CVA pain.  Thorax:  No bony deformities.  Lungs: Clear to A&P without rales or wheezes.  Respiratory effort normal.  Cardiac:   Regular rate and rhythm, normal S1, S2, 1/6 systolic murmur at aortic area  Abdomen:  Soft, active bowel sounds without bruits, mass, or tenderness.  Extremities:   No peripheral edema,    Skin:  No jaundice, peripheral cyanosis or lesions to suggest malignancy.  Neuro:  Alert and oriented.   No gross focal deficits.  Psychiatric:  Memory intact, mood appropriate.    DATA REVIEWED:  Additional History from Old Records Summarized (2): Cardiology and oncology notes reviewed  Decision to Obtain Records (1): None.   Radiology Tests Summarized or Ordered (1): DEXA scan reviewed  Labs Reviewed or Ordered (1): Labs reviewed and ordered  Medicine Test Summarized or Ordered (1): Previous echocardiogram reviewed  Independent Review of EKG or X-RAY(2 each): None.    The visit lasted a total of 30 minutes face to face with the  patient. Over 50% of the time was spent counseling and educating the patient about management of type 2 diabetes, essential hypertension, and other concerns      Dragon dictation was used for this note. Speech recognition errors are a possibility.     MEDICATIONS:  Current Outpatient Medications   Medication Sig Dispense Refill     allopurinol (ZYLOPRIM) 300 MG tablet [ALLOPURINOL (ZYLOPRIM) 300 MG TABLET] Take 1 tablet by mouth once daily. 90 tablet 3     amLODIPine (NORVASC) 2.5 MG tablet Take 1 tablet (2.5 mg) by mouth daily 90 tablet 3     anastrozole (ARIMIDEX) 1 MG tablet Take 1 tablet (1 mg total) by mouth daily. 90 tablet 3     atorvastatin (LIPITOR) 10 MG tablet Take 1 tablet (10 mg) by mouth daily 90 tablet 3     blood glucose test strips [BLOOD GLUCOSE TEST STRIPS] Use 1 each As Directed daily. 100 strip 3     cholecalciferol, vitamin D3, 1,000 unit tablet [CHOLECALCIFEROL, VITAMIN D3, 1,000 UNIT TABLET] Take 1,000 Units by mouth daily.       cloNIDine (CATAPRES) 0.1 MG tablet Take 1 tablet (0.1 mg) by mouth 2 times daily 180 tablet 3     furosemide (LASIX) 40 MG tablet Take 1 tablet (40 mg) by mouth daily 90 tablet 3     losartan (COZAAR) 100 MG tablet Take 1 tablet (100 mg) by mouth daily 90 tablet 3     magnesium oxide (MAG-OX) 400 mg tablet [MAGNESIUM OXIDE (MAG-OX) 400 MG TABLET] Take 400 mg by mouth daily.        metFORMIN (GLUCOPHAGE XR) 500 MG 24 hr tablet 500 mg daily (with dinner)       peg 400-propylene glycol (SYSTANE) 0.4-0.3 % Drop [-PROPYLENE GLYCOL (SYSTANE) 0.4-0.3 % DROP] Place 1 Drop into both eyes 2 times daily if needed for Dry Eyes. Indications: DRY EYE       sotalol (BETAPACE) 80 MG tablet Take 1 tablet (80 mg total) by mouth daily. 90 tablet 1     warfarin ANTICOAGULANT (COUMADIN) 2.5 MG tablet [WARFARIN ANTICOAGULANT (COUMADIN/JANTOVEN) 2.5 MG TABLET] Take 1/2-1 tablet (1.25-2.5 mg) by mouth daily. Adjust dose based on INR results as directed. (Patient taking differently:  0.5mg on mondays, the other days 2.5mg) 90 tablet 3

## 2022-06-14 ENCOUNTER — LAB (OUTPATIENT)
Dept: LAB | Facility: CLINIC | Age: 82
End: 2022-06-14
Payer: MEDICARE

## 2022-06-14 ENCOUNTER — ANTICOAGULATION THERAPY VISIT (OUTPATIENT)
Dept: ANTICOAGULATION | Facility: CLINIC | Age: 82
End: 2022-06-14

## 2022-06-14 DIAGNOSIS — I48.0 PAROXYSMAL ATRIAL FIBRILLATION (H): ICD-10-CM

## 2022-06-14 DIAGNOSIS — I48.0 PAROXYSMAL ATRIAL FIBRILLATION (H): Primary | ICD-10-CM

## 2022-06-14 LAB — INR BLD: 2.7 (ref 0.9–1.1)

## 2022-06-14 PROCEDURE — 85610 PROTHROMBIN TIME: CPT

## 2022-06-14 PROCEDURE — 36415 COLL VENOUS BLD VENIPUNCTURE: CPT

## 2022-06-14 NOTE — PROGRESS NOTES
ANTICOAGULATION MANAGEMENT     Angeles Can 81 year old female is on warfarin with therapeutic INR result. (Goal INR 2.0-3.0)    Recent labs: (last 7 days)     06/14/22  1312   INR 2.7*       ASSESSMENT       Source(s): Chart Review and Patient/Caregiver Call       Warfarin doses taken: Warfarin taken as instructed    Diet: No new diet changes identified    New illness, injury, or hospitalization: No    Medication/supplement changes: None noted    Signs or symptoms of bleeding or clotting: No    Previous INR: Therapeutic last visit; previously outside of goal range    Additional findings: None       PLAN     Recommended plan for no diet, medication or health factor changes affecting INR     Dosing Instructions: continue your current warfarin dose with next INR in 4 weeks       Summary  As of 6/14/2022    Full warfarin instructions:  1.25 mg every Mon, Thu; 2.5 mg all other days   Next INR check:  7/12/2022             Telephone call with Frida who verbalizes understanding and agrees to plan    Lab visit scheduled    Education provided: None required    Plan made per [facility] anticoagulation protocol    Reyna Radford RN  Anticoagulation Clinic  6/14/2022    _______________________________________________________________________     Anticoagulation Episode Summary     Current INR goal:  2.0-3.0   TTR:  79.4 % (1 y)   Target end date:  Indefinite   Send INR reminders to:  [facility]    Indications    Paroxysmal atrial fibrillation (H) [I48.0]           Comments:  PAF         Anticoagulation Care Providers     Provider Role Specialty Phone number    Freddy Lubin MD Referring Internal Medicine [phone]

## 2022-07-12 ENCOUNTER — ANTICOAGULATION THERAPY VISIT (OUTPATIENT)
Dept: ANTICOAGULATION | Facility: CLINIC | Age: 82
End: 2022-07-12

## 2022-07-12 ENCOUNTER — LAB (OUTPATIENT)
Dept: LAB | Facility: CLINIC | Age: 82
End: 2022-07-12
Payer: MEDICARE

## 2022-07-12 ENCOUNTER — DOCUMENTATION ONLY (OUTPATIENT)
Dept: ANTICOAGULATION | Facility: CLINIC | Age: 82
End: 2022-07-12

## 2022-07-12 DIAGNOSIS — I48.0 PAROXYSMAL ATRIAL FIBRILLATION (H): Primary | ICD-10-CM

## 2022-07-12 DIAGNOSIS — I48.0 PAROXYSMAL ATRIAL FIBRILLATION (H): ICD-10-CM

## 2022-07-12 LAB — INR BLD: 3.2 (ref 0.9–1.1)

## 2022-07-12 PROCEDURE — 85610 PROTHROMBIN TIME: CPT

## 2022-07-12 PROCEDURE — 36415 COLL VENOUS BLD VENIPUNCTURE: CPT

## 2022-07-12 NOTE — PROGRESS NOTES
ANTICOAGULATION CLINIC REFERRAL RENEWAL REQUEST       An annual renewal order is required for all patients referred to [facility] Anticoagulation [facility].?  Please review and sign the pended referral order for Angeles Can.       ANTICOAGULATION SUMMARY      Warfarin indication(s)   Atrial Fibrillation    Mechanical heart valve present?  NO       Current goal range   INR: 2.0-3.0     Goal appropriate for indication? Goal INR 2-3, standard for indication(s) above     Time in Therapeutic Range (TTR)  (Goal > 60%) 76.4%       Office visit with referring provider's group within last year yes on 2/7/22       Reyna Radford RN  [facility] Anticoagulation [facility]

## 2022-07-12 NOTE — PROGRESS NOTES
ANTICOAGULATION MANAGEMENT     Angeles Can 81 year old female is on warfarin with supratherapeutic INR result. (Goal INR 2.0-3.0)    Recent labs: (last 7 days)     07/12/22  1130   INR 3.2*       ASSESSMENT       Source(s): Chart Review and Template       Warfarin doses taken: Warfarin taken as instructed    Diet: No new diet changes identified    New illness, injury, or hospitalization: No    Medication/supplement changes: None noted    Signs or symptoms of bleeding or clotting: No    Previous INR: Therapeutic last 2(+) visits trending up    Additional findings: None       PLAN     Recommended plan for no diet, medication or health factor changes affecting INR     Dosing Instructions: decrease your warfarin dose (8.3% change) with next INR in 2 weeks       Summary  As of 7/12/2022    Full warfarin instructions:  1.25 mg every Mon, Thu, Sat; 2.5 mg all other days   Next INR check:  7/26/2022             Detailed voice message left for Frida with dosing instructions and follow up date.     Contact [phone] to schedule and with any changes, questions or concerns.     Education provided: None required    Plan made per [facility] anticoagulation protocol    Reyna Radford RN  Anticoagulation Clinic  7/12/2022    _______________________________________________________________________     Anticoagulation Episode Summary     Current INR goal:  2.0-3.0   TTR:  76.4 % (1 y)   Target end date:  Indefinite   Send INR reminders to:  [facility]    Indications    Paroxysmal atrial fibrillation (H) [I48.0]           Comments:  PAF         Anticoagulation Care Providers     Provider Role Specialty Phone number    Freddy Lubin MD Referring Internal Medicine [phone]

## 2022-07-13 ENCOUNTER — TELEPHONE (OUTPATIENT)
Dept: INTERNAL MEDICINE | Facility: CLINIC | Age: 82
End: 2022-07-13

## 2022-07-13 DIAGNOSIS — I48.0 PAROXYSMAL ATRIAL FIBRILLATION (H): Primary | ICD-10-CM

## 2022-07-13 NOTE — TELEPHONE ENCOUNTER
Spoke with Frida, reviewed plan and scheduled inr for when she returns from [facility]

## 2022-07-13 NOTE — TELEPHONE ENCOUNTER
Reason for Call:  INR    Who is calling?  Patient     Phone number:  [phone]    Fax number:      Name of caller: Angeles Can     INR Value:      Are there any other concerns:  Yes: Would like Reyna to call her     Can we leave a detailed message on this number? YES    Phone number patient can be reached at: Home number on file [phone] (home)      Call taken on 7/13/2022 at 1:16 PM by Kat Pa

## 2022-08-11 ENCOUNTER — TELEPHONE (OUTPATIENT)
Dept: ANTICOAGULATION | Facility: CLINIC | Age: 82
End: 2022-08-11

## 2022-08-11 NOTE — TELEPHONE ENCOUNTER
ANTICOAGULATION     Angeles Can is overdue for INR check.      Spoke with Frida and scheduled lab appointment on 8/15    Angelica Aguilera RN

## 2022-08-15 ENCOUNTER — ANTICOAGULATION THERAPY VISIT (OUTPATIENT)
Dept: ANTICOAGULATION | Facility: CLINIC | Age: 82
End: 2022-08-15

## 2022-08-15 ENCOUNTER — LAB (OUTPATIENT)
Dept: LAB | Facility: CLINIC | Age: 82
End: 2022-08-15
Payer: MEDICARE

## 2022-08-15 DIAGNOSIS — I48.0 PAROXYSMAL ATRIAL FIBRILLATION (H): ICD-10-CM

## 2022-08-15 DIAGNOSIS — I48.0 PAROXYSMAL ATRIAL FIBRILLATION (H): Primary | ICD-10-CM

## 2022-08-15 LAB — INR BLD: 2.2 (ref 0.9–1.1)

## 2022-08-15 PROCEDURE — 36415 COLL VENOUS BLD VENIPUNCTURE: CPT

## 2022-08-15 PROCEDURE — 85610 PROTHROMBIN TIME: CPT

## 2022-08-15 NOTE — PROGRESS NOTES
ANTICOAGULATION MANAGEMENT     Angeles Can 81 year old female is on warfarin with therapeutic INR result. (Goal INR 2.0-3.0)    Recent labs: (last 7 days)     08/15/22  1140   INR 2.2*       ASSESSMENT       Source(s): Chart Review and Template       Warfarin doses taken: Warfarin taken as instructed    Diet: No new diet changes identified    New illness, injury, or hospitalization: No    Medication/supplement changes: None noted    Signs or symptoms of bleeding or clotting: No    Previous INR: Supratherapeutic    Additional findings: None       PLAN     Recommended plan for no diet, medication or health factor changes affecting INR     Dosing Instructions: Continue your current warfarin dose with next INR in 4 weeks       Summary  As of 8/15/2022    Full warfarin instructions:  1.25 mg every Mon, Thu, Sat; 2.5 mg all other days   Next INR check:  9/12/2022             Detailed voice message left for Frida with dosing instructions and follow up date.     Contact [phone] to schedule and with any changes, questions or concerns.     Education provided: None required    Plan made per [facility] anticoagulation protocol    Reyna Radford RN  Anticoagulation Clinic  8/15/2022    _______________________________________________________________________     Anticoagulation Episode Summary     Current INR goal:  2.0-3.0   TTR:  78.3 % (1 y)   Target end date:  Indefinite   Send INR reminders to:  [facility]    Indications    Paroxysmal atrial fibrillation (H) [I48.0]           Comments:  PAF         Anticoagulation Care Providers     Provider Role Specialty Phone number    Freddy Lubin MD Referring Internal Medicine [phone]

## 2022-08-16 ENCOUNTER — HOSPITAL ENCOUNTER (OUTPATIENT)
Facility: CLINIC | Age: 82
Setting detail: OBSERVATION
Discharge: HOME | End: 2022-08-18
Attending: EMERGENCY MEDICINE | Admitting: STUDENT IN AN ORGANIZED HEALTH CARE EDUCATION/TRAINING PROGRAM
Payer: MEDICARE

## 2022-08-16 ENCOUNTER — APPOINTMENT (OUTPATIENT)
Dept: CT IMAGING | Facility: CLINIC | Age: 82
End: 2022-08-16
Attending: EMERGENCY MEDICINE
Payer: MEDICARE

## 2022-08-16 DIAGNOSIS — Z95.0 MRI SAFE CARDIAC PACEMAKER IN SITU: ICD-10-CM

## 2022-08-16 DIAGNOSIS — E78.00 HYPERCHOLESTEROLEMIA: Primary | ICD-10-CM

## 2022-08-16 DIAGNOSIS — G45.9 TIA (TRANSIENT ISCHEMIC ATTACK): ICD-10-CM

## 2022-08-16 DIAGNOSIS — I16.0 HYPERTENSIVE URGENCY: ICD-10-CM

## 2022-08-16 LAB
ANION GAP SERPL CALCULATED.3IONS-SCNC: 11 MMOL/L (ref 5–18)
BASOPHILS # BLD AUTO: 0 10E3/UL (ref 0–0.2)
BASOPHILS NFR BLD AUTO: 0 %
BUN SERPL-MCNC: 38 MG/DL (ref 8–28)
CALCIUM SERPL-MCNC: 11.3 MG/DL (ref 8.5–10.5)
CHLORIDE BLD-SCNC: 101 MMOL/L (ref 98–107)
CHOLEST SERPL-MCNC: 177 MG/DL
CO2 SERPL-SCNC: 27 MMOL/L (ref 22–31)
CREAT SERPL-MCNC: 1.36 MG/DL (ref 0.6–1.1)
D DIMER PPP FEU-MCNC: 0.49 UG/ML FEU (ref 0–0.5)
EOSINOPHIL # BLD AUTO: 0.1 10E3/UL (ref 0–0.7)
EOSINOPHIL NFR BLD AUTO: 1 %
ERYTHROCYTE [DISTWIDTH] IN BLOOD BY AUTOMATED COUNT: 14.1 % (ref 10–15)
FIBRINOGEN PPP-MCNC: 465 MG/DL (ref 170–490)
GFR SERPL CREATININE-BSD FRML MDRD: 38 ML/MIN/1.73M2
GLUCOSE BLD-MCNC: 132 MG/DL (ref 70–125)
HCT VFR BLD AUTO: 40.1 % (ref 35–47)
HDLC SERPL-MCNC: 58 MG/DL
HGB BLD-MCNC: 13.6 G/DL (ref 11.7–15.7)
HOLD SPECIMEN: NORMAL
IMM GRANULOCYTES # BLD: 0.1 10E3/UL
IMM GRANULOCYTES NFR BLD: 0 %
INR PPP: 2.07 (ref 0.85–1.15)
LDLC SERPL CALC-MCNC: 86 MG/DL
LYMPHOCYTES # BLD AUTO: 2.1 10E3/UL (ref 0.8–5.3)
LYMPHOCYTES NFR BLD AUTO: 19 %
MAGNESIUM SERPL-MCNC: 1.5 MG/DL (ref 1.8–2.6)
MCH RBC QN AUTO: 31 PG (ref 26.5–33)
MCHC RBC AUTO-ENTMCNC: 33.9 G/DL (ref 31.5–36.5)
MCV RBC AUTO: 91 FL (ref 78–100)
MONOCYTES # BLD AUTO: 0.6 10E3/UL (ref 0–1.3)
MONOCYTES NFR BLD AUTO: 5 %
NEUTROPHILS # BLD AUTO: 8.3 10E3/UL (ref 1.6–8.3)
NEUTROPHILS NFR BLD AUTO: 75 %
NRBC # BLD AUTO: 0 10E3/UL
NRBC BLD AUTO-RTO: 0 /100
PLATELET # BLD AUTO: 265 10E3/UL (ref 150–450)
POTASSIUM BLD-SCNC: 4.5 MMOL/L (ref 3.5–5)
RBC # BLD AUTO: 4.39 10E6/UL (ref 3.8–5.2)
SARS-COV-2 RNA RESP QL NAA+PROBE: POSITIVE
SODIUM SERPL-SCNC: 139 MMOL/L (ref 136–145)
TRIGL SERPL-MCNC: 163 MG/DL
TROPONIN I SERPL-MCNC: <0.01 NG/ML (ref 0–0.29)
WBC # BLD AUTO: 11.3 10E3/UL (ref 4–11)

## 2022-08-16 PROCEDURE — 83036 HEMOGLOBIN GLYCOSYLATED A1C: CPT

## 2022-08-16 PROCEDURE — 70498 CT ANGIOGRAPHY NECK: CPT | Mod: MG

## 2022-08-16 PROCEDURE — 250N000013 HC RX MED GY IP 250 OP 250 PS 637: Performed by: EMERGENCY MEDICINE

## 2022-08-16 PROCEDURE — 36415 COLL VENOUS BLD VENIPUNCTURE: CPT | Performed by: EMERGENCY MEDICINE

## 2022-08-16 PROCEDURE — 80048 BASIC METABOLIC PNL TOTAL CA: CPT | Performed by: EMERGENCY MEDICINE

## 2022-08-16 PROCEDURE — C9803 HOPD COVID-19 SPEC COLLECT: HCPCS

## 2022-08-16 PROCEDURE — 250N000011 HC RX IP 250 OP 636: Performed by: EMERGENCY MEDICINE

## 2022-08-16 PROCEDURE — 999N000285 HC STATISTIC VASC ACCESS LAB DRAW WITH PIV START

## 2022-08-16 PROCEDURE — 999N000127 HC STATISTIC PERIPHERAL IV START W US GUIDANCE

## 2022-08-16 PROCEDURE — 83735 ASSAY OF MAGNESIUM: CPT | Performed by: EMERGENCY MEDICINE

## 2022-08-16 PROCEDURE — 85379 FIBRIN DEGRADATION QUANT: CPT

## 2022-08-16 PROCEDURE — 258N000003 HC RX IP 258 OP 636

## 2022-08-16 PROCEDURE — G0378 HOSPITAL OBSERVATION PER HR: HCPCS

## 2022-08-16 PROCEDURE — U0005 INFEC AGEN DETEC AMPLI PROBE: HCPCS | Performed by: EMERGENCY MEDICINE

## 2022-08-16 PROCEDURE — 85384 FIBRINOGEN ACTIVITY: CPT

## 2022-08-16 PROCEDURE — 85014 HEMATOCRIT: CPT | Performed by: EMERGENCY MEDICINE

## 2022-08-16 PROCEDURE — 99285 EMERGENCY DEPT VISIT HI MDM: CPT | Mod: 25

## 2022-08-16 PROCEDURE — 85610 PROTHROMBIN TIME: CPT | Performed by: EMERGENCY MEDICINE

## 2022-08-16 PROCEDURE — 999N000203 HC STATISTICAL VASC ACCESS NURSE TIME, 16-31 MINUTES

## 2022-08-16 PROCEDURE — 84484 ASSAY OF TROPONIN QUANT: CPT | Performed by: EMERGENCY MEDICINE

## 2022-08-16 PROCEDURE — 80061 LIPID PANEL: CPT

## 2022-08-16 PROCEDURE — 93005 ELECTROCARDIOGRAM TRACING: CPT | Performed by: EMERGENCY MEDICINE

## 2022-08-16 PROCEDURE — G1010 CDSM STANSON: HCPCS

## 2022-08-16 RX ORDER — ALLOPURINOL 300 MG/1
300 TABLET ORAL DAILY
Status: DISCONTINUED | OUTPATIENT
Start: 2022-08-17 | End: 2022-08-18 | Stop reason: HOSPADM

## 2022-08-16 RX ORDER — WARFARIN SODIUM 2.5 MG/1
2.5 TABLET ORAL ONCE
Status: DISCONTINUED | OUTPATIENT
Start: 2022-08-17 | End: 2022-08-16

## 2022-08-16 RX ORDER — ACETAMINOPHEN 650 MG/1
650 SUPPOSITORY RECTAL EVERY 4 HOURS PRN
Status: DISCONTINUED | OUTPATIENT
Start: 2022-08-16 | End: 2022-08-18 | Stop reason: HOSPADM

## 2022-08-16 RX ORDER — MAGNESIUM OXIDE 400 MG/1
400 TABLET ORAL ONCE
Status: COMPLETED | OUTPATIENT
Start: 2022-08-16 | End: 2022-08-16

## 2022-08-16 RX ORDER — LABETALOL HYDROCHLORIDE 5 MG/ML
10-40 INJECTION, SOLUTION INTRAVENOUS EVERY 10 MIN PRN
Status: DISCONTINUED | OUTPATIENT
Start: 2022-08-16 | End: 2022-08-18 | Stop reason: HOSPADM

## 2022-08-16 RX ORDER — WARFARIN SODIUM 2.5 MG/1
2.5 TABLET ORAL
Status: DISCONTINUED | OUTPATIENT
Start: 2022-08-17 | End: 2022-08-17

## 2022-08-16 RX ORDER — LOSARTAN POTASSIUM 50 MG/1
100 TABLET ORAL DAILY
Status: DISCONTINUED | OUTPATIENT
Start: 2022-08-17 | End: 2022-08-18 | Stop reason: HOSPADM

## 2022-08-16 RX ORDER — CLONIDINE HYDROCHLORIDE 0.1 MG/1
0.1 TABLET ORAL 2 TIMES DAILY
Status: DISCONTINUED | OUTPATIENT
Start: 2022-08-16 | End: 2022-08-17

## 2022-08-16 RX ORDER — ANASTROZOLE 1 MG/1
1 TABLET ORAL DAILY
Status: DISCONTINUED | OUTPATIENT
Start: 2022-08-17 | End: 2022-08-18 | Stop reason: HOSPADM

## 2022-08-16 RX ORDER — ACETAMINOPHEN 325 MG/1
650 TABLET ORAL EVERY 4 HOURS PRN
Status: DISCONTINUED | OUTPATIENT
Start: 2022-08-16 | End: 2022-08-18 | Stop reason: HOSPADM

## 2022-08-16 RX ORDER — SODIUM CHLORIDE 9 MG/ML
INJECTION, SOLUTION INTRAVENOUS CONTINUOUS
Status: DISCONTINUED | OUTPATIENT
Start: 2022-08-16 | End: 2022-08-17

## 2022-08-16 RX ORDER — ATORVASTATIN CALCIUM 10 MG/1
10 TABLET, FILM COATED ORAL DAILY
Status: DISCONTINUED | OUTPATIENT
Start: 2022-08-17 | End: 2022-08-17

## 2022-08-16 RX ORDER — IOPAMIDOL 755 MG/ML
100 INJECTION, SOLUTION INTRAVASCULAR ONCE
Status: COMPLETED | OUTPATIENT
Start: 2022-08-16 | End: 2022-08-16

## 2022-08-16 RX ORDER — SOTALOL HYDROCHLORIDE 80 MG/1
80 TABLET ORAL DAILY
Status: DISCONTINUED | OUTPATIENT
Start: 2022-08-17 | End: 2022-08-17

## 2022-08-16 RX ORDER — AMLODIPINE BESYLATE 2.5 MG/1
2.5 TABLET ORAL DAILY
Status: DISCONTINUED | OUTPATIENT
Start: 2022-08-17 | End: 2022-08-18 | Stop reason: HOSPADM

## 2022-08-16 RX ORDER — FUROSEMIDE 20 MG
40 TABLET ORAL DAILY
Status: DISCONTINUED | OUTPATIENT
Start: 2022-08-17 | End: 2022-08-17

## 2022-08-16 RX ADMIN — Medication 400 MG: at 21:25

## 2022-08-16 RX ADMIN — SODIUM CHLORIDE: 9 INJECTION, SOLUTION INTRAVENOUS at 23:46

## 2022-08-16 RX ADMIN — IOPAMIDOL 100 ML: 755 INJECTION, SOLUTION INTRAVENOUS at 19:19

## 2022-08-16 ASSESSMENT — ENCOUNTER SYMPTOMS
FACIAL ASYMMETRY: 0
NUMBNESS: 0
WOUND: 0
STRIDOR: 0
CONSTIPATION: 0
BACK PAIN: 0
DIARRHEA: 0
CONFUSION: 0
DYSURIA: 0
BRUISES/BLEEDS EASILY: 1
FEVER: 0
HEADACHES: 0

## 2022-08-16 ASSESSMENT — ACTIVITIES OF DAILY LIVING (ADL)
DEPENDENT_IADLS:: INDEPENDENT
ADLS_ACUITY_SCORE: 22
ADLS_ACUITY_SCORE: 35
ADLS_ACUITY_SCORE: 35

## 2022-08-16 NOTE — ED TRIAGE NOTES
"Patient reports her left leg \"went out\" for a few seconds around 1pm. Legs are back to baseline. Denies other symptoms. HX for TIA a few years ago that affected her speech       "

## 2022-08-16 NOTE — PHARMACY-ADMISSION MEDICATION HISTORY
Pharmacy Note - Admission Medication History    Pertinent Provider Information: None     ______________________________________________________________________    Prior To Admission (PTA) med list completed and updated in EMR.       PTA Med List   Medication Sig Note Last Dose     allopurinol (ZYLOPRIM) 300 MG tablet [ALLOPURINOL (ZYLOPRIM) 300 MG TABLET] Take 1 tablet by mouth once daily.  8/15/2022 at Bedtime     amLODIPine (NORVASC) 2.5 MG tablet Take 1 tablet (2.5 mg) by mouth daily  8/15/2022 at Bedtime     anastrozole (ARIMIDEX) 1 MG tablet Take 1 tablet (1 mg total) by mouth daily.  8/16/2022 at Unknown time     atorvastatin (LIPITOR) 10 MG tablet Take 1 tablet (10 mg) by mouth daily  8/15/2022 at Bedtime     cholecalciferol, vitamin D3, 1,000 unit tablet [CHOLECALCIFEROL, VITAMIN D3, 1,000 UNIT TABLET] Take 1,000 Units by mouth daily.  8/15/2022 at Bedtime     cloNIDine (CATAPRES) 0.1 MG tablet Take 1 tablet (0.1 mg) by mouth 2 times daily  8/16/2022 at x 1     furosemide (LASIX) 40 MG tablet Take 1 tablet (40 mg) by mouth daily  8/16/2022 at Unknown time     losartan (COZAAR) 100 MG tablet Take 1 tablet (100 mg) by mouth daily  8/15/2022 at Bedtime     magnesium oxide (MAG-OX) 400 mg tablet [MAGNESIUM OXIDE (MAG-OX) 400 MG TABLET] Take 400 mg by mouth daily.   8/15/2022 at Bedtime     metFORMIN (GLUCOPHAGE XR) 500 MG 24 hr tablet 500 mg daily (with dinner)  8/15/2022 at Bedtime     peg 400-propylene glycol (SYSTANE) 0.4-0.3 % Drop [-PROPYLENE GLYCOL (SYSTANE) 0.4-0.3 % DROP] Place 1 Drop into both eyes 2 times daily if needed for Dry Eyes. Indications: DRY EYE  Past Week at Unknown time     sotalol (BETAPACE) 80 MG tablet Take 1 tablet (80 mg total) by mouth daily.  8/16/2022 at Unknown time     warfarin ANTICOAGULANT (COUMADIN) 2.5 MG tablet [WARFARIN ANTICOAGULANT (COUMADIN/JANTOVEN) 2.5 MG TABLET] Take 1/2-1 tablet (1.25-2.5 mg) by mouth daily. Adjust dose based on INR results as directed.  (Patient taking differently: No sig reported) 8/16/2022: Take 1.25mg Mon/Thurs/Sat and 2.5mg rest of week 8/15/2022 at Unknown time       Information source(s): Patient, Patient's pharmacy and Clinic records  Method of interview communication: in-person    Summary of Changes to PTA Med List  New: None  Discontinued: None  Changed: None    Patient was asked about OTC/herbal products specifically.  PTA med list reflects this.    In the past week, patient estimated taking medication this percent of the time:  greater than 90%.    Allergies were reviewed, assessed, and updated with the patient.      Patient does not use any multi-dose medications prior to admission.    The information provided in this note is only as accurate as the sources available at the time of the update(s).    Thank you for the opportunity to participate in the care of this patient.    Ab Moore [facility]  8/16/2022 6:39 PM

## 2022-08-17 ENCOUNTER — APPOINTMENT (OUTPATIENT)
Dept: OCCUPATIONAL THERAPY | Facility: CLINIC | Age: 82
End: 2022-08-17
Payer: MEDICARE

## 2022-08-17 ENCOUNTER — APPOINTMENT (OUTPATIENT)
Dept: PHYSICAL THERAPY | Facility: CLINIC | Age: 82
End: 2022-08-17
Payer: MEDICARE

## 2022-08-17 ENCOUNTER — DOCUMENTATION ONLY (OUTPATIENT)
Dept: CARDIOLOGY | Facility: CLINIC | Age: 82
End: 2022-08-17

## 2022-08-17 ENCOUNTER — APPOINTMENT (OUTPATIENT)
Dept: CARDIOLOGY | Facility: CLINIC | Age: 82
End: 2022-08-17
Attending: PHYSICIAN ASSISTANT
Payer: MEDICARE

## 2022-08-17 LAB
ATRIAL RATE - MUSE: 70 BPM
DIASTOLIC BLOOD PRESSURE - MUSE: NORMAL MMHG
GLUCOSE BLDC GLUCOMTR-MCNC: 157 MG/DL (ref 70–99)
GLUCOSE BLDC GLUCOMTR-MCNC: 209 MG/DL (ref 70–99)
GLUCOSE BLDC GLUCOMTR-MCNC: 233 MG/DL (ref 70–99)
HBA1C MFR BLD: 6.8 %
HOLD SPECIMEN: NORMAL
INR PPP: 1.83 (ref 0.85–1.15)
INTERPRETATION ECG - MUSE: NORMAL
MAGNESIUM SERPL-MCNC: 1.4 MG/DL (ref 1.8–2.6)
P AXIS - MUSE: 47 DEGREES
PR INTERVAL - MUSE: 108 MS
QRS DURATION - MUSE: 84 MS
QT - MUSE: 416 MS
QTC - MUSE: 449 MS
R AXIS - MUSE: 68 DEGREES
SYSTOLIC BLOOD PRESSURE - MUSE: NORMAL MMHG
T AXIS - MUSE: 5 DEGREES
VENTRICULAR RATE- MUSE: 70 BPM

## 2022-08-17 PROCEDURE — 250N000013 HC RX MED GY IP 250 OP 250 PS 637: Performed by: STUDENT IN AN ORGANIZED HEALTH CARE EDUCATION/TRAINING PROGRAM

## 2022-08-17 PROCEDURE — G0425 INPT/ED TELECONSULT30: HCPCS | Mod: G0 | Performed by: PHYSICIAN ASSISTANT

## 2022-08-17 PROCEDURE — 96361 HYDRATE IV INFUSION ADD-ON: CPT | Mod: XU

## 2022-08-17 PROCEDURE — 97161 PT EVAL LOW COMPLEX 20 MIN: CPT | Mod: GP

## 2022-08-17 PROCEDURE — 97535 SELF CARE MNGMENT TRAINING: CPT | Mod: GO

## 2022-08-17 PROCEDURE — 82962 GLUCOSE BLOOD TEST: CPT

## 2022-08-17 PROCEDURE — 96374 THER/PROPH/DIAG INJ IV PUSH: CPT | Mod: XU

## 2022-08-17 PROCEDURE — G0378 HOSPITAL OBSERVATION PER HR: HCPCS

## 2022-08-17 PROCEDURE — 96375 TX/PRO/DX INJ NEW DRUG ADDON: CPT | Mod: XU

## 2022-08-17 PROCEDURE — 36415 COLL VENOUS BLD VENIPUNCTURE: CPT

## 2022-08-17 PROCEDURE — 99219 PR INITIAL OBSERVATION CARE,LEVEL II: CPT | Mod: GC

## 2022-08-17 PROCEDURE — 999N000208 ECHOCARDIOGRAM COMPLETE

## 2022-08-17 PROCEDURE — 36415 COLL VENOUS BLD VENIPUNCTURE: CPT | Performed by: STUDENT IN AN ORGANIZED HEALTH CARE EDUCATION/TRAINING PROGRAM

## 2022-08-17 PROCEDURE — 83735 ASSAY OF MAGNESIUM: CPT | Performed by: STUDENT IN AN ORGANIZED HEALTH CARE EDUCATION/TRAINING PROGRAM

## 2022-08-17 PROCEDURE — 85610 PROTHROMBIN TIME: CPT

## 2022-08-17 PROCEDURE — 97165 OT EVAL LOW COMPLEX 30 MIN: CPT | Mod: GO

## 2022-08-17 PROCEDURE — 255N000002 HC RX 255 OP 636: Performed by: STUDENT IN AN ORGANIZED HEALTH CARE EDUCATION/TRAINING PROGRAM

## 2022-08-17 PROCEDURE — 93306 TTE W/DOPPLER COMPLETE: CPT | Mod: 26 | Performed by: INTERNAL MEDICINE

## 2022-08-17 PROCEDURE — 250N000013 HC RX MED GY IP 250 OP 250 PS 637

## 2022-08-17 RX ORDER — WARFARIN SODIUM 2.5 MG/1
2.5 TABLET ORAL
Status: COMPLETED | OUTPATIENT
Start: 2022-08-17 | End: 2022-08-17

## 2022-08-17 RX ORDER — FLUTICASONE PROPIONATE 50 MCG
1 SPRAY, SUSPENSION (ML) NASAL DAILY PRN
Status: DISCONTINUED | OUTPATIENT
Start: 2022-08-17 | End: 2022-08-18 | Stop reason: HOSPADM

## 2022-08-17 RX ORDER — METFORMIN HCL 500 MG
500 TABLET, EXTENDED RELEASE 24 HR ORAL
Status: DISCONTINUED | OUTPATIENT
Start: 2022-08-17 | End: 2022-08-18 | Stop reason: HOSPADM

## 2022-08-17 RX ORDER — SOTALOL HYDROCHLORIDE 80 MG/1
80 TABLET ORAL DAILY
Status: DISCONTINUED | OUTPATIENT
Start: 2022-08-17 | End: 2022-08-18 | Stop reason: HOSPADM

## 2022-08-17 RX ORDER — CLONIDINE HYDROCHLORIDE 0.1 MG/1
0.1 TABLET ORAL 2 TIMES DAILY
Status: DISCONTINUED | OUTPATIENT
Start: 2022-08-17 | End: 2022-08-18 | Stop reason: HOSPADM

## 2022-08-17 RX ORDER — FUROSEMIDE 40 MG
40 TABLET ORAL DAILY
Status: DISCONTINUED | OUTPATIENT
Start: 2022-08-17 | End: 2022-08-18 | Stop reason: HOSPADM

## 2022-08-17 RX ORDER — ATORVASTATIN CALCIUM 10 MG/1
20 TABLET, FILM COATED ORAL DAILY
Status: DISCONTINUED | OUTPATIENT
Start: 2022-08-18 | End: 2022-08-18 | Stop reason: HOSPADM

## 2022-08-17 RX ADMIN — SOTALOL HYDROCHLORIDE 80 MG: 80 TABLET ORAL at 14:24

## 2022-08-17 RX ADMIN — METFORMIN HYDROCHLORIDE 500 MG: 500 TABLET, EXTENDED RELEASE ORAL at 18:02

## 2022-08-17 RX ADMIN — FUROSEMIDE 40 MG: 40 TABLET ORAL at 14:24

## 2022-08-17 RX ADMIN — WARFARIN SODIUM 2.5 MG: 2.5 TABLET ORAL at 18:02

## 2022-08-17 RX ADMIN — PERFLUTREN 1.5 ML: 6.52 INJECTION, SUSPENSION INTRAVENOUS at 10:31

## 2022-08-17 RX ADMIN — CLONIDINE HYDROCHLORIDE 0.1 MG: 0.1 TABLET ORAL at 20:53

## 2022-08-17 RX ADMIN — CLONIDINE HYDROCHLORIDE 0.1 MG: 0.1 TABLET ORAL at 14:24

## 2022-08-17 ASSESSMENT — ACTIVITIES OF DAILY LIVING (ADL)
ADLS_ACUITY_SCORE: 22
ADLS_ACUITY_SCORE: 24
ADLS_ACUITY_SCORE: 22
ADLS_ACUITY_SCORE: 24
ADLS_ACUITY_SCORE: 22
ADLS_ACUITY_SCORE: 24
ADLS_ACUITY_SCORE: 24

## 2022-08-17 NOTE — PROGRESS NOTES
08/17/22 1143   Quick Adds   Type of Visit Initial PT Evaluation   Living Environment   People in Home alone   Current Living Arrangements condominium   Home Accessibility stairs to enter home;stairs within home   Number of Stairs, Main Entrance 2   Stair Railings, Main Entrance none   Number of Stairs, Within Home, Primary greater than 10 stairs   Transportation Anticipated family or friend will provide   Living Environment Comments has basement, but does not use.   Self-Care   Activity/Exercise/Self-Care Comment I with all ADL   General Information   Onset of Illness/Injury or Date of Surgery 08/16/22   Patient/Family Therapy Goals Statement (PT) to go home   Cognition   Affect/Mental Status (Cognition) WFL   Follows Commands (Cognition) WFL   Posture    Posture Not impaired   Range of Motion (ROM)   Range of Motion ROM is WFL   Strength Comprehensive (MMT)   General Manual Muscle Testing (MMT) Assessment no strength deficits identified   Strength (Manual Muscle Testing)   Strength (Manual Muscle Testing) strength is WFL   Transfers   Transfers sit-stand transfer   Sit-Stand Transfer   Sit-Stand [place] (Transfers) independent   Gait/Stairs (Locomotion)   [place] Level (Gait) independent   Distance in Feet (Required for LE Total Joints) 200   Pattern (Gait) swing-through   Maintains Weight-bearing Status (Gait) able to maintain   Balance   Balance no deficits were identified   Balance Comments able to stand single leg 5+ seconds, tandem stance 30 sec, eyes closed 10 sec, turning circles all are wnl   Sensory Examination   Sensory Perception patient reports no sensory changes   Coordination   Coordination no deficits were identified   Muscle Tone   Muscle Tone no deficits were identified   Clinical Impression   Criteria for Skilled Therapeutic Intervention Evaluation only   Clinical Impression Comments Patient at baseline. No skilled PT needs   PT Discharge Planning   PT Discharge Recommendation (DC  Rec) home   PT Rationale for DC Rec mobilizing well, no deficits identified from PT view   Total Evaluation Time   Total Evaluation Time (Minutes) 12

## 2022-08-17 NOTE — ED PROVIDER NOTES
EMERGENCY DEPARTMENT ENCOUNTER      NAME: Angeles Can  AGE: 81 year old female  YOB: 1940  MRN: [id number]  EVALUATION DATE & TIME: 8/16/2022  5:44 PM    PCP: Freddy Lubin    ED PROVIDER: Virgilio Malcolm MD        Chief Complaint   Patient presents with     Extremity Weakness     Left leg          FINAL IMPRESSION:  1. TIA (transient ischemic attack)    2. Hypertensive urgency          ED COURSE & MEDICAL DECISION MAKING:    Pertinent Labs & Imaging studies reviewed. (See chart for details)  81 year old female presents to the Emergency Department for evaluation of extremity weakness        ED Course as of 08/16/22 2251   Tue Aug 16, 2022   1841 Patient presents with painless left leg weakness that did not follow a dermatomal or radicular pattern that came on quickly and resolved quickly without any back pain   1842 History of TIA   1842 Normal neuro exam currently   1842 Aortic dissection, radulopathy, PAD unlikely cause of patient's symptoms   1842 Elevated blood pressure possibly compensation for possible TIA also possible due to irritation since patient states today started off that he seems ER than [place] [facility] and because of wait times and inability to care for her no care was done previously   1843 As pacemaker which patient is unsure was MRI compatible therefore CTA head and neck ordered   1843 concern for possible TIA or stroke   1843 Is on warfarin     6:04 PM I met with the patient to gather history and perform an initial exam.   9:11 PM Spoke with Dr. Reza, neurology. Discussed the patient's case.  9:40 PM Rechecked and updated the patient.  10:07 PM I spoke with the hospital resident, Dr. Jackson. We discussed the patient's case, and they agree to admit the patient.    CTA imaging without large vessel occlusion.  Patient's blood pressures been quite elevated in the ER but right now it is less than 200.  No headache.  No chest pain.  No shortness of breath.  Back to  "baseline    Neurology recommends blood pressure less than 200, admission for observation, MRI in the morning if able to    Family will bring MRI card to see if MRI is compatible    Likely TIA      At the conclusion of the encounter I discussed the results of all of the tests and the disposition. The questions were answered. The patient or family acknowledged understanding and was agreeable with the care plan.       MEDICATIONS GIVEN IN THE EMERGENCY:  Medications   iopamidol (ISOVUE-370) solution 100 mL (100 mLs Intravenous Given 8/16/22 1919)   magnesium oxide (MAG-OX) tablet 400 mg (400 mg Oral Given 8/16/22 2125)       NEW PRESCRIPTIONS STARTED AT TODAY'S ER VISIT  New Prescriptions    No medications on file          =================================================================    HPI    Triage note  \"Patient reports her left leg \"went out\" for a few seconds around 1pm. Legs are back to baseline. Denies other symptoms. HX for TIA a few years ago that affected her speech       \"      Patient information was obtained from: patient     Use of : N/A         Angeles Can is a 81 year old female with a pertinent history of transient ischemic, attack osteopenia, tachy-irvin syndrome, sick sinus syndrome, paroxymal atrial fibrilation, diabetes mellitus type II, obesity, hypercalcemia, arthritis, arrhythmia, hypertension, hyperlipidemia, and chronic kidney disease, who presents to this ED by walk in for evaluation of left leg weakness    Per chart review,  Pacemaker placed in 2018  Transient ischemic attack in 2013    Patient reports she experienced left leg weakness at approximately 1 PM while walking around her house. She reports generalized weakness along her whole leg that lasted a few minutes, with no associated leg pain. She also reports congestion and that she tested positive for COVID-19 on 8/4/2022. Patient has a history of a TIA in 2013, is on coumadin for heart fluttering, and has a " pacemaker. Patient denied any facial drooping, vision changes, numbness, history of back problems, headaches, bowel or urinary concerns, or any other complaints at this time.     REVIEW OF SYSTEMS   Review of Systems   Constitutional: Negative for fever.   HENT: Positive for congestion.    Eyes: Negative for visual disturbance.   Respiratory: Negative for stridor.    Cardiovascular: Negative for chest pain.   Gastrointestinal: Negative for constipation and diarrhea.   Genitourinary: Negative for dysuria.   Musculoskeletal: Negative for back pain.        Generalized left lower extremity weakness   Skin: Negative for wound.   Neurological: Negative for facial asymmetry, numbness and headaches.   Hematological: Bruises/bleeds easily.   Psychiatric/Behavioral: Negative for confusion.       PAST MEDICAL HISTORY:  Past Medical History:   Diagnosis Date     Arrhythmia     Paroxysmal Atrial fibrillation     Arthritis      Cancer (H)      Diabetes mellitus (H)      Hypercalcemia      Hyperlipidemia      Hypertension      Obesity        PAST SURGICAL HISTORY:  Past Surgical History:   Procedure Laterality Date     CATARACT EXTRACTION, BILATERAL       EP PACEMAKER INSERT N/A 3/2/2018    Procedure: EP Pacemaker Insertion;  Surgeon: Nahun Mina MD;  Location: [facility];  Service:      HC REMOVE TONSILS/ADENOIDS,<11 Y/O      Description: Tonsillectomy With Adenoidectomy;  Recorded: 09/16/2008;     HC REVISE MEDIAN N/CARPAL TUNNEL SURG      Description: Neuroplasty Decompression Median Nerve At Carpal Tunnel;  Recorded: 11/01/2009;  Comments: Right- 1/99; Left- 4/99     OK MASTECTOMY, PARTIAL Right 5/26/2021    Procedure: Right Lumpectomy after Wire Localizaiton;  Surgeon: Rosanna Peng MD;  Location: [facility];  Service: General     US BREAST CORE BIOPSY RIGHT Right 5/3/2021           CURRENT MEDICATIONS:    allopurinol (ZYLOPRIM) 300 MG tablet  amLODIPine (NORVASC) 2.5 MG tablet  anastrozole (ARIMIDEX)  "1 MG tablet  atorvastatin (LIPITOR) 10 MG tablet  cholecalciferol, vitamin D3, 1,000 unit tablet  cloNIDine (CATAPRES) 0.1 MG tablet  furosemide (LASIX) 40 MG tablet  losartan (COZAAR) 100 MG tablet  magnesium oxide (MAG-OX) 400 mg tablet  metFORMIN (GLUCOPHAGE XR) 500 MG 24 hr tablet  peg 400-propylene glycol (SYSTANE) 0.4-0.3 % Drop  sotalol (BETAPACE) 80 MG tablet  warfarin ANTICOAGULANT (COUMADIN) 2.5 MG tablet  blood glucose test strips        ALLERGIES:  Allergies   Allergen Reactions     Metformin Diarrhea     Hydrochlorothiazide Unknown     Annotation: hyperuricemia made worse by HCTZ   Causes pts gout       FAMILY HISTORY:  Family History   Problem Relation Age of Onset     No Known Problems Mother      No Known Problems Father      No Known Problems Sister      No Known Problems Daughter      No Known Problems Maternal Grandmother      No Known Problems Maternal Grandfather      No Known Problems Paternal Grandmother      No Known Problems Paternal Grandfather      No Known Problems Maternal Aunt      No Known Problems Paternal Aunt        SOCIAL HISTORY:   Social History     Socioeconomic History     Marital status:    Tobacco Use     Smoking status: Former Smoker     Smokeless tobacco: Never Used     Tobacco comment: quit years ago   Substance and Sexual Activity     Alcohol use: No     Drug use: No     Sexual activity: Not Currently       VITALS:  BP (!) 213/92   Pulse 71   Temp 98.4  F (36.9  C)   Resp 23   Ht 1.575 m (5' 2\")   Wt 74.8 kg (165 lb)   SpO2 96%   BMI 30.18 kg/m      PHYSICAL EXAM      Vitals: BP (!) 213/92   Pulse 71   Temp 98.4  F (36.9  C)   Resp 23   Ht 1.575 m (5' 2\")   Wt 74.8 kg (165 lb)   SpO2 96%   BMI 30.18 kg/m    General: Appears in no acute distress, awake, alert, interactive.  Eyes: Conjunctivae non-injected. Sclera anicteric.  HENT: Atraumatic. Congestion in nose  Neck: Supple.  Respiratory/Chest: Respiration unlabored.  Abdomen: non " distended  Musculoskeletal: Normal extremities. No edema or erythema. Negative left straight leg raise  Cardiovascular: Regular rate and rhythm   Skin: Normal color. No rash or diaphoresis.  Neurologic: Face symmetric, moves all extremities spontaneously. Speech clear.  Psychiatric: Oriented to person, place, and time. Affect appropriate.    PPE used includes N95, gloves.     LAB:  All pertinent labs reviewed and interpreted.  Results for orders placed or performed during the hospital encounter of 08/16/22   CTA Head Neck with Contrast    Impression    IMPRESSION:   HEAD CT:  1.  No CT evidence for acute intracranial process.  2.  Brain atrophy and presumed chronic microvascular ischemic changes as above.  3.  Inflammatory changes of the paranasal sinuses.    HEAD CTA:   1.  No significant stenosis, aneurysm, or high flow vascular malformation identified.    NECK CTA:  1.  No hemodynamically significant stenosis in the neck vessels by NASCET criteria.  2.  No evidence for dissection.   Basic metabolic panel   Result Value Ref Range    Sodium 139 136 - 145 mmol/L    Potassium 4.5 3.5 - 5.0 mmol/L    Chloride 101 98 - 107 mmol/L    Carbon Dioxide (CO2) 27 22 - 31 mmol/L    Anion Gap 11 5 - 18 mmol/L    Urea Nitrogen 38 (H) 8 - 28 mg/dL    Creatinine 1.36 (H) 0.60 - 1.10 mg/dL    Calcium 11.3 (H) 8.5 - 10.5 mg/dL    Glucose 132 (H) 70 - 125 mg/dL    GFR Estimate 38 (L) >60 mL/min/1.73m2   Result Value Ref Range    Magnesium 1.5 (L) 1.8 - 2.6 mg/dL   Result Value Ref Range    INR 2.07 (H) 0.85 - 1.15   CBC with platelets and differential   Result Value Ref Range    WBC Count 11.3 (H) 4.0 - 11.0 10e3/uL    RBC Count 4.39 3.80 - 5.20 10e6/uL    Hemoglobin 13.6 11.7 - 15.7 g/dL    Hematocrit 40.1 35.0 - 47.0 %    MCV 91 78 - 100 fL    MCH 31.0 26.5 - 33.0 pg    MCHC 33.9 31.5 - 36.5 g/dL    RDW 14.1 10.0 - 15.0 %    Platelet Count 265 150 - 450 10e3/uL    % Neutrophils 75 %    % Lymphocytes 19 %    % Monocytes 5 %    %  Eosinophils 1 %    % Basophils 0 %    % Immature Granulocytes 0 %    NRBCs per 100 WBC 0 <1 /100    Absolute Neutrophils 8.3 1.6 - 8.3 10e3/uL    Absolute Lymphocytes 2.1 0.8 - 5.3 10e3/uL    Absolute Monocytes 0.6 0.0 - 1.3 10e3/uL    Absolute Eosinophils 0.1 0.0 - 0.7 10e3/uL    Absolute Basophils 0.0 0.0 - 0.2 10e3/uL    Absolute Immature Granulocytes 0.1 <=0.4 10e3/uL    Absolute NRBCs 0.0 10e3/uL   Troponin I (now)   Result Value Ref Range    Troponin I <0.01 0.00 - 0.29 ng/mL   Extra Red Top Tube   Result Value Ref Range    Hold Specimen JIC        RADIOLOGY:  Reviewed all pertinent imaging. Please see official radiology report.  CTA Head Neck with Contrast   Final Result   IMPRESSION:    HEAD CT:   1.  No CT evidence for acute intracranial process.   2.  Brain atrophy and presumed chronic microvascular ischemic changes as above.   3.  Inflammatory changes of the paranasal sinuses.      HEAD CTA:    1.  No significant stenosis, aneurysm, or high flow vascular malformation identified.      NECK CTA:   1.  No hemodynamically significant stenosis in the neck vessels by NASCET criteria.   2.  No evidence for dissection.          EKG:    Performed at: 22:35    Impression: Electronic atrially paced rhythm, similar to previous    Rate: 70 bpm  Rhythm: Electronic atrially paced  Axis: 68  AK Interval: 108  QRS Interval: 84  QTc Interval: 449  ST Changes: None  Comparison: Similar to previous ECG from 5/24/22    I have independently reviewed and interpreted the EKG(s) documented above.    PROCEDURES:   none      I, Sadaf Lema, am serving as a scribe to document services personally performed by Tono Malcolm MD based on my observation and the provider's statements to me. I, Dr. Tono Malcolm, attest that Sadaf Lema is acting in a scribe capacity, has observed my performance of the services and has documented them in accordance with my direction.    Tono Malcolm MD  Emergency Medicine  [facility]  [facility] EMERGENCY ROOM  [facility] [facility] [address]  [phone]       Tono Malcolm MD  08/16/22 7272

## 2022-08-17 NOTE — ED NOTES
Pt reports her daughter is calling back with information on her pacemaker. MD would like to order an Mri for tomorrow.

## 2022-08-17 NOTE — ED NOTES
Pt states her left leg weakness has resolved even before coming in to the ER. She has been up ambulating to the bathroom and back without issue. Pt BP is elevated and she states she take medications twice a day for this. She is due for her evening meds.    STEPHANIE LAYNE RN

## 2022-08-17 NOTE — PROGRESS NOTES
8/17/22 Request for MRI checklist from  ED:      Is the implanted device safe for MRI Exam?  Yes  Is this device 3T compatible? Yes  Device Type: Pacemaker      Device Information:  Make: ClipMine  Model: Accolade MRI EL        Cardiology Orders for Device Programming     -- Yes -- The patient has a MRI conditional pulse generator and leads from the same     -- Yes -- The pulse generator and leads have been implanted for at least 6 weeks    -- Yes-- The device is implanted in the right or left pectoral region    -- Yes -- There are not any additional active cardiac devices, abandoned leads, lead extenders or adapters    -- Yes -- The device lead impedance measurements are within the normal range. (Manufacture recommendations: Medtronic Advisa and Revo 200-1,500 ohms; Medtronic ICD and CRT's 200-3000 ohms and defibrillation lead impedance   ohms)    -- N/A -- If the patient is pacemaker dependent the thresholds are less than or equal to 2.0V @ 0.4ms.     Date of last in-office Device check: 5/24/22  Results of last in-office Device check:  1.   Right atrium impedance: 569 ohms  2.   Right ventricle impedance: 365 ohms  3.   Left ventricle impedance: n/a     4.   Right atrium threshold: 0.7V@0.4ms  5.   Right ventricle threshold: 1.0V@0.4ms  6.   Left ventricle threshold: n/a     Device programming during the scan guidelines   Pacing Mode (check one): AOO  Pacing Rate: 80  bpm   Zandra Cantu, Device RN

## 2022-08-17 NOTE — PROGRESS NOTES
[facility]      OUTPATIENT OCCUPATIONAL THERAPY  EVALUATION  PLAN OF TREATMENT FOR OUTPATIENT REHABILITATION  (COMPLETE FOR INITIAL CLAIMS ONLY)  Patient's Last Name, First Name, M.I.  YOB: 1940  Angeles Can                          Provider's Name  [facility] Medical Record No.  [id number]                               Onset Date:  08/16/22   Start of Care Date:  08/16/22     Type:     ___PT   _X_OT   ___SLP Medical Diagnosis:  S/p TIA                        OT Diagnosis:  Decreased ind w/ ADLs   Visits from SOC:  1   _________________________________________________________________________________  Plan of Treatment/Functional Goals    Planned Interventions: ADL retraining   Goals: See Occupational Therapy Goals on Care Plan in [facility] electronic health record.    Therapy Frequency: One time eval and treatment  Predicted Duration of Therapy Intervention: 08/17/22  _________________________________________________________________________________    I CERTIFY THE NEED FOR THESE SERVICES FURNISHED UNDER        THIS PLAN OF TREATMENT AND WHILE UNDER MY CARE     (Physician co-signature of this document indicates review and certification of the therapy plan).              Certification date from: 08/17/22, Certification date to: 09/17/22    Referring Physician: Rocio Vann MD            Initial Assessment        See Occupational Therapy evaluation dated 08/16/22 in Epic electronic health record.

## 2022-08-17 NOTE — PLAN OF CARE
Patient is boarding in the ED this evening. Patient is A&Ox4. Patient's blood pressure is elevated.  BFM put in an order for patient to be NPO due to the TIA. Patient scored zero on NIH. Due to being NPO, she was unable to take her home BP meds. There is a PRN order for dilt, but for Bps >220 systolically. Patient ambulated to the bathroom with no issues. Patient is afebrile, and denies pain.  All therapies ordered.

## 2022-08-17 NOTE — PHARMACY-ANTICOAGULATION SERVICE
Clinical Pharmacy - Warfarin Dosing Consult     Pharmacy has been consulted to manage this patient s warfarin therapy.  Indication: Atrial Fibrillation  Therapy Goal: INR 2-3  Provider/Team: Hospitalist  Warfarin Prior to Admission: Yes  Warfarin PTA Regimen: Take 1.25 mg Monday, Thursday, Saturday and 2.5 mg the rest of the week.  Recent documented change in oral intake/nutrition: No  Dose Comments: 2.5 mg    INR   Date Value Ref Range Status   08/17/2022 1.83 (H) 0.85 - 1.15 Final   08/16/2022 2.07 (H) 0.85 - 1.15 Final       Recommend warfarin 2.5 mg today.  Pharmacy will monitor Angeles Can daily and order warfarin doses to achieve specified goal.      Please contact pharmacy as soon as possible if the warfarin needs to be held for a procedure or if the warfarin goals change.

## 2022-08-17 NOTE — PROGRESS NOTES
08/17/22 1035   Quick Adds   Quick Adds Certification   Type of Visit Initial Occupational Therapy Evaluation   Living Environment   People in Home alone   Current Living Arrangements [facility]  ([facility])   Home Accessibility stairs to enter home;stairs within home  (stairs to basement - does not go down often)   Number of Stairs, Main Entrance 2   Living Environment Comments walk in shower with one step, seat in shower, standard height toilet   Self-Care   Activity/Exercise/Self-Care Comment ind w/ all ADLs and IADLs prior   General Information   Onset of Illness/Injury or Date of Surgery 08/16/22   Referring Physician Rocio Vann MD   Patient/Family Therapy Goal Statement (OT) To return home   Additional Occupational Profile Info/Pertinent History of Current Problem Moderate   Existing Precautions/Restrictions no known precautions/restrictions   Cognitive Status Examination   Orientation Status orientation to person, place and time   Visual Perception   Visual Impairment/Limitations WFL;corrective lenses for reading   Visual Field Deficit   (None following assessment)   Impact of Vision Impairment on Function (Vision) Pt demos no visual deficits following visual screen   Sensory   Sensory Quick Adds No deficits were identified   Integumentary/Edema   Integumentary/Edema no deficits were identifed   Posture   Posture not impaired   Range of Motion Comprehensive   General Range of Motion no range of motion deficits identified   Strength Comprehensive (MMT)   General Manual Muscle Testing (MMT) Assessment no strength deficits identified   Muscle Tone Assessment   Muscle Tone Quick Adds No deficits were identified   Coordination   Upper Extremity Coordination No deficits were identified   Gross Motor Coordination Demos normal gross motor coordination following general ROM screen   Fine Motor Coordination Demos ability to tie shoe with no difficulties or assistance   Bed Mobility   Bed Mobility supine-sit    Supine-Sit [place] (Bed Mobility) contact guard   Transfers   Transfers bed-chair transfer;toilet transfer   Transfer Skill: Bed to Chair/Chair to Bed   Bed-Chair [place] (Transfers) contact guard   Toilet Transfer   [place] Level (Toilet Transfer) contact guard   Activities of Daily Living   BADL Assessment/Intervention toileting;lower body dressing   Lower Body Dressing Assessment/Training   [place] Level (Lower Body Dressing) contact guard assist   Toileting   [place] Level (Toileting) contact guard assist   Clinical Impression   Criteria for Skilled Therapeutic Interventions Met (OT) Yes, treatment indicated   OT Diagnosis Decreased ind w/ ADLs   Influenced by the following impairments S/p TIA   OT Problem List-Impairments impacting ADL balance;other (see comments)  (S/p TIA)   Assessment of Occupational Performance 1-3 Performance Deficits   Identified Performance Deficits Dressing, home mgmt   Planned Therapy Interventions (OT) ADL retraining   Clinical Decision Making Complexity (OT) low complexity   Risk & Benefits of therapy have been explained evaluation/treatment results reviewed;care plan/treatment goals reviewed;risks/benefits reviewed;patient   OT Discharge Planning   OT Discharge Recommendation (DC Rec) (S)  home   OT Rationale for DC Rec Pt demos baseline ADLs and fx mobility   Therapy Certification   Start of Care Date 08/16/22   Certification date from 08/17/22   Certification date to 09/17/22   Medical Diagnosis S/p TIA   Total Evaluation Time (Minutes)   Total Evaluation Time (Minutes) 8   OT Goals   Therapy Frequency (OT) One time eval and treatment   OT Predicted Duration/Target Date for Goal Attainment 08/17/22   OT Goals Bed Mobility;Toilet Transfer/Toileting   OT: Bed Mobility Modified independent;Goal Met;Completed   OT: Toilet Transfer/Toileting Modified independent;Goal Met;Completed

## 2022-08-17 NOTE — H&P
[facility]    History and Physical - Hospitalist Service       Date of Admission:  8/16/2022    Assessment & Plan      Angeles Can is a 81 year old female admitted on 8/16/2022. She has a history of TIA, HTN, Afib on warfarin, pacemaker, primary hyperparathyroidism, RA, breast cancer in remission and is admitted for left leg weakness     #Left leg weakness   History of TIA 2013  Patient is an 81-year-old female with history of TIA, other risk factors for CVA including hyperlipidemia and hypertension presenting with complaint of a sudden left leg weakness that was transient and lasted for less than a minute.   Previous TIA involved sudden speech difficulty that resolved immediately.  When she presented to the [facility] emergency department,  SBP elevated to 189s.  However she did not have any focal neurologic deficitsCT of the head and the neck versus MRI of the brain completed given patient's history of pacemaker placement. ED provider spoke with neuro. Per neuro, no recommendation to give aspirin.shortly neuro recommended keeping SBP below 200.   During my exam, patient's vital signs notable for systolic blood pressure to 213.  Physical exam globally benign.  Was no evidence of focal neurologic deficit.  Patient's reported symptoms likely a result of TIA that has resolved without any residual focal neurologic deficits.   -Admit to observation  -strict NPO until formal SLP eval  -Hold all p.o. medications until cleared by speech pathology  -IV fluid  mL/h  -Labetalol IV 10 to 40 mg every 10 minutes as needed for systolic blood pressure above 200, diastolic blood pressure above 110  -Bedrest  -Fall precautions  -Vital signs including BP; notify provider if systolic blood pressure above 220, diastolic blood pressure above 110  -Neurochecks per unit routine  -I/O  -PT/OT  -SLP  -Supplemental oxygen as needed  -Cardiac monitoring  -Consider MRI brain repeat CT in the  morning    #Recent covid-19 infection  Tested positive on 8/4. Currently asymptomtic. Should be out of self-isolation window.   -no air dropley precaution needed    #Afib on warfarin   Pacemaker   INR therapeutic. [employer] [address] [employer] MRI EL  DOI: 3/2/2018  Dr. Nahun Mina  -PTA warfarin; pharmacy to dose    hypomagnesiumia   -magnesium replacement protcol ordered     #DMII  A1c 6.8. on 6/2022   -hold metformin     #HLD:   -hold PTA lipitor     #Gout:   -hold PTA allopurinol,    Diet:  cardiac diet   DVT Prophylaxis: Warfarin  Fields Catheter: Not present  Fluids: PO  Central Lines: None  Cardiac Monitoring: None       Medical decision surgoate joey/kim, daughters.     Code Status:  Full code         Disposition Plan   25-hour observation without focal neurologic deficits    The patient's care was discussed with the Attending Physician, Dr. Castle.    Amy Jackosn DO  Hospitalist Service  [facility]  Securely message with the Vocera Web Console (learn more here)  Text page via [facility] Paging/Directory       ______________________________________________________________________    Chief Complaint   Sudden leg weakness      History is obtained from the patient    History of Present Illness   Angeles Can is a 81 year old female who has a history of TIA, HTN, HLD, Afib on warfarin,  and is admitted for TIA     Around 1pm on day of admission patient experienced left leg weakness only.  She did not fall, lose consciousness or hit her head.  Patient states that the weakness went away within a few minutes.  She denies ever experiencing similar symptoms in the past however she recalls that a few years ago she had an episode where she had difficulty speaking and was told that she might of had a TIA.  Patient reports that she was on a cruise ship to [place] around 4 August and at that point she was exposed to a family member who tested positive for COVID-19.  She started to develop  "sinus symptoms and felt just \"generally not good\", and was found to have COVID-19.  The symptoms have gone away since.    Currently denies changes  To her vision. Denies  difficulty urinatng, back pain, no numness or tinglng sensaion, weakness. No heart palpations, shortness of breath. Patient endorses sinus congestion.     ED course: denied any facial drooping, vision changes, numbness, history of back problems, headaches, bowel or urinary concerns, or any other complaints. She is admitted for TIA.     Review of Systems    The 10 point Review of Systems is negative other than noted in the HPI or here.     Past Medical History    I have reviewed this patient's medical history and updated it with pertinent information if needed.   Past Medical History:   Diagnosis Date     Arrhythmia     Paroxysmal Atrial fibrillation     Arthritis      Cancer (H)      Diabetes mellitus (H)      Hypercalcemia      Hyperlipidemia      Hypertension      Obesity         Past Surgical History   I have reviewed this patient's surgical history and updated it with pertinent information if needed.  Past Surgical History:   Procedure Laterality Date     CATARACT EXTRACTION, BILATERAL       EP PACEMAKER INSERT N/A 3/2/2018    Procedure: EP Pacemaker Insertion;  Surgeon: Nahun Mina MD;  Location: [facility] Lab;  Service:      HC REMOVE TONSILS/ADENOIDS,<13 Y/O      Description: Tonsillectomy With Adenoidectomy;  Recorded: 09/16/2008;     HC REVISE MEDIAN N/CARPAL TUNNEL SURG      Description: Neuroplasty Decompression Median Nerve At Carpal Tunnel;  Recorded: 11/01/2009;  Comments: Right- 1/99; Left- 4/99     AZ MASTECTOMY, PARTIAL Right 5/26/2021    Procedure: Right Lumpectomy after Wire Localizaiton;  Surgeon: Rosanna Peng MD;  [facility];  Service: General     US BREAST CORE BIOPSY RIGHT Right 5/3/2021        Social History   I have reviewed this patient's social history and updated it with pertinent " information if needed. Angeles Can  reports that she has quit smoking. She has never used smokeless tobacco. She reports that she does not drink alcohol and does not use drugs.haven't smoked for over 40 years. Occasional smoker for maybe 20 years on and off. No alcohol. No other ilcit drugs.     Family History   I have reviewed this patient's family history and updated it with pertinent information if needed.  Family History   Problem Relation Age of Onset     No Known Problems Mother      No Known Problems Father      No Known Problems Sister      No Known Problems Daughter      No Known Problems Maternal Grandmother      No Known Problems Maternal Grandfather      No Known Problems Paternal Grandmother      No Known Problems Paternal Grandfather      No Known Problems Maternal Aunt      No Known Problems Paternal Aunt        Prior to Admission Medications   Prior to Admission Medications   Prescriptions Last Dose Informant Patient Reported? Taking?   allopurinol (ZYLOPRIM) 300 MG tablet 8/15/2022 at Bedtime  No Yes   Sig: [ALLOPURINOL (ZYLOPRIM) 300 MG TABLET] Take 1 tablet by mouth once daily.   amLODIPine (NORVASC) 2.5 MG tablet 8/15/2022 at Bedtime  No Yes   Sig: Take 1 tablet (2.5 mg) by mouth daily   anastrozole (ARIMIDEX) 1 MG tablet 8/16/2022 at Unknown time  No Yes   Sig: Take 1 tablet (1 mg total) by mouth daily.   atorvastatin (LIPITOR) 10 MG tablet 8/15/2022 at Bedtime  No Yes   Sig: Take 1 tablet (10 mg) by mouth daily   blood glucose test strips   No No   Sig: [BLOOD GLUCOSE TEST STRIPS] Use 1 each As Directed daily.   cholecalciferol, vitamin D3, 1,000 unit tablet 8/15/2022 at Bedtime  Yes Yes   Sig: [CHOLECALCIFEROL, VITAMIN D3, 1,000 UNIT TABLET] Take 1,000 Units by mouth daily.   cloNIDine (CATAPRES) 0.1 MG tablet 8/16/2022 at x 1  No Yes   Sig: Take 1 tablet (0.1 mg) by mouth 2 times daily   furosemide (LASIX) 40 MG tablet 8/16/2022 at Unknown time  No Yes   Sig: Take 1 tablet (40 mg) by  mouth daily   losartan (COZAAR) 100 MG tablet 8/15/2022 at Bedtime  No Yes   Sig: Take 1 tablet (100 mg) by mouth daily   magnesium oxide (MAG-OX) 400 mg tablet 8/15/2022 at Bedtime  Yes Yes   Sig: [MAGNESIUM OXIDE (MAG-OX) 400 MG TABLET] Take 400 mg by mouth daily.    metFORMIN (GLUCOPHAGE XR) 500 MG 24 hr tablet 8/15/2022 at Bedtime  Yes Yes   Si mg daily (with dinner)   peg 400-propylene glycol (SYSTANE) 0.4-0.3 % Drop Past Week at Unknown time  Yes Yes   Sig: [-PROPYLENE GLYCOL (SYSTANE) 0.4-0.3 % DROP] Place 1 Drop into both eyes 2 times daily if needed for Dry Eyes. Indications: DRY EYE   sotalol (BETAPACE) 80 MG tablet 2022 at Unknown time  No Yes   Sig: Take 1 tablet (80 mg total) by mouth daily.   warfarin ANTICOAGULANT (COUMADIN) 2.5 MG tablet 8/15/2022 at Unknown time  No Yes   Sig: [WARFARIN ANTICOAGULANT (COUMADIN/JANTOVEN) 2.5 MG TABLET] Take 1/2-1 tablet (1.25-2.5 mg) by mouth daily. Adjust dose based on INR results as directed.   Patient taking differently: No sig reported      Facility-Administered Medications: None     Allergies   Allergies   Allergen Reactions     Metformin Diarrhea     Hydrochlorothiazide Unknown     Annotation: hyperuricemia made worse by HCTZ   Causes pts gout       Physical Exam   Vital Signs: Temp: 98.4  F (36.9  C)   BP: (!) 186/79 Pulse: 70   Resp: 16 SpO2: 97 %      Weight: 165 lbs 0 oz    Constitutional: awake, alert, cooperative, no apparent distress, and appears stated age and cooperative  Eyes: Lids and lashes normal, pupils equal, round and reactive to light, extra ocular muscles intact, sclera clear, conjunctiva normal  ENT: Normocephalic, without obvious abnormality, atraumatic, sinuses nontender on palpation, external ears without lesions, oral pharynx with moist mucous membranes, tonsils without erythema or exudates, gums normal and good dentition.  Hematologic / Lymphatic: no cervical lymphadenopathy  Respiratory: No increased work of breathing,  good air exchange, clear to auscultation bilaterally, no crackles or wheezing  Cardiovascular: Normal apical impulse, regular rate and rhythm, normal S1 and S2, no S3 or S4, and no murmur noted  GI: No scars, normal bowel sounds, soft, non-distended, non-tender, no masses palpated, no hepatosplenomegally  Musculoskeletal: There is no redness, warmth, or swelling of the joints.  Full range of motion noted.  Motor strength is 5 out of 5 all extremities bilaterally.  Tone is normal.  Neurologic: Awake, alert, oriented to name, place and time.  Cranial nerves II-XII are grossly intact.  Motor is 5 out of 5 bilaterally.  Cerebellar finger to nose, heel to shin intact.  Sensory is intact.  Babinski down going, Romberg negative, and gait is normal.  Neuropsychiatric: General: normal, calm and normal eye contact    Data   Data reviewed today: I reviewed all medications, new labs and imaging results over the last 24 hours. I personally reviewed the EKG tracing showing paced, nsr.    Recent Labs   Lab 08/16/22  1834 08/16/22  1530 08/15/22  1140   WBC  --  11.3*  --    HGB  --  13.6  --    MCV  --  91  --    PLT  --  265  --    INR  --  2.07* 2.2*     --   --    POTASSIUM 4.5  --   --    CHLORIDE 101  --   --    CO2 27  --   --    BUN 38*  --   --    CR 1.36*  --   --    ANIONGAP 11  --   --    GUILLERMINA 11.3*  --   --    *  --   --      Recent Results (from the past 24 hour(s))   CTA Head Neck with Contrast    Narrative    EXAM: CTA HEAD NECK W CONTRAST  LOCATION: [facility]  DATE/TIME: 8/16/2022 7:17 PM    INDICATION: TIA. Transient right leg weakness.  COMPARISON: None.  CONTRAST: 75ml Isovue 370  TECHNIQUE: Head and neck CT angiogram with IV contrast. Noncontrast head CT followed by axial helical CT images of the head and neck vessels obtained during the arterial phase of intravenous contrast administration. Axial 2D reconstructed images and   multiplanar 3D MIP reconstructed images of the  head and neck vessels were performed by the technologist. Dose reduction techniques were used. All stenosis measurements made according to NASCET criteria unless otherwise specified.    FINDINGS:   NONCONTRAST HEAD CT:   INTRACRANIAL CONTENTS: No intracranial hemorrhage, extraaxial collection, or mass effect.  No CT evidence of acute infarct. Mild presumed chronic small vessel ischemic changes. Mild generalized volume loss. No hydrocephalus.     VISUALIZED ORBITS/SINUSES/MASTOIDS: Prior bilateral cataract surgery. Visualized portions of the orbits are otherwise unremarkable. Mild mucosal thickening scattered about the paranasal sinuses. No middle ear or mastoid effusion.    BONES/SOFT TISSUES: No acute abnormality.    HEAD CTA:  ANTERIOR CIRCULATION: Atherosclerotic plaque involving the carotid siphons. No high-grade stenosis/occlusion, aneurysm, or high flow vascular malformation. Standard [place] anatomy.    POSTERIOR CIRCULATION: No stenosis/occlusion, aneurysm, or high flow vascular malformation. Dominant right and smaller left vertebral artery contribute to a normal basilar artery.     DURAL VENOUS SINUSES: Expected enhancement of the major dural venous sinuses.    NECK CTA:  RIGHT CAROTID: Calcified atherosclerotic plaque at the right carotid bifurcation and involving the right carotid bulb results in less than 50% stenosis in the right ICA. No dissection.    LEFT CAROTID: Calcified atherosclerotic plaque at the left carotid bifurcation results in less than 50% stenosis in the left ICA. No dissection.    VERTEBRAL ARTERIES: Mild atherosclerotic stenosis of the vertebral artery origins bilaterally. No additional focal stenosis or dissection. The right vertebral artery is dominant.    AORTIC ARCH: Standard three-vessel arch anatomy. Atherosclerotic plaque involving the aortic arch and arch vessel origins including moderate stenosis of the left subclavian artery origin.    NONVASCULAR STRUCTURES: Mild  cervical spondylosis. Included lung apices are clear. Partially visualized left subclavian transvenous pacing electrodes.      Impression    IMPRESSION:   HEAD CT:  1.  No CT evidence for acute intracranial process.  2.  Brain atrophy and presumed chronic microvascular ischemic changes as above.  3.  Inflammatory changes of the paranasal sinuses.    HEAD CTA:   1.  No significant stenosis, aneurysm, or high flow vascular malformation identified.    NECK CTA:  1.  No hemodynamically significant stenosis in the neck vessels by NASCET criteria.  2.  No evidence for dissection.

## 2022-08-17 NOTE — CONSULTS
[facility]    Stroke Consult Note    Reason for Consult:  TIA    Chief Complaint: Extremity Weakness (Left leg )       HPI  Angeles Can is a 81 year old female presenting with transient LLE weakness. PMH: prior TIA, HTN, HLD, DM2, atrial fibrillation on warfarin, pacemaker, RA, breast cancer, recent COVID 19 infection. Pt noted acute onset LLE weakness at 1300 hrs yesterday afternoon, symptoms lasted a few mins resolved completely. Non-focal exam reported in the ED. Denies associated pain in the leg, no upper extremity weakness or facial weakness, no numbness or tingling, no speech disturbance, no visual disturbance. Prior TIA in 2013 with transient aphasia. Tested positive for COVID on 8/4. INR on presentation therapeutic at 2.07, today 1.83.    BP has been very high since coming to the ED. She states normally when checked at home her BP runs about 135/70.    TIA Evaluation Summarized    MRI and/or Head CT Head CT: no acute findings, mild chronic SVID   Intracranial Vasculature CTA head: bilateral plaque carotid siphons without hemodynamically significant stenosis, no LVO   Cervical Vasculature CTA neck: calcified plaque at the bilateral carotid bifurcations with <50% stenosis     Echocardiogram pending   EKG/Telemetry SR     LDL 8/16/2022: 86 mg/dL   A1C 8/16/2022: 6.8 %       ABCD2 Patients Score   Age ? 60 years 1 point 1   Blood Pressure     -SBP ? 140 or DBP ?  90    1 point 1   Clinical Features    -Unilateral weakness   -Speech disturbance w/o weakness    -Other    2 points  1 point    0 points 2   Duration of symptoms    ?60 minutes    10-59 minutes    <10 minutes   2 points  1 point  0 points 0   Diabetes  1 point 1   Patient s ABCD2 Score (0-7) = 5       Impression  Transient ischemic attack- transient isolated LLE weakness with complete resolution in less than 5 minutes    Recommendations  - Neurochecks and Vital Signs every 4 hours   - Continue therapeutic  "anticoagulation for stroke prevention given history of atrial fibrillation (warfarin)  - Statin: increase atorvastatin to 20 mg, current LDL 86, goal 40-70  - MRI Brain with and without contrast - if unable to be done today she does not need to stay in the hospital to get this done and can be scheduled as an outpatient.  - Gradual normalization of BP, titration of home BP meds as per hospitalist, long term goal 130/80  - TTE pending  - 24-hour Telemetry  - Bedside Glucose Monitoring  - Nutrition: per nursing  - PT/OT/SLP as needed  - Stroke Education  - Euthermia, Euglycemia  - Hgb A1c goal <7    Patient Follow-up with general neurology in 6-8 weeks      Thank you for this consult. Will follow peripherally for echo result.    Ines Johnson PA-C  Vascular Neurology    To page me or covering stroke neurology team member, click here: [facility]COM   Choose \"On Call\" tab at top, then search dropdown box for \"Neurology Adult\", select location, press Enter, then look for stroke/neuro ICU/telestroke.      _____________________________________________________    Clinically Significant Risk Factors Present on Admission          # Hypercalcemia: Ca = 11.3 mg/dL (Ref range: 8.5 - 10.5 mg/dL) and/or iCa = N/A on admission, will monitor as appropriate  # Hypomagnesemia: Mg = 1.5 mg/dL (Ref range: 1.8 - 2.6 mg/dL) on admission, will replace as needed    # Coagulation Defect: home medication list includes an anticoagulant medication     # CKD, Stage 3b (GFR 30-44): Will monitor and treat as appropriate  - last Cr =  1.36 mg/dL (Ref range: 0.60 - 1.10 mg/dL)  - last GFR = 38 mL/min/1.73m2 (Ref range: >60 mL/min/1.73m2)     Past Medical History   Past Medical History:   Diagnosis Date     Arrhythmia     Paroxysmal Atrial fibrillation     Arthritis      Cancer (H)      Diabetes mellitus (H)      Hypercalcemia      Hyperlipidemia      Hypertension      Obesity      Past Surgical History   Past Surgical History:   Procedure Laterality Date     " CATARACT EXTRACTION, BILATERAL       EP PACEMAKER INSERT N/A 3/2/2018    Procedure: EP Pacemaker Insertion;  Surgeon: Nahun Mina MD;  Location: [facility];  Service:      HC REMOVE TONSILS/ADENOIDS,<13 Y/O      Description: Tonsillectomy With Adenoidectomy;  Recorded: 09/16/2008;     HC REVISE MEDIAN N/CARPAL TUNNEL SURG      Description: Neuroplasty Decompression Median Nerve At Carpal Tunnel;  Recorded: 11/01/2009;  Comments: Right- 1/99; Left- 4/99     NM MASTECTOMY, PARTIAL Right 5/26/2021    Procedure: Right Lumpectomy after Wire Localizaiton;  Surgeon: Rosanna Peng MD;  Location: [facility];  Service: General     US BREAST CORE BIOPSY RIGHT Right 5/3/2021     Medications   Home Meds  Prior to Admission medications    Medication Sig Start Date End Date Taking? Authorizing Provider   allopurinol (ZYLOPRIM) 300 MG tablet [ALLOPURINOL (ZYLOPRIM) 300 MG TABLET] Take 1 tablet by mouth once daily. 7/12/22  Yes Freddy Lubin MD   amLODIPine (NORVASC) 2.5 MG tablet Take 1 tablet (2.5 mg) by mouth daily 5/23/22  Yes Freddy Lubin MD   anastrozole (ARIMIDEX) 1 MG tablet Take 1 tablet (1 mg total) by mouth daily. 8/26/21  Yes Eduin Majano CNP   atorvastatin (LIPITOR) 10 MG tablet Take 1 tablet (10 mg) by mouth daily 9/13/21  Yes Freddy Lubin MD   cholecalciferol, vitamin D3, 1,000 unit tablet [CHOLECALCIFEROL, VITAMIN D3, 1,000 UNIT TABLET] Take 1,000 Units by mouth daily. 1/9/15  Yes Provider, Historical   cloNIDine (CATAPRES) 0.1 MG tablet Take 1 tablet (0.1 mg) by mouth 2 times daily 5/8/22  Yes Freddy Lubin MD   furosemide (LASIX) 40 MG tablet Take 1 tablet (40 mg) by mouth daily 10/29/21  Yes Freddy Lubin MD   losartan (COZAAR) 100 MG tablet Take 1 tablet (100 mg) by mouth daily 2/7/22  Yes Freddy Lubin MD   magnesium oxide (MAG-OX) 400 mg tablet [MAGNESIUM OXIDE (MAG-OX) 400 MG TABLET] Take 400 mg by mouth daily.  1/9/15  Yes Provider, Historical   metFORMIN  (GLUCOPHAGE XR) 500 MG 24 hr tablet 500 mg daily (with dinner) 4/5/22  Yes Reported, Patient   peg 400-propylene glycol (SYSTANE) 0.4-0.3 % Drop [-PROPYLENE GLYCOL (SYSTANE) 0.4-0.3 % DROP] Place 1 Drop into both eyes 2 times daily if needed for Dry Eyes. Indications: DRY EYE 7/1/16  Yes Provider, Historical   sotalol (BETAPACE) 80 MG tablet Take 1 tablet (80 mg total) by mouth daily. 7/5/22  Yes Blue Fagan MD   warfarin ANTICOAGULANT (COUMADIN) 2.5 MG tablet [WARFARIN ANTICOAGULANT (COUMADIN/JANTOVEN) 2.5 MG TABLET] Take 1/2-1 tablet (1.25-2.5 mg) by mouth daily. Adjust dose based on INR results as directed.  Patient taking differently: No sig reported 10/29/21  Yes Freddy Lubin MD   blood glucose test strips [BLOOD GLUCOSE TEST STRIPS] Use 1 each As Directed daily. 9/11/20   Freddy Lubin MD       Scheduled Meds    [Held by provider] allopurinol  300 mg Oral Daily     [Held by provider] amLODIPine  2.5 mg Oral Daily     [Held by provider] anastrozole  1 mg Oral Daily     [START ON 8/18/2022] atorvastatin  20 mg Oral Daily     [Held by provider] cloNIDine  0.1 mg Oral BID     [Held by provider] furosemide  40 mg Oral Daily     [Held by provider] losartan  100 mg Oral Daily     [Held by provider] sotalol  80 mg Oral Daily     [Held by provider] warfarin ANTICOAGULANT  2.5 mg Oral ONCE at 18:00     Warfarin Therapy Reminder  1 each Oral See Admin Instructions       Infusion Meds    - MEDICATION INSTRUCTIONS -       sodium chloride 100 mL/hr at 08/17/22 0541       PRN Meds  [Held by provider] acetaminophen **OR** [Held by provider] acetaminophen **OR** [Held by provider] acetaminophen, labetalol, - MEDICATION INSTRUCTIONS -, polyethylene glycol-propylene glycol    Allergies   Allergies   Allergen Reactions     Metformin Diarrhea     Hydrochlorothiazide Unknown     Annotation: hyperuricemia made worse by HCTZ   Causes pts gout     Family History   Family History   Problem Relation Age of  Onset     No Known Problems Mother      No Known Problems Father      No Known Problems Sister      No Known Problems Daughter      No Known Problems Maternal Grandmother      No Known Problems Maternal Grandfather      No Known Problems Paternal Grandmother      No Known Problems Paternal Grandfather      No Known Problems Maternal Aunt      No Known Problems Paternal Aunt      Social History   Social History     Tobacco Use     Smoking status: Former Smoker     Smokeless tobacco: Never Used     Tobacco comment: quit years ago   Substance Use Topics     Alcohol use: No     Drug use: No       Review of Systems   The 10 point Review of Systems is negative other than noted in the HPI or here.        PHYSICAL EXAMINATION   Temp:  [98.1  F (36.7  C)-98.4  F (36.9  C)] 98.3  F (36.8  C)  Pulse:  [69-87] 69  Resp:  [14-49] 14  BP: (147-232)/() 189/79  SpO2:  [92 %-97 %] 92 %    General Exam  General:  patient lying in bed without any acute distress    HEENT:  normocephalic/atraumatic    Neuro Exam  Mental Status:  alert, oriented x 3, follows commands, speech clear and fluent, naming and repetition normal  Cranial Nerves:  visual fields intact (tested by nurse), EOMI with normal smooth pursuit, facial sensation intact and symmetric (tested by nurse), facial movements symmetric, hearing not formally tested but intact to conversation, no dysarthria, tongue protrusion midline  Motor:  no abnormal movements, able to move all limbs antigravity spontaneously with no signs of hemiparesis observed, no pronator drift   Reflexes:  unable to test (telestroke)  Sensory:  light touch sensation intact and symmetric throughout upper and lower extremities (assessed by nurse), no extinction on double simultaneous stimulation (assessed by nurse)  Coordination:  normal finger-to-nose and heel-to-shin bilaterally without dysmetria, rapid alternating movements symmetric  Station/Gait:  unable to test due to telestroke    Dysphagia  Screen  Per Nursing    Stroke Scales    NIHSS  1a. Level of Consciousness 0-->Alert, keenly responsive   1b. LOC Questions 0-->Answers both questions correctly   1c. LOC Commands 0-->Performs both tasks correctly   2.   Best Gaze 0-->Normal   3.   Visual 0-->No visual loss   4.   Facial Palsy 0-->Normal symmetrical movements   5a. Motor Arm, Left 0-->No drift, limb holds 90 (or 45) degrees for full 10 secs   5b. Motor Arm, Right 0-->No drift, limb holds 90 (or 45) degrees for full 10 secs   6a. Motor Leg, Left 0-->No drift, leg holds 30 degree position for full 5 secs   6b. Motor Leg, right 0-->No drift, leg holds 30 degree position for full 5 secs   7.   Limb Ataxia 0-->Absent   8.   Sensory 0-->Normal, no sensory loss   9.   Best Language 0-->No aphasia, normal   10. Dysarthria 0-->Normal   11. Extinction and Inattention  0-->No abnormality   Total 0 (08/17/22 1105)       Modified Patria Score (Pre-morbid)  0 - No symptoms.    Imaging  I personally reviewed all imaging; relevant findings per HPI.    Labs Data   CBC  Recent Labs   Lab 08/16/22  1530   WBC 11.3*   RBC 4.39   HGB 13.6   HCT 40.1        Basic Metabolic Panel   Recent Labs   Lab 08/16/22  1834      POTASSIUM 4.5   CHLORIDE 101   CO2 27   BUN 38*   CR 1.36*   *   GUILLERMINA 11.3*     Liver Panel  No results for input(s): PROTTOTAL, ALBUMIN, BILITOTAL, ALKPHOS, AST, ALT, BILIDIRECT in the last 168 hours.  INR    Recent Labs   Lab Test 08/17/22  0811 08/16/22  1530 08/15/22  1140   INR 1.83* 2.07* 2.2*      Lipid Profile    Recent Labs   Lab Test 08/16/22  1834 06/07/22  1031 10/29/21  1448   CHOL 177 162 163   HDL 58 55 55   LDL 86 82 73   TRIG 163* 123 174*     A1C    Recent Labs   Lab Test 08/16/22  1530 06/07/22  1031 02/07/22  1250   A1C 6.8* 6.8* 8.0*     Troponin I    Recent Labs   Lab 08/16/22  1834   TROPONINI <0.01          Stroke Consult Data Data   Telestroke Service Details  (for non-emergent stroke consult with tele)  Video  start time 08/17/22   1102   Video end time 08/17/22   1131   Type of service telemedicine diagnostic assessment of acute neurological changes   Reason telemedicine is appropriate patient requires assessment with a specialist for diagnosis and treatment of neurological symptoms   Mode of transmission secure interactive audio and video communication per [employer]   Originating site (patient location) [facility]    Distant site (provider location) [facility]     Billing:  [id number]: I have personally spent a total of 70 minutes providing care today, time spent in reviewing medical records and reviewing tests, examining the patient and obtaining history, coordination of care, and discussion with the patient and/or family regarding diagnostic results, prognosis, symptom management, risks and benefits of management options, and development of plan of care. Greater than 50% was spent in counseling and coordination of care. Greater than 50% was spent in counseling and coordination of care

## 2022-08-17 NOTE — PROGRESS NOTES
Physical Therapy Discharge Summary    Reason for therapy discharge:    All goals and outcomes met, no further needs identified.    Progress towards therapy goal(s). See goals on Care Plan in [facility] electronic health record for goal details.  Goals met    Therapy recommendation(s):    No further therapy is recommended.Patient at baseline for functional mobility. No skilled PT needs.

## 2022-08-17 NOTE — PROGRESS NOTES
[facility]    Progress Note - Hospitalist Service       Date of Admission:  8/16/2022    Assessment & Plan          Angeles Can is a 81 year old female admitted on 8/16/2022. She has a history of TIA, HTN, Afib on warfarin, pacemaker, primary hyperparathyroidism, RA, breast cancer in remission and is admitted for left leg weakness.    Left leg weakness   History of TIA 2013  TIA  Patient is an 81-year-old female with history of TIA, other risk factors for CVA including hyperlipidemia and hypertension presenting with complaint of a sudden left leg weakness that was transient and lasted for less than a minute.   Previous TIA involved sudden speech difficulty that resolved immediately.  When she presented to the [facility] emergency department,  SBP elevated to 189s.  However she did not have any focal neurologic deficits. CT of the head and the neck versus MRI of the brain completed given patient's history of pacemaker placement. ED provider spoke with neuro. Per neuro, no recommendation to give aspirin. Neuro recommended keeping SBP below 200. CT negative for intracranial process.    Patient was admitted for observation  -diabetic diet - cleared by speech per nursing  -Labetalol IV 10 to 40 mg every 10 minutes as needed for systolic blood pressure above 200, diastolic blood pressure above 110  -Fall precautions  -Vital signs including BP; notify provider if systolic blood pressure above 220, diastolic blood pressure above 110  -Neurochecks per unit routine  -I/O  -PT/OT  -SLP  -Supplemental oxygen as needed  -Cardiac monitoring  -Per neurology MRI inpatient or outpatient if not possible inpatient   -echo ordered per neurology      Recent covid-19 infection  Tested positive on 8/4. Currently asymptomtic. Should be out of self-isolation window.     HTN  -PTA clonidine  -PTA furosemide  -PTA sotalol   -Plan for slow initiation of home Blood Pressure medications for long term goal 130/80  "per neurology        #Afib on warfarin   Pacemaker   INR therapeutic. [employer] [id number] [employer]olade MRI EL  DOI: 3/2/2018  Dr. Nahun Mina  -PTA warfarin; pharmacy to dose     hypomagnesiumia   -magnesium replacement protcol ordered      DMII  A1c 6.8. on 6/2022   -PTA metformin      HLD:   -Lipitor increase to 20 mg daily due to current LDL 86, goal 40-70 per neurology      Gout:   -PTA allopurinol     Diet: Moderate Consistent Carb (60 g CHO per Meal) Diet    DVT Prophylaxis: Ambulate every shift, Pt of warfarin   Fields Catheter: Not present  Fluids:   Central Lines: None  Cardiac Monitoring: None  Code Status: Full Code      Disposition Plan      Expected Discharge Date: 08/18/2022      Destination: home          Plan to discharge home 8/17/22 with further initiation of home blood pressure medications, outpatient PCP follow up, Neurology follow up, and outpatient MRI     The patient's care was discussed with the Attending Physician, Dr. Rocio Vann .    Yuan Winter DO  Hospitalist Service  [facility]  Securely message with the Vocera Web Console (learn more here)  Text page via [employer] Paging/Directory       Clinically Significant Risk Factors Present on Admission          # Hypercalcemia: Ca = 11.3 mg/dL (Ref range: 8.5 - 10.5 mg/dL) and/or iCa = N/A on admission, will monitor as appropriate  # Hypomagnesemia: Mg = 1.5 mg/dL (Ref range: 1.8 - 2.6 mg/dL) on admission, will replace as needed     # Coagulation Defect: home medication list includes an anticoagulant medication   # Hypertension: home medication list includes antihypertensive(s)    # DMII: A1C = 6.8 % (Ref range: <=5.6 %) within past 3 months  # Obesity: Estimated body mass index is 30.54 kg/m  as calculated from the following:    Height as of this encounter: 1.575 m (5' 2\").    Weight as of this encounter: 75.8 kg (167 lb).        ______________________________________________________________________    Interval " History   Patient doing well. She reports no current concerns. She denies fatigue, shortness of breath, nausea, vomiting, changes in bowel or bladder patterns. She denies changes in vision, weakness, numbness and tingling.     Data reviewed today: I reviewed all medications, new labs and imaging results over the last 24 hours.    Physical Exam   Vital Signs: Temp: 98.3  F (36.8  C) Temp src: Oral BP: (!) 191/81 Pulse: 69   Resp: 14 SpO2: 92 % O2 Device: None (Room air)    Weight: 167 lbs 0 oz          General:  patient lying in bed without any acute distress    HEENT:  normocephalic/atraumatic  CV: RRR, murmur not appreciated   RESP: Lungs CTAB  ABD: normoactive bowel sounds in all four quadrants, non tender to palpation in all four quadrants.   Neuro Exam  Mental Status:  alert, oriented x 3, follows commands, speech clear and fluent  Cranial Nerves:  EOMI with normal smooth pursuit, facial sensation intact and symmetric, facial movements symmetric, hearing not formally tested but intact to conversation, no dysarthria, tongue protrusion midline  Motor: no abnormal movements, able to move all limbs antigravity spontaneously with no signs of hemiparesis observed, no pronator drift. LE 5/5 motor strength bilaterally   Sensory: light touch sensation intact and symmetric throughout upper and lower extremities.    Data   Recent Labs   Lab 08/17/22  1438 08/17/22  0811 08/16/22  1834 08/16/22  1530 08/15/22  1140   WBC  --   --   --  11.3*  --    HGB  --   --   --  13.6  --    MCV  --   --   --  91  --    PLT  --   --   --  265  --    INR  --  1.83*  --  2.07* 2.2*   NA  --   --  139  --   --    POTASSIUM  --   --  4.5  --   --    CHLORIDE  --   --  101  --   --    CO2  --   --  27  --   --    BUN  --   --  38*  --   --    CR  --   --  1.36*  --   --    ANIONGAP  --   --  11  --   --    GUILLERMINA  --   --  11.3*  --   --    *  --  132*  --   --      Recent Results (from the past 24 hour(s))   CTA Head Neck with Contrast     Narrative    EXAM: CTA HEAD NECK W CONTRAST  LOCATION: [facility]  DATE/TIME: 8/16/2022 7:17 PM    INDICATION: TIA. Transient right leg weakness.  COMPARISON: None.  CONTRAST: 75ml Isovue 370  TECHNIQUE: Head and neck CT angiogram with IV contrast. Noncontrast head CT followed by axial helical CT images of the head and neck vessels obtained during the arterial phase of intravenous contrast administration. Axial 2D reconstructed images and   multiplanar 3D MIP reconstructed images of the head and neck vessels were performed by the technologist. Dose reduction techniques were used. All stenosis measurements made according to NASCET criteria unless otherwise specified.    FINDINGS:   NONCONTRAST HEAD CT:   INTRACRANIAL CONTENTS: No intracranial hemorrhage, extraaxial collection, or mass effect.  No CT evidence of acute infarct. Mild presumed chronic small vessel ischemic changes. Mild generalized volume loss. No hydrocephalus.     VISUALIZED ORBITS/SINUSES/MASTOIDS: Prior bilateral cataract surgery. Visualized portions of the orbits are otherwise unremarkable. Mild mucosal thickening scattered about the paranasal sinuses. No middle ear or mastoid effusion.    BONES/SOFT TISSUES: No acute abnormality.    HEAD CTA:  ANTERIOR CIRCULATION: Atherosclerotic plaque involving the carotid siphons. No high-grade stenosis/occlusion, aneurysm, or high flow vascular malformation. Standard [place] anatomy.    POSTERIOR CIRCULATION: No stenosis/occlusion, aneurysm, or high flow vascular malformation. Dominant right and smaller left vertebral artery contribute to a normal basilar artery.     DURAL VENOUS SINUSES: Expected enhancement of the major dural venous sinuses.    NECK CTA:  RIGHT CAROTID: Calcified atherosclerotic plaque at the right carotid bifurcation and involving the right carotid bulb results in less than 50% stenosis in the right ICA. No dissection.    LEFT CAROTID: Calcified  atherosclerotic plaque at the left carotid bifurcation results in less than 50% stenosis in the left ICA. No dissection.    VERTEBRAL ARTERIES: Mild atherosclerotic stenosis of the vertebral artery origins bilaterally. No additional focal stenosis or dissection. The right vertebral artery is dominant.    AORTIC ARCH: Standard three-vessel arch anatomy. Atherosclerotic plaque involving the aortic arch and arch vessel origins including moderate stenosis of the left subclavian artery origin.    NONVASCULAR STRUCTURES: Mild cervical spondylosis. Included lung apices are clear. Partially visualized left subclavian transvenous pacing electrodes.      Impression    IMPRESSION:   HEAD CT:  1.  No CT evidence for acute intracranial process.  2.  Brain atrophy and presumed chronic microvascular ischemic changes as above.  3.  Inflammatory changes of the paranasal sinuses.    HEAD CTA:   1.  No significant stenosis, aneurysm, or high flow vascular malformation identified.    NECK CTA:  1.  No hemodynamically significant stenosis in the neck vessels by NASCET criteria.  2.  No evidence for dissection.

## 2022-08-17 NOTE — PROGRESS NOTES
Occupational Therapy Discharge Summary    Reason for therapy discharge:    All goals and outcomes met, no further needs identified.    Progress towards therapy goal(s). See goals on Care Plan in [facility] electronic health record for goal details.  Goals met    Therapy recommendation(s):    No further therapy is recommended.

## 2022-08-17 NOTE — CONSULTS
Care Management Initial Consult    General Information  Assessment completed with: Patient,    Type of CM/SW Visit: Offer D/C Planning (MOON notice and education)    Primary Care Provider verified and updated as needed: Yes   Readmission within the last 30 days: no previous admission in last 30 days      Reason for Consult: discharge planning  Advance Care Planning: Advance Care Planning Reviewed: questions answered, other (see comments) (Given Honoring Choices information)     General Information Comments: Lives alone    Communication Assessment  Patient's communication style: spoken language (English or Bilingual)    Hearing Difficulty or Deaf: no   Wear Glasses or Blind: yes    Cognitive  Cognitive/Neuro/Behavioral: alert and appropriately oriented                      Living Environment:   People in home: alone     Current living Arrangements: other (see comments) (Town home)      Able to return to prior arrangements: yes  Living Arrangement Comments: Lives alone    Family/Social Support:  Care provided by: self  Provides care for: no one  Marital Status:   Children          Description of Support System: Supportive, Involved    Support Assessment: Adequate family and caregiver support, Patient communicates needs well met    Current Resources:   Patient receiving home care services: No     Community Resources: None  Equipment currently used at home: glucometer  Supplies currently used at home: Diabetic Supplies    Employment/Financial:  Employment Status: retired        Financial Concerns: No concerns identified           Lifestyle & Psychosocial Needs:  Social Determinants of Health     Tobacco Use: Medium Risk     Smoking Tobacco Use: Former Smoker     Smokeless Tobacco Use: Never Used   Alcohol Use: Not on file   Financial Resource Strain: Not on file   Food Insecurity: Not on file   Transportation Needs: Not on file   Physical Activity: Not on file   Stress: Not on file   Social Connections: Not on  file   Intimate Partner Violence: Not on file   Depression: Not at risk     PHQ-2 Score: 0   Housing Stability: Not on file       Functional Status:  Prior to admission patient needed assistance:   Dependent ADLs:: Independent  Dependent IADLs:: Independent  Assesssment of Functional Status: At functional baseline    Mental Health Status:      Denies     Chemical Dependency Status:     Denies            Values/Beliefs:  Spiritual, Cultural Beliefs, Scientology Practices, Values that affect care:   denies              Additional Information:  Chart reviewed. Initial CM consult completed by BECKY SHAFFER on 9/16 per flowsheet and confirmed by this CM.     1:40 PM  This CM provided MOON notice and education. Has Medicare and BCBS supplement. Patient verbalized understanding and signed. Original placed in chart and copy given to patient.     Per MD, anticipate discharge tomorrow.   Patient denies needs from CM. Daughter will transport home.     Lexus Funes RN

## 2022-08-17 NOTE — ED NOTES
Patient denies pain, denies leg weakness, denies dizziness, reports not being happy to be here, able to ambulate and steady on feet, has smooth even respirations.

## 2022-08-18 ENCOUNTER — APPOINTMENT (OUTPATIENT)
Dept: RADIOLOGY | Facility: CLINIC | Age: 82
End: 2022-08-18
Payer: MEDICARE

## 2022-08-18 ENCOUNTER — TELEPHONE (OUTPATIENT)
Dept: ANTICOAGULATION | Facility: CLINIC | Age: 82
End: 2022-08-18

## 2022-08-18 ENCOUNTER — APPOINTMENT (OUTPATIENT)
Dept: MRI IMAGING | Facility: CLINIC | Age: 82
End: 2022-08-18
Payer: MEDICARE

## 2022-08-18 VITALS
BODY MASS INDEX: 30.84 KG/M2 | HEIGHT: 62 IN | TEMPERATURE: 98.4 F | HEART RATE: 70 BPM | SYSTOLIC BLOOD PRESSURE: 146 MMHG | DIASTOLIC BLOOD PRESSURE: 69 MMHG | OXYGEN SATURATION: 97 % | RESPIRATION RATE: 18 BRPM | WEIGHT: 167.6 LBS

## 2022-08-18 DIAGNOSIS — I48.0 PAROXYSMAL ATRIAL FIBRILLATION (H): Primary | ICD-10-CM

## 2022-08-18 LAB
ANION GAP SERPL CALCULATED.3IONS-SCNC: 9 MMOL/L (ref 5–18)
BUN SERPL-MCNC: 28 MG/DL (ref 8–28)
CALCIUM SERPL-MCNC: 10.4 MG/DL (ref 8.5–10.5)
CHLORIDE BLD-SCNC: 105 MMOL/L (ref 98–107)
CO2 SERPL-SCNC: 26 MMOL/L (ref 22–31)
CREAT SERPL-MCNC: 0.89 MG/DL (ref 0.6–1.1)
GFR SERPL CREATININE-BSD FRML MDRD: 64 ML/MIN/1.73M2
GLUCOSE BLD-MCNC: 127 MG/DL (ref 70–125)
GLUCOSE BLDC GLUCOMTR-MCNC: 143 MG/DL (ref 70–99)
INR PPP: 1.84 (ref 0.85–1.15)
MAGNESIUM SERPL-MCNC: 2.2 MG/DL (ref 1.8–2.6)
POTASSIUM BLD-SCNC: 3.5 MMOL/L (ref 3.5–5)
SODIUM SERPL-SCNC: 140 MMOL/L (ref 136–145)

## 2022-08-18 PROCEDURE — 250N000011 HC RX IP 250 OP 636

## 2022-08-18 PROCEDURE — G0378 HOSPITAL OBSERVATION PER HR: HCPCS

## 2022-08-18 PROCEDURE — 82962 GLUCOSE BLOOD TEST: CPT

## 2022-08-18 PROCEDURE — 250N000011 HC RX IP 250 OP 636: Performed by: STUDENT IN AN ORGANIZED HEALTH CARE EDUCATION/TRAINING PROGRAM

## 2022-08-18 PROCEDURE — 71046 X-RAY EXAM CHEST 2 VIEWS: CPT

## 2022-08-18 PROCEDURE — 83735 ASSAY OF MAGNESIUM: CPT | Performed by: STUDENT IN AN ORGANIZED HEALTH CARE EDUCATION/TRAINING PROGRAM

## 2022-08-18 PROCEDURE — 96375 TX/PRO/DX INJ NEW DRUG ADDON: CPT | Mod: XU

## 2022-08-18 PROCEDURE — 250N000013 HC RX MED GY IP 250 OP 250 PS 637: Performed by: PHYSICIAN ASSISTANT

## 2022-08-18 PROCEDURE — 36415 COLL VENOUS BLD VENIPUNCTURE: CPT

## 2022-08-18 PROCEDURE — 99217 PR OBSERVATION CARE DISCHARGE: CPT | Mod: GC

## 2022-08-18 PROCEDURE — 255N000002 HC RX 255 OP 636: Performed by: STUDENT IN AN ORGANIZED HEALTH CARE EDUCATION/TRAINING PROGRAM

## 2022-08-18 PROCEDURE — 96374 THER/PROPH/DIAG INJ IV PUSH: CPT | Mod: XU

## 2022-08-18 PROCEDURE — 250N000013 HC RX MED GY IP 250 OP 250 PS 637: Performed by: STUDENT IN AN ORGANIZED HEALTH CARE EDUCATION/TRAINING PROGRAM

## 2022-08-18 PROCEDURE — 250N000013 HC RX MED GY IP 250 OP 250 PS 637

## 2022-08-18 PROCEDURE — A9585 GADOBUTROL INJECTION: HCPCS | Performed by: STUDENT IN AN ORGANIZED HEALTH CARE EDUCATION/TRAINING PROGRAM

## 2022-08-18 PROCEDURE — 80048 BASIC METABOLIC PNL TOTAL CA: CPT

## 2022-08-18 PROCEDURE — 85610 PROTHROMBIN TIME: CPT

## 2022-08-18 PROCEDURE — 36415 COLL VENOUS BLD VENIPUNCTURE: CPT | Performed by: STUDENT IN AN ORGANIZED HEALTH CARE EDUCATION/TRAINING PROGRAM

## 2022-08-18 PROCEDURE — G1010 CDSM STANSON: HCPCS

## 2022-08-18 RX ORDER — MAGNESIUM SULFATE 4 G/50ML
4 INJECTION INTRAVENOUS ONCE
Status: COMPLETED | OUTPATIENT
Start: 2022-08-18 | End: 2022-08-18

## 2022-08-18 RX ORDER — ATORVASTATIN CALCIUM 20 MG/1
20 TABLET, FILM COATED ORAL DAILY
Qty: 30 TABLET | Refills: 1 | OUTPATIENT
Start: 2022-08-19 | End: 2024-08-08

## 2022-08-18 RX ORDER — GADOBUTROL 604.72 MG/ML
7.5 INJECTION INTRAVENOUS ONCE
Status: COMPLETED | OUTPATIENT
Start: 2022-08-18 | End: 2022-08-18

## 2022-08-18 RX ORDER — LORAZEPAM 2 MG/ML
1 INJECTION INTRAMUSCULAR ONCE
Status: COMPLETED | OUTPATIENT
Start: 2022-08-18 | End: 2022-08-18

## 2022-08-18 RX ORDER — ATORVASTATIN CALCIUM 20 MG/1
20 TABLET, FILM COATED ORAL DAILY
Qty: 30 TABLET | Refills: 1 | Status: SHIPPED | OUTPATIENT
Start: 2022-08-19 | End: 2022-10-31

## 2022-08-18 RX ADMIN — ALLOPURINOL 300 MG: 300 TABLET ORAL at 09:21

## 2022-08-18 RX ADMIN — MAGNESIUM SULFATE HEPTAHYDRATE 4 G: 4 INJECTION, SOLUTION INTRAVENOUS at 02:37

## 2022-08-18 RX ADMIN — ATORVASTATIN CALCIUM 20 MG: 10 TABLET, FILM COATED ORAL at 09:21

## 2022-08-18 RX ADMIN — LORAZEPAM 1 MG: 2 INJECTION INTRAMUSCULAR; INTRAVENOUS at 08:15

## 2022-08-18 RX ADMIN — SOTALOL HYDROCHLORIDE 80 MG: 80 TABLET ORAL at 09:21

## 2022-08-18 RX ADMIN — GADOBUTROL 7.5 ML: 604.72 INJECTION INTRAVENOUS at 02:19

## 2022-08-18 RX ADMIN — FUROSEMIDE 40 MG: 40 TABLET ORAL at 09:21

## 2022-08-18 RX ADMIN — CLONIDINE HYDROCHLORIDE 0.1 MG: 0.1 TABLET ORAL at 09:21

## 2022-08-18 ASSESSMENT — ACTIVITIES OF DAILY LIVING (ADL)
ADLS_ACUITY_SCORE: 24

## 2022-08-18 NOTE — PLAN OF CARE
Problem: Hypertension Acute  Goal: Blood Pressure Within Desired Range  Outcome: Ongoing, Progressing  Intervention: Normalize Blood Pressure  Recent Flowsheet Documentation  Taken 8/17/2022 2000 by Everett Rodríguez RN  Medication Review/Management: medications reviewed     Problem: Cognitive Impairment (Stroke, Ischemic/Transient Ischemic Attack)  Goal: Optimal Cognitive Function  Outcome: Ongoing, Progressing     Pt admitted from  ED for ongoing monitoring post TIA. Full neuro check completed on admission, no deficits noted, continue to monitor. She has a permanent pacemaker, tele showing SR. LEONARDA ALCALA. Pt is hypertensive, prn labetalol available. SBA to bathroom. Plan to have MRI tomorrow at 0800, checklist completed & placed in pt's chart. Continue to monitor neuros, vitals & activity.

## 2022-08-18 NOTE — TELEPHONE ENCOUNTER
ANTICOAGULATION  MANAGEMENT: Discharge Review    [facility] chart reviewed for anticoagulation continuity of care    Hospital Admission on 8/16 for TIA.    Discharge disposition: Home    Results:    Recent labs: (last 7 days)     08/15/22  1140 08/16/22  1530 08/17/22  0811 08/18/22  0453   INR 2.2* 2.07* 1.83* 1.84*     Anticoagulation inpatient management:     home regimen continued    Anticoagulation discharge instructions:     Warfarin dosing: home regimen continued assumed, did not reach Frida today   Bridging: No   INR goal change: No      Medication changes affecting anticoagulation: No    Additional factors affecting anticoagulation: No     PLAN     No adjustment to anticoagulation plan needed    Left a detailed message for Frida, asked her to call with any dosing questions and check inr in one week with pcp follow up    Anticoagulation Calendar updated    Reyna Radford RN

## 2022-08-18 NOTE — PROGRESS NOTES
PRIMARY DIAGNOSIS: SYNCOPE/TIA  OUTPATIENT/OBSERVATION GOALS TO BE MET BEFORE DISCHARGE:  1. Orthostatic performed: N/A    2. Diagnostic testing complete & at baseline neurologic testing: Yes    3. Cleared by consultants (if involved): Yes    4. Interpretation of cardiac rhythm per telemetry tech: Paced    5. Tolerating adequate PO diet and medications: Yes    6. Return to near baseline physical activity or neurologic status: Yes    Discharge Planner Nurse   Safe discharge environment identified: Yes  Barriers to discharge: No       Entered by: Eunice Jessica RN 08/18/2022 8:00 am     Please review provider order for any additional goals.   Nurse to notify provider when observation goals have been met and patient is ready for discharge.  Eunice Jessica RN on 8/18/2022 at 8:00 am

## 2022-08-18 NOTE — PROGRESS NOTES
Patient's device placed into MRI Surescan mode prior to scan with [employer] Device Representative Medhi N.  Patient given ativan prior to scan by floor RN. Patient monitored by RN via ECG and pulse oximetry throughout procedure.  Patient stable throughout.  Device placed back into previous mode at completion of MRI with [employer] device Representative Medhi N.

## 2022-08-18 NOTE — DISCHARGE SUMMARY
[facility]  Discharge Summary - Medicine & Pediatrics       Date of Admission:  8/16/2022  Date of Discharge:  8/18/2022  Discharging Provider: Dr. Rocio Vann  Discharge Service: Hospitalist Service    Discharge Diagnoses    Active Problems:    TIA (transient ischemic attack) (8/16/2022)     Left leg weakness     Recent COVID-19 infection     HTN     Afib on warfarin     Hypomagnesemia      DMII     HLD      Follow-ups Needed After Discharge    Recommend follow-up with PCP within one week. Additionally neurology recommends follow-up to general neurology in 6-8 weeks.     Unresulted Labs Ordered in the Past 30 Days of this Admission     Date and Time Order Name Status Description    8/18/2022  6:59 AM Magnesium In process       These results will be followed up by PCP and outpatient general neurology.     Discharge Disposition   Discharged to home  Condition at discharge: Stable        Hospital Course   Ang[facility] Can was presented on 8/16/2022 for transient left lower extremity weakness. Patient has a past medical history significant for previous TIA, DM2, atrial fibrillation-anticoagulated with warfarin, HTN, HLD, pacemaker, and recent COVID-119 infection (8/4/22).   The following problems were addressed during her hospitalization:    The patient presented after an episode of transient left lower extremity weakness, which resolved in a few minutes. Patient did not have any focal neurological defects in the ED. CT and MRI was negative for any acute intracranial process. TIA was suspected. Neurology was consulted and cleared the patient for follow-up outpatient in about 6-8 weeks. Additionally, patient's LDL was 86, neurology recommended increasing atorvastatin to 20 mg daily.       Consultations This Hospital Stay   CARE MANAGEMENT / SOCIAL WORK IP CONSULT  PHARMACY TO DOSE WARFARIN  SMOKING CESSATION PROGRAM IP CONSULT  NEUROLOGY IP STROKE CONSULT  PHYSICAL THERAPY ADULT IP  CONSULT  OCCUPATIONAL THERAPY ADULT IP CONSULT  SPEECH LANGUAGE PATH ADULT IP CONSULT  NEUROSURGERY IP CONSULT  NEUROLOGY IP CONSULT    Code Status   Full Code       The patient was discussed with Dr. Rocio Winter DO  [place] Team Service  [facility] 3 ICU  [address] [address]  Phone: [phone]  Fax: [phone]  ______________________________________________________________________    Physical Exam   Vital Signs: Temp: 98.5  F (36.9  C) Temp src: Oral BP: (!) 179/72 Pulse: 80   Resp: 20 SpO2: 96 % O2 Device: Nasal cannula Oxygen Delivery: 2 LPM  Weight: 167 lbs 9.6 oz  General:  patient lying in bed without any acute distress    HEENT:  normocephalic/atraumatic  CV: RRR, murmur not appreciated   RESP: Lungs CTAB  ABD: normoactive bowel sounds in all four quadrants, non tender to palpation in all four quadrants.   Mental Status:  alert, oriented x 3, follows commands, speech clear and fluent  Cranial Nerves:  EOMI with normal smooth pursuit, facial sensation intact and symmetric, facial movements symmetric, hearing not formally tested but intact to conversation, no dysarthria, tongue protrusion midline  Motor: no abnormal movements, able to move all limbs antigravity spontaneously with no signs of hemiparesis observed, no pronator drift. LE 5/5 motor strength bilaterally   Sensory: light touch sensation intact and symmetric throughout upper and lower extremities.    Primary Care Physician   Freddy Lubin    Discharge Orders   No discharge procedures on file.    Significant Results and Procedures   Most Recent 3 CBC's:Recent Labs   Lab Test 08/16/22  1530 06/07/22  1031 05/24/21  1303   WBC 11.3* 8.4 8.7   HGB 13.6 13.0 13.2   MCV 91 93 96    225 225     Most Recent 3 INR's:Recent Labs   Lab Test 08/18/22  0453 08/17/22  0811 08/16/22  1530   INR 1.84* 1.83* 2.07*     Most Recent 3 Creatinines:Recent Labs   Lab Test 08/18/22  0457  08/16/22  1834 06/07/22  1031   CR 0.89 1.36* 1.16*     Most Recent 3 Hemoglobins:Recent Labs   Lab Test 08/16/22  1530 06/07/22  1031 05/24/21  1303   HGB 13.6 13.0 13.2   ,   Results for orders placed or performed during the hospital encounter of 08/16/22   CTA Head Neck with Contrast    Narrative    EXAM: CTA HEAD NECK W CONTRAST  LOCATION: [facility]  DATE/TIME: 8/16/2022 7:17 PM    INDICATION: TIA. Transient right leg weakness.  COMPARISON: None.  CONTRAST: 75ml Isovue 370  TECHNIQUE: Head and neck CT angiogram with IV contrast. Noncontrast head CT followed by axial helical CT images of the head and neck vessels obtained during the arterial phase of intravenous contrast administration. Axial 2D reconstructed images and   multiplanar 3D MIP reconstructed images of the head and neck vessels were performed by the technologist. Dose reduction techniques were used. All stenosis measurements made according to NASCET criteria unless otherwise specified.    FINDINGS:   NONCONTRAST HEAD CT:   INTRACRANIAL CONTENTS: No intracranial hemorrhage, extraaxial collection, or mass effect.  No CT evidence of acute infarct. Mild presumed chronic small vessel ischemic changes. Mild generalized volume loss. No hydrocephalus.     VISUALIZED ORBITS/SINUSES/MASTOIDS: Prior bilateral cataract surgery. Visualized portions of the orbits are otherwise unremarkable. Mild mucosal thickening scattered about the paranasal sinuses. No middle ear or mastoid effusion.    BONES/SOFT TISSUES: No acute abnormality.    HEAD CTA:  ANTERIOR CIRCULATION: Atherosclerotic plaque involving the carotid siphons. No high-grade stenosis/occlusion, aneurysm, or high flow vascular malformation. Standard [place] anatomy.    POSTERIOR CIRCULATION: No stenosis/occlusion, aneurysm, or high flow vascular malformation. Dominant right and smaller left vertebral artery contribute to a normal basilar artery.     DURAL VENOUS SINUSES:  Expected enhancement of the major dural venous sinuses.    NECK CTA:  RIGHT CAROTID: Calcified atherosclerotic plaque at the right carotid bifurcation and involving the right carotid bulb results in less than 50% stenosis in the right ICA. No dissection.    LEFT CAROTID: Calcified atherosclerotic plaque at the left carotid bifurcation results in less than 50% stenosis in the left ICA. No dissection.    VERTEBRAL ARTERIES: Mild atherosclerotic stenosis of the vertebral artery origins bilaterally. No additional focal stenosis or dissection. The right vertebral artery is dominant.    AORTIC ARCH: Standard three-vessel arch anatomy. Atherosclerotic plaque involving the aortic arch and arch vessel origins including moderate stenosis of the left subclavian artery origin.    NONVASCULAR STRUCTURES: Mild cervical spondylosis. Included lung apices are clear. Partially visualized left subclavian transvenous pacing electrodes.      Impression    IMPRESSION:   HEAD CT:  1.  No CT evidence for acute intracranial process.  2.  Brain atrophy and presumed chronic microvascular ischemic changes as above.  3.  Inflammatory changes of the paranasal sinuses.    HEAD CTA:   1.  No significant stenosis, aneurysm, or high flow vascular malformation identified.    NECK CTA:  1.  No hemodynamically significant stenosis in the neck vessels by NASCET criteria.  2.  No evidence for dissection.   MR Brain w/o & w Contrast    Narrative    EXAM: MR BRAIN W/O and W CONTRAST  LOCATION: [facility]  DATE/TIME: 8/18/2022 8:07 AM    INDICATION: TIA with resolution of symptoms, 28hrs from symptoms onset of R leg weakness.  COMPARISON: Head and neck CT angiogram 08/16/2022  CONTRAST: 7.5mlGadavist  TECHNIQUE: Routine multiplanar multisequence head MRI without and with intravenous contrast.    FINDINGS:  INTRACRANIAL CONTENTS: Diffusion-weighted images demonstrate no areas of restricted diffusion. No subacute or chronic  intracranial hemorrhage. Mild diffuse cerebral and cerebellar parenchymal volume loss. No midline shift. The basilar cisterns are   patent. No cerebellar tonsillar ectopia. Mild to moderate periventricular and scattered foci of deep white matter T2 prolongation involving both hemispheres and the central lelia. Chronic lacunes in the cerebellar hemispheres. Whole brain postcontrast   images demonstrate no abnormal enhancement.    SELLA: Partially empty sella appearance.    OSSEOUS STRUCTURES/SOFT TISSUES: Normal marrow signal. The major intracranial vascular flow voids are maintained.     ORBITS: Prior bilateral cataract surgery. Visualized portions of the orbits are otherwise unremarkable.     SINUSES/MASTOIDS: Moderate mucosal thickenings of the ethmoid air cells. Mild mucosal thickening of the rest of the paranasal sinuses. Mild opacification of the mastoid air cells.       Impression    IMPRESSION:  1.  No acute/subacute infarcts, mass lesions, hydrocephalus or MRI evidence of intracranial hemorrhage. No abnormal intracranial enhancement.  2.  Mild diffuse cerebral parenchymal volume loss. Presumed chronic hypertensive/microvascular ischemic white matter changes.  3.  Chronic lacunes in the cerebellar hemispheres.    XR Chest 2 Views    Narrative    EXAM: XR CHEST 2 VIEWS  LOCATION: [facility]  DATE/TIME: 8/18/2022 7:56 AM    INDICATION: Evaluate cardiac pacemaker  COMPARISON: None.      Impression    IMPRESSION:     Left subclavian approach dual chamber pacemaker has right atrial appendage and right ventricular leads. No fracture related to retained lead fragments.    Cardiac silhouette is normal in size. There are fairly diffuse thoracic and abdominal aorta atheromatous calcifications but no findings to suggest aortic enlargement.    Symmetric lung inflation. No lung edema or consolidation.    No pleural effusion.       Discharge Medications   Current Discharge Medication List       CONTINUE these medications which have NOT CHANGED    Details   allopurinol (ZYLOPRIM) 300 MG tablet [ALLOPURINOL (ZYLOPRIM) 300 MG TABLET] Take 1 tablet by mouth once daily.  Qty: 90 tablet, Refills: 3    Associated Diagnoses: Medication monitoring encounter      amLODIPine (NORVASC) 2.5 MG tablet Take 1 tablet (2.5 mg) by mouth daily  Qty: 90 tablet, Refills: 3    Associated Diagnoses: Essential hypertension, benign      anastrozole (ARIMIDEX) 1 MG tablet Take 1 tablet (1 mg total) by mouth daily.  Qty: 90 tablet, Refills: 3    Associated Diagnoses: Malignant neoplasm of lower-inner quadrant of right breast of female, estrogen receptor positive (H)      atorvastatin (LIPITOR) 10 MG tablet Take 1 tablet (10 mg) by mouth daily  Qty: 90 tablet, Refills: 3    Associated Diagnoses: Mixed hyperlipidemia      cholecalciferol, vitamin D3, 1,000 unit tablet [CHOLECALCIFEROL, VITAMIN D3, 1,000 UNIT TABLET] Take 1,000 Units by mouth daily.      cloNIDine (CATAPRES) 0.1 MG tablet Take 1 tablet (0.1 mg) by mouth 2 times daily  Qty: 180 tablet, Refills: 3    Associated Diagnoses: Essential hypertension, benign      furosemide (LASIX) 40 MG tablet Take 1 tablet (40 mg) by mouth daily  Qty: 90 tablet, Refills: 3    Associated Diagnoses: Peripheral edema      losartan (COZAAR) 100 MG tablet Take 1 tablet (100 mg) by mouth daily  Qty: 90 tablet, Refills: 3    Associated Diagnoses: Essential hypertension with goal blood pressure less than 140/90; Essential hypertension      magnesium oxide (MAG-OX) 400 mg tablet [MAGNESIUM OXIDE (MAG-OX) 400 MG TABLET] Take 400 mg by mouth daily.       metFORMIN (GLUCOPHAGE XR) 500 MG 24 hr tablet 500 mg daily (with dinner)      peg 400-propylene glycol (SYSTANE) 0.4-0.3 % Drop [-PROPYLENE GLYCOL (SYSTANE) 0.4-0.3 % DROP] Place 1 Drop into both eyes 2 times daily if needed for Dry Eyes. Indications: DRY EYE      sotalol (BETAPACE) 80 MG tablet Take 1 tablet (80 mg total) by mouth  daily.  Qty: 90 tablet, Refills: 2    Associated Diagnoses: Paroxysmal atrial fibrillation (H)      warfarin ANTICOAGULANT (COUMADIN) 2.5 MG tablet [WARFARIN ANTICOAGULANT (COUMADIN/JANTOVEN) 2.5 MG TABLET] Take 1/2-1 tablet (1.25-2.5 mg) by mouth daily. Adjust dose based on INR results as directed.  Qty: 90 tablet, Refills: 3    Associated Diagnoses: Paroxysmal atrial fibrillation (H)      blood glucose test strips [BLOOD GLUCOSE TEST STRIPS] Use 1 each As Directed daily.  Qty: 100 strip, Refills: 3    Comments: One touch ultra 2 meter  Associated Diagnoses: Type 2 diabetes mellitus (H); Type 2 diabetes mellitus with hyperglycemia, without long-term current use of insulin (H)           Allergies   Allergies   Allergen Reactions     Metformin Diarrhea     Hydrochlorothiazide Unknown     Annotation: hyperuricemia made worse by HCTZ   Causes pts gout

## 2022-08-18 NOTE — PROGRESS NOTES
"Stroke neurology note 8/18/2022    MRI brain reviewed. No acute stroke. Please see yesterday's consult for assessment and recommendations.    Will sign off. Please call with questions.    Ines Johnson PA-C  Vascular Neurology    To page me or covering stroke neurology team member, click here: [facility]   Choose \"On Call\" tab at top, then search dropdown box for \"Neurology Adult\", select location, press Enter, then look for stroke/neuro ICU/telestroke.      "

## 2022-08-18 NOTE — PROGRESS NOTES
PRIMARY DIAGNOSIS: TIA  OUTPATIENT/OBSERVATION GOALS TO BE MET BEFORE DISCHARGE:  1. Orthostatic performed: No    2. Diagnostic testing complete & at baseline neurologic testing: N/A    3. Cleared by consultants (if involved): Yes    4. Interpretation of cardiac rhythm per telemetry tech: Normal Sinus Rhythm    5. Tolerating adequate PO diet and medications: Yes    6. Return to near baseline physical activity or neurologic status: Yes    Discharge Planner Nurse   Safe discharge environment identified: Yes  Barriers to discharge: Yes       Entered by: Sharon Carias RN 08/18/2022 3:56 AM  Pt to have Brain MRI in the morning. If not done, per neurology note pt can be scheduled for a outpatient appointment. No deficits in neuro checks, BP still elevated. Pt on RA. SBA with activity in room. Continue to monitor neuro and BP.  .

## 2022-08-18 NOTE — PLAN OF CARE
"Patient to discharge home with daughter. AVS instructions gone over with patient and daughter, questions answered. All belongings with patient.    Eunice Jessica RN on 8/18/2022 at 1:32 PM    Problem: Plan of Care - These are the overarching goals to be used throughout the patient stay.    Goal: Plan of Care Review/Shift Note  Description: The Plan of Care Review/Shift note should be completed every shift.  The Outcome Evaluation is a brief statement about your assessment that the patient is improving, declining, or no change.  This information will be displayed automatically on your shift note.  Outcome: Met  Goal: Patient-Specific Goal (Individualized)  Description: You can add care plan individualizations to a care plan. Examples of Individualization might be:  \"Parent requests to be called daily at 9am for status\", \"I have a hard time hearing out of my right ear\", or \"Do not touch me to wake me up as it startles me\".  Outcome: Met  Goal: Absence of Hospital-Acquired Illness or Injury  Outcome: Met  Intervention: Identify and Manage Fall Risk  Recent Flowsheet Documentation  Taken 8/18/2022 0738 by Eunice Jessica RN  Safety Promotion/Fall Prevention:   activity supervised   clutter free environment maintained   fall prevention program maintained   increased rounding and observation   increase visualization of patient   nonskid shoes/slippers when out of bed  Intervention: Prevent Skin Injury  Recent Flowsheet Documentation  Taken 8/18/2022 0738 by Eunice Jessica RN  Body Position: position changed independently  Intervention: Prevent and Manage VTE (Venous Thromboembolism) Risk  Recent Flowsheet Documentation  Taken 8/18/2022 0738 by Eunice Jessica RN  Activity Management: ambulated in room  Goal: Optimal Comfort and Wellbeing  Outcome: Met  Goal: Readiness for Transition of Care  Outcome: Met     Problem: Risk for Delirium  Goal: Optimal Coping  Outcome: Met  Goal: Improved Behavioral " Control  Outcome: Met  Goal: Improved Attention and Thought Clarity  Outcome: Met  Goal: Improved Sleep  Outcome: Met     Problem: Hypertension Acute  Goal: Blood Pressure Within Desired Range  Outcome: Met  Intervention: Normalize Blood Pressure  Recent Flowsheet Documentation  Taken 8/18/2022 0738 by Eunice Jessica RN  Medication Review/Management: medications reviewed     Problem: Cognitive Impairment (Stroke, Ischemic/Transient Ischemic Attack)  Goal: Optimal Cognitive Function  Outcome: Met     Problem: Communication Impairment (Stroke, Ischemic/Transient Ischemic Attack)  Goal: Improved Communication Skills  Outcome: Met     Problem: Functional Ability Impaired (Stroke, Ischemic/Transient Ischemic Attack)  Goal: Optimal Functional Ability  Outcome: Met  Intervention: Optimize Functional Ability  Recent Flowsheet Documentation  Taken 8/18/2022 0738 by Eunice Jessica RN  Activity Management: ambulated in room     Problem: Swallowing Impairment (Stroke, Ischemic/Transient Ischemic Attack)  Goal: Optimal Eating and Swallowing without Aspiration  Outcome: Met   Goal Outcome Evaluation:

## 2022-08-19 NOTE — CONFIDENTIAL NOTE
Patient left a VM on the home care line asking for Reyna to call back as she is having problems with her phone. Please call her on her Cell phone at [phone].  Bailey Engle RN  Anticoagulation Nurse - [facility], [place]

## 2022-08-22 ENCOUNTER — LAB (OUTPATIENT)
Dept: LAB | Facility: CLINIC | Age: 82
End: 2022-08-22
Payer: MEDICARE

## 2022-08-22 ENCOUNTER — ALLIED HEALTH/NURSE VISIT (OUTPATIENT)
Dept: FAMILY MEDICINE | Facility: CLINIC | Age: 82
End: 2022-08-22
Payer: MEDICARE

## 2022-08-22 ENCOUNTER — ANTICOAGULATION THERAPY VISIT (OUTPATIENT)
Dept: ANTICOAGULATION | Facility: CLINIC | Age: 82
End: 2022-08-22

## 2022-08-22 VITALS
HEART RATE: 70 BPM | BODY MASS INDEX: 30.91 KG/M2 | WEIGHT: 169 LBS | SYSTOLIC BLOOD PRESSURE: 138 MMHG | DIASTOLIC BLOOD PRESSURE: 72 MMHG | OXYGEN SATURATION: 96 %

## 2022-08-22 DIAGNOSIS — I48.0 PAROXYSMAL ATRIAL FIBRILLATION (H): ICD-10-CM

## 2022-08-22 DIAGNOSIS — Z01.30 BP CHECK: Primary | ICD-10-CM

## 2022-08-22 DIAGNOSIS — I48.0 PAROXYSMAL ATRIAL FIBRILLATION (H): Primary | ICD-10-CM

## 2022-08-22 LAB — INR BLD: 2.2 (ref 0.9–1.1)

## 2022-08-22 PROCEDURE — 85610 PROTHROMBIN TIME: CPT

## 2022-08-22 PROCEDURE — 36416 COLLJ CAPILLARY BLOOD SPEC: CPT

## 2022-08-22 PROCEDURE — 99207 PR NO CHARGE NURSE ONLY: CPT

## 2022-08-22 NOTE — PROGRESS NOTES
ANTICOAGULATION MANAGEMENT     Angeles Can 81 year old female is on warfarin with therapeutic INR result. (Goal INR 2.0-3.0)    Recent labs: (last 7 days)     08/22/22  1204   INR 2.2*       ASSESSMENT       Source(s): Chart Review and Patient/Caregiver Call       Warfarin doses taken: Warfarin taken as instructed    Diet: No new diet changes identified    New illness, injury, or hospitalization: No    Medication/supplement changes: None noted    Signs or symptoms of bleeding or clotting: No    Previous INR: Therapeutic last 2(+) visits    Additional findings: None       PLAN     Recommended plan for no diet, medication or health factor changes affecting INR     Dosing Instructions: Continue your current warfarin dose with next INR in 2 weeks       Summary  As of 8/22/2022    Full warfarin instructions:  1.25 mg every Mon, Thu, Sat; 2.5 mg all other days   Next INR check:  9/5/2022             Telephone call with Frida who verbalizes understanding and agrees to plan    Lab visit scheduled    Education provided: None required    Plan made per [facility] anticoagulation protocol    Reyna Radford RN  Anticoagulation Clinic  8/22/2022    _______________________________________________________________________     Anticoagulation Episode Summary     Current INR goal:  2.0-3.0   TTR:  77.4 % (1 y)   Target end date:  Indefinite   Send INR reminders to:  [facility]    Indications    Paroxysmal atrial fibrillation (H) [I48.0]           Comments:  PAF         Anticoagulation Care Providers     Provider Role Specialty Phone number    Freddy Lubin MD Referring Internal Medicine [phone]

## 2022-08-22 NOTE — PROGRESS NOTES
Angeles Can is a 81 year old patient who comes in today for a Blood Pressure check.  Initial BP:  /72   Pulse 70   Wt 76.7 kg (169 lb)   SpO2 96%   BMI 30.91 kg/m       70  Disposition: results routed to provider

## 2022-09-06 ENCOUNTER — ANTICOAGULATION THERAPY VISIT (OUTPATIENT)
Dept: ANTICOAGULATION | Facility: CLINIC | Age: 82
End: 2022-09-06

## 2022-09-06 ENCOUNTER — ANCILLARY PROCEDURE (OUTPATIENT)
Dept: CARDIOLOGY | Facility: CLINIC | Age: 82
End: 2022-09-06
Attending: INTERNAL MEDICINE
Payer: MEDICARE

## 2022-09-06 ENCOUNTER — LAB (OUTPATIENT)
Dept: LAB | Facility: CLINIC | Age: 82
End: 2022-09-06
Payer: MEDICARE

## 2022-09-06 DIAGNOSIS — I48.0 PAROXYSMAL ATRIAL FIBRILLATION (H): ICD-10-CM

## 2022-09-06 DIAGNOSIS — I49.5 SICK SINUS SYNDROME (H): ICD-10-CM

## 2022-09-06 DIAGNOSIS — Z95.0 PACEMAKER: ICD-10-CM

## 2022-09-06 DIAGNOSIS — I48.0 PAROXYSMAL ATRIAL FIBRILLATION (H): Primary | ICD-10-CM

## 2022-09-06 LAB — INR BLD: 1.8 (ref 0.9–1.1)

## 2022-09-06 PROCEDURE — 36415 COLL VENOUS BLD VENIPUNCTURE: CPT

## 2022-09-06 PROCEDURE — 85610 PROTHROMBIN TIME: CPT

## 2022-09-06 NOTE — PROGRESS NOTES
ANTICOAGULATION MANAGEMENT     Angeles Can 81 year old female is on warfarin with subtherapeutic INR result. (Goal INR 2.0-3.0)    Recent labs: (last 7 days)     09/06/22  1103   INR 1.8*       ASSESSMENT       Source(s): Chart Review and Patient/Caregiver Call       Warfarin doses taken: Warfarin taken as instructed    Diet: No new diet changes identified    New illness, injury, or hospitalization: No    Medication/supplement changes: None noted    Signs or symptoms of bleeding or clotting: No    Previous INR: Therapeutic last 2(+) visits    Additional findings: None       PLAN     Recommended plan for no diet, medication or health factor changes affecting INR     Dosing Instructions: Increase your warfarin dose (9.1% change) with next INR in 2 weeks       Summary  As of 9/6/2022    Full warfarin instructions:  1.25 mg every Mon, Thu; 2.5 mg all other days   Next INR check:  9/20/2022             Detailed voice message left for Frida with dosing instructions and follow up date.     Contact [phone] to schedule and with any changes, questions or concerns.     Education provided: None required    Plan made per [facility] anticoagulation protocol    Reyna Radford RN  Anticoagulation Clinic  9/6/2022    _______________________________________________________________________     Anticoagulation Episode Summary     Current INR goal:  2.0-3.0   TTR:  75.4 % (1 y)   Target end date:  Indefinite   Send INR reminders to:  [facility]    Indications    Paroxysmal atrial fibrillation (H) [I48.0]           Comments:  PAF         Anticoagulation Care Providers     Provider Role Specialty Phone number    Freddy Lubin MD Referring Internal Medicine [phone]

## 2022-09-07 ENCOUNTER — TELEPHONE (OUTPATIENT)
Dept: INTERNAL MEDICINE | Facility: CLINIC | Age: 82
End: 2022-09-07

## 2022-09-07 DIAGNOSIS — I48.0 PAROXYSMAL ATRIAL FIBRILLATION (H): Primary | ICD-10-CM

## 2022-09-07 LAB
MDC_IDC_EPISODE_DTM: NORMAL
MDC_IDC_EPISODE_DURATION: 13 S
MDC_IDC_EPISODE_ID: NORMAL
MDC_IDC_EPISODE_TYPE: NORMAL
MDC_IDC_LEAD_IMPLANT_DT: NORMAL
MDC_IDC_LEAD_IMPLANT_DT: NORMAL
MDC_IDC_LEAD_LOCATION: NORMAL
MDC_IDC_LEAD_LOCATION: NORMAL
MDC_IDC_LEAD_LOCATION_DETAIL_1: NORMAL
MDC_IDC_LEAD_LOCATION_DETAIL_1: NORMAL
MDC_IDC_LEAD_MFG: NORMAL
MDC_IDC_LEAD_MFG: NORMAL
MDC_IDC_LEAD_MODEL: NORMAL
MDC_IDC_LEAD_MODEL: NORMAL
MDC_IDC_LEAD_POLARITY_TYPE: NORMAL
MDC_IDC_LEAD_POLARITY_TYPE: NORMAL
MDC_IDC_LEAD_SERIAL: NORMAL
MDC_IDC_LEAD_SERIAL: NORMAL
MDC_IDC_MSMT_BATTERY_DTM: NORMAL
MDC_IDC_MSMT_BATTERY_REMAINING_LONGEVITY: 108 MO
MDC_IDC_MSMT_BATTERY_REMAINING_PERCENTAGE: 100 %
MDC_IDC_MSMT_BATTERY_STATUS: NORMAL
MDC_IDC_MSMT_LEADCHNL_RA_IMPEDANCE_VALUE: 518 OHM
MDC_IDC_MSMT_LEADCHNL_RA_PACING_THRESHOLD_AMPLITUDE: 0.4 V
MDC_IDC_MSMT_LEADCHNL_RA_PACING_THRESHOLD_PULSEWIDTH: 0.4 MS
MDC_IDC_MSMT_LEADCHNL_RV_IMPEDANCE_VALUE: 376 OHM
MDC_IDC_MSMT_LEADCHNL_RV_PACING_THRESHOLD_AMPLITUDE: 1.4 V
MDC_IDC_MSMT_LEADCHNL_RV_PACING_THRESHOLD_PULSEWIDTH: 0.4 MS
MDC_IDC_PG_IMPLANT_DTM: NORMAL
MDC_IDC_PG_MFG: NORMAL
MDC_IDC_PG_MODEL: NORMAL
MDC_IDC_PG_SERIAL: NORMAL
MDC_IDC_PG_TYPE: NORMAL
MDC_IDC_SESS_CLINIC_NAME: NORMAL
MDC_IDC_SESS_DTM: NORMAL
MDC_IDC_SESS_TYPE: NORMAL
MDC_IDC_SET_BRADY_AT_MODE_SWITCH_MODE: NORMAL
MDC_IDC_SET_BRADY_AT_MODE_SWITCH_RATE: 170 {BEATS}/MIN
MDC_IDC_SET_BRADY_LOWRATE: 70 {BEATS}/MIN
MDC_IDC_SET_BRADY_MAX_SENSOR_RATE: 130 {BEATS}/MIN
MDC_IDC_SET_BRADY_MAX_TRACKING_RATE: 130 {BEATS}/MIN
MDC_IDC_SET_BRADY_MODE: NORMAL
MDC_IDC_SET_BRADY_PAV_DELAY_HIGH: 200 MS
MDC_IDC_SET_BRADY_PAV_DELAY_LOW: 250 MS
MDC_IDC_SET_BRADY_SAV_DELAY_HIGH: 200 MS
MDC_IDC_SET_BRADY_SAV_DELAY_LOW: 250 MS
MDC_IDC_SET_LEADCHNL_RA_PACING_AMPLITUDE: 1.5 V
MDC_IDC_SET_LEADCHNL_RA_PACING_CAPTURE_MODE: NORMAL
MDC_IDC_SET_LEADCHNL_RA_PACING_POLARITY: NORMAL
MDC_IDC_SET_LEADCHNL_RA_PACING_PULSEWIDTH: 0.4 MS
MDC_IDC_SET_LEADCHNL_RA_SENSING_ADAPTATION_MODE: NORMAL
MDC_IDC_SET_LEADCHNL_RA_SENSING_POLARITY: NORMAL
MDC_IDC_SET_LEADCHNL_RA_SENSING_SENSITIVITY: 0.25 MV
MDC_IDC_SET_LEADCHNL_RV_PACING_AMPLITUDE: 2.1 V
MDC_IDC_SET_LEADCHNL_RV_PACING_CAPTURE_MODE: NORMAL
MDC_IDC_SET_LEADCHNL_RV_PACING_POLARITY: NORMAL
MDC_IDC_SET_LEADCHNL_RV_PACING_PULSEWIDTH: 0.4 MS
MDC_IDC_SET_LEADCHNL_RV_SENSING_ADAPTATION_MODE: NORMAL
MDC_IDC_SET_LEADCHNL_RV_SENSING_POLARITY: NORMAL
MDC_IDC_SET_LEADCHNL_RV_SENSING_SENSITIVITY: 1.5 MV
MDC_IDC_SET_ZONE_DETECTION_INTERVAL: 375 MS
MDC_IDC_SET_ZONE_TYPE: NORMAL
MDC_IDC_SET_ZONE_VENDOR_TYPE: NORMAL
MDC_IDC_STAT_AT_BURDEN_PERCENT: 1 %
MDC_IDC_STAT_AT_DTM_END: NORMAL
MDC_IDC_STAT_AT_DTM_START: NORMAL
MDC_IDC_STAT_BRADY_DTM_END: NORMAL
MDC_IDC_STAT_BRADY_DTM_START: NORMAL
MDC_IDC_STAT_BRADY_RA_PERCENT_PACED: 99 %
MDC_IDC_STAT_BRADY_RV_PERCENT_PACED: 0 %
MDC_IDC_STAT_EPISODE_RECENT_COUNT: 0
MDC_IDC_STAT_EPISODE_RECENT_COUNT: 0
MDC_IDC_STAT_EPISODE_RECENT_COUNT: 1
MDC_IDC_STAT_EPISODE_RECENT_COUNT: 6
MDC_IDC_STAT_EPISODE_RECENT_COUNT: 6
MDC_IDC_STAT_EPISODE_RECENT_COUNT_DTM_END: NORMAL
MDC_IDC_STAT_EPISODE_RECENT_COUNT_DTM_START: NORMAL
MDC_IDC_STAT_EPISODE_TYPE: NORMAL
MDC_IDC_STAT_EPISODE_VENDOR_TYPE: NORMAL

## 2022-09-07 PROCEDURE — 93294 REM INTERROG EVL PM/LDLS PM: CPT | Performed by: INTERNAL MEDICINE

## 2022-09-07 PROCEDURE — 93296 REM INTERROG EVL PM/IDS: CPT | Performed by: INTERNAL MEDICINE

## 2022-09-07 NOTE — TELEPHONE ENCOUNTER
Reason for Call:  Other call back    Detailed comments: PT LOOKING TO RECEIVE INR RESULTS     Phone Number Patient can be reached at: Home number on file [phone] (home)    Best Time: ANYIME    Can we leave a detailed message on this number? YES    Call taken on 9/7/2022 at 12:27 PM by Deepthi Briones

## 2022-09-13 DIAGNOSIS — I10 ESSENTIAL HYPERTENSION WITH GOAL BLOOD PRESSURE LESS THAN 140/90: ICD-10-CM

## 2022-09-13 DIAGNOSIS — I10 ESSENTIAL HYPERTENSION: ICD-10-CM

## 2022-09-14 RX ORDER — LOSARTAN POTASSIUM 100 MG/1
100 TABLET ORAL DAILY
Qty: 90 TABLET | Refills: 3 | Status: SHIPPED | OUTPATIENT
Start: 2022-09-14

## 2022-09-14 NOTE — TELEPHONE ENCOUNTER
"Last Written Prescription Date: 2/7/22  Last Fill Quantity: 90,  # refills: 3   Last office visit provider:  6/7/22    Requested Prescriptions   Pending Prescriptions Disp Refills     losartan (COZAAR) 100 MG tablet [Pharmacy Med Name: Losartan Potassium Oral Tablet 100 MG] 90 tablet 0     Sig: Take 1 tablet (100 mg) by mouth daily       Angiotensin-II Receptors Passed - 9/13/2022  2:58 PM        Passed - Last blood pressure under 140/90 in past 12 months     BP Readings from Last 3 Encounters:   08/22/22 138/72   08/18/22 (!) 146/69   06/07/22 (!) 143/65                 Passed - Recent (12 mo) or future (30 days) visit within the authorizing provider's specialty     Patient has had an office visit with the authorizing provider or a provider within the authorizing providers department within the previous 12 mos or has a future within next 30 days. See \"Patient Info\" tab in inbasket, or \"Choose Columns\" in Meds & Orders section of the refill encounter.              Passed - Medication is active on med list        Passed - Patient is age 18 or older        Passed - No active pregnancy on record        Passed - Normal serum creatinine on file in past 12 months     Recent Labs   Lab Test 08/18/22  0453   CR 0.89       Ok to refill medication if creatinine is low          Passed - Normal serum potassium on file in past 12 months     Recent Labs   Lab Test 08/18/22  0453   POTASSIUM 3.5                    Passed - No positive pregnancy test in past 12 months             Myriam Zamora RN 09/14/22 8:44 AM  "

## 2022-09-19 DIAGNOSIS — Z17.0 MALIGNANT NEOPLASM OF LOWER-INNER QUADRANT OF RIGHT BREAST OF FEMALE, ESTROGEN RECEPTOR POSITIVE (H): ICD-10-CM

## 2022-09-19 DIAGNOSIS — C50.311 MALIGNANT NEOPLASM OF LOWER-INNER QUADRANT OF RIGHT BREAST OF FEMALE, ESTROGEN RECEPTOR POSITIVE (H): ICD-10-CM

## 2022-09-19 RX ORDER — ANASTROZOLE 1 MG/1
TABLET ORAL
Qty: 90 TABLET | Refills: 0 | Status: SHIPPED | OUTPATIENT
Start: 2022-09-19 | End: 2022-10-18

## 2022-09-19 NOTE — TELEPHONE ENCOUNTER
[facility]: Cancer Care                                                                                          Refill request received via fax from [facility] for anastrozole.     Last refilled by Eduin Majano CNP on 8/26/21.  Quantity #90. 3 refills.    Last seen by Dr. Merlos on 11/24/21. She is over due for 6 month follow-up.    Patient scheduled to see Dr. Merlos on 10/18/22.    Message sent to Dr. Merlos.      Signature:  Dior Rush RN

## 2022-09-20 ENCOUNTER — LAB (OUTPATIENT)
Dept: LAB | Facility: CLINIC | Age: 82
End: 2022-09-20
Payer: MEDICARE

## 2022-09-20 ENCOUNTER — ANTICOAGULATION THERAPY VISIT (OUTPATIENT)
Dept: ANTICOAGULATION | Facility: CLINIC | Age: 82
End: 2022-09-20

## 2022-09-20 DIAGNOSIS — I48.0 PAROXYSMAL ATRIAL FIBRILLATION (H): ICD-10-CM

## 2022-09-20 DIAGNOSIS — I48.0 PAROXYSMAL ATRIAL FIBRILLATION (H): Primary | ICD-10-CM

## 2022-09-20 LAB — INR BLD: 2.6 (ref 0.9–1.1)

## 2022-09-20 PROCEDURE — 36415 COLL VENOUS BLD VENIPUNCTURE: CPT

## 2022-09-20 PROCEDURE — 85610 PROTHROMBIN TIME: CPT

## 2022-09-20 NOTE — PROGRESS NOTES
ANTICOAGULATION MANAGEMENT     Angeles Can 81 year old female is on warfarin with therapeutic INR result. (Goal INR 2.0-3.0)    Recent labs: (last 7 days)     09/20/22  1139   INR 2.6*       ASSESSMENT       Source(s): Chart Review and Patient/Caregiver Call       Warfarin doses taken: Warfarin taken as instructed    Diet: No new diet changes identified    New illness, injury, or hospitalization: No    Medication/supplement changes: None noted    Signs or symptoms of bleeding or clotting: No    Previous INR: Therapeutic last 2(+) visits    Additional findings: None       PLAN     Recommended plan for no diet, medication or health factor changes affecting INR     Dosing Instructions: Continue your current warfarin dose with next INR in 2 weeks       Summary  As of 9/20/2022    Full warfarin instructions:  1.25 mg every Mon, Thu; 2.5 mg all other days   Next INR check:  10/4/2022             Telephone call with Frida who verbalizes understanding and agrees to plan    Lab visit scheduled    Education provided: None required    Plan made per [facility] anticoagulation protocol    Reyna Radford RN  Anticoagulation Clinic  9/20/2022    _______________________________________________________________________     Anticoagulation Episode Summary     Current INR goal:  2.0-3.0   TTR:  74.4 % (1 y)   Target end date:  Indefinite   Send IN[facility] reminders to:  [facility]    Indications    Paroxysmal atrial fibrillation (H) [I48.0]           Comments:  PAF         Anticoagulation Care Providers     Provider Role Specialty Phone number    Freddy Lubin MD Referring Internal Medicine [phone]

## 2022-09-25 ENCOUNTER — HEALTH MAINTENANCE LETTER (OUTPATIENT)
Age: 82
End: 2022-09-25

## 2022-10-04 ENCOUNTER — LAB (OUTPATIENT)
Dept: LAB | Facility: CLINIC | Age: 82
End: 2022-10-04
Payer: MEDICARE

## 2022-10-04 ENCOUNTER — ANTICOAGULATION THERAPY VISIT (OUTPATIENT)
Dept: ANTICOAGULATION | Facility: CLINIC | Age: 82
End: 2022-10-04

## 2022-10-04 DIAGNOSIS — I48.0 PAROXYSMAL ATRIAL FIBRILLATION (H): Primary | ICD-10-CM

## 2022-10-04 DIAGNOSIS — I48.0 PAROXYSMAL ATRIAL FIBRILLATION (H): ICD-10-CM

## 2022-10-04 LAB — INR BLD: 1.9 (ref 0.9–1.1)

## 2022-10-04 PROCEDURE — 85610 PROTHROMBIN TIME: CPT

## 2022-10-04 PROCEDURE — 36416 COLLJ CAPILLARY BLOOD SPEC: CPT

## 2022-10-04 NOTE — PROGRESS NOTES
ANTICOAGULATION MANAGEMENT     Angeles Can 82 year old female is on warfarin with subtherapeutic INR result. (Goal INR 2.0-3.0)    Recent labs: (last 7 days)     10/04/22  1138   INR 1.9*       ASSESSMENT       Source(s): Chart Review and Template       Warfarin doses taken: Warfarin taken as instructed    Diet: No new diet changes identified    New illness, injury, or hospitalization: No    Medication/supplement changes: None noted    Signs or symptoms of bleeding or clotting: No    Previous INR: Therapeutic last 2(+) visits    Additional findings: None       PLAN     Recommended plan for no diet, medication or health factor changes affecting INR     Dosing Instructions: booster dose then continue your current warfarin dose with next INR in 2 weeks       Summary  As of 10/4/2022    Full warfarin instructions:  10/4: 3.75 mg; Otherwise 1.25 mg every Mon, Thu; 2.5 mg all other days   Next INR check:  10/18/2022             Detailed voice message left for Frida with dosing instructions and follow up date.     Contact [phone] to schedule and with any changes, questions or concerns.     Education provided: None required    Plan made per [facility] anticoagulation protocol    Reyna Radford RN  Anticoagulation Clinic  10/4/2022    _______________________________________________________________________     Anticoagulation Episode Summary     Current INR goal:  2.0-3.0   TTR:  76.0 % (1 y)   Target end date:  Indefinite   Send INR reminders to:  [facility]    Indications    Paroxysmal atrial fibrillation (H) [I48.0]           Comments:  PAF         Anticoagulation Care Providers     Provider Role Specialty Phone number    Freddy Lubin MD Referring Internal Medicine [phone]

## 2022-10-18 ENCOUNTER — ONCOLOGY VISIT (OUTPATIENT)
Dept: ONCOLOGY | Facility: CLINIC | Age: 82
End: 2022-10-18
Attending: INTERNAL MEDICINE
Payer: MEDICARE

## 2022-10-18 VITALS
OXYGEN SATURATION: 97 % | HEART RATE: 74 BPM | HEIGHT: 62 IN | WEIGHT: 169.9 LBS | DIASTOLIC BLOOD PRESSURE: 81 MMHG | BODY MASS INDEX: 31.27 KG/M2 | TEMPERATURE: 98.7 F | RESPIRATION RATE: 16 BRPM | SYSTOLIC BLOOD PRESSURE: 154 MMHG

## 2022-10-18 DIAGNOSIS — Z12.31 VISIT FOR SCREENING MAMMOGRAM: ICD-10-CM

## 2022-10-18 DIAGNOSIS — C50.311 MALIGNANT NEOPLASM OF LOWER-INNER QUADRANT OF RIGHT BREAST OF FEMALE, ESTROGEN RECEPTOR POSITIVE (H): Primary | ICD-10-CM

## 2022-10-18 DIAGNOSIS — Z78.0 MENOPAUSE: ICD-10-CM

## 2022-10-18 DIAGNOSIS — Z79.811 AROMATASE INHIBITOR USE: ICD-10-CM

## 2022-10-18 DIAGNOSIS — Z17.0 MALIGNANT NEOPLASM OF LOWER-INNER QUADRANT OF RIGHT BREAST OF FEMALE, ESTROGEN RECEPTOR POSITIVE (H): Primary | ICD-10-CM

## 2022-10-18 PROCEDURE — 99214 OFFICE O/P EST MOD 30 MIN: CPT | Performed by: INTERNAL MEDICINE

## 2022-10-18 PROCEDURE — G0463 HOSPITAL OUTPT CLINIC VISIT: HCPCS

## 2022-10-18 RX ORDER — ANASTROZOLE 1 MG/1
TABLET ORAL
Qty: 90 TABLET | Refills: 1 | Status: SHIPPED | OUTPATIENT
Start: 2022-10-18 | End: 2023-06-06

## 2022-10-18 ASSESSMENT — PAIN SCALES - GENERAL: PAINLEVEL: NO PAIN (0)

## 2022-10-18 NOTE — PROGRESS NOTES
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  G1, ER+, MN+, HER-2/alvin- (1+ by IHC).   Clinically well.  Exam is unremarkable.  She has good tolerance to anastrozole.  Recommended to continue adjuvant anastrozole therapy.  Refilled today.     Requested screening mammogram, due in 5/2023.     Follow-up clinical exam in 5/2023 requested follow-up after mammogram and DEXA scan)    #.  Low bone density with moderate fracture risk, by DEXA scan from 4/2021   DEXA scan from April 2021 showed L-spine T score of 2.6, left femoral neck T score of-1.1, right femoral neck T score-1.4.     She has persistent hypercalcemia, elevated PTH consistent with primary hyperparathyroidism.   Follow-up DEXA scan prior to follow-up visit in 5/2023.    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  4/28/2021-diagnostic mammogram and ultrasound showed asymmetry with microlobulated margins and ultrasound confirmed 7 x 9 x 9 mm hypoechoic lesion at the same location.  5/3/2021-ultrasound-guided core needle biopsy of the right breast mass and it showed invasive ductal carcinoma and DCIS.  ER positive> 95%, LA positive> 95%, HER-2/alvin negative by IHC (1+).  5/26/201- Final path: Invasive ductal carcinoma, grade 1, 15 mm, negative margins.  There is associated DCIS, solid and focal cribriform, no necrosis and no microcalcification, negative margins. pT1c Nx.    Treatment to date  5/26/2021-underwent right breast lumpectomy by Dr. Peng.  6/2021-started adjuvant anastrozole.    History of Present Illness    Ms. Angeles Skinnerer here for follow-up.  She reported that she is doing very well.  She takes anastrozole regularly.  She needs refill.  She does not have unusual bone pain, headache, appetite changes, weight changes upon further questioning.     Review of systems  Apart from describing in HPI, the remainder of comprehensive ROS was negative.    Past History    Past Medical History:   Diagnosis Date     Arrhythmia     Paroxysmal Atrial fibrillation     Arthritis      Cancer (H)      Diabetes mellitus (H)      Hypercalcemia      Hyperlipidemia      Hypertension      Obesity        Past Surgical History:   Procedure Laterality Date     CATARACT EXTRACTION, BILATERAL       EP PACEMAKER INSERT N/A 3/2/2018    Procedure: EP Pacemaker Insertion;  Surgeon: Nahun Mina MD;  Location: [facility];  Service:      HC REMOVE TONSILS/ADENOIDS,<13 Y/O      Description: Tonsillectomy With Adenoidectomy;  Recorded: 09/16/2008;     HC REVISE MEDIAN N/CARPAL TUNNEL SURG       "Description: Neuroplasty Decompression Median Nerve At Carpal Tunnel;  Recorded: 11/01/2009;  Comments: Right- 1/99; Left- 4/99     [place] MASTECTOMY, PARTIAL Right 5/26/2021    Procedure: Right Lumpectomy after Wire Localizaiton;  Surgeon: Rosanna Peng MD;  Location: [facility];  Service: General     US BREAST CORE BIOPSY RIGHT Right 5/3/2021         Physical Exam    BP (!) 154/81   Pulse 74   Temp 98.7  F (37.1  C) (Oral)   Resp 16   Ht 1.575 m (5' 2\")   Wt 77.1 kg (169 lb 14.4 oz)   SpO2 97%   BMI 31.08 kg/m      General: alert, awake, not in acute distress  HEENT: Head: Normal, normocephalic, atraumatic.  Eye: Normal external eye, conjunctiva, lids cornea, DIVYA.  Pharynx: Normal buccal mucosa. Normal pharynx.  Neck / Thyroid: Supple, no masses, nodes, nodules or enlargement.  Lymphatics: No abnormally enlarged lymph nodes.  Chest: Normal chest wall and respirations. Clear to auscultation.  Breasts: Unremarkable  Heart: S1 S2 RRR.   Abdomen: abdomen is soft without significant tenderness, masses, organomegaly or guarding  Extremities: normal strength, tone, and muscle mass  Skin: normal. no rash or abnormalities  CNS: non focal.    Lab Results    Recent Results (from the past 168 hour(s))   INR point of care   Result Value Ref Range    INR 2.8 (H) 0.9 - 1.1       Imaging    No results found.    30 minutes spent on the date of the encounter doing chart review, history and exam, documentation and further activities as noted above.      Signed by: Etelvina Merlos MD  "

## 2022-10-18 NOTE — LETTER
"    10/18/2022         RE: Angeles Can  [address]   [place] [address]        Dear Colleague,    Thank you for referring your patient, Angeles Can, to the [facility] [place]. Please see a copy of my visit note below.    Oncology Rooming Note    October 18, 2022 3:42 PM   Angeles Can is a 82 year old female who presents for:    Chief Complaint   Patient presents with     Oncology Clinic Visit     Paroxysmal atrial fibrillation.      Initial Vitals: BP (!) 154/81   Pulse 74   Temp 98.7  F (37.1  C) (Oral)   Resp 16   Ht 1.575 m (5' 2\")   Wt 77.1 kg (169 lb 14.4 oz)   SpO2 97%   BMI 31.08 kg/m   Estimated body mass index is 31.08 kg/m  as calculated from the following:    Height as of this encounter: 1.575 m (5' 2\").    Weight as of this encounter: 77.1 kg (169 lb 14.4 oz). Body surface area is 1.84 meters squared.  No Pain (0) Comment: Data Unavailable   No LMP recorded. Patient is postmenopausal.  Allergies reviewed: Yes  Medications reviewed: Yes    Medications: MEDICATION REFILLS NEEDED TODAY. Provider was notified.  Pharmacy name entered into [employer]: [id number] - [place], [place] - [address]    Clinical concerns Arimidex refill.       Carmen Burrows LPN              [facility] Hematology and Oncology Progress Note    Patient: Angeles Can  MRN: [id number]  Date of Service: Oct 18, 2022         Reason for Visit    Chief Complaint   Patient presents with     Oncology Clinic Visit     Paroxysmal atrial fibrillation.        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  G1, ER+, WV+, HER-2/alvin- (1+ by IHC).   Clinically well.  Exam is unremarkable.  She has good tolerance to anastrozole.  Recommended to continue adjuvant anastrozole therapy.  Refilled today.     Requested screening mammogram, due in 5/2023.     Follow-up clinical exam in 5/2023 requested follow-up after mammogram and DEXA scan)    #.  Low bone density with moderate fracture risk, by DEXA scan from 4/2021   DEXA scan from April 2021 showed L-spine T score of 2.6, left femoral neck T score of-1.1, right femoral neck T score-1.4.     She has persistent hypercalcemia, elevated PTH consistent with primary hyperparathyroidism.   Follow-up DEXA scan prior to follow-up visit in 5/2023.    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  4/28/2021-diagnostic mammogram and ultrasound showed asymmetry with microlobulated margins and ultrasound confirmed 7 x 9 x 9 mm hypoechoic lesion at the same location.  5/3/2021-ultrasound-guided core needle biopsy of the right breast mass and it showed invasive ductal carcinoma and DCIS.  ER positive> 95%, WV positive> 95%, HER-2/alvin negative by IHC (1+).  5/26/201- Final path: Invasive ductal carcinoma, grade 1,  "15 mm, negative margins.  There is associated DCIS, solid and focal cribriform, no necrosis and no microcalcification, negative margins. pT1c Nx.    Treatment to date  5/26/2021-underwent right breast lumpectomy by Dr. Peng.  6/2021-started adjuvant anastrozole.    History of Present Illness    Ms. Angeles Skinnerer here for follow-up.  She reported that she is doing very well.  She takes anastrozole regularly.  She needs refill.  She does not have unusual bone pain, headache, appetite changes, weight changes upon further questioning.     Review of systems  Apart from describing in HPI, the remainder of comprehensive ROS was negative.    Past History    Past Medical History:   Diagnosis Date     Arrhythmia     Paroxysmal Atrial fibrillation     Arthritis      Cancer (H)      Diabetes mellitus (H)      Hypercalcemia      Hyperlipidemia      Hypertension      Obesity        Past Surgical History:   Procedure Laterality Date     CATARACT EXTRACTION, BILATERAL       EP PACEMAKER INSERT N/A 3/2/2018    Procedure: EP Pacemaker Insertion;  Surgeon: Nahun Mina MD;  Location: [facility];  Service:      HC REMOVE TONSILS/ADENOIDS,<11 Y/O      Description: Tonsillectomy With Adenoidectomy;  Recorded: 09/16/2008;     HC REVISE MEDIAN N/CARPAL TUNNEL SURG      Description: Neuroplasty Decompression Median Nerve At Carpal Tunnel;  Recorded: 11/01/2009;  Comments: Right- 1/99; Left- 4/99     [place] MASTECTOMY, PARTIAL Right 5/26/2021    Procedure: Right Lumpectomy after Wire Localizaiton;  Surgeon: Rosanna Peng MD;  Location: [facility];  Service: General     US BREAST CORE BIOPSY RIGHT Right 5/3/2021         Physical Exam    BP (!) 154/81   Pulse 74   Temp 98.7  F (37.1  C) (Oral)   Resp 16   Ht 1.575 m (5' 2\")   Wt 77.1 kg (169 lb 14.4 oz)   SpO2 97%   BMI 31.08 kg/m      General: alert, awake, not in acute distress  HEENT: Head: Normal, normocephalic, atraumatic.  Eye: Normal external eye, " conjunctiva, lids cornea, DIVYA.  Pharynx: Normal buccal mucosa. Normal pharynx.  Neck / Thyroid: Supple, no masses, nodes, nodules or enlargement.  Lymphatics: No abnormally enlarged lymph nodes.  Chest: Normal chest wall and respirations. Clear to auscultation.  Breasts: Unremarkable  Heart: S1 S2 RRR.   Abdomen: abdomen is soft without significant tenderness, masses, organomegaly or guarding  Extremities: normal strength, tone, and muscle mass  Skin: normal. no rash or abnormalities  CNS: non focal.    Lab Results    Recent Results (from the past 168 hour(s))   INR point of care   Result Value Ref Range    INR 2.8 (H) 0.9 - 1.1       Imaging    No results found.    30 minutes spent on the date of the encounter doing chart review, history and exam, documentation and further activities as noted above.      Signed by: Etelvina Merlos MD      Again, thank you for allowing me to participate in the care of your patient.        Sincerely,        Etelvina Merlos MD

## 2022-10-18 NOTE — PROGRESS NOTES
"Oncology Rooming Note    October 18, 2022 3:42 PM   Angeles Can is a 82 year old female who presents for:    Chief Complaint   Patient presents with     Oncology Clinic Visit     Paroxysmal atrial fibrillation.      Initial Vitals: BP (!) 154/81   Pulse 74   Temp 98.7  F (37.1  C) (Oral)   Resp 16   Ht 1.575 m (5' 2\")   Wt 77.1 kg (169 lb 14.4 oz)   SpO2 97%   BMI 31.08 kg/m   Estimated body mass index is 31.08 kg/m  as calculated from the following:    Height as of this encounter: 1.575 m (5' 2\").    Weight as of this encounter: 77.1 kg (169 lb 14.4 oz). Body surface area is 1.84 meters squared.  No Pain (0) Comment: Data Unavailable   No LMP recorded. Patient is postmenopausal.  Allergies reviewed: Yes  Medications reviewed: Yes    Medications: MEDICATION REFILLS NEEDED TODAY. Provider was notified.  Pharmacy name entered into [employer]: [id number] [place] [place] - [address]    Clinical concerns Arimidex refill.       Carmen Burrows LPN            "

## 2022-10-19 ENCOUNTER — ANTICOAGULATION THERAPY VISIT (OUTPATIENT)
Dept: ANTICOAGULATION | Facility: CLINIC | Age: 82
End: 2022-10-19

## 2022-10-19 ENCOUNTER — LAB (OUTPATIENT)
Dept: LAB | Facility: CLINIC | Age: 82
End: 2022-10-19
Payer: MEDICARE

## 2022-10-19 DIAGNOSIS — I48.0 PAROXYSMAL ATRIAL FIBRILLATION (H): Primary | ICD-10-CM

## 2022-10-19 DIAGNOSIS — I48.0 PAROXYSMAL ATRIAL FIBRILLATION (H): ICD-10-CM

## 2022-10-19 LAB — INR BLD: 2.8 (ref 0.9–1.1)

## 2022-10-19 PROCEDURE — 85610 PROTHROMBIN TIME: CPT

## 2022-10-19 PROCEDURE — 36416 COLLJ CAPILLARY BLOOD SPEC: CPT

## 2022-10-19 NOTE — PROGRESS NOTES
ANTICOAGULATION MANAGEMENT     Angeles Can 82 year old female is on warfarin with therapeutic INR result. (Goal INR 2.0-3.0)    Recent labs: (last 7 days)     10/19/22  0937   INR 2.8*       ASSESSMENT       Source(s): Chart Review and Patient/Caregiver Call       Warfarin doses taken: Warfarin taken as instructed    Diet: No new diet changes identified    New illness, injury, or hospitalization: No    Medication/supplement changes: None noted    Signs or symptoms of bleeding or clotting: No    Previous INR: Subtherapeutic    Additional findings: None       PLAN     Recommended plan for no diet, medication or health factor changes affecting INR     Dosing Instructions: Continue your current warfarin dose with next INR in 2 weeks       Summary  As of 10/19/2022    Full warfarin instructions:  1.25 mg every Mon, Thu; 2.5 mg all other days   Next INR check:  11/2/2022             Telephone call with Frida who verbalizes understanding and agrees to plan    Lab visit scheduled    Education provided: None required    Plan made per [facility] anticoagulation protocol    Reyna Radford RN  Anticoagulation Clinic  10/19/2022    _______________________________________________________________________     Anticoagulation Episode Summary     Current INR goal:  2.0-3.0   TTR:  77.1 % (1 y)   Target end date:  Indefinite   Send INR reminders to:  [facility]    Indications    Paroxysmal atrial fibrillation (H) [I48.0]           Comments:  PAF         Anticoagulation Care Providers     Provider Role Specialty Phone number    Freddy Lubin MD Referring Internal Medicine [phone]

## 2022-10-31 ENCOUNTER — OFFICE VISIT (OUTPATIENT)
Dept: INTERNAL MEDICINE | Facility: CLINIC | Age: 82
End: 2022-10-31
Payer: MEDICARE

## 2022-10-31 ENCOUNTER — ANTICOAGULATION THERAPY VISIT (OUTPATIENT)
Dept: ANTICOAGULATION | Facility: CLINIC | Age: 82
End: 2022-10-31

## 2022-10-31 VITALS
SYSTOLIC BLOOD PRESSURE: 110 MMHG | HEART RATE: 71 BPM | RESPIRATION RATE: 12 BRPM | WEIGHT: 166 LBS | OXYGEN SATURATION: 95 % | DIASTOLIC BLOOD PRESSURE: 60 MMHG | BODY MASS INDEX: 30.36 KG/M2

## 2022-10-31 DIAGNOSIS — Z23 NEED FOR INFLUENZA VACCINATION: ICD-10-CM

## 2022-10-31 DIAGNOSIS — C50.311 MALIGNANT NEOPLASM OF LOWER-INNER QUADRANT OF RIGHT BREAST OF FEMALE, ESTROGEN RECEPTOR POSITIVE (H): ICD-10-CM

## 2022-10-31 DIAGNOSIS — Z51.81 ENCOUNTER FOR THERAPEUTIC DRUG MONITORING: ICD-10-CM

## 2022-10-31 DIAGNOSIS — E11.65 TYPE 2 DIABETES MELLITUS WITH HYPERGLYCEMIA, WITHOUT LONG-TERM CURRENT USE OF INSULIN (H): ICD-10-CM

## 2022-10-31 DIAGNOSIS — Z17.0 MALIGNANT NEOPLASM OF LOWER-INNER QUADRANT OF RIGHT BREAST OF FEMALE, ESTROGEN RECEPTOR POSITIVE (H): ICD-10-CM

## 2022-10-31 DIAGNOSIS — E78.00 HYPERCHOLESTEROLEMIA: ICD-10-CM

## 2022-10-31 DIAGNOSIS — I48.0 PAROXYSMAL ATRIAL FIBRILLATION (H): Primary | ICD-10-CM

## 2022-10-31 DIAGNOSIS — I48.0 PAROXYSMAL ATRIAL FIBRILLATION (H): ICD-10-CM

## 2022-10-31 DIAGNOSIS — Z23 NEED FOR COVID-19 VACCINE: Primary | ICD-10-CM

## 2022-10-31 LAB — INR BLD: 1.8 (ref 0.9–1.1)

## 2022-10-31 PROCEDURE — G0008 ADMIN INFLUENZA VIRUS VAC: HCPCS | Performed by: INTERNAL MEDICINE

## 2022-10-31 PROCEDURE — 85610 PROTHROMBIN TIME: CPT | Performed by: INTERNAL MEDICINE

## 2022-10-31 PROCEDURE — 99214 OFFICE O/P EST MOD 30 MIN: CPT | Mod: 25 | Performed by: INTERNAL MEDICINE

## 2022-10-31 PROCEDURE — 90662 IIV NO PRSV INCREASED AG IM: CPT | Performed by: INTERNAL MEDICINE

## 2022-10-31 PROCEDURE — 36415 COLL VENOUS BLD VENIPUNCTURE: CPT | Performed by: INTERNAL MEDICINE

## 2022-10-31 RX ORDER — ATORVASTATIN CALCIUM 20 MG/1
20 TABLET, FILM COATED ORAL DAILY
Qty: 90 TABLET | Refills: 3 | Status: SHIPPED | OUTPATIENT
Start: 2022-10-31

## 2022-10-31 RX ORDER — METFORMIN HCL 500 MG
500 TABLET, EXTENDED RELEASE 24 HR ORAL
Qty: 90 TABLET | Refills: 3 | Status: SHIPPED | OUTPATIENT
Start: 2022-10-31

## 2022-10-31 NOTE — PROGRESS NOTES
"  1. Type 2 diabetes mellitus with hyperglycemia, without long-term current use of insulin (H)  She is treated with metformin  mg daily.  Hemoglobin A1c was acceptable at 6.8 when checked in August.  This should be rechecked after November 16  - metFORMIN (GLUCOPHAGE XR) 500 MG 24 hr tablet; Take 1 tablet (500 mg) by mouth daily (with dinner)  Dispense: 90 tablet; Refill: 3  - Hemoglobin A1c; Future  - Basic metabolic panel  (Ca, Cl, CO2, Creat, Gluc, K, Na, BUN); Future  - Comprehensive metabolic panel; Future    2. Hypercholesterolemia  On atorvastatin 20 mg daily.  Lipids were acceptable when checked in August with total cholesterol 177 and an LDL of 86.  - atorvastatin (LIPITOR) 20 MG tablet; Take 1 tablet (20 mg) by mouth daily  Dispense: 90 tablet; Refill: 3  - Lipid panel reflex to direct LDL Fasting; Future    3. Paroxysmal atrial fibrillation (H)  No recent symptomatic palpitations.  She is on warfarin as an anticoagulant  - INR point of care    4. Need for COVID-19 vaccine  She is interested in a bivalent COVID-vaccine  - COVID-19,PF,PFIZER BOOSTER BIVALENT; Future    5. Need for influenza vaccination  Influenza vaccine is advised  - INFLUENZA, QUAD, HD, PF, 65+ (FLUZONE HD)    6. Encounter for therapeutic drug monitoring  AST will be checked with next labs for medication monitoring  - AST; Future    7.  History of breast cancer\"  On Arimidex with no evidence for recurrence.  She has regular oncology follow-up.    8.  Low bone mass:  She had a lowest T score of -1.4 in the right femoral neck in 2021.  She would be at risk for rapid decline in bone density given a history of hyperparathyroidism and aromatase inhibitor use.  Bone density should be reassessed next year.    Patient Instructions   1.  Influenza vaccine today    2.  Bivalent Covid vaccine in one month    3.  See in 4 to 6 months    4.  Fasting labs in one month as a lab only appointment.    5.  Continue atorvastatin 20 mg daily    6.  " Refills for metformin and atorvastatin were sent    7.  Recheck bone density after 4/22/2023      Zaid Galicia is a 82 year 1 old woman presenting for the following health issues:  Follow Up  Has a history of type 2 diabetes treated with metformin  mg daily.  Hemoglobin A1c was acceptable at 6.8 when checked in August.  She denies any adverse effects from this dose of metformin.    She has a history of paroxysmal atrial fibrillation and does have a pacemaker in place.  She denies symptomatic palpitations.  She is on warfarin.  She is on sotalol 80 mg once daily    She has a history of essential hypertension.  She is treated with amlodipine 2.5 mg daily, clonidine 0.1 mg twice daily, furosemide 40 mg daily and losartan 100 mg daily.  Blood pressure is good on this regimen.  Home readings generally run in the 130s systolic    She underwent lumpectomy for breast cancer in 2021.  She is on Arimidex.    Last bone density test was in April 2021.  Lowest T score was -1.4.      She has a past history of hyperparathyroidism.  This has been monitored without surgical intervention given her fairly good bone density for age.  HPI         Review of Systems   Constitutional, HEENT, cardiovascular, pulmonary, GI, , musculoskeletal, neuro, skin, endocrine and psych systems are negative, except as otherwise noted.      Objective    /60   Pulse 71   Resp 12   Wt 75.3 kg (166 lb)   SpO2 95%   BMI 30.36 kg/m    Body mass index is 30.36 kg/m .  Physical Exam   GENERAL APPEARANCE: healthy, alert, no distress and cooperative  EYES: Eyes grossly normal to inspection  NECK: no adenopathy, no asymmetry, masses, or scars and thyroid normal to palpation  RESP: lungs clear to auscultation - no rales, rhonchi or wheezes  CV: regular rates and rhythm, normal S1 S2, no S3 or S4, no murmur, click or rub and pacemaker left chest  LYMPHATICS: no cervical adenopathy  ABDOMEN: soft, nontender, without hepatosplenomegaly or  masses and bowel sounds normal  MS: extremities normal- no gross deformities noted  SKIN: no suspicious lesions or rashes  NEURO: Normal strength and tone, mentation intact and speech normal    Results for orders placed or performed in visit on 10/31/22   INR point of care     Status: Abnormal   Result Value Ref Range    INR 1.8 (H) 0.9 - 1.1    Narrative    This test is intended for monitoring Coumadin therapy. Results are not accurate in patients with prolonged INR due to factor deficiency.       Visit time 30 minutes

## 2022-10-31 NOTE — PATIENT INSTRUCTIONS
Influenza vaccine today    2.  Bivalent Covid vaccine in one month    3.  See in 4 to 6 months    4.  Fasting labs in one month as a lab only appointment.    5.  Continue atorvastatin 20 mg daily    6.  Refills for metformin and atorvastatin

## 2022-10-31 NOTE — PROGRESS NOTES
ANTICOAGULATION MANAGEMENT     Angeles Can 82 year old female is on warfarin with subtherapeutic INR result. (Goal INR 2.0-3.0)    Recent labs: (last 7 days)     10/31/22  1251   INR 1.8*       ASSESSMENT       Source(s): Chart Review and Patient/Caregiver Call       Warfarin doses taken: Warfarin taken as instructed    Diet: Increased greens/vitamin K in diet; ongoing change    New illness, injury, or hospitalization: No    Medication/supplement changes: None noted    Signs or symptoms of bleeding or clotting: No    Previous INR: Therapeutic last visit; previously outside of goal range    Additional findings: None ov today, no changes       PLAN     Recommended plan for ongoing change(s) affecting INR     Dosing Instructions: Increase your warfarin dose (8.3% change) with next INR in 2 weeks       Summary  As of 10/31/2022    Full warfarin instructions:  1.25 mg every Thu; 2.5 mg all other days; Starting 10/31/2022   Next INR check:  11/14/2022             Telephone call with Frida who verbalizes understanding and agrees to plan    Lab visit scheduled    Education provided:     None required    Plan made per [facility]agulation protocol    Reyna Radford RN  Anticoagulation Clinic  10/31/2022    _______________________________________________________________________     Anticoagulation Episode Summary     Current INR goal:  2.0-3.0   TTR:  76.5 % (1 y)   Target end date:  Indefinite   Send INR reminders to:  [facility]    Indications    Paroxysmal atrial fibrillation (H) [I48.0]           Comments:  PAF         Anticoagulation Care Providers     Provider Role Specialty Phone number    Freddy Lubin MD Referring Internal Medicine [phone]

## 2022-11-15 ENCOUNTER — LAB (OUTPATIENT)
Dept: LAB | Facility: CLINIC | Age: 82
End: 2022-11-15
Payer: MEDICARE

## 2022-11-15 ENCOUNTER — ANTICOAGULATION THERAPY VISIT (OUTPATIENT)
Dept: ANTICOAGULATION | Facility: CLINIC | Age: 82
End: 2022-11-15

## 2022-11-15 DIAGNOSIS — I48.0 PAROXYSMAL ATRIAL FIBRILLATION (H): ICD-10-CM

## 2022-11-15 DIAGNOSIS — R60.0 PERIPHERAL EDEMA: ICD-10-CM

## 2022-11-15 DIAGNOSIS — I48.0 PAROXYSMAL ATRIAL FIBRILLATION (H): Primary | ICD-10-CM

## 2022-11-15 LAB — INR BLD: 2.7 (ref 0.9–1.1)

## 2022-11-15 PROCEDURE — 36415 COLL VENOUS BLD VENIPUNCTURE: CPT

## 2022-11-15 PROCEDURE — 85610 PROTHROMBIN TIME: CPT

## 2022-11-15 NOTE — PROGRESS NOTES
ANTICOAGULATION MANAGEMENT     Angeles Can 82 year old female is on warfarin with therapeutic INR result. (Goal INR 2.0-3.0)    Recent labs: (last 7 days)     11/15/22  1302   INR 2.7*       ASSESSMENT       Source(s): Chart Review and Patient/Caregiver Call       Warfarin doses taken: Warfarin taken as instructed    Diet: No new diet changes identified    New illness, injury, or hospitalization: No    Medication/supplement changes: None noted    Signs or symptoms of bleeding or clotting: No    Previous INR: Subtherapeutic    Additional findings: None       PLAN     Recommended plan for no diet, medication or health factor changes affecting INR     Dosing Instructions: Continue your current warfarin dose with next INR in 2 weeks       Summary  As of 11/15/2022    Full warfarin instructions:  1.25 mg every Thu; 2.5 mg all other days; Starting 11/15/2022   Next INR check:  11/29/2022             Telephone call with Frida who verbalizes understanding and agrees to plan    Lab visit scheduled    Education provided:     Contact [phone] with any changes, questions or concerns.     Plan made per [facility] protocol    Reyna Radford RN  Anticoagulation Clinic  11/15/2022    _______________________________________________________________________     Anticoagulation Episode Summary     Current INR goal:  2.0-3.0   TTR:  75.6 % (1 y)   Target end date:  Indefinite   Send INR reminders to:  [facility]    Indications    Paroxysmal atrial fibrillation (H) [I48.0]           Comments:  PAF         Anticoagulation Care Providers     Provider Role Specialty Phone number    Freddy Lubin MD Referring Internal Medicine [phone]

## 2022-11-16 RX ORDER — FUROSEMIDE 40 MG
40 TABLET ORAL DAILY
Qty: 90 TABLET | Refills: 2 | Status: SHIPPED | OUTPATIENT
Start: 2022-11-16

## 2022-11-16 NOTE — TELEPHONE ENCOUNTER
"Last Written Prescription Date:  10/29/21  Last Fill Quantity: 90,  # refills: 3   Last office visit provider:  10/31/22     Requested Prescriptions   Pending Prescriptions Disp Refills     furosemide (LASIX) 40 MG tablet [Pharmacy Med Name: Furosemide Oral Tablet 40 MG] 90 tablet 0     Sig: Take 1 tablet (40 mg) by mouth daily.       Diuretics (Including Combos) Protocol Passed - 11/15/2022  6:15 PM        Passed - Blood pressure under 140/90 in past 12 months     BP Readings from Last 3 Encounters:   10/31/22 110/60   10/18/22 (!) 154/81   08/22/22 138/72                 Passed - Recent (12 mo) or future (30 days) visit within the authorizing provider's specialty     Patient has had an office visit with the authorizing provider or a provider within the authorizing providers department within the previous 12 mos or has a future within next 30 days. See \"Patient Info\" tab in inbasket, or \"Choose Columns\" in Meds & Orders section of the refill encounter.              Passed - Medication is active on med list        Passed - Patient is age 18 or older        Passed - No active pregancy on record        Passed - Normal serum creatinine on file in past 12 months     Recent Labs   Lab Test 08/18/22  0453   CR 0.89              Passed - Normal serum potassium on file in past 12 months     Recent Labs   Lab Test 08/18/22  0453   POTASSIUM 3.5                    Passed - Normal serum sodium on file in past 12 months     Recent Labs   Lab Test 08/18/22  0453                 Passed - No positive pregnancy test in past 12 months             [place], Alison, RN 11/16/22 4:37 PM  "

## 2022-11-30 ENCOUNTER — ANTICOAGULATION THERAPY VISIT (OUTPATIENT)
Dept: ANTICOAGULATION | Facility: CLINIC | Age: 82
End: 2022-11-30

## 2022-11-30 ENCOUNTER — LAB (OUTPATIENT)
Dept: LAB | Facility: CLINIC | Age: 82
End: 2022-11-30
Payer: MEDICARE

## 2022-11-30 DIAGNOSIS — I48.0 PAROXYSMAL ATRIAL FIBRILLATION (H): Primary | ICD-10-CM

## 2022-11-30 DIAGNOSIS — I48.0 PAROXYSMAL ATRIAL FIBRILLATION (H): ICD-10-CM

## 2022-11-30 LAB — INR BLD: 2.4 (ref 0.9–1.1)

## 2022-11-30 PROCEDURE — 36416 COLLJ CAPILLARY BLOOD SPEC: CPT

## 2022-11-30 PROCEDURE — 85610 PROTHROMBIN TIME: CPT

## 2022-11-30 NOTE — PROGRESS NOTES
ANTICOAGULATION MANAGEMENT     Angeles Can 82 year old female is on warfarin with therapeutic INR result. (Goal INR 2.0-3.0)    Recent labs: (last 7 days)     11/30/22  1132   INR 2.4*       ASSESSMENT       Source(s): Chart Review and Patient/Caregiver Call       Warfarin doses taken: Warfarin taken as instructed    Diet: No new diet changes identified    New illness, injury, or hospitalization: No    Medication/supplement changes: None noted    Signs or symptoms of bleeding or clotting: No    Previous INR: Therapeutic last 2(+) visits    Additional findings: None       PLAN     Recommended plan for no diet, medication or health factor changes affecting INR     Dosing Instructions: Continue your current warfarin dose with next INR in 4 weeks       Summary  As of 11/30/2022    Full warfarin instructions:  1.25 mg every Thu; 2.5 mg all other days; Starting 11/30/2022   Next INR check:  12/28/2022             Detailed voice message left for Frida with dosing instructions and follow up date.     Contact [phone] to schedule and with any changes, questions or concerns.     Education provided:     Please call back if any changes to your diet, medications or how you've been taking warfarin    Plan made per [facility]agulation protocol    Reyna Radford RN  Anticoagulation Clinic  11/30/2022    _______________________________________________________________________     Anticoagulation Episode Summary     Current INR goal:  2.0-3.0   TTR:  75.6 % (1 y)   Target end date:  Indefinite   Send INR reminders to:  [facility]    Indications    Paroxysmal atrial fibrillation (H) [I48.0]           Comments:  PAF         Anticoagulation Care Providers     Provider Role Specialty Phone number    Freddy Lubin MD Referring Internal Medicine [phone]

## 2022-12-01 ENCOUNTER — TELEPHONE (OUTPATIENT)
Dept: INTERNAL MEDICINE | Facility: CLINIC | Age: 82
End: 2022-12-01

## 2022-12-01 DIAGNOSIS — I48.0 PAROXYSMAL ATRIAL FIBRILLATION (H): Primary | ICD-10-CM

## 2022-12-07 ENCOUNTER — ALLIED HEALTH/NURSE VISIT (OUTPATIENT)
Dept: FAMILY MEDICINE | Facility: CLINIC | Age: 82
End: 2022-12-07
Payer: MEDICARE

## 2022-12-07 ENCOUNTER — LAB (OUTPATIENT)
Dept: LAB | Facility: CLINIC | Age: 82
End: 2022-12-07
Payer: MEDICARE

## 2022-12-07 DIAGNOSIS — Z51.81 ENCOUNTER FOR THERAPEUTIC DRUG MONITORING: ICD-10-CM

## 2022-12-07 DIAGNOSIS — E11.65 TYPE 2 DIABETES MELLITUS WITH HYPERGLYCEMIA, WITHOUT LONG-TERM CURRENT USE OF INSULIN (H): ICD-10-CM

## 2022-12-07 DIAGNOSIS — E78.00 HYPERCHOLESTEROLEMIA: ICD-10-CM

## 2022-12-07 DIAGNOSIS — Z23 ENCOUNTER FOR IMMUNIZATION: Primary | ICD-10-CM

## 2022-12-07 LAB
ALBUMIN SERPL BCG-MCNC: 3.9 G/DL (ref 3.5–5.2)
ALP SERPL-CCNC: 92 U/L (ref 35–104)
ALT SERPL W P-5'-P-CCNC: 13 U/L (ref 10–35)
ANION GAP SERPL CALCULATED.3IONS-SCNC: 13 MMOL/L (ref 7–15)
AST SERPL W P-5'-P-CCNC: 22 U/L (ref 10–35)
BILIRUB SERPL-MCNC: 0.7 MG/DL
BUN SERPL-MCNC: 35.5 MG/DL (ref 8–23)
CALCIUM SERPL-MCNC: 10.5 MG/DL (ref 8.8–10.2)
CHLORIDE SERPL-SCNC: 101 MMOL/L (ref 98–107)
CHOLEST SERPL-MCNC: 164 MG/DL
CREAT SERPL-MCNC: 1.07 MG/DL (ref 0.51–0.95)
DEPRECATED HCO3 PLAS-SCNC: 27 MMOL/L (ref 22–29)
GFR SERPL CREATININE-BSD FRML MDRD: 51 ML/MIN/1.73M2
GLUCOSE SERPL-MCNC: 164 MG/DL (ref 70–99)
HBA1C MFR BLD: 7 % (ref 0–5.6)
HDLC SERPL-MCNC: 60 MG/DL
LDLC SERPL CALC-MCNC: 73 MG/DL
NONHDLC SERPL-MCNC: 104 MG/DL
POTASSIUM SERPL-SCNC: 4.2 MMOL/L (ref 3.4–5.3)
PROT SERPL-MCNC: 6.5 G/DL (ref 6.4–8.3)
SODIUM SERPL-SCNC: 141 MMOL/L (ref 136–145)
TRIGL SERPL-MCNC: 157 MG/DL

## 2022-12-07 PROCEDURE — 80053 COMPREHEN METABOLIC PANEL: CPT

## 2022-12-07 PROCEDURE — 80061 LIPID PANEL: CPT

## 2022-12-07 PROCEDURE — 83036 HEMOGLOBIN GLYCOSYLATED A1C: CPT

## 2022-12-07 PROCEDURE — 91312 COVID-19 VACCINE BIVALENT BOOSTER 12+ (PFIZER): CPT

## 2022-12-07 PROCEDURE — 99207 PR NO CHARGE NURSE ONLY: CPT

## 2022-12-07 PROCEDURE — 36415 COLL VENOUS BLD VENIPUNCTURE: CPT

## 2022-12-07 PROCEDURE — 0124A COVID-19 VACCINE BIVALENT BOOSTER 12+ (PFIZER): CPT

## 2022-12-12 ENCOUNTER — ANCILLARY PROCEDURE (OUTPATIENT)
Dept: CARDIOLOGY | Facility: CLINIC | Age: 82
End: 2022-12-12
Attending: INTERNAL MEDICINE
Payer: MEDICARE

## 2022-12-12 DIAGNOSIS — Z95.0 PACEMAKER: ICD-10-CM

## 2022-12-12 DIAGNOSIS — I49.5 SICK SINUS SYNDROME (H): ICD-10-CM

## 2022-12-12 LAB
MDC_IDC_EPISODE_DTM: NORMAL
MDC_IDC_EPISODE_DURATION: 14 S
MDC_IDC_EPISODE_ID: NORMAL
MDC_IDC_EPISODE_TYPE: NORMAL
MDC_IDC_LEAD_IMPLANT_DT: NORMAL
MDC_IDC_LEAD_IMPLANT_DT: NORMAL
MDC_IDC_LEAD_LOCATION: NORMAL
MDC_IDC_LEAD_LOCATION: NORMAL
MDC_IDC_LEAD_LOCATION_DETAIL_1: NORMAL
MDC_IDC_LEAD_LOCATION_DETAIL_1: NORMAL
MDC_IDC_LEAD_MFG: NORMAL
MDC_IDC_LEAD_MFG: NORMAL
MDC_IDC_LEAD_MODEL: NORMAL
MDC_IDC_LEAD_MODEL: NORMAL
MDC_IDC_LEAD_POLARITY_TYPE: NORMAL
MDC_IDC_LEAD_POLARITY_TYPE: NORMAL
MDC_IDC_LEAD_SERIAL: NORMAL
MDC_IDC_LEAD_SERIAL: NORMAL
MDC_IDC_MSMT_BATTERY_DTM: NORMAL
MDC_IDC_MSMT_BATTERY_REMAINING_LONGEVITY: 108 MO
MDC_IDC_MSMT_BATTERY_REMAINING_PERCENTAGE: 100 %
MDC_IDC_MSMT_BATTERY_STATUS: NORMAL
MDC_IDC_MSMT_LEADCHNL_RA_IMPEDANCE_VALUE: 535 OHM
MDC_IDC_MSMT_LEADCHNL_RA_PACING_THRESHOLD_AMPLITUDE: 0.4 V
MDC_IDC_MSMT_LEADCHNL_RA_PACING_THRESHOLD_PULSEWIDTH: 0.4 MS
MDC_IDC_MSMT_LEADCHNL_RV_IMPEDANCE_VALUE: 375 OHM
MDC_IDC_MSMT_LEADCHNL_RV_PACING_THRESHOLD_AMPLITUDE: 1 V
MDC_IDC_MSMT_LEADCHNL_RV_PACING_THRESHOLD_PULSEWIDTH: 0.4 MS
MDC_IDC_PG_IMPLANT_DTM: NORMAL
MDC_IDC_PG_MFG: NORMAL
MDC_IDC_PG_MODEL: NORMAL
MDC_IDC_PG_SERIAL: NORMAL
MDC_IDC_PG_TYPE: NORMAL
MDC_IDC_SESS_CLINIC_NAME: NORMAL
MDC_IDC_SESS_DTM: NORMAL
MDC_IDC_SESS_TYPE: NORMAL
MDC_IDC_SET_BRADY_AT_MODE_SWITCH_MODE: NORMAL
MDC_IDC_SET_BRADY_AT_MODE_SWITCH_RATE: 170 {BEATS}/MIN
MDC_IDC_SET_BRADY_LOWRATE: 70 {BEATS}/MIN
MDC_IDC_SET_BRADY_MAX_SENSOR_RATE: 130 {BEATS}/MIN
MDC_IDC_SET_BRADY_MAX_TRACKING_RATE: 130 {BEATS}/MIN
MDC_IDC_SET_BRADY_MODE: NORMAL
MDC_IDC_SET_BRADY_PAV_DELAY_HIGH: 200 MS
MDC_IDC_SET_BRADY_PAV_DELAY_LOW: 250 MS
MDC_IDC_SET_BRADY_SAV_DELAY_HIGH: 200 MS
MDC_IDC_SET_BRADY_SAV_DELAY_LOW: 250 MS
MDC_IDC_SET_LEADCHNL_RA_PACING_AMPLITUDE: 1.5 V
MDC_IDC_SET_LEADCHNL_RA_PACING_CAPTURE_MODE: NORMAL
MDC_IDC_SET_LEADCHNL_RA_PACING_POLARITY: NORMAL
MDC_IDC_SET_LEADCHNL_RA_PACING_PULSEWIDTH: 0.4 MS
MDC_IDC_SET_LEADCHNL_RA_SENSING_ADAPTATION_MODE: NORMAL
MDC_IDC_SET_LEADCHNL_RA_SENSING_POLARITY: NORMAL
MDC_IDC_SET_LEADCHNL_RA_SENSING_SENSITIVITY: 0.25 MV
MDC_IDC_SET_LEADCHNL_RV_PACING_AMPLITUDE: 1.7 V
MDC_IDC_SET_LEADCHNL_RV_PACING_CAPTURE_MODE: NORMAL
MDC_IDC_SET_LEADCHNL_RV_PACING_POLARITY: NORMAL
MDC_IDC_SET_LEADCHNL_RV_PACING_PULSEWIDTH: 0.4 MS
MDC_IDC_SET_LEADCHNL_RV_SENSING_ADAPTATION_MODE: NORMAL
MDC_IDC_SET_LEADCHNL_RV_SENSING_POLARITY: NORMAL
MDC_IDC_SET_LEADCHNL_RV_SENSING_SENSITIVITY: 1.5 MV
MDC_IDC_SET_ZONE_DETECTION_INTERVAL: 375 MS
MDC_IDC_SET_ZONE_TYPE: NORMAL
MDC_IDC_SET_ZONE_VENDOR_TYPE: NORMAL
MDC_IDC_STAT_AT_BURDEN_PERCENT: 1 %
MDC_IDC_STAT_AT_DTM_END: NORMAL
MDC_IDC_STAT_AT_DTM_START: NORMAL
MDC_IDC_STAT_BRADY_DTM_END: NORMAL
MDC_IDC_STAT_BRADY_DTM_START: NORMAL
MDC_IDC_STAT_BRADY_RA_PERCENT_PACED: 99 %
MDC_IDC_STAT_BRADY_RV_PERCENT_PACED: 0 %
MDC_IDC_STAT_EPISODE_RECENT_COUNT: 0
MDC_IDC_STAT_EPISODE_RECENT_COUNT: 1
MDC_IDC_STAT_EPISODE_RECENT_COUNT: 1
MDC_IDC_STAT_EPISODE_RECENT_COUNT_DTM_END: NORMAL
MDC_IDC_STAT_EPISODE_RECENT_COUNT_DTM_START: NORMAL
MDC_IDC_STAT_EPISODE_TYPE: NORMAL
MDC_IDC_STAT_EPISODE_VENDOR_TYPE: NORMAL

## 2022-12-12 PROCEDURE — 93294 REM INTERROG EVL PM/LDLS PM: CPT | Performed by: INTERNAL MEDICINE

## 2022-12-12 PROCEDURE — 93296 REM INTERROG EVL PM/IDS: CPT | Performed by: INTERNAL MEDICINE

## 2022-12-28 ENCOUNTER — LAB (OUTPATIENT)
Dept: LAB | Facility: CLINIC | Age: 82
End: 2022-12-28
Payer: MEDICARE

## 2022-12-28 ENCOUNTER — ANTICOAGULATION THERAPY VISIT (OUTPATIENT)
Dept: ANTICOAGULATION | Facility: CLINIC | Age: 82
End: 2022-12-28

## 2022-12-28 DIAGNOSIS — I48.0 PAROXYSMAL ATRIAL FIBRILLATION (H): Primary | ICD-10-CM

## 2022-12-28 DIAGNOSIS — I48.0 PAROXYSMAL ATRIAL FIBRILLATION (H): ICD-10-CM

## 2022-12-28 LAB — INR BLD: 2.9 (ref 0.9–1.1)

## 2022-12-28 PROCEDURE — 85610 PROTHROMBIN TIME: CPT

## 2022-12-28 PROCEDURE — 36416 COLLJ CAPILLARY BLOOD SPEC: CPT

## 2022-12-28 NOTE — PROGRESS NOTES
ANTICOAGULATION MANAGEMENT     Angeles Can 82 year old female is on warfarin with therapeutic INR result. (Goal INR 2.0-3.0)    Recent labs: (last 7 days)     12/28/22  1138   INR 2.9*       ASSESSMENT       Source(s): Chart Review and Patient/Caregiver Call       Warfarin doses taken: Warfarin taken as instructed    Diet: No new diet changes identified    New illness, injury, or hospitalization: No    Medication/supplement changes: None noted    Signs or symptoms of bleeding or clotting: No    Previous INR: Therapeutic last 2(+) visits    Additional findings: None       PLAN     Recommended plan for no diet, medication or health factor changes affecting INR     Dosing Instructions: Continue your current warfarin dose with next INR in 4 weeks       Summary  As of 12/28/2022    Full warfarin instructions:  1.25 mg every Thu; 2.5 mg all other days   Next INR check:  1/25/2023             Telephone call with Frida who verbalizes understanding and agrees to plan    Lab visit scheduled    Education provided:     Contact [phone] with any changes, questions or concerns.     Plan made per [facility] anticoagulation protocol    Reyna Radford RN  Anticoagulation Clinic  12/28/2022    _______________________________________________________________________     Anticoagulation Episode Summary     Current INR goal:  2.0-3.0   TTR:  75.6 % (1 y)   Target end date:  Indefinite   Send INR reminders to:  [facility]    Indications    Paroxysmal atrial fibrillation (H) [I48.0]           Comments:  [facility]         Anticoagulation Care Providers     Provider Role Specialty Phone number    Freddy Lubin MD Referring Internal Medicine [phone]

## 2023-01-15 ENCOUNTER — E-VISIT (OUTPATIENT)
Dept: INTERNAL MEDICINE | Facility: CLINIC | Age: 83
End: 2023-01-15
Payer: MEDICARE

## 2023-01-15 DIAGNOSIS — E11.65 TYPE 2 DIABETES MELLITUS WITH HYPERGLYCEMIA, WITHOUT LONG-TERM CURRENT USE OF INSULIN (H): Primary | ICD-10-CM

## 2023-01-15 PROCEDURE — 99207 PR NO CHARGE LOS: CPT | Performed by: INTERNAL MEDICINE

## 2023-01-17 NOTE — TELEPHONE ENCOUNTER
Provider E-Visit time total (minutes): 5 minutes.  No charge    She wondered about the potential physicians after I retire.  She lives in [place].  She is interested in seeing an internist in the future and has looked at the [facility].  This would seem a good option for the future.  She could still keep her current cardiologist and oncologist.

## 2023-01-17 NOTE — PATIENT INSTRUCTIONS
Thank you for choosing us for your care. Based on your symptoms and length of illness, I do not think that you need a prescription at this time.  Please follow the care advise I've provided and use the over the counter medications to help relieve your symptoms.   View your full visit summary for details by clicking on the link below.     If you're not feeling better within 2-3 days, please respond to this message and we can consider if a prescription is needed.  You can schedule an appointment right here in [facility], or call [phone]  If the visit is for the same symptoms as your eVisit, we'll refund the cost of your eVisit if seen within seven days.

## 2023-01-25 ENCOUNTER — ANTICOAGULATION THERAPY VISIT (OUTPATIENT)
Dept: ANTICOAGULATION | Facility: CLINIC | Age: 83
End: 2023-01-25

## 2023-01-25 ENCOUNTER — LAB (OUTPATIENT)
Dept: LAB | Facility: CLINIC | Age: 83
End: 2023-01-25
Payer: MEDICARE

## 2023-01-25 DIAGNOSIS — I48.0 PAROXYSMAL ATRIAL FIBRILLATION (H): Primary | ICD-10-CM

## 2023-01-25 DIAGNOSIS — I48.0 PAROXYSMAL ATRIAL FIBRILLATION (H): ICD-10-CM

## 2023-01-25 DIAGNOSIS — N18.30 CHRONIC KIDNEY DISEASE, STAGE 3 (H): Primary | ICD-10-CM

## 2023-01-25 LAB — INR BLD: 2.1 (ref 0.9–1.1)

## 2023-01-25 PROCEDURE — 85610 PROTHROMBIN TIME: CPT

## 2023-01-25 PROCEDURE — 36416 COLLJ CAPILLARY BLOOD SPEC: CPT

## 2023-01-25 NOTE — PROGRESS NOTES
ANTICOAGULATION MANAGEMENT     Angeles Can 82 year old female is on warfarin with therapeutic INR result. (Goal INR 2.0-3.0)    Recent labs: (last 7 days)     01/25/23  1138   INR 2.1*       ASSESSMENT       Source(s): Chart Review and Patient/Caregiver Call       Warfarin doses taken: Warfarin taken as instructed    Diet: No new diet changes identified    New illness, injury, or hospitalization: No    Medication/supplement changes: None noted    Signs or symptoms of bleeding or clotting: No    Previous INR: Therapeutic last 2(+) visits    Additional findings: None       PLAN     Recommended plan for no diet, medication or health factor changes affecting INR     Dosing Instructions: Continue your current warfarin dose with next INR in 4 weeks       Summary  As of 1/25/2023    Full warfarin instructions:  1.25 mg every Thu; 2.5 mg all other days   Next INR check:  2/22/2023             Telephone call with Frida who verbalizes understanding and agrees to plan    Lab visit scheduled    Education provided:     Contact [phone] with any changes, questions or concerns.     Plan made per [facility]agulation protocol    Reyna Radford RN  Anticoagulation Clinic  1/25/2023    _______________________________________________________________________     Anticoagulation Episode Summary     Current INR goal:  2.0-3.0   TTR:  75.6 % (1 y)   Target end date:  Indefinite   Send INR reminders to:  [facility]    Indications    Paroxysmal atrial fibrillation (H) [I48.0]           Comments:  [facility]         Anticoagulation Care Providers     Provider Role Specialty Phone number    Freddy Lubin MD Referring Internal Medicine [phone]

## 2023-02-04 ENCOUNTER — HEALTH MAINTENANCE LETTER (OUTPATIENT)
Age: 83
End: 2023-02-04

## 2023-02-28 ENCOUNTER — ANTICOAGULATION THERAPY VISIT (OUTPATIENT)
Dept: ANTICOAGULATION | Facility: CLINIC | Age: 83
End: 2023-02-28

## 2023-02-28 ENCOUNTER — LAB (OUTPATIENT)
Dept: LAB | Facility: CLINIC | Age: 83
End: 2023-02-28
Payer: MEDICARE

## 2023-02-28 DIAGNOSIS — I48.0 PAROXYSMAL ATRIAL FIBRILLATION (H): Primary | ICD-10-CM

## 2023-02-28 DIAGNOSIS — I48.0 PAROXYSMAL ATRIAL FIBRILLATION (H): ICD-10-CM

## 2023-02-28 LAB — INR BLD: 3.5 (ref 0.9–1.1)

## 2023-02-28 PROCEDURE — 36416 COLLJ CAPILLARY BLOOD SPEC: CPT

## 2023-02-28 PROCEDURE — 85610 PROTHROMBIN TIME: CPT

## 2023-02-28 NOTE — PROGRESS NOTES
ANTICOAGULATION MANAGEMENT     Angeles Can 82 year old female is on warfarin with supratherapeutic INR result. (Goal INR 2.0-3.0)    Recent labs: (last 7 days)     02/28/23  1138   INR 3.5*       ASSESSMENT       Source(s): Chart Review and Patient/Caregiver Call       Warfarin doses taken: Warfarin taken differently, but did not change total weekly dose - pt states that she has been taking 1.25mg on Mondays not on Thursday - updated maintenance dose to reflect how pt has been taking warfarin, does not change weekly mg amount    Diet: Decreased greens/vitamin K in diet; plans to resume previous intake    New illness, injury, or hospitalization: No    Medication/supplement changes: None noted    Signs or symptoms of bleeding or clotting: No    Previous INR: Therapeutic last 2(+) visits    Additional findings: Pt states that her insurance has changed and she is going to be establishing care with a new provider in [facility] in the next couple weeks. Will have [facility] follow up in a couple weeks to make sure pt has transitioned to new provider before [facility] discharges her from our Anticoagulation Clinic         PLAN     Recommended plan for temporary change(s) affecting INR     Dosing Instructions: partial hold then continue your current warfarin dose with next INR in 2 weeks       Summary  As of 2/28/2023    Full warfarin instructions:  2/28: 1.25 mg; Otherwise 1.25 mg every Mon; 2.5 mg all other days   Next INR check:  3/14/2023             Telephone call with Frida who verbalizes understanding and agrees to plan    Patient offered & declined to schedule next visit    Education provided:     Goal range and lab monitoring: goal range and significance of current result and Importance of therapeutic range    Dietary considerations: importance of consistent vitamin K intake and impact of vitamin K foods on INR    Plan made per [facility] anticoagulation protocol    Nigel Woodall, RN  Anticoagulation  Clinic  2/28/2023    _______________________________________________________________________     Anticoagulation Episode Summary     Current INR goal:  2.0-3.0   TTR:  72.3 % (1 y)   Target end date:  Indefinite   Send INR reminders to:  [facility]    Indications    Paroxysmal atrial fibrillation (H) [I48.0]           Comments:  PAF         Anticoagulation Care Providers     Provider Role Specialty Phone number    Freddy Lubin MD Referring Internal Medicine [phone]

## 2023-03-15 ENCOUNTER — TELEPHONE (OUTPATIENT)
Dept: ANTICOAGULATION | Facility: CLINIC | Age: 83
End: 2023-03-15
Payer: MEDICARE

## 2023-03-15 DIAGNOSIS — I48.0 PAROXYSMAL ATRIAL FIBRILLATION (H): Primary | ICD-10-CM

## 2023-03-15 NOTE — TELEPHONE ENCOUNTER
ANTICOAGULATION  MANAGEMENT    Angeles Can is being discharged from the [facility] Anticoagulation Management Program ([facility]).    Reason for discharge: care has been transferred to [facility]    Anticoagulation episode resolved and Standing order discontinued    If patient needs warfarin management in the future, please send a new referral    Reyna Radford RN

## 2023-03-22 ENCOUNTER — ANCILLARY PROCEDURE (OUTPATIENT)
Dept: CARDIOLOGY | Facility: CLINIC | Age: 83
End: 2023-03-22
Attending: INTERNAL MEDICINE
Payer: MEDICARE

## 2023-03-22 DIAGNOSIS — I49.5 SICK SINUS SYNDROME (H): ICD-10-CM

## 2023-03-22 DIAGNOSIS — Z95.0 PACEMAKER: ICD-10-CM

## 2023-03-26 LAB
MDC_IDC_EPISODE_DTM: NORMAL
MDC_IDC_EPISODE_DURATION: 14 S
MDC_IDC_EPISODE_DURATION: 14 S
MDC_IDC_EPISODE_DURATION: 15 S
MDC_IDC_EPISODE_DURATION: 5 S
MDC_IDC_EPISODE_ID: NORMAL
MDC_IDC_EPISODE_TYPE: NORMAL
MDC_IDC_LEAD_IMPLANT_DT: NORMAL
MDC_IDC_LEAD_IMPLANT_DT: NORMAL
MDC_IDC_LEAD_LOCATION: NORMAL
MDC_IDC_LEAD_LOCATION: NORMAL
MDC_IDC_LEAD_LOCATION_DETAIL_1: NORMAL
MDC_IDC_LEAD_LOCATION_DETAIL_1: NORMAL
MDC_IDC_LEAD_MFG: NORMAL
MDC_IDC_LEAD_MFG: NORMAL
MDC_IDC_LEAD_MODEL: NORMAL
MDC_IDC_LEAD_MODEL: NORMAL
MDC_IDC_LEAD_POLARITY_TYPE: NORMAL
MDC_IDC_LEAD_POLARITY_TYPE: NORMAL
MDC_IDC_LEAD_SERIAL: NORMAL
MDC_IDC_LEAD_SERIAL: NORMAL
MDC_IDC_MSMT_BATTERY_DTM: NORMAL
MDC_IDC_MSMT_BATTERY_REMAINING_LONGEVITY: 108 MO
MDC_IDC_MSMT_BATTERY_REMAINING_PERCENTAGE: 100 %
MDC_IDC_MSMT_BATTERY_STATUS: NORMAL
MDC_IDC_MSMT_LEADCHNL_RA_IMPEDANCE_VALUE: 513 OHM
MDC_IDC_MSMT_LEADCHNL_RA_PACING_THRESHOLD_AMPLITUDE: 0.4 V
MDC_IDC_MSMT_LEADCHNL_RA_PACING_THRESHOLD_PULSEWIDTH: 0.4 MS
MDC_IDC_MSMT_LEADCHNL_RV_IMPEDANCE_VALUE: 344 OHM
MDC_IDC_MSMT_LEADCHNL_RV_PACING_THRESHOLD_AMPLITUDE: 1.4 V
MDC_IDC_MSMT_LEADCHNL_RV_PACING_THRESHOLD_PULSEWIDTH: 0.4 MS
MDC_IDC_PG_IMPLANT_DTM: NORMAL
MDC_IDC_PG_MFG: NORMAL
MDC_IDC_PG_MODEL: NORMAL
MDC_IDC_PG_SERIAL: NORMAL
MDC_IDC_PG_TYPE: NORMAL
MDC_IDC_SESS_CLINIC_NAME: NORMAL
MDC_IDC_SESS_DTM: NORMAL
MDC_IDC_SESS_TYPE: NORMAL
MDC_IDC_SET_BRADY_AT_MODE_SWITCH_MODE: NORMAL
MDC_IDC_SET_BRADY_AT_MODE_SWITCH_RATE: 170 {BEATS}/MIN
MDC_IDC_SET_BRADY_LOWRATE: 70 {BEATS}/MIN
MDC_IDC_SET_BRADY_MAX_SENSOR_RATE: 130 {BEATS}/MIN
MDC_IDC_SET_BRADY_MAX_TRACKING_RATE: 130 {BEATS}/MIN
MDC_IDC_SET_BRADY_MODE: NORMAL
MDC_IDC_SET_BRADY_PAV_DELAY_HIGH: 200 MS
MDC_IDC_SET_BRADY_PAV_DELAY_LOW: 250 MS
MDC_IDC_SET_BRADY_SAV_DELAY_HIGH: 200 MS
MDC_IDC_SET_BRADY_SAV_DELAY_LOW: 250 MS
MDC_IDC_SET_LEADCHNL_RA_PACING_AMPLITUDE: 1.5 V
MDC_IDC_SET_LEADCHNL_RA_PACING_CAPTURE_MODE: NORMAL
MDC_IDC_SET_LEADCHNL_RA_PACING_POLARITY: NORMAL
MDC_IDC_SET_LEADCHNL_RA_PACING_PULSEWIDTH: 0.4 MS
MDC_IDC_SET_LEADCHNL_RA_SENSING_ADAPTATION_MODE: NORMAL
MDC_IDC_SET_LEADCHNL_RA_SENSING_POLARITY: NORMAL
MDC_IDC_SET_LEADCHNL_RA_SENSING_SENSITIVITY: 0.25 MV
MDC_IDC_SET_LEADCHNL_RV_PACING_AMPLITUDE: 2.1 V
MDC_IDC_SET_LEADCHNL_RV_PACING_CAPTURE_MODE: NORMAL
MDC_IDC_SET_LEADCHNL_RV_PACING_POLARITY: NORMAL
MDC_IDC_SET_LEADCHNL_RV_PACING_PULSEWIDTH: 0.4 MS
MDC_IDC_SET_LEADCHNL_RV_SENSING_ADAPTATION_MODE: NORMAL
MDC_IDC_SET_LEADCHNL_RV_SENSING_POLARITY: NORMAL
MDC_IDC_SET_LEADCHNL_RV_SENSING_SENSITIVITY: 1.5 MV
MDC_IDC_SET_ZONE_DETECTION_INTERVAL: 375 MS
MDC_IDC_SET_ZONE_TYPE: NORMAL
MDC_IDC_SET_ZONE_VENDOR_TYPE: NORMAL
MDC_IDC_STAT_AT_BURDEN_PERCENT: 1 %
MDC_IDC_STAT_AT_DTM_END: NORMAL
MDC_IDC_STAT_AT_DTM_START: NORMAL
MDC_IDC_STAT_BRADY_DTM_END: NORMAL
MDC_IDC_STAT_BRADY_DTM_START: NORMAL
MDC_IDC_STAT_BRADY_RA_PERCENT_PACED: 100 %
MDC_IDC_STAT_BRADY_RV_PERCENT_PACED: 0 %
MDC_IDC_STAT_EPISODE_RECENT_COUNT: 0
MDC_IDC_STAT_EPISODE_RECENT_COUNT: 0
MDC_IDC_STAT_EPISODE_RECENT_COUNT: 1
MDC_IDC_STAT_EPISODE_RECENT_COUNT: 2
MDC_IDC_STAT_EPISODE_RECENT_COUNT: 3
MDC_IDC_STAT_EPISODE_RECENT_COUNT_DTM_END: NORMAL
MDC_IDC_STAT_EPISODE_RECENT_COUNT_DTM_START: NORMAL
MDC_IDC_STAT_EPISODE_TYPE: NORMAL
MDC_IDC_STAT_EPISODE_VENDOR_TYPE: NORMAL

## 2023-03-26 PROCEDURE — 93294 REM INTERROG EVL PM/LDLS PM: CPT | Performed by: INTERNAL MEDICINE

## 2023-03-26 PROCEDURE — 93296 REM INTERROG EVL PM/IDS: CPT | Performed by: INTERNAL MEDICINE

## 2023-04-03 DIAGNOSIS — I48.0 PAROXYSMAL ATRIAL FIBRILLATION (H): ICD-10-CM

## 2023-04-03 RX ORDER — SOTALOL HYDROCHLORIDE 80 MG/1
TABLET ORAL
Qty: 90 TABLET | Refills: 0 | Status: SHIPPED | OUTPATIENT
Start: 2023-04-03 | End: 2023-07-10

## 2023-04-21 NOTE — PROGRESS NOTES
I met with Frida Can at the request of DR VILLARREAL  to recheck her blood pressure.  Blood pressure medications on the MAR were reviewed with patient.    Patient has taken all medications as per usual regimen: Yes  Patient reports tolerating them without any issues or concerns: No    Vitals:    12/21/20 1111   BP: 138/78   Pulse: 79   SpO2: 96%       Blood pressure was taken, previous encounter was reviewed, recorded blood pressure below 140/90.  Patient was discharged and the note will be sent to the provider for final review.          
Medical Assessment Completed on: 21-Apr-2023 18:04

## 2023-05-18 ENCOUNTER — ANCILLARY PROCEDURE (OUTPATIENT)
Dept: MAMMOGRAPHY | Facility: CLINIC | Age: 83
End: 2023-05-18
Attending: INTERNAL MEDICINE
Payer: MEDICARE

## 2023-05-18 ENCOUNTER — ANCILLARY PROCEDURE (OUTPATIENT)
Dept: BONE DENSITY | Facility: CLINIC | Age: 83
End: 2023-05-18
Attending: INTERNAL MEDICINE
Payer: MEDICARE

## 2023-05-18 DIAGNOSIS — Z78.0 MENOPAUSE: ICD-10-CM

## 2023-05-18 DIAGNOSIS — Z79.811 AROMATASE INHIBITOR USE: ICD-10-CM

## 2023-05-18 DIAGNOSIS — Z78.0 POST-MENOPAUSAL: ICD-10-CM

## 2023-05-18 DIAGNOSIS — Z12.31 VISIT FOR SCREENING MAMMOGRAM: ICD-10-CM

## 2023-05-18 PROCEDURE — 77081 DXA BONE DENSITY APPENDICULR: CPT | Mod: TC | Performed by: PHYSICIAN ASSISTANT

## 2023-05-18 PROCEDURE — 77067 SCR MAMMO BI INCL CAD: CPT | Mod: TC | Performed by: STUDENT IN AN ORGANIZED HEALTH CARE EDUCATION/TRAINING PROGRAM

## 2023-05-18 PROCEDURE — 77080 DXA BONE DENSITY AXIAL: CPT | Mod: TC | Performed by: PHYSICIAN ASSISTANT

## 2023-05-18 PROCEDURE — 77063 BREAST TOMOSYNTHESIS BI: CPT | Mod: TC | Performed by: STUDENT IN AN ORGANIZED HEALTH CARE EDUCATION/TRAINING PROGRAM

## 2023-06-06 ENCOUNTER — OFFICE VISIT (OUTPATIENT)
Dept: CARDIOLOGY | Facility: CLINIC | Age: 83
End: 2023-06-06
Attending: INTERNAL MEDICINE
Payer: MEDICARE

## 2023-06-06 ENCOUNTER — ONCOLOGY VISIT (OUTPATIENT)
Dept: ONCOLOGY | Facility: CLINIC | Age: 83
End: 2023-06-06
Attending: INTERNAL MEDICINE
Payer: MEDICARE

## 2023-06-06 VITALS
WEIGHT: 173 LBS | OXYGEN SATURATION: 96 % | RESPIRATION RATE: 16 BRPM | HEIGHT: 62 IN | BODY MASS INDEX: 31.83 KG/M2 | DIASTOLIC BLOOD PRESSURE: 72 MMHG | SYSTOLIC BLOOD PRESSURE: 130 MMHG | HEART RATE: 80 BPM

## 2023-06-06 VITALS
BODY MASS INDEX: 31.47 KG/M2 | HEART RATE: 70 BPM | WEIGHT: 171 LBS | DIASTOLIC BLOOD PRESSURE: 64 MMHG | HEIGHT: 62 IN | SYSTOLIC BLOOD PRESSURE: 118 MMHG

## 2023-06-06 DIAGNOSIS — I35.0 NONRHEUMATIC AORTIC VALVE STENOSIS: Primary | ICD-10-CM

## 2023-06-06 DIAGNOSIS — I48.0 PAROXYSMAL ATRIAL FIBRILLATION (H): ICD-10-CM

## 2023-06-06 DIAGNOSIS — E78.5 DYSLIPIDEMIA: ICD-10-CM

## 2023-06-06 DIAGNOSIS — M94.9 DISORDER OF BONE AND CARTILAGE: ICD-10-CM

## 2023-06-06 DIAGNOSIS — I49.5 SICK SINUS SYNDROME (H): Primary | ICD-10-CM

## 2023-06-06 DIAGNOSIS — C50.911 INVASIVE DUCTAL CARCINOMA OF BREAST, FEMALE, RIGHT (H): Primary | ICD-10-CM

## 2023-06-06 DIAGNOSIS — Z95.0 PACEMAKER: ICD-10-CM

## 2023-06-06 DIAGNOSIS — M89.9 DISORDER OF BONE AND CARTILAGE: ICD-10-CM

## 2023-06-06 PROCEDURE — G0463 HOSPITAL OUTPT CLINIC VISIT: HCPCS | Performed by: INTERNAL MEDICINE

## 2023-06-06 PROCEDURE — 99214 OFFICE O/P EST MOD 30 MIN: CPT | Performed by: INTERNAL MEDICINE

## 2023-06-06 PROCEDURE — 93000 ELECTROCARDIOGRAM COMPLETE: CPT | Performed by: INTERNAL MEDICINE

## 2023-06-06 PROCEDURE — 93280 PM DEVICE PROGR EVAL DUAL: CPT | Performed by: INTERNAL MEDICINE

## 2023-06-06 RX ORDER — TAMOXIFEN CITRATE 20 MG/1
20 TABLET ORAL DAILY
Qty: 90 TABLET | Refills: 1 | Status: SHIPPED | OUTPATIENT
Start: 2023-06-06 | End: 2023-06-14

## 2023-06-06 RX ORDER — SILICONE ADHESIVE 1.5" X 3"
1-2 SHEET (EA) TOPICAL
COMMUNITY

## 2023-06-06 ASSESSMENT — PAIN SCALES - GENERAL: PAINLEVEL: NO PAIN (0)

## 2023-06-06 NOTE — LETTER
6/6/2023    Krista Molina, DO  [address] [facility] [address]    RE: Angeles Can       Dear Colleague,     I had the pleasure of seeing Angeles Can in the [facility].         [facility]   [address], [place] [address]   [email or web address]   OFFICE: [phone]          Thank you Dr. Boss for asking the [facility] team to participate in the care of your patient, Angeles Can.     Impression and Plan     1.  Atrial fibrillation.  Frida in 2018 he had presented with atrial fibrillation and findings consistent with tachycardia/bradycardia syndrome.  She underwent permanent pacemaker placement 2 March 2018.  Frida's [id number] Score is 7.  Most recent device interrogation continues to reveal low atrial fibrillation burden of less than 1%.  QTc on twelve-lead ECG today is reasonable at 438 ms.  Continue warfarin.  Continue sotalol 80 mg twice daily.     2.  Aortic stenosis.  This was felt very mild in degree on echocardiogram 17 August 2022 with mean gradient of only 12 mmHg and peak velocity of 2.1 m/s.     3.  Dyslipidemia.  Lipid profile 7 December 2022 revealed LDL 73 mg/dL and HDL 60 mg/dL.  Continue atorvastatin.     Plan on follow-up in 1 year.  Patient will be followed through Device Clinic per protocol as well.      35 minutes spent reviewing prior records (including documentation, laboratory studies, cardiac testing/imaging), interview with patient along with physical exam, planning, and subsequent documentation/crafting of note).           History of Present Illness    Once again I would like to thank you again for asking me to participate in the care of your patient, Angeles Can.  As you know, but to reiterate for my own records, Angeles Can is a 82 year old female with history of sick sinus syndrome.  Patient underwent permanent pacemaker placement 2 March 2018 ([employer] L331  ACCOLADE MRI EL device).     On interview today, patient is without complaint from a cardiac standpoint.  She reports no chest pain or shortness of breath.  She denies any subjective decline in exercise tolerance baseline, palpitations, or lightheadedness.  Patient denies any fevers, chills, or other constitutional symptoms.  She denies any fevers, chills, or other constitutional symptoms.     Further review of systems is otherwise negative/noncontributory (medical record and 13 point review of systems reviewed as well and pertinent positives noted).         Cardiac Diagnostics      Echocardiogram 17 August 2022 (personally reviewed):  Normal left ventricular size and systolic performance with a visually estimated ejection fraction of 65-70%.  There is mild concentric increase in left ventricular wall thickness.  There is very mild aortic stenosis.  Normal right ventricular size and systolic performance.  There is mild to moderate left atrial enlargement.  Right ventricular systolic pressure relative to right atrial pressure is mildly increased. The pulmonary artery pressure is estimated to be 35-40 mmHg plus right atrial pressure (the IVC is of normal caliber).  When compared to the prior real-time echocardiogram dated 29 April 2021, there has been no major interval change.    Echocardiogram 29 April 2021 (personally reviewed):  Normal left ventricular size and systolic performance with a visually estimated ejection fraction of 55-60%.   There is mild concentric increase in left ventricular wall thickness.   There is very mild aortic stenosis.   Normal right ventricular size and systolic performance.   There is mild left atrial enlargement.   When compared to the prior real-time echocardiogram dated 2 March 2018, the pacing electrodes appear to be new.  Otherwise, there has been little appreciable interval change.     Echocardiogram 2 March 2018 (personally reviewed):  Normal left ventricular size and systolic  performance with a visually estimated ejection fraction of 65-70%.   There is mild concentric increase in left ventricular wall thickness.   There is mild aortic stenosis.   Normal right ventricular size and systolic performance.   There is mild left atrial enlargement.     Device interrogation 22 March 2023 ([employer] L331 ACCOLADE MRI EL device implanted 2 March 2018):  Encounter Type: Routine remote pacemaker transmission.   Device: [employer] Accolade.  Pacing % /Programmed: %,  0% at DDDR 70/130.  Lead(s): Stable.  Battery longevity: 9yrs.  Presenting: AP-VS 70 bpm.  Atrial arrhythmia: since 12 December 2022; two mode switch episodes <1min, appears to be atrial tachy arrhythmia.  Anticoagulant: warfarin.  Ventricular arrhythmia: since December 2022; three ventricular high rate episodes, these appear to be PSVT.  Device/Lead alerts: Accolade device on advisory.   Comments: Normal magnet and pacemaker function.    Device interrogation 5 January 2021 ([employer] L331 ACCOLADE MRI EL device implanted 2 March 2018):  Type: routine remote pacemaker transmission.  Presenting rhythm: AP-VS  pm.  Battery/lead status: stable.  Arrhythmias: since 19 October 2021; 6 mode switch episodes, longest lasting 1hr 14 minutes, available EGMs confirm AF, AF burden <1%. Two ventricular high rate episodes, one episode appears to be RVR during AF, the other appears to be PSVT.  Anticoagulant: warfarin.  Comments: Normal magnet and pacemaker function.     Device interrogation 5 January 2021 ([employer] L331 ACCOLADE MRI EL device implanted 2 March 2018):  Presenting rhythm: AP-VS rate 70 bpm.  Battery/lead status: stable  Arrhythmias: since 5 October 2020, three mode switches, all <1 minute, EGMs confirm atrial tachy arrhythmias. Three nonsustained ventricular high rate  episodes, EGMs suggest PSVT.  Anticoagulant: warfarin  Comments: normal magnet and pacemaker function.     Device interrogation 20  "February 2020 ([employer] L331 ACCOLADE MRI EL device implanted 2 March 2018):  Presenting rhythm: AP-VS 70 bpm.  Battery/lead status: stable.  Arrhythmias: since 20 November 2019; three ventricular high rate episodes, two available EGMs show PSVT. One mode switch <1min, appears to be atrial tachy  arrhythmia.  Comments: Normal magnet and pacemaker function.       Twelve-lead ECG (personally reviewed) 6 June 2023: Atrial paced rhythm.  QTc 438 ms.         Physical Examination       /64 (BP Location: Right arm, Patient Position: Sitting, Cuff Size: Adult Large)   Pulse 70   Ht 1.562 m (5' 1.5\")   Wt 77.6 kg (171 lb)   BMI 31.79 kg/m          Wt Readings from Last 3 Encounters:   06/06/23 77.6 kg (171 lb)   06/06/23 78.5 kg (173 lb)   10/31/22 75.3 kg (166 lb)     The patient is alert and oriented times three. Sclerae are anicteric. Mucosal membranes are moist. Jugular venous pressure is normal. No significant adenopathy/thyromegally appreciated. Lungs are clear with good expansion. On cardiovascular exam, the patient has a regular S1 and S2. Abdomen is soft and non-tender. Extremities reveal no clubbing, cyanosis, or edema.         Family History/Social History/Risk Factors   Patient does not smoke.  Family history reviewed, and family history includes No Known Problems in her daughter, father, maternal aunt, maternal grandfather, maternal grandmother, mother, paternal aunt, paternal grandfather, paternal grandmother, and sister.          Medical History  Surgical History Family History Social History   Past Medical History:   Diagnosis Date    Arrhythmia     Paroxysmal Atrial fibrillation    Arthritis     Cancer (H)     Diabetes mellitus (H)     Hypercalcemia     Hyperlipidemia     Hypertension     Obesity      Past Surgical History:   Procedure Laterality Date    CATARACT EXTRACTION, BILATERAL      EP PACEMAKER INSERT N/A 3/2/2018    Procedure: EP Pacemaker Insertion;  Surgeon: Nahun Mina MD;  " Location: [facility];  Service:     HC REMOVE TONSILS/ADENOIDS,<13 Y/O      Description: Tonsillectomy With Adenoidectomy;  Recorded: 09/16/2008;    HC REVISE MEDIAN N/CARPAL TUNNEL SURG      Description: Neuroplasty Decompression Median Nerve At Carpal Tunnel;  Recorded: 11/01/2009;  Comments: Right- 1/99; Left- 4/99    OH MASTECTOMY, PARTIAL Right 5/26/2021    Procedure: Right Lumpectomy after Wire Localizaiton;  Surgeon: Rosanna Peng MD;  Location: [facility];  Service: General    US BREAST CORE BIOPSY RIGHT Right 5/3/2021     Family History   Problem Relation Age of Onset    No Known Problems Mother     No Known Problems Father     No Known Problems Sister     No Known Problems Daughter     No Known Problems Maternal Grandmother     No Known Problems Maternal Grandfather     No Known Problems Paternal Grandmother     No Known Problems Paternal Grandfather     No Known Problems Maternal Aunt     No Known Problems Paternal Aunt         Social History     Socioeconomic History    Marital status:      Spouse name: Not on file    Number of children: Not on file    Years of education: Not on file    Highest education level: Not on file   Occupational History    Not on file   Tobacco Use    Smoking status: Former    Smokeless tobacco: Never    Tobacco comments:     quit years ago   Vaping Use    Vaping status: Not on file   Substance and Sexual Activity    Alcohol use: No    Drug use: No    Sexual activity: Not Currently   Other Topics Concern    Not on file   Social History Narrative    Not on file     Social Determinants of Health     Financial Resource Strain: Not on file   Food Insecurity: Not on file   Transportation Needs: Not on file   Physical Activity: Not on file   Stress: Not on file   Social Connections: Not on file   Intimate Partner Violence: Not on file   Housing Stability: Not on file           Medications  Allergies   Current Outpatient Medications   Medication Sig Dispense  Refill    amLODIPine (NORVASC) 2.5 MG tablet Take 1 tablet (2.5 mg) by mouth daily 90 tablet 3    atorvastatin (LIPITOR) 20 MG tablet Take 1 tablet (20 mg) by mouth daily 90 tablet 3    blood glucose test strips [BLOOD GLUCOSE TEST STRIPS] Use 1 each As Directed daily. 100 strip 3    cholecalciferol, vitamin D3, 1,000 unit tablet [CHOLECALCIFEROL, VITAMIN D3, 1,000 UNIT TABLET] Take 1,000 Units by mouth daily.      cloNIDine (CATAPRES) 0.1 MG tablet Take 1 tablet (0.1 mg) by mouth 2 times daily 180 tablet 3    furosemide (LASIX) 40 MG tablet Take 1 tablet (40 mg) by mouth daily. 90 tablet 2    losartan (COZAAR) 100 MG tablet Take 1 tablet (100 mg) by mouth daily 90 tablet 3    magnesium oxide (MAG-OX) 400 mg tablet [MAGNESIUM OXIDE (MAG-OX) 400 MG TABLET] Take 400 mg by mouth daily.       metFORMIN (GLUCOPHAGE XR) 500 MG 24 hr tablet Take 1 tablet (500 mg) by mouth daily (with dinner) 90 tablet 3    sodium chloride (KATALINA 128) 5 % ophthalmic solution 1-2 drops every 3 hours as needed for dry eyes      sotalol (BETAPACE) 80 MG tablet Take 1 tablet (80 mg total) by mouth daily. 90 tablet 0    tamoxifen (NOLVADEX) 20 MG tablet Take 1 tablet (20 mg) by mouth daily 90 tablet 1    warfarin ANTICOAGULANT (COUMADIN) 2.5 MG tablet Take 1/2 tablet (1.25mg) to 1 tablet (2.5mg) by mouth daily, as directed.  Adjust dose based on INR results. 90 tablet 1    allopurinol (ZYLOPRIM) 300 MG tablet [ALLOPURINOL (ZYLOPRIM) 300 MG TABLET] Take 1 tablet by mouth once daily. (Patient not taking: Reported on 6/6/2023) 90 tablet 3    peg 400-propylene glycol (SYSTANE) 0.4-0.3 % Drop [-PROPYLENE GLYCOL (SYSTANE) 0.4-0.3 % DROP] Place 1 Drop into both eyes 2 times daily if needed for Dry Eyes. Indications: DRY EYE (Patient not taking: Reported on 6/6/2023)         Allergies   Allergen Reactions    Metformin Diarrhea    Hydrochlorothiazide Unknown     Annotation: hyperuricemia made worse by HCTZ   Causes pts gout          Lab Results     Chemistry/lipid CBC Cardiac Enzymes/BNP/TSH/INR   Recent Labs   Lab Test 12/07/22  1106   CHOL 164   HDL 60   LDL 73   TRIG 157*     Recent Labs   Lab Test 12/07/22  1106 08/16/22  1834 06/07/22  1031   LDL 73 86 82     Recent Labs   Lab Test 12/07/22  1106      POTASSIUM 4.2   CHLORIDE 101   CO2 27   *   BUN 35.5*   CR 1.07*   GFRESTIMATED 51*   GUILLERMINA 10.5*     Recent Labs   Lab Test 12/07/22  1106 08/18/22  0453 08/16/22  1834   CR 1.07* 0.89 1.36*     Recent Labs   Lab Test 12/07/22  1106 08/16/22  1530 06/07/22  1031   A1C 7.0* 6.8* 6.8*          Recent Labs   Lab Test 08/16/22  1530   WBC 11.3*   HGB 13.6   HCT 40.1   MCV 91        Recent Labs   Lab Test 08/16/22  1530 06/07/22  1031 05/24/21  1303   HGB 13.6 13.0 13.2    Recent Labs   Lab Test 08/16/22  1834   TROPONINI <0.01     Recent Labs   Lab Test 03/09/21  1742   BNP 49     Recent Labs   Lab Test 07/16/18  1538   TSH 3.57     Recent Labs   Lab Test 02/28/23  1138 01/25/23  1138 12/28/22  1138   INR 3.5* 2.1* 2.9*          Medications  Allergies   Current Outpatient Medications   Medication Sig Dispense Refill    amLODIPine (NORVASC) 2.5 MG tablet Take 1 tablet (2.5 mg) by mouth daily 90 tablet 3    atorvastatin (LIPITOR) 20 MG tablet Take 1 tablet (20 mg) by mouth daily 90 tablet 3    blood glucose test strips [BLOOD GLUCOSE TEST STRIPS] Use 1 each As Directed daily. 100 strip 3    cholecalciferol, vitamin D3, 1,000 unit tablet [CHOLECALCIFEROL, VITAMIN D3, 1,000 UNIT TABLET] Take 1,000 Units by mouth daily.      cloNIDine (CATAPRES) 0.1 MG tablet Take 1 tablet (0.1 mg) by mouth 2 times daily 180 tablet 3    furosemide (LASIX) 40 MG tablet Take 1 tablet (40 mg) by mouth daily. 90 tablet 2    losartan (COZAAR) 100 MG tablet Take 1 tablet (100 mg) by mouth daily 90 tablet 3    magnesium oxide (MAG-OX) 400 mg tablet [MAGNESIUM OXIDE (MAG-OX) 400 MG TABLET] Take 400 mg by mouth daily.       metFORMIN (GLUCOPHAGE XR) 500 MG 24 hr tablet  Take 1 tablet (500 mg) by mouth daily (with dinner) 90 tablet 3    sodium chloride (KATALINA 128) 5 % ophthalmic solution 1-2 drops every 3 hours as needed for dry eyes      sotalol (BETAPACE) 80 MG tablet Take 1 tablet (80 mg total) by mouth daily. 90 tablet 0    tamoxifen (NOLVADEX) 20 MG tablet Take 1 tablet (20 mg) by mouth daily 90 tablet 1    warfarin ANTICOAGULANT (COUMADIN) 2.5 MG tablet Take 1/2 tablet (1.25mg) to 1 tablet (2.5mg) by mouth daily, as directed.  Adjust dose based on INR results. 90 tablet 1    allopurinol (ZYLOPRIM) 300 MG tablet [ALLOPURINOL (ZYLOPRIM) 300 MG TABLET] Take 1 tablet by mouth once daily. (Patient not taking: Reported on 6/6/2023) 90 tablet 3    peg 400-propylene glycol (SYSTANE) 0.4-0.3 % Drop [-PROPYLENE GLYCOL (SYSTANE) 0.4-0.3 % DROP] Place 1 Drop into both eyes 2 times daily if needed for Dry Eyes. Indications: DRY EYE (Patient not taking: Reported on 6/6/2023)        Allergies   Allergen Reactions    Metformin Diarrhea    Hydrochlorothiazide Unknown     Annotation: hyperuricemia made worse by HCTZ   Causes pts gout          Lab Results   Lab Results   Component Value Date     12/07/2022    CO2 27 12/07/2022    CO2 26 08/18/2022    BUN 35.5 12/07/2022    BUN 28 08/18/2022     Lab Results   Component Value Date    WBC 11.3 08/16/2022    HGB 13.6 08/16/2022    HCT 40.1 08/16/2022    MCV 91 08/16/2022     08/16/2022     Lab Results   Component Value Date    CHOL 164 12/07/2022    TRIG 157 12/07/2022    HDL 60 12/07/2022     Lab Results   Component Value Date    INR 3.5 02/28/2023    INR 1.84 08/18/2022     Lab Results   Component Value Date    BNP 49 03/09/2021     Lab Results   Component Value Date    TROPONINI <0.01 08/16/2022     Lab Results   Component Value Date    TSH 3.57 07/16/2018                    Thank you for allowing me to participate in the care of your patient.      Sincerely,     Blue Fagan MD     [facility]  [facility]  cc:   Erasmo Boss MD  [address]  [place] [address]

## 2023-06-06 NOTE — LETTER
"    6/6/2023         RE: Angeles Can  [address] [facility] [address]        Dear Colleague,    Thank you for referring your patient, Angeles Can, to the [facility]. Please see a copy of my visit note below.    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    Weight as of this encounter: 78.5 kg (173 lb). Body surface area is 1.85 meters squared.  No Pain (0) Comment: Data Unavailable   No LMP recorded. Patient is postmenopausal.  Allergies reviewed: Yes  Medications reviewed: Yes    Medications: MEDICATION REFILLS NEEDED TODAY. Provider was notified.  Pharmacy name entered into [employer]: [id number] - [place], [place] - [address]    Clinical concerns:  6 month follow up      Cally Sanchez              [facility] Hematology and Oncology Progress Note    Patient: Angeles Can  MRN: [id number]  Date of Service: Jun 6, 2023         Reason for Visit    Chief Complaint   Patient presents with     Oncology Clinic Visit     Malignant neoplasm of lower-inner quadrant of right breast of female, estrogen receptor positive       Assessment and Plan     Cancer Staging   Malignant neoplasm of lower-inner quadrant of right breast of female, estrogen receptor positive (H)  Staging form: Breast, AJCC 8th Edition  - Pathologic stage from 6/16/2021: Stage Unknown (pT1c, pNX, cM0, G1, ER+, VT+, HER2-) - Signed by Etelvina Merlos MD on 11/29/2021  - Clinical: No stage assigned - " Unsigned      ECOG Performance    0 - Independent     Pain  Pain Score: No Pain (0)    #. 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  G1, ER+, OK+, HER-2/alvin- (1+ by IHC).   Clinically well.  Exam is unremarkable.  Mammogram was benign.     She has good tolerance to anastrozole.  However she has decline in bone density.  She has concern about continuing anastrozole at this point.  I offered tamoxifen and she is interested to switch to tamoxifen.  Prescription sent today.     Follow-up clinical exam in 6 months.    #.  Low bone density with moderate fracture risk, by DEXA scan from 4/2021   I reviewed the DEXA scan from May 18, 2023.  It showed a significant decline in bone density in lumbar spine, right hip, left radius.  She has high probability of osteoporotic fracture.  She does not want to add any other medication to help bone density.  Primary hyperparathyroidism also playing a role in this.     She desired to switch from aromatase inhibitors to tamoxifen as above.   Follow-up DEXA scan in 1-2-year and continue to assess indication for bisphosphonate therapy.    Encounter Diagnoses:    Problem List Items Addressed This Visit        Oncology Diagnoses    Invasive ductal carcinoma of breast, female, right (H) - Primary       Other    Osteopenia        CC: Freddy Lubin MD   ______________________________________________________________________________  Diagnosis  4/22/2021-screening mammogram showed focal asymmetry within the right breast at 4 o'clock position.  4/28/2021-diagnostic mammogram and ultrasound showed asymmetry with microlobulated margins and ultrasound confirmed 7 x 9 x 9 mm hypoechoic lesion at the same location.  5/3/2021-ultrasound-guided core needle biopsy of the right breast mass and it showed invasive ductal carcinoma and DCIS.  ER positive> 95%, OK positive> 95%, HER-2/alvin negative by IHC (1+).  5/26/201- Final path: Invasive ductal carcinoma,  "grade 1, 15 mm, negative margins.  There is associated DCIS, solid and focal cribriform, no necrosis and no microcalcification, negative margins. pT1c Nx.    Treatment to date  5/26/2021-underwent right breast lumpectomy by Dr. Peng.  6/2021-started adjuvant anastrozole. Switched to tamoxifen in 6/2023 due to decline in bone density.    History of Present Illness    Ms. Angeles Skinnerer here for follow-up.    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  Otherwise she does not have any new health issue.  She does not have headaches.  No intolerable side effects on anastrozole.    Review of systems  Apart from describing in HPI, the remainder of comprehensive ROS was negative.    Past History    Past Medical History:   Diagnosis Date     Arrhythmia     Paroxysmal Atrial fibrillation     Arthritis      Cancer (H)      Diabetes mellitus (H)      Hypercalcemia      Hyperlipidemia      Hypertension      Obesity        Past Surgical History:   Procedure Laterality Date     CATARACT EXTRACTION, BILATERAL       EP PACEMAKER INSERT N/A 3/2/2018    Procedure: EP Pacemaker Insertion;  Surgeon: Nahun Mina MD;  Location: [facility];  Service:      HC REMOVE TONSILS/ADENOIDS,<13 Y/O      Description: Tonsillectomy With Adenoidectomy;  Recorded: 09/16/2008;     HC REVISE MEDIAN N/CARPAL TUNNEL SURG      Description: Neuroplasty Decompression Median Nerve At Carpal Tunnel;  Recorded: 11/01/2009;  Comments: Right- 1/99; Left- 4/99     MT MASTECTOMY, PARTIAL Right 5/26/2021    Procedure: Right Lumpectomy after Wire Localizaiton;  Surgeon: Rosanna Peng MD;  Location: [facility];  Service: General     US BREAST CORE BIOPSY RIGHT Right 5/3/2021         Physical Exam    /72   Pulse 80   Resp 16   Ht 1.575 m (5' 2\")   Wt 78.5 kg (173 lb)   SpO2 96%   BMI 31.64 kg/m      General: alert, awake, not in acute distress  HEENT: Head: Normal, normocephalic, " atraumatic.  Eye: Normal external eye, conjunctiva, lids cornea, DIVYA.  Pharynx: Normal buccal mucosa. Normal pharynx.  Neck / Thyroid: Supple, no masses, nodes, nodules or enlargement.  Lymphatics: No abnormally enlarged lymph nodes.  Chest: Normal chest wall and respirations. Clear to auscultation.  Breasts: No palpable abnormalities.  Heart: S1 S2 RRR.   Abdomen: abdomen is soft without significant tenderness, masses, organomegaly or guarding  Extremities: normal strength, tone, and muscle mass  Skin: normal. no rash or abnormalities  CNS: non focal.    Lab Results    No results found for this or any previous visit (from the past 168 hour(s)).    Imaging    Cardiac Device Check - In Clinic    Result Date: 6/10/2023  Encounter Type: Patient seen in clinic for annual device evaluation and iterative programming, followed by office visit with Dr Fagan.  Device: [employer] Accolade (D) Pacemaker Pacing %/Programmed: % and  <1% in DDDR  bpm programming mode Lead(s): RA and RV leads with stable measurements & trends Battery longevity: Estimated at 8.5 years remaining Presenting rhythm: AP VS at 70 bpm Underlying rhythm: SB 30's bpm Heart rates:  bpm but primarily  bpm per histograms Atrial arrhythmias: 8 mode switches, burden of <1%, V-rates >/=120 bpm ~ 40%.  Last episode on 3/3/2023 & longest episode on 8/4/2022 lasting ~ 16 minutes.  EGM's suggest AT/AF with V-rates  bpm.  Pt denies symptoms.  Anticoagulant: Coumadin          Antiarrhythmic: Sotalol Ventricular arrhythmias: 12 VHR episodes.  2 available EGM's suggest SVT (1:1 conduction) at 166-170 bpm, episodes lasting 10-14 seconds.  Pt asymptomatic.  EF 65-70% per echo on 8/21/2022.  Lead/device alerts: Accolade device on advisory Device site: Clean, dry, and intact- no redness or swelling present, well healed Comments: Normal device function.  No programming changes were made today.  Plan: Next scheduled remote on  September 7th, 2023- patient is aware & reminder letter was given to her.  Device follow up for the entirety of having the Pacemaker, based on best practices determined by [employer] and in Compliance with Medicare guidelines. Continue remote device monitoring per patient plan. Mellisa Navarro RN I have reviewed and interpreted the device interrogation, settings, programming, and encounter summary. The device is functioning within normal device parameters. I agree with the current findings, assessment and plan.     30 minutes spent on the date of the encounter doing chart review, history and exam, documentation, communication of the treatment plan with the care team and further activities as noted above.    Signed by: Etelvina Merlos MD      Again, thank you for allowing me to participate in the care of your patient.        Sincerely,        Etelvina Merlos MD

## 2023-06-06 NOTE — PROGRESS NOTES
[facility] HEART CARE   [address], [place] [address]   [email or web address]   OFFICE: [phone]          Thank you Dr. Boss for asking the [facility] Heart Care team to participate in the care of your patient, Angeles Can.     Impression and Plan     1.  Atrial fibrillation.  Frida in 2018 he had presented with atrial fibrillation and findings consistent with tachycardia/bradycardia syndrome.  She underwent permanent pacemaker placement 2 March 2018.  Frida's [id number] Score is 7.  Most recent device interrogation continues to reveal low atrial fibrillation burden of less than 1%.  QTc on twelve-lead ECG today is reasonable at 438 ms.    Continue warfarin.    Continue sotalol 80 mg twice daily.     2.  Aortic stenosis.  This was felt very mild in degree on echocardiogram 17 August 2022 with mean gradient of only 12 mmHg and peak velocity of 2.1 m/s.     3.  Dyslipidemia.  Lipid profile 7 December 2022 revealed LDL 73 mg/dL and HDL 60 mg/dL.    Continue atorvastatin.     Plan on follow-up in 1 year.  Patient will be followed through Device Clinic per protocol as well.      35 minutes spent reviewing prior records (including documentation, laboratory studies, cardiac testing/imaging), interview with patient along with physical exam, planning, and subsequent documentation/crafting of note).           History of Present Illness    Once again I would like to thank you again for asking me to participate in the care of your patient, Angeles Can.  As you know, but to reiterate for my own records, Angeles Can is a 82 year old female with history of sick sinus syndrome.  Patient underwent permanent pacemaker placement 2 March 2018 ([employer] L331 ACCOLADE MRI EL device).     On interview today, patient is without complaint from a cardiac standpoint.  She reports no chest pain or shortness of breath.  She denies any subjective decline in exercise  tolerance baseline, palpitations, or lightheadedness.  Patient denies any fevers, chills, or other constitutional symptoms.  She denies any fevers, chills, or other constitutional symptoms.     Further review of systems is otherwise negative/noncontributory (medical record and 13 point review of systems reviewed as well and pertinent positives noted).         Cardiac Diagnostics      Echocardiogram 17 August 2022 (personally reviewed):  1. Normal left ventricular size and systolic performance with a visually estimated ejection fraction of 65-70%.  2. There is mild concentric increase in left ventricular wall thickness.  3. There is very mild aortic stenosis.  4. Normal right ventricular size and systolic performance.  5. There is mild to moderate left atrial enlargement.  6. Right ventricular systolic pressure relative to right atrial pressure is mildly increased. The pulmonary artery pressure is estimated to be 35-40 mmHg plus right atrial pressure (the IVC is of normal caliber).    When compared to the prior real-time echocardiogram dated 29 April 2021, there has been no major interval change.    Echocardiogram 29 April 2021 (personally reviewed):  1. Normal left ventricular size and systolic performance with a visually estimated ejection fraction of 55-60%.   2. There is mild concentric increase in left ventricular wall thickness.   3. There is very mild aortic stenosis.   4. Normal right ventricular size and systolic performance.   5. There is mild left atrial enlargement.     When compared to the prior real-time echocardiogram dated 2 March 2018, the pacing electrodes appear to be new.  Otherwise, there has been little appreciable interval change.     Echocardiogram 2 March 2018 (personally reviewed):  1. Normal left ventricular size and systolic performance with a visually estimated ejection fraction of 65-70%.   2. There is mild concentric increase in left ventricular wall thickness.   3. There is mild aortic  stenosis.   4. Normal right ventricular size and systolic performance.   5. There is mild left atrial enlargement.     Device interrogation 22 March 2023 ([employer] L331 ACCOLADE MRI EL device implanted 2 March 2018):  1. Encounter Type: Routine remote pacemaker transmission.   2. Device: [employer] Accolade.  3. Pacing % /Programmed: %,  0% at DDDR 70/130.  4. Lead(s): Stable.  5. Battery longevity: 9yrs.  6. Presenting: AP-VS 70 bpm.  7. Atrial arrhythmia: since 12 December 2022; two mode switch episodes <1min, appears to be atrial tachy arrhythmia.  8. Anticoagulant: warfarin.  9. Ventricular arrhythmia: since December 2022; three ventricular high rate episodes, these appear to be PSVT.  10. Device/Lead alerts: Accolade device on advisory.   11. Comments: Normal magnet and pacemaker function.    Device interrogation 5 January 2021 ([employer] L331 ACCOLADE MRI EL device implanted 2 March 2018):  1. Type: routine remote pacemaker transmission.  2. Presenting rhythm: AP-VS  pm.  3. Battery/lead status: stable.  4. Arrhythmias: since 19 October 2021; 6 mode switch episodes, longest lasting 1hr 14 minutes, available EGMs confirm AF, AF burden <1%. Two ventricular high rate episodes, one episode appears to be RVR during AF, the other appears to be PSVT.  5. Anticoagulant: warfarin.  6. Comments: Normal magnet and pacemaker function.     Device interrogation 5 January 2021 ([employer] L331 ACCOLADE MRI EL device implanted 2 March 2018):  1. Presenting rhythm: AP-VS rate 70 bpm.  2. Battery/lead status: stable  3. Arrhythmias: since 5 October 2020, three mode switches, all <1 minute, EGMs confirm atrial tachy arrhythmias. Three nonsustained ventricular high rate  4. episodes, EGMs suggest PSVT.  5. Anticoagulant: warfarin  6. Comments: normal magnet and pacemaker function.     Device interrogation 20 February 2020 ([employer] L331 ACCOLADE MRI EL device implanted 2 March  "2018):  1. Presenting rhythm: AP-VS 70 bpm.  2. Battery/lead status: stable.  3. Arrhythmias: since 20 November 2019; three ventricular high rate episodes, two available EGMs show PSVT. One mode switch <1min, appears to be atrial tachy  4. arrhythmia.  5. Comments: Normal magnet and pacemaker function.       Twelve-lead ECG (personally reviewed) 6 June 2023: Atrial paced rhythm.  QTc 438 ms.         Physical Examination       /64 (BP Location: Right arm, Patient Position: Sitting, Cuff Size: Adult Large)   Pulse 70   Ht 1.562 m (5' 1.5\")   Wt 77.6 kg (171 lb)   BMI 31.79 kg/m          Wt Readings from Last 3 Encounters:   06/06/23 77.6 kg (171 lb)   06/06/23 78.5 kg (173 lb)   10/31/22 75.3 kg (166 lb)     The patient is alert and oriented times three. Sclerae are anicteric. Mucosal membranes are moist. Jugular venous pressure is normal. No significant adenopathy/thyromegally appreciated. Lungs are clear with good expansion. On cardiovascular exam, the patient has a regular S1 and S2. Abdomen is soft and non-tender. Extremities reveal no clubbing, cyanosis, or edema.         Family History/Social History/Risk Factors   Patient does not smoke.  Family history reviewed, and family history includes No Known Problems in her daughter, father, maternal aunt, maternal grandfather, maternal grandmother, mother, paternal aunt, paternal grandfather, paternal grandmother, and sister.          Medical History  Surgical History Family History Social History   Past Medical History:   Diagnosis Date     Arrhythmia     Paroxysmal Atrial fibrillation     Arthritis      Cancer (H)      Diabetes mellitus (H)      Hypercalcemia      Hyperlipidemia      Hypertension      Obesity      Past Surgical History:   Procedure Laterality Date     CATARACT EXTRACTION, BILATERAL       EP PACEMAKER INSERT N/A 3/2/2018    Procedure: EP Pacemaker Insertion;  Surgeon: Nahun Mina MD;  Location: [facility];  Service:       " REMOVE TONSILS/ADENOIDS,<13 Y/O      Description: Tonsillectomy With Adenoidectomy;  Recorded: 09/16/2008;     HC REVISE MEDIAN N/CARPAL TUNNEL SURG      Description: Neuroplasty Decompression Median Nerve At Carpal Tunnel;  Recorded: 11/01/2009;  Comments: Right- 1/99; Left- 4/99     IN MASTECTOMY, PARTIAL Right 5/26/2021    Procedure: Right Lumpectomy after Wire Localizaiton;  Surgeon: Rosanna Peng MD;  Location: [facility];  Service: General     US BREAST CORE BIOPSY RIGHT Right 5/3/2021     Family History   Problem Relation Age of Onset     No Known Problems Mother      No Known Problems Father      No Known Problems Sister      No Known Problems Daughter      No Known Problems Maternal Grandmother      No Known Problems Maternal Grandfather      No Known Problems Paternal Grandmother      No Known Problems Paternal Grandfather      No Known Problems Maternal Aunt      No Known Problems Paternal Aunt         Social History     Socioeconomic History     Marital status:      Spouse name: Not on file     Number of children: Not on file     Years of education: Not on file     Highest education level: Not on file   Occupational History     Not on file   Tobacco Use     Smoking status: Former     Smokeless tobacco: Never     Tobacco comments:     quit years ago   Vaping Use     Vaping status: Not on file   Substance and Sexual Activity     Alcohol use: No     Drug use: No     Sexual activity: Not Currently   Other Topics Concern     Not on file   Social History Narrative     Not on file     Social Determinants of Health     Financial Resource Strain: Not on file   Food Insecurity: Not on file   Transportation Needs: Not on file   Physical Activity: Not on file   Stress: Not on file   Social Connections: Not on file   Intimate Partner Violence: Not on file   Housing Stability: Not on file           Medications  Allergies   Current Outpatient Medications   Medication Sig Dispense Refill     amLODIPine  (NORVASC) 2.5 MG tablet Take 1 tablet (2.5 mg) by mouth daily 90 tablet 3     atorvastatin (LIPITOR) 20 MG tablet Take 1 tablet (20 mg) by mouth daily 90 tablet 3     blood glucose test strips [BLOOD GLUCOSE TEST STRIPS] Use 1 each As Directed daily. 100 strip 3     cholecalciferol, vitamin D3, 1,000 unit tablet [CHOLECALCIFEROL, VITAMIN D3, 1,000 UNIT TABLET] Take 1,000 Units by mouth daily.       cloNIDine (CATAPRES) 0.1 MG tablet Take 1 tablet (0.1 mg) by mouth 2 times daily 180 tablet 3     furosemide (LASIX) 40 MG tablet Take 1 tablet (40 mg) by mouth daily. 90 tablet 2     losartan (COZAAR) 100 MG tablet Take 1 tablet (100 mg) by mouth daily 90 tablet 3     magnesium oxide (MAG-OX) 400 mg tablet [MAGNESIUM OXIDE (MAG-OX) 400 MG TABLET] Take 400 mg by mouth daily.        metFORMIN (GLUCOPHAGE XR) 500 MG 24 hr tablet Take 1 tablet (500 mg) by mouth daily (with dinner) 90 tablet 3     sodium chloride (KATALINA 128) 5 % ophthalmic solution 1-2 drops every 3 hours as needed for dry eyes       sotalol (BETAPACE) 80 MG tablet Take 1 tablet (80 mg total) by mouth daily. 90 tablet 0     tamoxifen (NOLVADEX) 20 MG tablet Take 1 tablet (20 mg) by mouth daily 90 tablet 1     warfarin ANTICOAGULANT (COUMADIN) 2.5 MG tablet Take 1/2 tablet (1.25mg) to 1 tablet (2.5mg) by mouth daily, as directed.  Adjust dose based on INR results. 90 tablet 1     allopurinol (ZYLOPRIM) 300 MG tablet [ALLOPURINOL (ZYLOPRIM) 300 MG TABLET] Take 1 tablet by mouth once daily. (Patient not taking: Reported on 6/6/2023) 90 tablet 3     peg 400-propylene glycol (SYSTANE) 0.4-0.3 % Drop [-PROPYLENE GLYCOL (SYSTANE) 0.4-0.3 % DROP] Place 1 Drop into both eyes 2 times daily if needed for Dry Eyes. Indications: DRY EYE (Patient not taking: Reported on 6/6/2023)         Allergies   Allergen Reactions     Metformin Diarrhea     Hydrochlorothiazide Unknown     Annotation: hyperuricemia made worse by HCTZ   Causes pts gout          Lab Results     Chemistry/lipid CBC Cardiac Enzymes/BNP/TSH/INR   Recent Labs   Lab Test 12/07/22  1106   CHOL 164   HDL 60   LDL 73   TRIG 157*     Recent Labs   Lab Test 12/07/22  1106 08/16/22  1834 06/07/22  1031   LDL 73 86 82     Recent Labs   Lab Test 12/07/22  1106      POTASSIUM 4.2   CHLORIDE 101   CO2 27   *   BUN 35.5*   CR 1.07*   GFRESTIMATED 51*   GUILLERMINA 10.5*     Recent Labs   Lab Test 12/07/22  1106 08/18/22  0453 08/16/22  1834   CR 1.07* 0.89 1.36*     Recent Labs   Lab Test 12/07/22  1106 08/16/22  1530 06/07/22  1031   A1C 7.0* 6.8* 6.8*          Recent Labs   Lab Test 08/16/22  1530   WBC 11.3*   HGB 13.6   HCT 40.1   MCV 91        Recent Labs   Lab Test 08/16/22  1530 06/07/22  1031 05/24/21  1303   HGB 13.6 13.0 13.2    Recent Labs   Lab Test 08/16/22  1834   TROPONINI <0.01     Recent Labs   Lab Test 03/09/21  1742   BNP 49     Recent Labs   Lab Test 07/16/18  1538   TSH 3.57     Recent Labs   Lab Test 02/28/23  1138 01/25/23  1138 12/28/22  1138   INR 3.5* 2.1* 2.9*          Medications  Allergies   Current Outpatient Medications   Medication Sig Dispense Refill     amLODIPine (NORVASC) 2.5 MG tablet Take 1 tablet (2.5 mg) by mouth daily 90 tablet 3     atorvastatin (LIPITOR) 20 MG tablet Take 1 tablet (20 mg) by mouth daily 90 tablet 3     blood glucose test strips [BLOOD GLUCOSE TEST STRIPS] Use 1 each As Directed daily. 100 strip 3     cholecalciferol, vitamin D3, 1,000 unit tablet [CHOLECALCIFEROL, VITAMIN D3, 1,000 UNIT TABLET] Take 1,000 Units by mouth daily.       cloNIDine (CATAPRES) 0.1 MG tablet Take 1 tablet (0.1 mg) by mouth 2 times daily 180 tablet 3     furosemide (LASIX) 40 MG tablet Take 1 tablet (40 mg) by mouth daily. 90 tablet 2     losartan (COZAAR) 100 MG tablet Take 1 tablet (100 mg) by mouth daily 90 tablet 3     magnesium oxide (MAG-OX) 400 mg tablet [MAGNESIUM OXIDE (MAG-OX) 400 MG TABLET] Take 400 mg by mouth daily.        metFORMIN (GLUCOPHAGE XR) 500 MG 24 hr  tablet Take 1 tablet (500 mg) by mouth daily (with dinner) 90 tablet 3     sodium chloride (KATALINA 128) 5 % ophthalmic solution 1-2 drops every 3 hours as needed for dry eyes       sotalol (BETAPACE) 80 MG tablet Take 1 tablet (80 mg total) by mouth daily. 90 tablet 0     tamoxifen (NOLVADEX) 20 MG tablet Take 1 tablet (20 mg) by mouth daily 90 tablet 1     warfarin ANTICOAGULANT (COUMADIN) 2.5 MG tablet Take 1/2 tablet (1.25mg) to 1 tablet (2.5mg) by mouth daily, as directed.  Adjust dose based on INR results. 90 tablet 1     allopurinol (ZYLOPRIM) 300 MG tablet [ALLOPURINOL (ZYLOPRIM) 300 MG TABLET] Take 1 tablet by mouth once daily. (Patient not taking: Reported on 6/6/2023) 90 tablet 3     peg 400-propylene glycol (SYSTANE) 0.4-0.3 % Drop [-PROPYLENE GLYCOL (SYSTANE) 0.4-0.3 % DROP] Place 1 Drop into both eyes 2 times daily if needed for Dry Eyes. Indications: DRY EYE (Patient not taking: Reported on 6/6/2023)        Allergies   Allergen Reactions     Metformin Diarrhea     Hydrochlorothiazide Unknown     Annotation: hyperuricemia made worse by HCTZ   Causes pts gout          Lab Results   Lab Results   Component Value Date     12/07/2022    CO2 27 12/07/2022    CO2 26 08/18/2022    BUN 35.5 12/07/2022    BUN 28 08/18/2022     Lab Results   Component Value Date    WBC 11.3 08/16/2022    HGB 13.6 08/16/2022    HCT 40.1 08/16/2022    MCV 91 08/16/2022     08/16/2022     Lab Results   Component Value Date    CHOL 164 12/07/2022    TRIG 157 12/07/2022    HDL 60 12/07/2022     Lab Results   Component Value Date    INR 3.5 02/28/2023    INR 1.84 08/18/2022     Lab Results   Component Value Date    BNP 49 03/09/2021     Lab Results   Component Value Date    TROPONINI <0.01 08/16/2022     Lab Results   Component Value Date    TSH 3.57 07/16/2018

## 2023-06-06 NOTE — PROGRESS NOTES
[facility] Hematology and Oncology Progress Note    Patient: Angeles Can  MRN: [id number]  Date of Service: Jun 6, 2023         Reason for Visit    Chief Complaint   Patient presents with     Oncology Clinic Visit     Malignant neoplasm of lower-inner quadrant of right breast of female, estrogen receptor positive       Assessment and Plan     Cancer Staging   Malignant neoplasm of lower-inner quadrant of right breast of female, estrogen receptor positive (H)  Staging form: Breast, AJCC 8th Edition  - Pathologic stage from 6/16/2021: Stage Unknown (pT1c, pNX, cM0, G1, ER+, VT+, HER2-) - Signed by Etelvina Merlos MD on 11/29/2021  - Clinical: No stage assigned - Unsigned      ECOG Performance    0 - Independent     Pain  Pain Score: No Pain (0)    #. 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  G1, ER+, VT+, HER-2/alvin- (1+ by IHC).   Clinically well.  Exam is unremarkable.  Mammogram was benign.     She has good tolerance to anastrozole.  However she has decline in bone density.  She has concern about continuing anastrozole at this point.  I offered tamoxifen and she is interested to switch to tamoxifen.  Prescription sent today.     Follow-up clinical exam in 6 months.    #.  Low bone density with moderate fracture risk, by DEXA scan from 4/2021   I reviewed the DEXA scan from May 18, 2023.  It showed a significant decline in bone density in lumbar spine, right hip, left radius.  She has high probability of osteoporotic fracture.  She does not want to add any other medication to help bone density.  Primary hyperparathyroidism also playing a role in this.     She desired to switch from aromatase inhibitors to tamoxifen as above.   Follow-up DEXA scan in 1-2-year and continue to assess indication for bisphosphonate therapy.    Encounter Diagnoses:    Problem List Items Addressed This Visit        Oncology Diagnoses    Invasive ductal carcinoma of breast, female,  right (H) - Primary       Other    Osteopenia        CC: Freddy Lubin MD   ______________________________________________________________________________  Diagnosis  4/22/2021-screening mammogram showed focal asymmetry within the right breast at 4 o'clock position.  4/28/2021-diagnostic mammogram and ultrasound showed asymmetry with microlobulated margins and ultrasound confirmed 7 x 9 x 9 mm hypoechoic lesion at the same location.  5/3/2021-ultrasound-guided core needle biopsy of the right breast mass and it showed invasive ductal carcinoma and DCIS.  ER positive> 95%, NV positive> 95%, HER-2/alvin negative by IHC (1+).  5/26/201- Final path: Invasive ductal carcinoma, grade 1, 15 mm, negative margins.  There is associated DCIS, solid and focal cribriform, no necrosis and no microcalcification, negative margins. pT1c Nx.    Treatment to date  5/26/2021-underwent right breast lumpectomy by Dr. Peng.  6/2021-started adjuvant anastrozole. Switched to tamoxifen in 6/2023 due to decline in bone density.    History of Present Illness    Ms. Angeles Skinnerer here for follow-up.    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  Otherwise she does not have any new health issue.  She does not have headaches.  No intolerable side effects on anastrozole.    Review of systems  Apart from describing in HPI, the remainder of comprehensive ROS was negative.    Past History    Past Medical History:   Diagnosis Date     Arrhythmia     Paroxysmal Atrial fibrillation     Arthritis      Cancer (H)      Diabetes mellitus (H)      Hypercalcemia      Hyperlipidemia      Hypertension      Obesity        Past Surgical History:   Procedure Laterality Date     CATARACT EXTRACTION, BILATERAL       EP PACEMAKER INSERT N/A 3/2/2018    Procedure: EP Pacemaker Insertion;  Surgeon: Nahun Mina MD;  [facility]: [facility];  Service:      HC REMOVE TONSILS/ADENOIDS,<11 Y/O      Description:  "Tonsillectomy With Adenoidectomy;  Recorded: 09/16/2008;     HC REVISE MEDIAN N/CARPAL TUNNEL SURG      Description: Neuroplasty Decompression Median Nerve At Carpal Tunnel;  Recorded: 11/01/2009;  Comments: Right- 1/99; Left- 4/99     MA MASTECTOMY, PARTIAL Right 5/26/2021    Procedure: Right Lumpectomy after Wire Localizaiton;  Surgeon: Rosanna Peng MD;  Location: [facility];  Service: General     US BREAST CORE BIOPSY RIGHT Right 5/3/2021         Physical Exam    /72   Pulse 80   Resp 16   Ht 1.575 m (5' 2\")   Wt 78.5 kg (173 lb)   SpO2 96%   BMI 31.64 kg/m      General: alert, awake, not in acute distress  HEENT: Head: Normal, normocephalic, atraumatic.  Eye: Normal external eye, conjunctiva, lids cornea, DIVYA.  Pharynx: Normal buccal mucosa. Normal pharynx.  Neck / Thyroid: Supple, no masses, nodes, nodules or enlargement.  Lymphatics: No abnormally enlarged lymph nodes.  Chest: Normal chest wall and respirations. Clear to auscultation.  Breasts: No palpable abnormalities.  Heart: S1 S2 RRR.   Abdomen: abdomen is soft without significant tenderness, masses, organomegaly or guarding  Extremities: normal strength, tone, and muscle mass  Skin: normal. no rash or abnormalities  CNS: non focal.    Lab Results    No results found for this or any previous visit (from the past 168 hour(s)).    Imaging    Cardiac Device Check - In Clinic    Result Date: 6/10/2023  Encounter Type: Patient seen in clinic for annual device evaluation and iterative programming, followed by office visit with Dr Fagan.  Device: [employer] Accolade (D) Pacemaker Pacing %/Programmed: % and  <1% in DDDR  bpm programming mode Lead(s): RA and RV leads with stable measurements & trends Battery longevity: Estimated at 8.5 years remaining Presenting rhythm: AP VS at 70 bpm Underlying rhythm: SB 30's bpm Heart rates:  bpm but primarily  bpm per histograms Atrial arrhythmias: 8 mode switches, " burden of <1%, V-rates >/=120 bpm ~ 40%.  Last episode on 3/3/2023 & longest episode on 8/4/2022 lasting ~ 16 minutes.  EGM's suggest AT/AF with V-rates  bpm.  Pt denies symptoms.  Anticoagulant: Coumadin          Antiarrhythmic: Sotalol Ventricular arrhythmias: 12 VHR episodes.  2 available EGM's suggest SVT (1:1 conduction) at 166-170 bpm, episodes lasting 10-14 seconds.  Pt asymptomatic.  EF 65-70% per echo on 8/21/2022.  Lead/device alerts: Accolade device on advisory Device site: Clean, dry, and intact- no redness or swelling present, well healed Comments: Normal device function.  No programming changes were made today.  Plan: Next scheduled remote on September 7th, 2023- patient is aware & reminder letter was given to her.  Device follow up for the entirety of having the Pacemaker, based on best practices determined by Heart R[employer] Society and in Compliance with Medicare guidelines. Continue remote device monitoring per patient plan. Mellisa Navarro RN I have reviewed and interpreted the device interrogation, settings, programming, and encounter summary. The device is functioning within normal device parameters. I agree with the current findings, assessment and plan.     30 minutes spent on the date of the encounter doing chart review, history and exam, documentation, communication of the treatment plan with the care team and further activities as noted above.    Signed by: Etelvina Merlos MD

## 2023-06-06 NOTE — PROGRESS NOTES
"Oncology Rooming Note    June 6, 2023 10:21 AM   Angeles Can is a 82 year old female who presents for:    Chief Complaint   Patient presents with     Oncology Clinic Visit     Malignant neoplasm of lower-inner quadrant of right breast of female, estrogen receptor positive     Initial Vitals: /72   Pulse 80   Resp 16   Ht 1.575 m (5' 2\")   Wt 78.5 kg (173 lb)   SpO2 96%   BMI 31.64 kg/m   Estimated body mass index is 31.64 kg/m  as calculated from the following:    Height as of this encounter: 1.575 m (5' 2\").    Weight as of this encounter: 78.5 kg (173 lb). Body surface area is 1.85 meters squared.  No Pain (0) Comment: Data Unavailable   No LMP recorded. Patient is postmenopausal.  Allergies reviewed: Yes  Medications reviewed: Yes    Medications: MEDICATION REFILLS NEEDED TODAY. Provider was notified.  Pharmacy name entered into [employer]: [id number] - [place], [place] - [address]    Clinical concerns:  6 month follow up      Cally Sanchez            "

## 2023-06-07 LAB
ATRIAL RATE - MUSE: 70 BPM
DIASTOLIC BLOOD PRESSURE - MUSE: NORMAL MMHG
INTERPRETATION ECG - MUSE: NORMAL
P AXIS - MUSE: 40 DEGREES
PR INTERVAL - MUSE: 148 MS
QRS DURATION - MUSE: 82 MS
QT - MUSE: 406 MS
QTC - MUSE: 438 MS
R AXIS - MUSE: 36 DEGREES
SYSTOLIC BLOOD PRESSURE - MUSE: NORMAL MMHG
T AXIS - MUSE: -2 DEGREES
VENTRICULAR RATE- MUSE: 70 BPM

## 2023-06-10 LAB
MDC_IDC_LEAD_IMPLANT_DT: NORMAL
MDC_IDC_LEAD_IMPLANT_DT: NORMAL
MDC_IDC_LEAD_LOCATION: NORMAL
MDC_IDC_LEAD_LOCATION: NORMAL
MDC_IDC_LEAD_LOCATION_DETAIL_1: NORMAL
MDC_IDC_LEAD_LOCATION_DETAIL_1: NORMAL
MDC_IDC_LEAD_MFG: NORMAL
MDC_IDC_LEAD_MFG: NORMAL
MDC_IDC_LEAD_MODEL: NORMAL
MDC_IDC_LEAD_MODEL: NORMAL
MDC_IDC_LEAD_POLARITY_TYPE: NORMAL
MDC_IDC_LEAD_POLARITY_TYPE: NORMAL
MDC_IDC_LEAD_SERIAL: NORMAL
MDC_IDC_LEAD_SERIAL: NORMAL
MDC_IDC_MSMT_BATTERY_DTM: NORMAL
MDC_IDC_MSMT_BATTERY_REMAINING_LONGEVITY: 102 MO
MDC_IDC_MSMT_BATTERY_REMAINING_PERCENTAGE: 98 %
MDC_IDC_MSMT_BATTERY_STATUS: NORMAL
MDC_IDC_MSMT_LEADCHNL_RA_IMPEDANCE_VALUE: 524 OHM
MDC_IDC_MSMT_LEADCHNL_RA_PACING_THRESHOLD_AMPLITUDE: 0.6 V
MDC_IDC_MSMT_LEADCHNL_RA_PACING_THRESHOLD_PULSEWIDTH: 0.4 MS
MDC_IDC_MSMT_LEADCHNL_RA_SENSING_INTR_AMPL: 5.9 MV
MDC_IDC_MSMT_LEADCHNL_RV_IMPEDANCE_VALUE: 374 OHM
MDC_IDC_MSMT_LEADCHNL_RV_PACING_THRESHOLD_AMPLITUDE: 1.1 V
MDC_IDC_MSMT_LEADCHNL_RV_PACING_THRESHOLD_PULSEWIDTH: 0.4 MS
MDC_IDC_MSMT_LEADCHNL_RV_SENSING_INTR_AMPL: 21.8 MV
MDC_IDC_PG_IMPLANT_DTM: NORMAL
MDC_IDC_PG_MFG: NORMAL
MDC_IDC_PG_MODEL: NORMAL
MDC_IDC_PG_SERIAL: NORMAL
MDC_IDC_PG_TYPE: NORMAL
MDC_IDC_SESS_CLINIC_NAME: NORMAL
MDC_IDC_SESS_DTM: NORMAL
MDC_IDC_SESS_TYPE: NORMAL
MDC_IDC_SET_BRADY_AT_MODE_SWITCH_MODE: NORMAL
MDC_IDC_SET_BRADY_AT_MODE_SWITCH_RATE: 170 {BEATS}/MIN
MDC_IDC_SET_BRADY_LOWRATE: 70 {BEATS}/MIN
MDC_IDC_SET_BRADY_MAX_SENSOR_RATE: 130 {BEATS}/MIN
MDC_IDC_SET_BRADY_MAX_TRACKING_RATE: 130 {BEATS}/MIN
MDC_IDC_SET_BRADY_MODE: NORMAL
MDC_IDC_SET_BRADY_PAV_DELAY_HIGH: 200 MS
MDC_IDC_SET_BRADY_PAV_DELAY_LOW: 250 MS
MDC_IDC_SET_BRADY_SAV_DELAY_HIGH: 200 MS
MDC_IDC_SET_BRADY_SAV_DELAY_LOW: 250 MS
MDC_IDC_SET_LEADCHNL_RA_PACING_AMPLITUDE: 1.5 V
MDC_IDC_SET_LEADCHNL_RA_PACING_CAPTURE_MODE: NORMAL
MDC_IDC_SET_LEADCHNL_RA_PACING_POLARITY: NORMAL
MDC_IDC_SET_LEADCHNL_RA_PACING_PULSEWIDTH: 0.4 MS
MDC_IDC_SET_LEADCHNL_RA_SENSING_ADAPTATION_MODE: NORMAL
MDC_IDC_SET_LEADCHNL_RA_SENSING_POLARITY: NORMAL
MDC_IDC_SET_LEADCHNL_RA_SENSING_SENSITIVITY: 0.25 MV
MDC_IDC_SET_LEADCHNL_RV_PACING_AMPLITUDE: NORMAL
MDC_IDC_SET_LEADCHNL_RV_PACING_CAPTURE_MODE: NORMAL
MDC_IDC_SET_LEADCHNL_RV_PACING_POLARITY: NORMAL
MDC_IDC_SET_LEADCHNL_RV_PACING_PULSEWIDTH: 0.4 MS
MDC_IDC_SET_LEADCHNL_RV_SENSING_ADAPTATION_MODE: NORMAL
MDC_IDC_SET_LEADCHNL_RV_SENSING_POLARITY: NORMAL
MDC_IDC_SET_LEADCHNL_RV_SENSING_SENSITIVITY: 1.5 MV
MDC_IDC_SET_ZONE_DETECTION_INTERVAL: 375 MS
MDC_IDC_SET_ZONE_TYPE: NORMAL
MDC_IDC_SET_ZONE_VENDOR_TYPE: NORMAL
MDC_IDC_STAT_AT_BURDEN_PERCENT: 1 %
MDC_IDC_STAT_AT_DTM_END: NORMAL
MDC_IDC_STAT_AT_DTM_START: NORMAL
MDC_IDC_STAT_AT_MODE_SW_COUNT: 8
MDC_IDC_STAT_BRADY_DTM_END: NORMAL
MDC_IDC_STAT_BRADY_DTM_START: NORMAL
MDC_IDC_STAT_BRADY_RA_PERCENT_PACED: 100 %
MDC_IDC_STAT_BRADY_RV_PERCENT_PACED: 0 %
MDC_IDC_STAT_EPISODE_RECENT_COUNT: 0
MDC_IDC_STAT_EPISODE_RECENT_COUNT: 0
MDC_IDC_STAT_EPISODE_RECENT_COUNT: 1
MDC_IDC_STAT_EPISODE_RECENT_COUNT: 12
MDC_IDC_STAT_EPISODE_RECENT_COUNT: 8
MDC_IDC_STAT_EPISODE_RECENT_COUNT_DTM_END: NORMAL
MDC_IDC_STAT_EPISODE_RECENT_COUNT_DTM_START: NORMAL
MDC_IDC_STAT_EPISODE_TYPE: NORMAL
MDC_IDC_STAT_EPISODE_VENDOR_TYPE: NORMAL

## 2023-06-13 ENCOUNTER — TELEPHONE (OUTPATIENT)
Dept: ONCOLOGY | Facility: CLINIC | Age: 83
End: 2023-06-13
Payer: MEDICARE

## 2023-06-13 NOTE — TELEPHONE ENCOUNTER
"Patient calls regarding recent visit with Dr. Merlos on 6/6/23. She reports that Dr. Merlos started her on Tamoxifen in place of Anastrozole. Patient reports that she would like to go back on Anastrozole. Per patient she says that \"Tamoxifen does not work with her other medications,\" and after reading about it she does not want to take it.     Advised that message will be sent to Dr. Merlos. Patient will be called back with any further questions or follow up that is needed.    Nancy Mendoza RN  "

## 2023-06-14 DIAGNOSIS — C50.911 INVASIVE DUCTAL CARCINOMA OF BREAST, FEMALE, RIGHT (H): Primary | ICD-10-CM

## 2023-06-14 RX ORDER — ANASTROZOLE 1 MG/1
1 TABLET ORAL DAILY
Qty: 90 TABLET | Refills: 1 | Status: SHIPPED | OUTPATIENT
Start: 2023-06-14 | End: 2023-12-14

## 2023-06-14 NOTE — TELEPHONE ENCOUNTER
Anastrozole Rx sent to patient's pharmacy by Dr. Merlos. Patient notified and thanked me for calling.     Dior Rush RN

## 2023-07-06 DIAGNOSIS — I48.0 PAROXYSMAL ATRIAL FIBRILLATION (H): ICD-10-CM

## 2023-07-10 RX ORDER — SOTALOL HYDROCHLORIDE 80 MG/1
TABLET ORAL
Qty: 90 TABLET | Refills: 3 | Status: SHIPPED | OUTPATIENT
Start: 2023-07-10 | End: 2024-07-08

## 2023-08-05 ENCOUNTER — HEALTH MAINTENANCE LETTER (OUTPATIENT)
Age: 83
End: 2023-08-05

## 2023-09-07 ENCOUNTER — ANCILLARY PROCEDURE (OUTPATIENT)
Dept: CARDIOLOGY | Facility: CLINIC | Age: 83
End: 2023-09-07
Attending: INTERNAL MEDICINE
Payer: MEDICARE

## 2023-09-07 DIAGNOSIS — I49.5 SICK SINUS SYNDROME (H): ICD-10-CM

## 2023-09-07 DIAGNOSIS — Z95.0 PACEMAKER: ICD-10-CM

## 2023-09-07 LAB
MDC_IDC_EPISODE_DTM: NORMAL
MDC_IDC_EPISODE_DURATION: 1 S
MDC_IDC_EPISODE_DURATION: 1 S
MDC_IDC_EPISODE_DURATION: 14 S
MDC_IDC_EPISODE_DURATION: 15 S
MDC_IDC_EPISODE_DURATION: 20 S
MDC_IDC_EPISODE_ID: NORMAL
MDC_IDC_EPISODE_TYPE: NORMAL
MDC_IDC_LEAD_IMPLANT_DT: NORMAL
MDC_IDC_LEAD_IMPLANT_DT: NORMAL
MDC_IDC_LEAD_LOCATION: NORMAL
MDC_IDC_LEAD_LOCATION: NORMAL
MDC_IDC_LEAD_LOCATION_DETAIL_1: NORMAL
MDC_IDC_LEAD_LOCATION_DETAIL_1: NORMAL
MDC_IDC_LEAD_MFG: NORMAL
MDC_IDC_LEAD_MFG: NORMAL
MDC_IDC_LEAD_MODEL: NORMAL
MDC_IDC_LEAD_MODEL: NORMAL
MDC_IDC_LEAD_POLARITY_TYPE: NORMAL
MDC_IDC_LEAD_POLARITY_TYPE: NORMAL
MDC_IDC_LEAD_SERIAL: NORMAL
MDC_IDC_LEAD_SERIAL: NORMAL
MDC_IDC_MSMT_BATTERY_DTM: NORMAL
MDC_IDC_MSMT_BATTERY_REMAINING_LONGEVITY: 102 MO
MDC_IDC_MSMT_BATTERY_REMAINING_PERCENTAGE: 98 %
MDC_IDC_MSMT_BATTERY_STATUS: NORMAL
MDC_IDC_MSMT_LEADCHNL_RA_IMPEDANCE_VALUE: 486 OHM
MDC_IDC_MSMT_LEADCHNL_RA_PACING_THRESHOLD_AMPLITUDE: 0.5 V
MDC_IDC_MSMT_LEADCHNL_RA_PACING_THRESHOLD_PULSEWIDTH: 0.4 MS
MDC_IDC_MSMT_LEADCHNL_RV_IMPEDANCE_VALUE: 340 OHM
MDC_IDC_MSMT_LEADCHNL_RV_PACING_THRESHOLD_AMPLITUDE: 1.8 V
MDC_IDC_MSMT_LEADCHNL_RV_PACING_THRESHOLD_PULSEWIDTH: 0.4 MS
MDC_IDC_PG_IMPLANT_DTM: NORMAL
MDC_IDC_PG_MFG: NORMAL
MDC_IDC_PG_MODEL: NORMAL
MDC_IDC_PG_SERIAL: NORMAL
MDC_IDC_PG_TYPE: NORMAL
MDC_IDC_SESS_CLINIC_NAME: NORMAL
MDC_IDC_SESS_DTM: NORMAL
MDC_IDC_SESS_TYPE: NORMAL
MDC_IDC_SET_BRADY_AT_MODE_SWITCH_MODE: NORMAL
MDC_IDC_SET_BRADY_AT_MODE_SWITCH_RATE: 170 {BEATS}/MIN
MDC_IDC_SET_BRADY_LOWRATE: 70 {BEATS}/MIN
MDC_IDC_SET_BRADY_MAX_SENSOR_RATE: 130 {BEATS}/MIN
MDC_IDC_SET_BRADY_MAX_TRACKING_RATE: 130 {BEATS}/MIN
MDC_IDC_SET_BRADY_MODE: NORMAL
MDC_IDC_SET_BRADY_PAV_DELAY_HIGH: 200 MS
MDC_IDC_SET_BRADY_PAV_DELAY_LOW: 250 MS
MDC_IDC_SET_BRADY_SAV_DELAY_HIGH: 200 MS
MDC_IDC_SET_BRADY_SAV_DELAY_LOW: 250 MS
MDC_IDC_SET_LEADCHNL_RA_PACING_AMPLITUDE: 1.5 V
MDC_IDC_SET_LEADCHNL_RA_PACING_CAPTURE_MODE: NORMAL
MDC_IDC_SET_LEADCHNL_RA_PACING_POLARITY: NORMAL
MDC_IDC_SET_LEADCHNL_RA_PACING_PULSEWIDTH: 0.4 MS
MDC_IDC_SET_LEADCHNL_RA_SENSING_ADAPTATION_MODE: NORMAL
MDC_IDC_SET_LEADCHNL_RA_SENSING_POLARITY: NORMAL
MDC_IDC_SET_LEADCHNL_RA_SENSING_SENSITIVITY: 0.25 MV
MDC_IDC_SET_LEADCHNL_RV_PACING_AMPLITUDE: 2.2 V
MDC_IDC_SET_LEADCHNL_RV_PACING_CAPTURE_MODE: NORMAL
MDC_IDC_SET_LEADCHNL_RV_PACING_POLARITY: NORMAL
MDC_IDC_SET_LEADCHNL_RV_PACING_PULSEWIDTH: 0.4 MS
MDC_IDC_SET_LEADCHNL_RV_SENSING_ADAPTATION_MODE: NORMAL
MDC_IDC_SET_LEADCHNL_RV_SENSING_POLARITY: NORMAL
MDC_IDC_SET_LEADCHNL_RV_SENSING_SENSITIVITY: 1.5 MV
MDC_IDC_SET_ZONE_DETECTION_INTERVAL: 375 MS
MDC_IDC_SET_ZONE_TYPE: NORMAL
MDC_IDC_SET_ZONE_VENDOR_TYPE: NORMAL
MDC_IDC_STAT_AT_BURDEN_PERCENT: 1 %
MDC_IDC_STAT_AT_DTM_END: NORMAL
MDC_IDC_STAT_AT_DTM_START: NORMAL
MDC_IDC_STAT_BRADY_DTM_END: NORMAL
MDC_IDC_STAT_BRADY_DTM_START: NORMAL
MDC_IDC_STAT_BRADY_RA_PERCENT_PACED: 100 %
MDC_IDC_STAT_BRADY_RV_PERCENT_PACED: 0 %
MDC_IDC_STAT_EPISODE_RECENT_COUNT: 0
MDC_IDC_STAT_EPISODE_RECENT_COUNT: 2
MDC_IDC_STAT_EPISODE_RECENT_COUNT: 5
MDC_IDC_STAT_EPISODE_RECENT_COUNT_DTM_END: NORMAL
MDC_IDC_STAT_EPISODE_RECENT_COUNT_DTM_START: NORMAL
MDC_IDC_STAT_EPISODE_TYPE: NORMAL
MDC_IDC_STAT_EPISODE_VENDOR_TYPE: NORMAL

## 2023-09-07 PROCEDURE — 93294 REM INTERROG EVL PM/LDLS PM: CPT | Performed by: INTERNAL MEDICINE

## 2023-09-07 PROCEDURE — 93296 REM INTERROG EVL PM/IDS: CPT | Performed by: INTERNAL MEDICINE

## 2023-12-12 ENCOUNTER — ANCILLARY PROCEDURE (OUTPATIENT)
Dept: CARDIOLOGY | Facility: CLINIC | Age: 83
End: 2023-12-12
Attending: INTERNAL MEDICINE
Payer: MEDICARE

## 2023-12-12 DIAGNOSIS — I49.5 SICK SINUS SYNDROME (H): ICD-10-CM

## 2023-12-12 DIAGNOSIS — Z95.0 PACEMAKER: ICD-10-CM

## 2023-12-12 LAB
MDC_IDC_EPISODE_DTM: NORMAL
MDC_IDC_EPISODE_DURATION: 13 S
MDC_IDC_EPISODE_DURATION: 13 S
MDC_IDC_EPISODE_DURATION: 14 S
MDC_IDC_EPISODE_DURATION: 2 S
MDC_IDC_EPISODE_DURATION: 3 S
MDC_IDC_EPISODE_ID: NORMAL
MDC_IDC_EPISODE_TYPE: NORMAL
MDC_IDC_EPISODE_TYPE_INDUCED: NO
MDC_IDC_LEAD_CONNECTION_STATUS: NORMAL
MDC_IDC_LEAD_CONNECTION_STATUS: NORMAL
MDC_IDC_LEAD_IMPLANT_DT: NORMAL
MDC_IDC_LEAD_IMPLANT_DT: NORMAL
MDC_IDC_LEAD_LOCATION: NORMAL
MDC_IDC_LEAD_LOCATION: NORMAL
MDC_IDC_LEAD_LOCATION_DETAIL_1: NORMAL
MDC_IDC_LEAD_LOCATION_DETAIL_1: NORMAL
MDC_IDC_LEAD_MFG: NORMAL
MDC_IDC_LEAD_MFG: NORMAL
MDC_IDC_LEAD_MODEL: NORMAL
MDC_IDC_LEAD_MODEL: NORMAL
MDC_IDC_LEAD_POLARITY_TYPE: NORMAL
MDC_IDC_LEAD_POLARITY_TYPE: NORMAL
MDC_IDC_LEAD_SERIAL: NORMAL
MDC_IDC_LEAD_SERIAL: NORMAL
MDC_IDC_MSMT_BATTERY_DTM: NORMAL
MDC_IDC_MSMT_BATTERY_REMAINING_LONGEVITY: 96 MO
MDC_IDC_MSMT_BATTERY_REMAINING_PERCENTAGE: 95 %
MDC_IDC_MSMT_BATTERY_STATUS: NORMAL
MDC_IDC_MSMT_LEADCHNL_RA_IMPEDANCE_VALUE: 474 OHM
MDC_IDC_MSMT_LEADCHNL_RA_PACING_THRESHOLD_AMPLITUDE: 0.5 V
MDC_IDC_MSMT_LEADCHNL_RA_PACING_THRESHOLD_PULSEWIDTH: 0.4 MS
MDC_IDC_MSMT_LEADCHNL_RV_IMPEDANCE_VALUE: 326 OHM
MDC_IDC_MSMT_LEADCHNL_RV_PACING_THRESHOLD_AMPLITUDE: 1.7 V
MDC_IDC_MSMT_LEADCHNL_RV_PACING_THRESHOLD_PULSEWIDTH: 0.4 MS
MDC_IDC_PG_IMPLANT_DTM: NORMAL
MDC_IDC_PG_MFG: NORMAL
MDC_IDC_PG_MODEL: NORMAL
MDC_IDC_PG_SERIAL: NORMAL
MDC_IDC_PG_TYPE: NORMAL
MDC_IDC_SESS_CLINIC_NAME: NORMAL
MDC_IDC_SESS_DTM: NORMAL
MDC_IDC_SESS_TYPE: NORMAL
MDC_IDC_SET_BRADY_AT_MODE_SWITCH_MODE: NORMAL
MDC_IDC_SET_BRADY_AT_MODE_SWITCH_RATE: 170 {BEATS}/MIN
MDC_IDC_SET_BRADY_LOWRATE: 70 {BEATS}/MIN
MDC_IDC_SET_BRADY_MAX_SENSOR_RATE: 130 {BEATS}/MIN
MDC_IDC_SET_BRADY_MAX_TRACKING_RATE: 130 {BEATS}/MIN
MDC_IDC_SET_BRADY_MODE: NORMAL
MDC_IDC_SET_BRADY_PAV_DELAY_HIGH: 200 MS
MDC_IDC_SET_BRADY_PAV_DELAY_LOW: 250 MS
MDC_IDC_SET_BRADY_SAV_DELAY_HIGH: 200 MS
MDC_IDC_SET_BRADY_SAV_DELAY_LOW: 250 MS
MDC_IDC_SET_LEADCHNL_RA_PACING_AMPLITUDE: 1.5 V
MDC_IDC_SET_LEADCHNL_RA_PACING_CAPTURE_MODE: NORMAL
MDC_IDC_SET_LEADCHNL_RA_PACING_POLARITY: NORMAL
MDC_IDC_SET_LEADCHNL_RA_PACING_PULSEWIDTH: 0.4 MS
MDC_IDC_SET_LEADCHNL_RA_SENSING_ADAPTATION_MODE: NORMAL
MDC_IDC_SET_LEADCHNL_RA_SENSING_POLARITY: NORMAL
MDC_IDC_SET_LEADCHNL_RA_SENSING_SENSITIVITY: 0.25 MV
MDC_IDC_SET_LEADCHNL_RV_PACING_AMPLITUDE: 1.9 V
MDC_IDC_SET_LEADCHNL_RV_PACING_CAPTURE_MODE: NORMAL
MDC_IDC_SET_LEADCHNL_RV_PACING_POLARITY: NORMAL
MDC_IDC_SET_LEADCHNL_RV_PACING_PULSEWIDTH: 0.4 MS
MDC_IDC_SET_LEADCHNL_RV_SENSING_ADAPTATION_MODE: NORMAL
MDC_IDC_SET_LEADCHNL_RV_SENSING_POLARITY: NORMAL
MDC_IDC_SET_LEADCHNL_RV_SENSING_SENSITIVITY: 1.5 MV
MDC_IDC_SET_ZONE_DETECTION_INTERVAL: 375 MS
MDC_IDC_SET_ZONE_STATUS: NORMAL
MDC_IDC_SET_ZONE_TYPE: NORMAL
MDC_IDC_SET_ZONE_VENDOR_TYPE: NORMAL
MDC_IDC_STAT_AT_BURDEN_PERCENT: 1 %
MDC_IDC_STAT_AT_DTM_END: NORMAL
MDC_IDC_STAT_AT_DTM_START: NORMAL
MDC_IDC_STAT_BRADY_DTM_END: NORMAL
MDC_IDC_STAT_BRADY_DTM_START: NORMAL
MDC_IDC_STAT_BRADY_RA_PERCENT_PACED: 100 %
MDC_IDC_STAT_BRADY_RV_PERCENT_PACED: 0 %
MDC_IDC_STAT_EPISODE_RECENT_COUNT: 0
MDC_IDC_STAT_EPISODE_RECENT_COUNT: 0
MDC_IDC_STAT_EPISODE_RECENT_COUNT: 2
MDC_IDC_STAT_EPISODE_RECENT_COUNT: 2
MDC_IDC_STAT_EPISODE_RECENT_COUNT_DTM_END: NORMAL
MDC_IDC_STAT_EPISODE_RECENT_COUNT_DTM_START: NORMAL
MDC_IDC_STAT_EPISODE_TYPE: NORMAL
MDC_IDC_STAT_EPISODE_VENDOR_TYPE: NORMAL

## 2023-12-12 PROCEDURE — 93294 REM INTERROG EVL PM/LDLS PM: CPT | Performed by: INTERNAL MEDICINE

## 2023-12-12 PROCEDURE — 93296 REM INTERROG EVL PM/IDS: CPT | Performed by: INTERNAL MEDICINE

## 2023-12-14 ENCOUNTER — ONCOLOGY VISIT (OUTPATIENT)
Dept: ONCOLOGY | Facility: CLINIC | Age: 83
End: 2023-12-14
Attending: INTERNAL MEDICINE
Payer: MEDICARE

## 2023-12-14 VITALS
HEART RATE: 75 BPM | SYSTOLIC BLOOD PRESSURE: 169 MMHG | DIASTOLIC BLOOD PRESSURE: 64 MMHG | BODY MASS INDEX: 31.56 KG/M2 | WEIGHT: 169.8 LBS | OXYGEN SATURATION: 94 %

## 2023-12-14 DIAGNOSIS — C50.911 INVASIVE DUCTAL CARCINOMA OF BREAST, FEMALE, RIGHT (H): ICD-10-CM

## 2023-12-14 DIAGNOSIS — Z79.811 LONG TERM CURRENT USE OF AROMATASE INHIBITOR: Primary | ICD-10-CM

## 2023-12-14 PROCEDURE — 99214 OFFICE O/P EST MOD 30 MIN: CPT | Performed by: INTERNAL MEDICINE

## 2023-12-14 PROCEDURE — G0463 HOSPITAL OUTPT CLINIC VISIT: HCPCS | Performed by: INTERNAL MEDICINE

## 2023-12-14 RX ORDER — ANASTROZOLE 1 MG/1
1 TABLET ORAL DAILY
Qty: 90 TABLET | Refills: 1 | Status: SHIPPED | OUTPATIENT
Start: 2023-12-14 | End: 2024-06-14

## 2023-12-14 ASSESSMENT — PAIN SCALES - GENERAL: PAINLEVEL: NO PAIN (0)

## 2023-12-14 NOTE — LETTER
"    12/14/2023         RE: Angeles Can  [address] [place] [address]        Dear Colleague,    Thank you for referring your patient, Angeles Can, to the [facility]. Please see a copy of my visit note below.    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    Weight as of this encounter: 77 kg (169 lb 12.8 oz). Body surface area is 1.83 meters squared.  No Pain (0) Comment: Data Unavailable   No LMP recorded. Patient is postmenopausal.  Allergies reviewed: Yes  Medications reviewed: Yes    Medications: MEDICATION REFILLS NEEDED TODAY. Provider was notified.  Pharmacy name entered into [employer]: 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  History of pT1c Nx cM0 invasive ductal carcinoma of the lower inner quadrant of the right breast.  G1, ER+, NY+, HER-2/alvin- (1+ by IHC).   Clinically well.  Exam is unremarkable.    She is on anastrozole for 2-1/2 years at this point.  She has good tolerance to anastrozole.  Refilled anastrozole today.   Mammogram requested for 5/2024.   Follow-up clinical exam in 6 months.    #.  Low bone density with moderate fracture risk, by DEXA scan from 4/2021   There was significant decline in bone density by DEXA scan from 1/2023.  She does not want to add bisphosphonate therapy at this point.  She does not want to change to tamoxifen due to drug interaction with his cardiac medications.  We decided to closely monitor her bone density scan.  Next DEXA scan due in 2025.    Encounter Diagnoses:    Problem List Items Addressed This Visit          Oncology Diagnoses    Invasive ductal carcinoma of breast, female, right (H)    Relevant Medications    anastrozole (ARIMIDEX) 1 MG tablet       Other    Long term current use of aromatase inhibitor - Primary          CC: Freddy Lubin MD   ______________________________________________________________________________  Diagnosis  4/22/2021-screening mammogram showed focal asymmetry within the right breast at 4 o'clock position.  4/28/2021-diagnostic mammogram and ultrasound showed asymmetry with microlobulated margins and ultrasound confirmed 7 x 9 x 9 mm hypoechoic lesion at the same location.  5/3/2021-ultrasound-guided core needle biopsy of the right breast mass and it showed invasive ductal carcinoma and DCIS.  ER positive> 95%, NY positive> 95%, HER-2/alvin negative by IHC (1+).  5/26/201- Final path: Invasive ductal carcinoma, grade 1, 15 mm, negative margins.  There is associated DCIS, solid and focal cribriform, no necrosis and no  microcalcification, negative margins. pT1c Nx.    Treatment to date  5/26/2021-underwent right breast lumpectomy by Dr. Peng.  6/2021-started adjuvant anastrozole.  Due to drug interaction with her cardiac medicines, she did not switch to tamoxifen.    History of Present Illness    Ms. Angeles Skinnerer here for follow-up.    She reported she is doing well.  No side effects from anastrozole.  She does not have headaches, or unusual bone pain.  No concern about her breasts.  She gets calcium from milk.  She takes vitamin D.      Review of systems  Apart from describing in HPI, the remainder of comprehensive ROS was negative.    Past History    Past Medical History:   Diagnosis Date     Arrhythmia     Paroxysmal Atrial fibrillation     Arthritis      Cancer (H)      Diabetes mellitus (H)      Hypercalcemia      Hyperlipidemia      Hypertension      Obesity        Past Surgical History:   Procedure Laterality Date     CATARACT EXTRACTION, BILATERAL       EP PACEMAKER INSERT N/A 3/2/2018    Procedure: EP Pacemaker Insertion;  Surgeon: Nahun Mina MD;  Location: [facility];  Service:       REMOVE TONSILS/ADENOIDS,<11 Y/O      Description: Tonsillectomy With Adenoidectomy;  Recorded: 09/16/2008;     HC REVISE MEDIAN N/CARPAL TUNNEL SURG      Description: Neuroplasty Decompression Median Nerve At Carpal Tunnel;  Recorded: 11/01/2009;  Comments: Right- 1/99; Left- 4/99     WY MASTECTOMY, PARTIAL Right 5/26/2021    Procedure: Right Lumpectomy after Wire Localizaiton;  Surgeon: Rosanna Peng MD;  Location: [facility];  Service: General      BREAST CORE BIOPSY RIGHT Right 5/3/2021         Physical Exam    BP (!) 169/64 (BP Location: Left arm, Patient Position: Sitting, Cuff Size: Adult Regular)   Pulse 75   Wt 77 kg (169 lb 12.8 oz)   SpO2 94%   BMI 31.56 kg/m      General: alert, awake, not in acute distress  HEENT: Head: Normal, normocephalic, atraumatic.  Eye: Normal external eye,  conjunctiva, lids cornea, DIVYA.  Pharynx: Normal buccal mucosa. Normal pharynx.  Neck / Thyroid: Supple, no masses, nodes, nodules or enlargement.  Lymphatics: No abnormally enlarged lymph nodes.  Chest: Normal chest wall and respirations. Clear to auscultation.  Breasts: No discrete palpable abnormalities.  Pendulous breast bilaterally.  Heart: S1 S2 RRR.   Abdomen: abdomen is soft without significant tenderness, masses, organomegaly or guarding  Extremities: normal strength, tone, and muscle mass  Skin: normal. no rash or abnormalities  CNS: non focal.    Lab Results    Recent Results (from the past 168 hour(s))   Cardiac Device Check - Remote (Standing ORD 5 count)   Result Value Ref Range    Date Time Interrogation Session [id number]     Implantable Pulse Generator  [employer]     Implantable Pulse Generator Model L331 ACCOLADE MRI EL     Implantable Pulse Generator Serial Number [id number]     Type Interrogation Session Remote Scheduled     Clinic Name [facility]     Implantable Pulse Generator Type Pacemaker     Implantable Pulse Generator Implant Date 20180302     Implantable Lead  [employer]     Implantable Lead Model 4469 Fineline II Sterox EZ     Implantable Lead Serial Number [id number]     Implantable Lead Implant Date 20180302     Implantable Lead Polarity Type Bipolar Lead     Implantable Lead Location Detail 1 UNKNOWN     Implantable Lead Location Right Atrium     Implantable Lead Connection Status Connected     Implantable Lead  [employer]     Implantable Lead Model 4470 Fineline II Sterox EZ     Implantable Lead Serial Number [id number]     Implantable Lead Implant Date 20180302     Implantable Lead Polarity Type Bipolar Lead     Implantable Lead Location Detail 1 UNKNOWN     Implantable Lead Location Right Ventricle     Implantable Lead Connection Status Connected     Fraknie Setting Mode (NBG Code) DDDR     Frankie Setting Lower  Rate Limit 70 [beats]/min    Frankie Setting Maximum Tracking Rate 130 [beats]/min    Frankie Setting Maximum Sensor Rate 130 [beats]/min    Frankie Setting THO Delay Low 250 ms    Frankie Setting PAV Delay Low 250 ms    Frankie Setting PAV Delay High 200 ms    Frankie Setting THO Delay High 200 ms    Frankie Setting AT Mode Switch Rate 170 [beats]/min    Frankie Setting AT Mode Switch Mode DDIR     Lead Channel Setting Sensing Polarity Bipolar     Lead Channel Setting Sensing Sensitivity 0.25 mV    Lead Channel Setting Sensing Adaptation Mode Adaptive     Lead Channel Setting Sensing Polarity Bipolar     Lead Channel Setting Sensing Sensitivity 1.5 mV    Lead Channel Setting Sensing Adaptation Mode Adaptive     Lead Channel Setting Pacing Polarity Bipolar     Lead Channel Setting Pacing Pulse Width 0.4 ms    Lead Channel Setting Pacing Amplitude 1.5 V    Lead Channel Setting Pacing Capture Mode Monitor     Lead Channel Setting Pacing Polarity Bipolar     Lead Channel Setting Pacing Pulse Width 0.4 ms    Lead Channel Setting Pacing Amplitude 1.9 V    Lead Channel Setting Pacing Capture Mode Adaptive     Zone Setting Type Category VT     Zone Setting Vendor Type Category VT     Zone Setting Status Monitor     Zone Setting Detection Interval 375 ms    Lead Channel Impedance Value 474 ohm    Lead Channel Pacing Threshold Amplitude 0.5 V    Lead Channel Pacing Threshold Pulse Width 0.4 ms    Lead Channel Impedance Value 326 ohm    Lead Channel Pacing Threshold Amplitude 1.7 V    Lead Channel Pacing Threshold Pulse Width 0.4 ms    Battery Date Time of Measurements 20231212112100     Battery Status Beginning of Service     Battery Remaining Longevity 96 mo    Battery Remaining Percentage 95 %    Frankie Statistic Date Time Start 20230606000000     Frankie Statistic Date Time End 20231212000000     Frankie Statistic RA Percent Paced 100 %    Frankie Statistic RV Percent Paced 0 %    Atrial Tachy Statistic Date Time Start 20230608000000     Atrial  Tachy Statistic Date Time End 20231212000000     Atrial Tachy Statistic AT/AF [place] Percent 1 %    Episode Statistic Recent Count 2     Episode Statistic Type Category AT/AF     Episode Statistic Vendor Type Category AF     Episode Statistic Recent Count 0     Episode Statistic Type Category Other     Episode Statistic Recent Count 2     Episode Statistic Type Category VT     Episode Statistic Vendor Type Category NSVT     Episode Statistic Recent Count 0     Episode Statistic Type Category VT     Episode Statistic Vendor Type Category VT     Episode Statistic Recent Date Time Start 20230907000000     Episode Statistic Recent Date Time End 20231212000000     Episode Statistic Recent Date Time Start 20230907000000     Episode Statistic Recent Date Time End 20231212000000     Episode Statistic Recent Date Time Start 20230907000000     Episode Statistic Recent Date Time End 20231212000000     Episode Statistic Recent Date Time Start 20230907000000     Episode Statistic Recent Date Time End 20231212000000     Episode Identifier [id number]     Episode Type Category Other     Episode Date Time 20231212085700     Episode Identifier [id number]     Episode Type Category Other     Episode Date Time 20231212085600     Episode Identifier [email or web address]     Episode Type Category AT/AF     Episode Date Time [id number]     Episode Duration 3 s    Episode Identifier [id number]     Episode Type Category AT/AF     Episode Date Time 20231125112300     Episode Duration 2 s    Episode Identifier V-53     Episode Type Category VT     Episode Date Time 20231125112300     Episode Duration 13 s    Episode Type Induced Flag NO     Episode Identifier V-52     Episode Type Category VT     Episode Date Time 20231004001500     Episode Duration 14 s    Episode Type Induced Flag NO     Episode Identifier V-51     Episode Type Category VT     Episode Date Time 20230921064100     Episode Duration 13 s    Episode Type Induced Flag NO        Imaging    Cardiac  Device Check - Remote (Standing ORD 5 count)    Result Date: 12/12/2023  Encounter type: routine remote pacemaker transmission. Device: BSCI Accolade Pacing%/Programmed: %,  0%, in DDDR  ppm mode. Lead(s): stable Battery longevity: estimated 8 yrs Presenting: AP-VS rate 70 bpm. Atrial high rates: since 9/7/23, two mode switch episodes, both <1 minute. Anticoagulant: Coumadin Ventricular high rates: three nonsustained episodes, EGMs suggest PAT. Comments: normal device function. Plan: next remote transmission March 25, 2024.   YAKOV Packer, Device Specialist Device follow up for the entirety of having the pacemaker, based on best practices determined by [employer] and in compliance with Medicare guidelines. Continue remote device monitoring per patient plan. I have reviewed and interpreted the device interrogation, settings, programming, and encounter summary. The device is functioning within normal device parameters. I agree with the current findings, assessment and plan.     30 minutes spent on the date of the encounter doing chart review, history and exam, documentation, communication of the treatment plan with the care team and further activities as noted above.    Signed by: Etelvina Merlos MD      Again, thank you for allowing me to participate in the care of your patient.        Sincerely,        Etelvina Merlos MD

## 2023-12-14 NOTE — PROGRESS NOTES
"Oncology Rooming Note    December 14, 2023 3:45 PM   Angeles Can is a 83 year old female who presents for:    Chief Complaint   Patient presents with    Oncology Clinic Visit       Malignant neoplasm of lower-inner quadrant of right breast of female, estrogen receptor positive         Initial Vitals: BP (!) 196/82 (BP Location: Left arm, Patient Position: Sitting, Cuff Size: Adult Regular)   Pulse 75   Wt 77 kg (169 lb 12.8 oz)   SpO2 94%   BMI 31.56 kg/m   Estimated body mass index is 31.56 kg/m  as calculated from the following:    Height as of 6/6/23: 1.562 m (5' 1.5\").    Weight as of this encounter: 77 kg (169 lb 12.8 oz). Body surface area is 1.83 meters squared.  No Pain (0) Comment: Data Unavailable   No LMP recorded. Patient is postmenopausal.  Allergies reviewed: Yes  Medications reviewed: Yes    Medications: MEDICATION REFILLS NEEDED TODAY. Provider was notified.  Pharmacy name entered into Contractually: [id number], [place] - [address]    Clinical concerns: none    Lynn Rice MA            "

## 2023-12-14 NOTE — PROGRESS NOTES
[facility] Hematology and Oncology Progress Note    Patient: Angeles Can  MRN: [id number]  Date of Service: Dec 14, 2023         Reason for Visit    Chief Complaint   Patient presents with    Oncology Clinic Visit       Malignant neoplasm of lower-inner quadrant of right breast of female, estrogen receptor positive           Assessment and Plan     Cancer Staging   Malignant neoplasm of lower-inner quadrant of right breast of female, estrogen receptor positive (H)  Staging form: Breast, AJCC 8th Edition  - Pathologic stage from 6/16/2021: Stage Unknown (pT1c, pNX, cM0, G1, ER+, NJ+, HER2-) - Signed by Etelvina Merlos MD on 11/29/2021  - Clinical: No stage assigned - Unsigned      ECOG Performance    0 - Independent     Pain  Pain Score: No Pain (0)    #.  History of pT1c Nx cM0 invasive ductal carcinoma of the lower inner quadrant of the right breast.  G1, ER+, NJ+, HER-2/alvin- (1+ by IHC).   Clinically well.  Exam is unremarkable.    She is on anastrozole for 2-1/2 years at this point.  She has good tolerance to anastrozole.  Refilled anastrozole today.   Mammogram requested for 5/2024.   Follow-up clinical exam in 6 months.    #.  Low bone density with moderate fracture risk, by DEXA scan from 4/2021   There was significant decline in bone density by DEXA scan from 1/2023.  She does not want to add bisphosphonate therapy at this point.  She does not want to change to tamoxifen due to drug interaction with his cardiac medications.  We decided to closely monitor her bone density scan.  Next DEXA scan due in 2025.    Encounter Diagnoses:    Problem List Items Addressed This Visit          Oncology Diagnoses    Invasive ductal carcinoma of breast, female, right (H)    Relevant Medications    anastrozole (ARIMIDEX) 1 MG tablet       Other    Long term current use of aromatase inhibitor - Primary          CC: Freddy Lubin MD    ______________________________________________________________________________  Diagnosis  4/22/2021-screening mammogram showed focal asymmetry within the right breast at 4 o'clock position.  4/28/2021-diagnostic mammogram and ultrasound showed asymmetry with microlobulated margins and ultrasound confirmed 7 x 9 x 9 mm hypoechoic lesion at the same location.  5/3/2021-ultrasound-guided core needle biopsy of the right breast mass and it showed invasive ductal carcinoma and DCIS.  ER positive> 95%, ME positive> 95%, HER-2/alvin negative by IHC (1+).  5/26/201- Final path: Invasive ductal carcinoma, grade 1, 15 mm, negative margins.  There is associated DCIS, solid and focal cribriform, no necrosis and no microcalcification, negative margins. pT1c Nx.    Treatment to date  5/26/2021-underwent right breast lumpectomy by Dr. Peng.  6/2021-started adjuvant anastrozole.  Due to drug interaction with her cardiac medicines, she did not switch to tamoxifen.    History of Present Illness    Ms. Angeles Skinnerer here for follow-up.    She reported she is doing well.  No side effects from anastrozole.  She does not have headaches, or unusual bone pain.  No concern about her breasts.  She gets calcium from milk.  She takes vitamin D.      Review of systems  Apart from describing in HPI, the remainder of comprehensive ROS was negative.    Past History    Past Medical History:   Diagnosis Date    Arrhythmia     Paroxysmal Atrial fibrillation    Arthritis     Cancer (H)     Diabetes mellitus (H)     Hypercalcemia     Hyperlipidemia     Hypertension     Obesity        Past Surgical History:   Procedure Laterality Date    CATARACT EXTRACTION, BILATERAL      EP PACEMAKER INSERT N/A 3/2/2018    Procedure: EP Pacemaker Insertion;  Surgeon: Nahun Mina MD;  Location: [facility];  Service:      REMOVE TONSILS/ADENOIDS,<11 Y/O      Description: Tonsillectomy With Adenoidectomy;  Recorded: 09/16/2008;    HC REVISE MEDIAN  N/CARPAL TUNNEL SURG      Description: Neuroplasty Decompression Median Nerve At Carpal Tunnel;  Recorded: 11/01/2009;  Comments: Right- 1/99; Left- 4/99    AR MASTECTOMY, PARTIAL Right 5/26/2021    Procedure: Right Lumpectomy after Wire Localizaiton;  Surgeon: Rosanna Peng MD;  Location: [facility];  Service: General    US BREAST CORE BIOPSY RIGHT Right 5/3/2021         Physical Exam    BP (!) 169/64 (BP Location: Left arm, Patient Position: Sitting, Cuff Size: Adult Regular)   Pulse 75   Wt 77 kg (169 lb 12.8 oz)   SpO2 94%   BMI 31.56 kg/m      General: alert, awake, not in acute distress  HEENT: Head: Normal, normocephalic, atraumatic.  Eye: Normal external eye, conjunctiva, lids cornea, DIVYA.  Pharynx: Normal buccal mucosa. Normal pharynx.  Neck / Thyroid: Supple, no masses, nodes, nodules or enlargement.  Lymphatics: No abnormally enlarged lymph nodes.  Chest: Normal chest wall and respirations. Clear to auscultation.  Breasts: No discrete palpable abnormalities.  Pendulous breast bilaterally.  Heart: S1 S2 RRR.   Abdomen: abdomen is soft without significant tenderness, masses, organomegaly or guarding  Extremities: normal strength, tone, and muscle mass  Skin: normal. no rash or abnormalities  CNS: non focal.    Lab Results    Recent Results (from the past 168 hour(s))   Cardiac Device Check - Remote (Standing ORD 5 count)   Result Value Ref Range    Date Time Interrogation Session [id number]     Implantable Pulse Generator  [employer]     Implantable Pulse Generator Model L331 ACCOLADE MRI EL     Implantable Pulse Generator Serial Number [id number]     Type Interrogation Session Remote Scheduled     Clinic Name [facility]     Implantable Pulse Generator Type Pacemaker     Implantable Pulse Generator Implant Date 20180302     Implantable Lead  [employer]     Implantable Lead Model 4469 Fineline II Sterox EZ     Implantable Lead Serial  Number [id number]     Implantable Lead Implant Date 20180302     Implantable Lead Polarity Type Bipolar Lead     Implantable Lead Location Detail 1 UNKNOWN     Implantable Lead Location Right Atrium     Implantable Lead Connection Status Connected     Implantable Lead  [employer]     Implantable Lead Model 4470 Fineline II Sterox EZ     Implantable Lead Serial Number [id number]     Implantable Lead Implant Date 20180302     Implantable Lead Polarity Type Bipolar Lead     Implantable Lead Location Detail 1 UNKNOWN     Implantable Lead Location Right Ventricle     Implantable Lead Connection Status Connected     Frankie Setting Mode (NBG Code) DDDR     Frankie Setting Lower Rate Limit 70 [beats]/min    Frankie Setting Maximum Tracking Rate 130 [beats]/min    Frankie Setting Maximum Sensor Rate 130 [beats]/min    Frankie Setting THO Delay Low 250 ms    Frankie Setting PAV Delay Low 250 ms    Frankie Setting PAV Delay High 200 ms    Frankie Setting THO Delay High 200 ms    Frankie Setting AT Mode Switch Rate 170 [beats]/min    Frankie Setting AT Mode Switch Mode DDIR     Lead Channel Setting Sensing Polarity Bipolar     Lead Channel Setting Sensing Sensitivity 0.25 mV    Lead Channel Setting Sensing Adaptation Mode Adaptive     Lead Channel Setting Sensing Polarity Bipolar     Lead Channel Setting Sensing Sensitivity 1.5 mV    Lead Channel Setting Sensing Adaptation Mode Adaptive     Lead Channel Setting Pacing Polarity Bipolar     Lead Channel Setting Pacing Pulse Width 0.4 ms    Lead Channel Setting Pacing Amplitude 1.5 V    Lead Channel Setting Pacing Capture Mode Monitor     Lead Channel Setting Pacing Polarity Bipolar     Lead Channel Setting Pacing Pulse Width 0.4 ms    Lead Channel Setting Pacing Amplitude 1.9 V    Lead Channel Setting Pacing Capture Mode Adaptive     Zone Setting Type Category VT     Zone Setting Vendor Type Category VT     Zone Setting Status Monitor     Zone Setting Detection Interval 375 ms    Lead  Channel Impedance Value 474 ohm    Lead Channel Pacing Threshold Amplitude 0.5 V    Lead Channel Pacing Threshold Pulse Width 0.4 ms    Lead Channel Impedance Value 326 ohm    Lead Channel Pacing Threshold Amplitude 1.7 V    Lead Channel Pacing Threshold Pulse Width 0.4 ms    Battery Date Time of Measurements 20231212112100     Battery Status Beginning of Service     Battery Remaining Longevity 96 mo    Battery Remaining Percentage 95 %    Frankie Statistic Date Time Start 20230606000000     Frankie Statistic Date Time End 20231212000000     Frankie Statistic RA Percent Paced 100 %    Frankie Statistic RV Percent Paced 0 %    Atrial Tachy Statistic Date Time Start 20230608000000     Atrial Tachy Statistic Date Time End 20231212000000     Atrial Tachy Statistic AT/AF [place] Percent 1 %    Episode Statistic Recent Count 2     Episode Statistic Type Category AT/AF     Episode Statistic Vendor Type Category AF     Episode Statistic Recent Count 0     Episode Statistic Type Category Other     Episode Statistic Recent Count 2     Episode Statistic Type Category VT     Episode Statistic Vendor Type Category NSVT     Episode Statistic Recent Count 0     Episode Statistic Type Category VT     Episode Statistic Vendor Type Category VT     Episode Statistic Recent Date Time Start 20230907000000     Episode Statistic Recent Date Time End 20231212000000     Episode Statistic Recent Date Time Start 20230907000000     Episode Statistic Recent Date Time End 20231212000000     Episode Statistic Recent Date Time Start 20230907000000     Episode Statistic Recent Date Time End 20231212000000     Episode Statistic Recent Date Time Start 20230907000000     Episode Statistic Recent Date Time End 20231212000000     Episode Identifier [id number]     Episode Type Category Other     Episode Date Time 20231212085700     Episode Identifier [id number]     Episode Type Category Other     Episode Date Time 20231212085600     Episode Identifier [email or web address]      Episode Type Category AT/AF     Episode Date Time [id number]     Episode Duration 3 s    Episode Identifier [email or web address]-[id number]     Episode Type Category AT/AF     Episode Date Time [id number]     Episode Duration 2 s    Episode Identifier V-53     Episode Type Category VT     Episode Date Time 20231125112300     Episode Duration 13 s    Episode Type Induced Flag NO     Episode Identifier V-52     Episode Type Category VT     Episode Date Time [id number]     Episode Duration 14 s    Episode Type Induced Flag NO     Episode Identifier V-51     Episode Type Category VT     Episode Date Time [id number]     Episode Duration 13 s    Episode Type Induced Flag NO        Imaging    Cardiac Device Check - Remote (Standing ORD 5 count)    Result Date: 12/12/2023  Encounter type: routine remote pacemaker transmission. Device: [employer]SCI Accolade Pacing%/Programmed: %,  0%, in DDDR  ppm mode. Lead(s): stable Battery longevity: estimated 8 yrs Presenting: AP-VS rate 70 bpm. Atrial high rates: since 9/7/23, two mode switch episodes, both <1 minute. Anticoagulant: Coumadin Ventricular high rates: three nonsustained episodes, EGMs suggest PAT. Comments: normal device function. Plan: next remote transmission March 25, 2024.   YAKOV Packer, Device Specialist Device follow up for the entirety of having the pacemaker, based on best practices determined by [employer] and in compliance with Medicare guidelines. Continue remote device monitoring per patient plan. I have reviewed and interpreted the device interrogation, settings, programming, and encounter summary. The device is functioning within normal device parameters. I agree with the current findings, assessment and plan.     30 minutes spent on the date of the encounter doing chart review, history and exam, documentation, communication of the treatment plan with the care team and further activities as noted above.    Signed by: Etelvina Merlos MD

## 2024-03-02 ENCOUNTER — HEALTH MAINTENANCE LETTER (OUTPATIENT)
Age: 84
End: 2024-03-02

## 2024-03-25 ENCOUNTER — ANCILLARY PROCEDURE (OUTPATIENT)
Dept: CARDIOLOGY | Facility: CLINIC | Age: 84
End: 2024-03-25
Attending: INTERNAL MEDICINE
Payer: MEDICARE

## 2024-03-25 DIAGNOSIS — I49.5 SICK SINUS SYNDROME (H): ICD-10-CM

## 2024-03-25 DIAGNOSIS — I48.0 PAROXYSMAL ATRIAL FIBRILLATION (H): ICD-10-CM

## 2024-03-25 DIAGNOSIS — Z95.0 PACEMAKER: ICD-10-CM

## 2024-03-30 LAB
MDC_IDC_EPISODE_DTM: NORMAL
MDC_IDC_EPISODE_DURATION: 1 S
MDC_IDC_EPISODE_DURATION: 10 S
MDC_IDC_EPISODE_DURATION: 11 S
MDC_IDC_EPISODE_DURATION: 118 S
MDC_IDC_EPISODE_DURATION: 13 S
MDC_IDC_EPISODE_DURATION: 13 S
MDC_IDC_EPISODE_DURATION: 14 S
MDC_IDC_EPISODE_DURATION: 15 S
MDC_IDC_EPISODE_DURATION: 16 S
MDC_IDC_EPISODE_DURATION: 17 S
MDC_IDC_EPISODE_DURATION: 21 S
MDC_IDC_EPISODE_DURATION: 23 S
MDC_IDC_EPISODE_DURATION: 23 S
MDC_IDC_EPISODE_DURATION: 2337 S
MDC_IDC_EPISODE_DURATION: 2348 S
MDC_IDC_EPISODE_DURATION: 3 S
MDC_IDC_EPISODE_DURATION: 38 S
MDC_IDC_EPISODE_DURATION: 55 S
MDC_IDC_EPISODE_DURATION: 96 S
MDC_IDC_EPISODE_ID: NORMAL
MDC_IDC_EPISODE_TYPE: NORMAL
MDC_IDC_EPISODE_TYPE_INDUCED: NO
MDC_IDC_EPISODE_VENDOR_TYPE: NORMAL
MDC_IDC_LEAD_CONNECTION_STATUS: NORMAL
MDC_IDC_LEAD_CONNECTION_STATUS: NORMAL
MDC_IDC_LEAD_IMPLANT_DT: NORMAL
MDC_IDC_LEAD_IMPLANT_DT: NORMAL
MDC_IDC_LEAD_LOCATION: NORMAL
MDC_IDC_LEAD_LOCATION: NORMAL
MDC_IDC_LEAD_LOCATION_DETAIL_1: NORMAL
MDC_IDC_LEAD_LOCATION_DETAIL_1: NORMAL
MDC_IDC_LEAD_MFG: NORMAL
MDC_IDC_LEAD_MFG: NORMAL
MDC_IDC_LEAD_MODEL: NORMAL
MDC_IDC_LEAD_MODEL: NORMAL
MDC_IDC_LEAD_POLARITY_TYPE: NORMAL
MDC_IDC_LEAD_POLARITY_TYPE: NORMAL
MDC_IDC_LEAD_SERIAL: NORMAL
MDC_IDC_LEAD_SERIAL: NORMAL
MDC_IDC_MSMT_BATTERY_DTM: NORMAL
MDC_IDC_MSMT_BATTERY_REMAINING_LONGEVITY: 96 MO
MDC_IDC_MSMT_BATTERY_REMAINING_PERCENTAGE: 92 %
MDC_IDC_MSMT_BATTERY_STATUS: NORMAL
MDC_IDC_MSMT_LEADCHNL_RA_IMPEDANCE_VALUE: 524 OHM
MDC_IDC_MSMT_LEADCHNL_RA_PACING_THRESHOLD_AMPLITUDE: 0.5 V
MDC_IDC_MSMT_LEADCHNL_RA_PACING_THRESHOLD_PULSEWIDTH: 0.4 MS
MDC_IDC_MSMT_LEADCHNL_RV_IMPEDANCE_VALUE: 352 OHM
MDC_IDC_MSMT_LEADCHNL_RV_PACING_THRESHOLD_AMPLITUDE: 1.6 V
MDC_IDC_MSMT_LEADCHNL_RV_PACING_THRESHOLD_PULSEWIDTH: 0.4 MS
MDC_IDC_PG_IMPLANT_DTM: NORMAL
MDC_IDC_PG_MFG: NORMAL
MDC_IDC_PG_MODEL: NORMAL
MDC_IDC_PG_SERIAL: NORMAL
MDC_IDC_PG_TYPE: NORMAL
MDC_IDC_SESS_CLINIC_NAME: NORMAL
MDC_IDC_SESS_DTM: NORMAL
MDC_IDC_SESS_TYPE: NORMAL
MDC_IDC_SET_BRADY_AT_MODE_SWITCH_MODE: NORMAL
MDC_IDC_SET_BRADY_AT_MODE_SWITCH_RATE: 170 {BEATS}/MIN
MDC_IDC_SET_BRADY_LOWRATE: 70 {BEATS}/MIN
MDC_IDC_SET_BRADY_MAX_SENSOR_RATE: 130 {BEATS}/MIN
MDC_IDC_SET_BRADY_MAX_TRACKING_RATE: 130 {BEATS}/MIN
MDC_IDC_SET_BRADY_MODE: NORMAL
MDC_IDC_SET_BRADY_PAV_DELAY_HIGH: 200 MS
MDC_IDC_SET_BRADY_PAV_DELAY_LOW: 250 MS
MDC_IDC_SET_BRADY_SAV_DELAY_HIGH: 200 MS
MDC_IDC_SET_BRADY_SAV_DELAY_LOW: 250 MS
MDC_IDC_SET_LEADCHNL_RA_PACING_AMPLITUDE: 1.5 V
MDC_IDC_SET_LEADCHNL_RA_PACING_CAPTURE_MODE: NORMAL
MDC_IDC_SET_LEADCHNL_RA_PACING_POLARITY: NORMAL
MDC_IDC_SET_LEADCHNL_RA_PACING_PULSEWIDTH: 0.4 MS
MDC_IDC_SET_LEADCHNL_RA_SENSING_ADAPTATION_MODE: NORMAL
MDC_IDC_SET_LEADCHNL_RA_SENSING_POLARITY: NORMAL
MDC_IDC_SET_LEADCHNL_RA_SENSING_SENSITIVITY: 0.25 MV
MDC_IDC_SET_LEADCHNL_RV_PACING_AMPLITUDE: 1.7 V
MDC_IDC_SET_LEADCHNL_RV_PACING_CAPTURE_MODE: NORMAL
MDC_IDC_SET_LEADCHNL_RV_PACING_POLARITY: NORMAL
MDC_IDC_SET_LEADCHNL_RV_PACING_PULSEWIDTH: 0.4 MS
MDC_IDC_SET_LEADCHNL_RV_SENSING_ADAPTATION_MODE: NORMAL
MDC_IDC_SET_LEADCHNL_RV_SENSING_POLARITY: NORMAL
MDC_IDC_SET_LEADCHNL_RV_SENSING_SENSITIVITY: 1.5 MV
MDC_IDC_SET_ZONE_DETECTION_INTERVAL: 375 MS
MDC_IDC_SET_ZONE_STATUS: NORMAL
MDC_IDC_SET_ZONE_TYPE: NORMAL
MDC_IDC_SET_ZONE_VENDOR_TYPE: NORMAL
MDC_IDC_STAT_AT_BURDEN_PERCENT: 1 %
MDC_IDC_STAT_AT_DTM_END: NORMAL
MDC_IDC_STAT_AT_DTM_START: NORMAL
MDC_IDC_STAT_BRADY_DTM_END: NORMAL
MDC_IDC_STAT_BRADY_DTM_START: NORMAL
MDC_IDC_STAT_BRADY_RA_PERCENT_PACED: 99 %
MDC_IDC_STAT_BRADY_RV_PERCENT_PACED: 0 %
MDC_IDC_STAT_EPISODE_RECENT_COUNT: 0
MDC_IDC_STAT_EPISODE_RECENT_COUNT: 0
MDC_IDC_STAT_EPISODE_RECENT_COUNT: 75
MDC_IDC_STAT_EPISODE_RECENT_COUNT_DTM_END: NORMAL
MDC_IDC_STAT_EPISODE_RECENT_COUNT_DTM_START: NORMAL
MDC_IDC_STAT_EPISODE_TYPE: NORMAL
MDC_IDC_STAT_EPISODE_VENDOR_TYPE: NORMAL
MDC_IDC_STAT_EPISODE_VENDOR_TYPE: NORMAL

## 2024-03-30 PROCEDURE — 93296 REM INTERROG EVL PM/IDS: CPT | Performed by: INTERNAL MEDICINE

## 2024-03-30 PROCEDURE — 93294 REM INTERROG EVL PM/LDLS PM: CPT | Performed by: INTERNAL MEDICINE

## 2024-05-23 ENCOUNTER — ANCILLARY PROCEDURE (OUTPATIENT)
Dept: MAMMOGRAPHY | Facility: CLINIC | Age: 84
End: 2024-05-23
Attending: INTERNAL MEDICINE
Payer: MEDICARE

## 2024-05-23 DIAGNOSIS — Z12.31 VISIT FOR SCREENING MAMMOGRAM: ICD-10-CM

## 2024-05-23 PROCEDURE — 77067 SCR MAMMO BI INCL CAD: CPT | Mod: TC | Performed by: STUDENT IN AN ORGANIZED HEALTH CARE EDUCATION/TRAINING PROGRAM

## 2024-05-23 PROCEDURE — 77063 BREAST TOMOSYNTHESIS BI: CPT | Mod: TC | Performed by: STUDENT IN AN ORGANIZED HEALTH CARE EDUCATION/TRAINING PROGRAM

## 2024-06-13 NOTE — PROGRESS NOTES
HEART CARE ENCOUNTER CONSULTATON NOTE      [facility]  [phone]      Assessment/Recommendations   Assessment:   Atrial fibrillation: Continues sotalol 80 mg daily, warfarin with therapeutic INR  - Increased mode switches over the last year showing AF with RVR primarily  bpm, although maintains less than 1% burden of atrial fibrillation.   - Almost entirely asymptomatic except for 42-minute episode of atrial fibrillation 12/26/2023 after taking Nyquil, recalls palpitations.  Denies any chest pains, shortness of breath.  Sinus node dysfunction s/p cardiac pacemaker: Normal device function, AP 99% <1%, increased VHR/VHR showing AT/AF/NSVT  Hyperlipidemia: Atorvastatin 20 mg  Hypertension: Controlled on amlodipine 2.5 mg, clonidine 0.1 mg, losartan 100 mg  Mild aortic stenosis: Echo 8/2022, continue to monitor    Plan:   EKG today personally reviewed - atrial paced rhythm with Qtc 438 ms  Continue sotalol grams daily and warfarin  We discussed addition of low-dose metoprolol to control episodes of RVR.  After discussion patient elects to continue current medications and monitor device checks for increasing burden/RVR.  With continued low burden of atrial fibrillation we will continue to monitor device checks at this time.  Instructed patient to notify of development of palpitations/racing heart rates.  We discussed opportunities for future rhythm management such as increased sotalol, ablation.  At this time patient would prefer medication management to procedural if recommended in the future  Continue antihypertensive and hyperlipidemia regimens        Follow up in 1 year or sooner as needed     History of Present Illness/Subjective    HPI: Angeles Can is a 83 year old female with PMHx of atrial fibrillation, sinus node dysfunction with cardiac pacemaker, HLD, HTN, mild aortic stenosis presents for follow up. Episode 42 minutes of atrial fibrillation last December, overall  "atrial fibrillation burden less than 1%, ventricular rates greater than 120 bpm 50-60% of the time.    She reports feeling well with no concerns today.  She is continuing to take her medications regularly.  When asked about palpitations she recalls 1 episode around Christmas 2023 where she took NyQuil and had noticeable heartbeat/fast heart rates.  This correlates with 42-minute episode of atrial fibrillation on device check.  Otherwise patient denies palpitations, racing heart rates.  Denies bleeding, blood in stool, nosebleeds on warfarin.    She denies fatigue, lightheadedness, shortness of breath, dyspnea on exertion, orthopnea, PND, chest pain, and lower extremity edema.      Echocardiogram 8/2022 Results:  1. Normal left ventricular size and systolic performance with a visually  estimated ejection fraction of 65-70%.  2. There is mild concentric increase in left ventricular wall thickness.  3. There is very mild aortic stenosis.  4. Normal right ventricular size and systolic performance.  5. There is mild to moderate left atrial enlargement.  6. Right ventricular systolic pressure relative to right atrial pressure is  mildly increased. The pulmonary artery pressure is estimated to be 35-40 mmHg  plus right atrial pressure (the IVC is of normal caliber).     When compared to the prior real-time echocardiogram dated 29 April 2021, there  has been no major interval change.     Physical Examination  Review of Systems   Vitals: /62 (BP Location: Right arm, Patient Position: Sitting, Cuff Size: Adult Large)   Pulse 64   Resp 12   Ht 1.575 m (5' 2\")   Wt 71.7 kg (158 lb)   BMI 28.90 kg/m    BMI= Body mass index is 28.9 kg/m .  Wt Readings from Last 3 Encounters:   06/14/24 71.7 kg (158 lb)   12/14/23 77 kg (169 lb 12.8 oz)   06/06/23 77.6 kg (171 lb)           ENT/Mouth: membranes moist, no oral lesions or bleeding gums.      EYES:  no scleral icterus, normal conjunctivae       Chest/Lungs:   lungs are " clear to auscultation, no rales or wheezing, equal chest wall expansion    Cardiovascular:   Regular. Normal first and second heart sounds with no murmurs, rubs, or gallops; the radial and posterior tibial pulses are intact, absent edema bilaterally        Extremities: no cyanosis or clubbing   Skin: no xanthelasma, warm.    Neurologic: no tremors     Psychiatric: alert and oriented x3, calm        Please refer above for cardiac ROS details.        Medical History  Surgical History Family History Social History   Past Medical History:   Diagnosis Date    Arrhythmia     Paroxysmal Atrial fibrillation    Arthritis     Cancer (H)     Diabetes mellitus (H)     Hypercalcemia     Hyperlipidemia     Hypertension     Obesity      Past Surgical History:   Procedure Laterality Date    CATARACT EXTRACTION, BILATERAL      EP PACEMAKER INSERT N/A 3/2/2018    Procedure: EP Pacemaker Insertion;  Surgeon: Nahun Mina MD;  Location: [facility];  Service:      REMOVE TONSILS/ADENOIDS,<13 Y/O      Description: Tonsillectomy With Adenoidectomy;  Recorded: 09/16/2008;    HC REVISE MEDIAN N/CARPAL TUNNEL SURG      Description: Neuroplasty Decompression Median Nerve At Carpal Tunnel;  Recorded: 11/01/2009;  Comments: Right- 1/99; Left- 4/99    [place] MASTECTOMY, PARTIAL Right 5/26/2021    Procedure: Right Lumpectomy after Wire Localizaiton;  Surgeon: Rosanna Peng MD;  [facility]: [facility];  Service: General    US BREAST CORE BIOPSY RIGHT Right 5/3/2021     Family History   Problem Relation Age of Onset    No Known Problems Mother     No Known Problems Father     No Known Problems Sister     No Known Problems Daughter     No Known Problems Maternal Grandmother     No Known Problems Maternal Grandfather     No Known Problems Paternal Grandmother     No Known Problems Paternal Grandfather     No Known Problems Maternal Aunt     No Known Problems Paternal Aunt         Social History     Socioeconomic History    Marital  status:      Spouse name: Not on file    Number of children: Not on file    Years of education: Not on file    Highest education level: Not on file   Occupational History    Not on file   Tobacco Use    Smoking status: Former    Smokeless tobacco: Never    Tobacco comments:     quit years ago   Vaping Use    Vaping status: Never Used   Substance and Sexual Activity    Alcohol use: No    Drug use: No    Sexual activity: Not Currently   Other Topics Concern    Not on file   Social History Narrative    Not on file     Social Determinants of Health     Financial Resource Strain: Low Risk  (8/8/2023)    [facility] [employer] [employer] [facility]    Financial Resource Strain     Difficulty of Paying Living Expenses: 3     Difficulty of Paying Living Expenses: Not on file   Food Insecurity: No Food Insecurity (8/8/2023)    Received from [facility], [facility] [employer] [employer] [facility]    Food Insecurity     Worried About Running Out of Food in the Last Year: 1   Transportation Needs: No Transportation Needs (8/8/2023)    Received from [facility], [facility] [employer] [employer] [facility]    Transportation Needs     Lack of Transportation (Medical): 1   Physical Activity: Not on file   Stress: Not on file   Social Connections: Socially Integrated (8/8/2023)    Received from [facility], [facility] [employer] [employer] [facility]    Social Connections     Frequency of Communication with Friends and Family: 0   Interpersonal Safety: Not on file   Housing Stability: Low Risk  (8/8/2023)    Received from [facility], [facility] [employer] [employer] [facility]    Housing Stability     Unable to Pay for Housing in the Last Year: 1           Medications  Allergies   Current Outpatient Medications   Medication Sig  Dispense Refill    allopurinol (ZYLOPRIM) 300 MG tablet [ALLOPURINOL (ZYLOPRIM) 300 MG TABLET] Take 1 tablet by mouth once daily. 90 tablet 3    amLODIPine (NORVASC) 2.5 MG tablet Take 1 tablet (2.5 mg) by mouth daily 90 tablet 3    anastrozole (ARIMIDEX) 1 MG tablet Take 1 tablet (1 mg) by mouth daily 90 tablet 1    atorvastatin (LIPITOR) 20 MG tablet Take 1 tablet (20 mg) by mouth daily 90 tablet 3    blood glucose test strips [BLOOD GLUCOSE TEST STRIPS] Use 1 each As Directed daily. 100 strip 3    cholecalciferol, vitamin D3, 1,000 unit tablet [CHOLECALCIFEROL, VITAMIN D3, 1,000 UNIT TABLET] Take 1,000 Units by mouth daily.      cloNIDine (CATAPRES) 0.1 MG tablet Take 1 tablet (0.1 mg) by mouth 2 times daily 180 tablet 3    furosemide (LASIX) 40 MG tablet Take 1 tablet (40 mg) by mouth daily. 90 tablet 2    losartan (COZAAR) 100 MG tablet Take 1 tablet (100 mg) by mouth daily 90 tablet 3    magnesium oxide (MAG-OX) 400 mg tablet [MAGNESIUM OXIDE (MAG-OX) 400 MG TABLET] Take 400 mg by mouth daily.       metFORMIN (GLUCOPHAGE XR) 500 MG 24 hr tablet Take 1 tablet (500 mg) by mouth daily (with dinner) 90 tablet 3    peg 400-propylene glycol (SYSTANE) 0.4-0.3 % Drop Place 2 drops into both eyes as needed      sodium chloride (KATALINA 128) 5 % ophthalmic solution 1-2 drops every 3 hours as needed for dry eyes      sotalol (BETAPACE) 80 MG tablet Take 1 tablet (80 mg total) by mouth daily. 90 tablet 3    warfarin ANTICOAGULANT (COUMADIN) 2.5 MG tablet Take 1/2 tablet (1.25mg) to 1 tablet (2.5mg) by mouth daily, as directed.  Adjust dose based on INR results. 90 tablet 1       Allergies   Allergen Reactions    Metformin Diarrhea    Hydrochlorothiazide Unknown     Annotation: hyperuricemia made worse by HCTZ   Causes pts gout          Lab Results    Chemistry/lipid CBC Cardiac Enzymes/BNP/TSH/INR   Recent Labs   Lab Test 12/07/22  1106   CHOL 164   HDL 60   LDL 73   TRIG 157*     Recent Labs   Lab Test 12/07/22  1106  08/16/22  1834 06/07/22  1031   LDL 73 86 82     Recent Labs   Lab Test 12/07/22  1106      POTASSIUM 4.2   CHLORIDE 101   CO2 27   *   BUN 35.5*   CR 1.07*   GFRESTIMATED 51*   GUILLERMINA 10.5*     Recent Labs   Lab Test 12/07/22  1106 08/18/22  0453 08/16/22  1834   CR 1.07* 0.89 1.36*     Recent Labs   Lab Test 12/07/22  1106 08/16/22  1530 06/07/22  1031   A1C 7.0* 6.8* 6.8*          Recent Labs   Lab Test 08/16/22  1530   WBC 11.3*   HGB 13.6   HCT 40.1   MCV 91        Recent Labs   Lab Test 08/16/22  1530 06/07/22  1031 05/24/21  1303   HGB 13.6 13.0 13.2    Recent Labs   Lab Test 08/16/22  1834   TROPONINI <0.01     Recent Labs   Lab Test 03/09/21  1742   BNP 49     Recent Labs   Lab Test 07/16/18  1538   TSH 3.57     Recent Labs   Lab Test 02/28/23  1138 01/25/23  1138 12/28/22  1138   INR 3.5* 2.1* 2.9*          This note has been dictated using voice recognition software. Any grammatical, typographical, or context distortions are unintentional and inherent to the software    Emma Kaplan PA-C

## 2024-06-14 ENCOUNTER — ANCILLARY PROCEDURE (OUTPATIENT)
Dept: CARDIOLOGY | Facility: CLINIC | Age: 84
End: 2024-06-14
Attending: INTERNAL MEDICINE
Payer: MEDICARE

## 2024-06-14 ENCOUNTER — ONCOLOGY VISIT (OUTPATIENT)
Dept: ONCOLOGY | Facility: HOSPITAL | Age: 84
End: 2024-06-14
Attending: INTERNAL MEDICINE
Payer: MEDICARE

## 2024-06-14 VITALS
OXYGEN SATURATION: 94 % | HEART RATE: 70 BPM | BODY MASS INDEX: 29.21 KG/M2 | TEMPERATURE: 99.3 F | DIASTOLIC BLOOD PRESSURE: 62 MMHG | SYSTOLIC BLOOD PRESSURE: 132 MMHG | RESPIRATION RATE: 18 BRPM | HEIGHT: 62 IN | WEIGHT: 158.7 LBS

## 2024-06-14 VITALS
DIASTOLIC BLOOD PRESSURE: 62 MMHG | HEART RATE: 64 BPM | HEIGHT: 62 IN | RESPIRATION RATE: 12 BRPM | SYSTOLIC BLOOD PRESSURE: 134 MMHG | WEIGHT: 158 LBS | BODY MASS INDEX: 29.08 KG/M2

## 2024-06-14 DIAGNOSIS — C50.911 INVASIVE DUCTAL CARCINOMA OF BREAST, FEMALE, RIGHT (H): ICD-10-CM

## 2024-06-14 DIAGNOSIS — I49.5 SICK SINUS SYNDROME (H): ICD-10-CM

## 2024-06-14 DIAGNOSIS — I35.0 MILD AORTIC STENOSIS: ICD-10-CM

## 2024-06-14 DIAGNOSIS — Z95.0 CARDIAC PACEMAKER IN SITU: Primary | ICD-10-CM

## 2024-06-14 DIAGNOSIS — I48.0 PAROXYSMAL ATRIAL FIBRILLATION (H): ICD-10-CM

## 2024-06-14 DIAGNOSIS — Z95.0 PACEMAKER: ICD-10-CM

## 2024-06-14 LAB
ATRIAL RATE - MUSE: 70 BPM
DIASTOLIC BLOOD PRESSURE - MUSE: NORMAL MMHG
INTERPRETATION ECG - MUSE: NORMAL
MDC_IDC_LEAD_CONNECTION_STATUS: NORMAL
MDC_IDC_LEAD_CONNECTION_STATUS: NORMAL
MDC_IDC_LEAD_IMPLANT_DT: NORMAL
MDC_IDC_LEAD_IMPLANT_DT: NORMAL
MDC_IDC_LEAD_LOCATION: NORMAL
MDC_IDC_LEAD_LOCATION: NORMAL
MDC_IDC_LEAD_LOCATION_DETAIL_1: NORMAL
MDC_IDC_LEAD_LOCATION_DETAIL_1: NORMAL
MDC_IDC_LEAD_MFG: NORMAL
MDC_IDC_LEAD_MFG: NORMAL
MDC_IDC_LEAD_MODEL: NORMAL
MDC_IDC_LEAD_MODEL: NORMAL
MDC_IDC_LEAD_POLARITY_TYPE: NORMAL
MDC_IDC_LEAD_POLARITY_TYPE: NORMAL
MDC_IDC_LEAD_SERIAL: NORMAL
MDC_IDC_LEAD_SERIAL: NORMAL
MDC_IDC_MSMT_BATTERY_DTM: NORMAL
MDC_IDC_MSMT_BATTERY_REMAINING_LONGEVITY: 90 MO
MDC_IDC_MSMT_BATTERY_REMAINING_PERCENTAGE: 87 %
MDC_IDC_MSMT_BATTERY_STATUS: NORMAL
MDC_IDC_MSMT_LEADCHNL_RA_IMPEDANCE_VALUE: 514 OHM
MDC_IDC_MSMT_LEADCHNL_RA_PACING_THRESHOLD_AMPLITUDE: 0.7 V
MDC_IDC_MSMT_LEADCHNL_RA_PACING_THRESHOLD_PULSEWIDTH: 0.4 MS
MDC_IDC_MSMT_LEADCHNL_RA_SENSING_INTR_AMPL: 4.5 MV
MDC_IDC_MSMT_LEADCHNL_RV_IMPEDANCE_VALUE: 351 OHM
MDC_IDC_MSMT_LEADCHNL_RV_PACING_THRESHOLD_AMPLITUDE: 1.1 V
MDC_IDC_MSMT_LEADCHNL_RV_PACING_THRESHOLD_PULSEWIDTH: 0.4 MS
MDC_IDC_MSMT_LEADCHNL_RV_SENSING_INTR_AMPL: 20.3 MV
MDC_IDC_PG_IMPLANT_DTM: NORMAL
MDC_IDC_PG_MFG: NORMAL
MDC_IDC_PG_MODEL: NORMAL
MDC_IDC_PG_SERIAL: NORMAL
MDC_IDC_PG_TYPE: NORMAL
MDC_IDC_SESS_CLINIC_NAME: NORMAL
MDC_IDC_SESS_DTM: NORMAL
MDC_IDC_SESS_TYPE: NORMAL
MDC_IDC_SET_BRADY_AT_MODE_SWITCH_MODE: NORMAL
MDC_IDC_SET_BRADY_AT_MODE_SWITCH_RATE: 170 {BEATS}/MIN
MDC_IDC_SET_BRADY_LOWRATE: 70 {BEATS}/MIN
MDC_IDC_SET_BRADY_MAX_SENSOR_RATE: 130 {BEATS}/MIN
MDC_IDC_SET_BRADY_MAX_TRACKING_RATE: 130 {BEATS}/MIN
MDC_IDC_SET_BRADY_MODE: NORMAL
MDC_IDC_SET_BRADY_PAV_DELAY_HIGH: 200 MS
MDC_IDC_SET_BRADY_PAV_DELAY_LOW: 250 MS
MDC_IDC_SET_BRADY_SAV_DELAY_HIGH: 200 MS
MDC_IDC_SET_BRADY_SAV_DELAY_LOW: 250 MS
MDC_IDC_SET_LEADCHNL_RA_PACING_AMPLITUDE: 1.8 V
MDC_IDC_SET_LEADCHNL_RA_PACING_CAPTURE_MODE: NORMAL
MDC_IDC_SET_LEADCHNL_RA_PACING_POLARITY: NORMAL
MDC_IDC_SET_LEADCHNL_RA_PACING_PULSEWIDTH: 0.4 MS
MDC_IDC_SET_LEADCHNL_RA_SENSING_ADAPTATION_MODE: NORMAL
MDC_IDC_SET_LEADCHNL_RA_SENSING_POLARITY: NORMAL
MDC_IDC_SET_LEADCHNL_RA_SENSING_SENSITIVITY: 0.25 MV
MDC_IDC_SET_LEADCHNL_RV_PACING_AMPLITUDE: NORMAL
MDC_IDC_SET_LEADCHNL_RV_PACING_CAPTURE_MODE: NORMAL
MDC_IDC_SET_LEADCHNL_RV_PACING_POLARITY: NORMAL
MDC_IDC_SET_LEADCHNL_RV_PACING_PULSEWIDTH: 0.4 MS
MDC_IDC_SET_LEADCHNL_RV_SENSING_ADAPTATION_MODE: NORMAL
MDC_IDC_SET_LEADCHNL_RV_SENSING_POLARITY: NORMAL
MDC_IDC_SET_LEADCHNL_RV_SENSING_SENSITIVITY: 1.5 MV
MDC_IDC_SET_ZONE_DETECTION_INTERVAL: 375 MS
MDC_IDC_SET_ZONE_STATUS: NORMAL
MDC_IDC_SET_ZONE_TYPE: NORMAL
MDC_IDC_SET_ZONE_VENDOR_TYPE: NORMAL
MDC_IDC_STAT_AT_BURDEN_PERCENT: 1 %
MDC_IDC_STAT_AT_DTM_END: NORMAL
MDC_IDC_STAT_AT_DTM_START: NORMAL
MDC_IDC_STAT_AT_MODE_SW_COUNT: 31
MDC_IDC_STAT_BRADY_DTM_END: NORMAL
MDC_IDC_STAT_BRADY_DTM_START: NORMAL
MDC_IDC_STAT_BRADY_RA_PERCENT_PACED: 99 %
MDC_IDC_STAT_BRADY_RV_PERCENT_PACED: 0 %
MDC_IDC_STAT_EPISODE_RECENT_COUNT: 0
MDC_IDC_STAT_EPISODE_RECENT_COUNT: 0
MDC_IDC_STAT_EPISODE_RECENT_COUNT: 76
MDC_IDC_STAT_EPISODE_RECENT_COUNT_DTM_END: NORMAL
MDC_IDC_STAT_EPISODE_RECENT_COUNT_DTM_START: NORMAL
MDC_IDC_STAT_EPISODE_TYPE: NORMAL
MDC_IDC_STAT_EPISODE_VENDOR_TYPE: NORMAL
MDC_IDC_STAT_EPISODE_VENDOR_TYPE: NORMAL
P AXIS - MUSE: 37 DEGREES
PR INTERVAL - MUSE: 110 MS
QRS DURATION - MUSE: 86 MS
QT - MUSE: 406 MS
QTC - MUSE: 438 MS
R AXIS - MUSE: 50 DEGREES
SYSTOLIC BLOOD PRESSURE - MUSE: NORMAL MMHG
T AXIS - MUSE: -11 DEGREES
VENTRICULAR RATE- MUSE: 70 BPM

## 2024-06-14 PROCEDURE — 99214 OFFICE O/P EST MOD 30 MIN: CPT | Performed by: INTERNAL MEDICINE

## 2024-06-14 PROCEDURE — 99214 OFFICE O/P EST MOD 30 MIN: CPT

## 2024-06-14 PROCEDURE — G0463 HOSPITAL OUTPT CLINIC VISIT: HCPCS | Performed by: INTERNAL MEDICINE

## 2024-06-14 PROCEDURE — 93280 PM DEVICE PROGR EVAL DUAL: CPT | Performed by: INTERNAL MEDICINE

## 2024-06-14 PROCEDURE — 93000 ELECTROCARDIOGRAM COMPLETE: CPT | Performed by: INTERNAL MEDICINE

## 2024-06-14 PROCEDURE — G2211 COMPLEX E/M VISIT ADD ON: HCPCS

## 2024-06-14 PROCEDURE — G2211 COMPLEX E/M VISIT ADD ON: HCPCS | Performed by: INTERNAL MEDICINE

## 2024-06-14 RX ORDER — ANASTROZOLE 1 MG/1
1 TABLET ORAL DAILY
Qty: 90 TABLET | Refills: 3 | Status: SHIPPED | OUTPATIENT
Start: 2024-06-14

## 2024-06-14 ASSESSMENT — PAIN SCALES - GENERAL: PAINLEVEL: NO PAIN (0)

## 2024-06-14 NOTE — PATIENT INSTRUCTIONS
It was a pleasure taking part in your care today:    - Follow up in 1 year. Report new palpitations or racing heart rates.  - Continue sotalol and warfarin  Continue regular device checks    Please call the [facility] clinic with any questions or concerns at [phone].     Emma Kaplan PA-C

## 2024-06-14 NOTE — LETTER
6/14/2024    Krista Molina, DO  [address]  [place] [address]    RE: Angeles Can       Dear Colleague,     I had the pleasure of seeing Angeles Can in the [facility].    HEART CARE ENCOUNTER CONSULTATON NOTE      [facility]  [phone]      Assessment/Recommendations   Assessment:   Atrial fibrillation: Continues sotalol 80 mg daily, warfarin with therapeutic INR  - Increased mode switches over the last year showing AF with RVR primarily  bpm, although maintains less than 1% burden of atrial fibrillation.   - Almost entirely asymptomatic except for 42-minute episode of atrial fibrillation 12/26/2023 after taking Nyquil, recalls palpitations.  Denies any chest pains, shortness of breath.  Sinus node dysfunction s/p cardiac pacemaker: Normal device function, AP 99% <1%, increased VHR/VHR showing AT/AF/NSVT  Hyperlipidemia: Atorvastatin 20 mg  Hypertension: Controlled on amlodipine 2.5 mg, clonidine 0.1 mg, losartan 100 mg  Mild aortic stenosis: Echo 8/2022, continue to monitor    Plan:   EKG today personally reviewed - atrial paced rhythm with Qtc 438 ms  Continue sotalol grams daily and warfarin  We discussed addition of low-dose metoprolol to control episodes of RVR.  After discussion patient elects to continue current medications and monitor device checks for increasing burden/RVR.  With continued low burden of atrial fibrillation we will continue to monitor device checks at this time.  Instructed patient to notify of development of palpitations/racing heart rates.  We discussed opportunities for future rhythm management such as increased sotalol, ablation.  At this time patient would prefer medication management to procedural if recommended in the future  Continue antihypertensive and hyperlipidemia regimens        Follow up in 1 year or sooner as needed     History of Present Illness/Subjective    HPI: Angeles Can is a 83 year  "old female with PMHx of atrial fibrillation, sinus node dysfunction with cardiac pacemaker, HLD, HTN, mild aortic stenosis presents for follow up. Episode 42 minutes of atrial fibrillation last December, overall atrial fibrillation burden less than 1%, ventricular rates greater than 120 bpm 50-60% of the time.    She reports feeling well with no concerns today.  She is continuing to take her medications regularly.  When asked about palpitations she recalls 1 episode around Christmas 2023 where she took NyQuil and had noticeable heartbeat/fast heart rates.  This correlates with 42-minute episode of atrial fibrillation on device check.  Otherwise patient denies palpitations, racing heart rates.  Denies bleeding, blood in stool, nosebleeds on warfarin.    She denies fatigue, lightheadedness, shortness of breath, dyspnea on exertion, orthopnea, PND, chest pain, and lower extremity edema.      Echocardiogram 8/2022 Results:  1. Normal left ventricular size and systolic performance with a visually  estimated ejection fraction of 65-70%.  2. There is mild concentric increase in left ventricular wall thickness.  3. There is very mild aortic stenosis.  4. Normal right ventricular size and systolic performance.  5. There is mild to moderate left atrial enlargement.  6. Right ventricular systolic pressure relative to right atrial pressure is  mildly increased. The pulmonary artery pressure is estimated to be 35-40 mmHg  plus right atrial pressure (the IVC is of normal caliber).     When compared to the prior real-time echocardiogram dated 29 April 2021, there  has been no major interval change.     Physical Examination  Review of Systems   Vitals: /62 (BP Location: Right arm, Patient Position: Sitting, Cuff Size: Adult Large)   Pulse 64   Resp 12   Ht 1.575 m (5' 2\")   Wt 71.7 kg (158 lb)   BMI 28.90 kg/m    BMI= Body mass index is 28.9 kg/m .  Wt Readings from Last 3 Encounters:   06/14/24 71.7 kg (158 lb) "   12/14/23 77 kg (169 lb 12.8 oz)   06/06/23 77.6 kg (171 lb)           ENT/Mouth: membranes moist, no oral lesions or bleeding gums.      EYES:  no scleral icterus, normal conjunctivae       Chest/Lungs:   lungs are clear to auscultation, no rales or wheezing, equal chest wall expansion    Cardiovascular:   Regular. Normal first and second heart sounds with no murmurs, rubs, or gallops; the radial and posterior tibial pulses are intact, absent edema bilaterally        Extremities: no cyanosis or clubbing   Skin: no xanthelasma, warm.    Neurologic: no tremors     Psychiatric: alert and oriented x3, calm        Please refer above for cardiac ROS details.        Medical History  Surgical History Family History Social History   Past Medical History:   Diagnosis Date    Arrhythmia     Paroxysmal Atrial fibrillation    Arthritis     Cancer (H)     Diabetes mellitus (H)     Hypercalcemia     Hyperlipidemia     Hypertension     Obesity      Past Surgical History:   Procedure Laterality Date    CATARACT EXTRACTION, BILATERAL      EP PACEMAKER INSERT N/A 3/2/2018    Procedure: EP Pacemaker Insertion;  Surgeon: Nahun Mina MD;  Location: [facility];  Service:     HC REMOVE TONSILS/ADENOIDS,<13 Y/O      Description: Tonsillectomy With Adenoidectomy;  Recorded: 09/16/2008;    HC REVISE MEDIAN N/CARPAL TUNNEL SURG      Description: Neuroplasty Decompression Median Nerve At Carpal Tunnel;  Recorded: 11/01/2009;  Comments: Right- 1/99; Left- 4/99    DC MASTECTOMY, PARTIAL Right 5/26/2021    Procedure: Right Lumpectomy after Wire Localizaiton;  Surgeon: Rosanna Peng MD;  Location: [facility];  Service: General    US BREAST CORE BIOPSY RIGHT Right 5/3/2021     Family History   Problem Relation Age of Onset    No Known Problems Mother     No Known Problems Father     No Known Problems Sister     No Known Problems Daughter     No Known Problems Maternal Grandmother     No Known Problems Maternal Grandfather      No Known Problems Paternal Grandmother     No Known Problems Paternal Grandfather     No Known Problems Maternal Aunt     No Known Problems Paternal Aunt         Social History     Socioeconomic History    Marital status:      Spouse name: Not on file    Number of children: Not on file    Years of education: Not on file    Highest education level: Not on file   Occupational History    Not on file   Tobacco Use    Smoking status: Former    Smokeless tobacco: Never    Tobacco comments:     quit years ago   Vaping Use    Vaping status: Never Used   Substance and Sexual Activity    Alcohol use: No    Drug use: No    Sexual activity: Not Currently   Other Topics Concern    Not on file   Social History Narrative    Not on file     Social Determinants of Health     Financial Resource Strain: Low Risk  (8/8/2023)    Received from [facility] [employer] [facility], [facility]    Financial Resource Strain     Difficulty of Paying Living Expenses: 3     Difficulty of Paying Living Expenses: Not on file   Food Insecurity: No Food Insecurity (8/8/2023)    Received from [facility] [employer] [facility] [employer], [facility] [employer] [facility]    Food Insecurity     Worried About Running Out of Food in the Last Year: 1   Transportation Needs: No Transportation Needs (8/8/2023)    Received from [facility] [employer] [facility], [facility]    Transportation Needs     Lack of Transportation (Medical): 1   Physical Activity: Not on file   Stress: Not on file   Social Connections: Socially Integrated (8/8/2023)    Received from [facility] [facility], [facility]    Social Connections     Frequency of Communication with Friends and Family: 0   Interpersonal Safety: Not on file   Housing Stability: Low Risk  (8/8/2023)    [facility], [facility]    Housing Stability     Unable to Pay for Housing in the Last Year: 1           Medications  Allergies   Current Outpatient Medications   Medication Sig Dispense Refill    allopurinol (ZYLOPRIM) 300 MG tablet [ALLOPURINOL (ZYLOPRIM) 300 MG TABLET] Take 1 tablet by mouth once daily. 90 tablet 3    amLODIPine (NORVASC) 2.5 MG tablet Take 1 tablet (2.5 mg) by mouth daily 90 tablet 3    anastrozole (ARIMIDEX) 1 MG tablet Take 1 tablet (1 mg) by mouth daily 90 tablet 1    atorvastatin (LIPITOR) 20 MG tablet Take 1 tablet (20 mg) by mouth daily 90 tablet 3    blood glucose test strips [BLOOD GLUCOSE TEST STRIPS] Use 1 each As Directed daily. 100 strip 3    cholecalciferol, vitamin D3, 1,000 unit tablet [CHOLECALCIFEROL, VITAMIN D3, 1,000 UNIT TABLET] Take 1,000 Units by mouth daily.      cloNIDine (CATAPRES) 0.1 MG tablet Take 1 tablet (0.1 mg) by mouth 2 times daily 180 tablet 3    furosemide (LASIX) 40 MG tablet Take 1 tablet (40 mg) by mouth daily. 90 tablet 2    losartan (COZAAR) 100 MG tablet Take 1 tablet (100 mg) by mouth daily 90 tablet 3    magnesium oxide (MAG-OX) 400 mg tablet [MAGNESIUM OXIDE (MAG-OX) 400 MG TABLET] Take 400 mg by mouth daily.       metFORMIN (GLUCOPHAGE XR) 500 MG 24 hr tablet Take 1 tablet (500 mg) by mouth daily (with dinner) 90 tablet 3    peg 400-propylene glycol (SYSTANE) 0.4-0.3 % Drop Place 2 drops into both eyes as needed      sodium chloride (KATALINA 128) 5 % ophthalmic solution 1-2 drops every 3 hours as needed for dry eyes      sotalol (BETAPACE) 80 MG tablet Take 1 tablet (80 mg total) by mouth daily. 90 tablet 3    warfarin ANTICOAGULANT (COUMADIN) 2.5 MG tablet Take 1/2 tablet (1.25mg) to 1 tablet (2.5mg) by mouth daily, as directed.  Adjust dose based on INR results. 90 tablet 1       Allergies   Allergen Reactions    Metformin Diarrhea    Hydrochlorothiazide Unknown     Annotation: hyperuricemia made worse by  HCTZ   Causes pts gout          Lab Results    Chemistry/lipid CBC Cardiac Enzymes/BNP/TSH/INR   Recent Labs   Lab Test 12/07/22  1106   CHOL 164   HDL 60   LDL 73   TRIG 157*     Recent Labs   Lab Test 12/07/22  1106 08/16/22  1834 06/07/22  1031   LDL 73 86 82     Recent Labs   Lab Test 12/07/22  1106      POTASSIUM 4.2   CHLORIDE 101   CO2 27   *   BUN 35.5*   CR 1.07*   GFRESTIMATED 51*   GUILLERMINA 10.5*     Recent Labs   Lab Test 12/07/22  1106 08/18/22  0453 08/16/22  1834   CR 1.07* 0.89 1.36*     Recent Labs   Lab Test 12/07/22  1106 08/16/22  1530 06/07/22  1031   A1C 7.0* 6.8* 6.8*          Recent Labs   Lab Test 08/16/22  1530   WBC 11.3*   HGB 13.6   HCT 40.1   MCV 91        Recent Labs   Lab Test 08/16/22  1530 06/07/22  1031 05/24/21  1303   HGB 13.6 13.0 13.2    Recent Labs   Lab Test 08/16/22  1834   TROPONINI <0.01     Recent Labs   Lab Test 03/09/21  1742   BNP 49     Recent Labs   Lab Test 07/16/18  1538   TSH 3.57     Recent Labs   Lab Test 02/28/23  1138 01/25/23  1138 12/28/22  1138   INR 3.5* 2.1* 2.9*          This note has been dictated using voice recognition software. Any grammatical, typographical, or context distortions are unintentional and inherent to the software    Emma Kaplan PA-C    Thank you for allowing me to participate in the care of your patient.      Sincerely,     Emma Sanchez PA-C     [facility]  cc:   Erasmo Boss MD  [address]  [place] [address]

## 2024-06-14 NOTE — LETTER
"6/14/2024      Angeles Can  [address]  [place] [address]      Dear Colleague,    Thank you for referring your patient, Angeles Can, to the [facility]. Please see a copy of my visit note below.    Oncology Rooming Note    June 14, 2024 3:23 PM   Angeles Can is a 83 year old female who presents for:    Chief Complaint   Patient presents with     Oncology Clinic Visit     Malignant neoplasm of lower-inner quadrant of right breast of female, estrogen receptor positive (H)  Invasive ductal carcinoma of breast, female, right (H)  Hyperplastic Polyp Of The Large Intestine       Initial Vitals: /62   Pulse 70   Temp 99.3  F (37.4  C)   Resp 18   Ht 1.575 m (5' 2\")   Wt 72 kg (158 lb 11.2 oz)   SpO2 94%   BMI 29.03 kg/m   Estimated body mass index is 29.03 kg/m  as calculated from the following:    Height as of this encounter: 1.575 m (5' 2\").    Weight as of this encounter: 72 kg (158 lb 11.2 oz). Body surface area is 1.77 meters squared.  No Pain (0) Comment: Data Unavailable   No LMP recorded. Patient is postmenopausal.  Allergies reviewed: Yes  Medications reviewed: Yes    Medications: yes. Yes   Pharmacy name entered into [employer]: [id number] - [place], [place] - [address]    Frailty Screening:   Is the patient here for a new oncology consult visit in cancer care? 2. No      Clinical concerns: How does she know that the pain in the elbow is not cancer. How do you know if you have bone cancer.       Eunice Canchola LPN               [facility] Hematology and Oncology Progress Note    Patient: Angeles Can  MRN: [id number]  Date of Service: Jun 14, 2024         Reason for Visit    Chief Complaint   Patient presents with     Oncology Clinic Visit     Malignant neoplasm of lower-inner quadrant of right breast of female, estrogen receptor positive (H)  Invasive ductal carcinoma of breast, female, right (H)  Hyperplastic " Polyp Of The Large Intestine         Assessment and Plan     Cancer Staging   Malignant neoplasm of lower-inner quadrant of right breast of female, estrogen receptor positive (H)  Staging form: Breast, AJCC 8th Edition  - Pathologic stage from 6/16/2021: Stage Unknown (pT1c, pNX, cM0, G1, ER+, ID+, HER2-) - Signed by Etelvina Merlos MD on 11/29/2021  - Clinical: No stage assigned - Unsigned      ECOG Performance    0 - Independent     Pain  Pain Score: No Pain (0)    #.  History of pT1c Nx cM0 invasive ductal carcinoma of the lower inner quadrant of the right breast.  G1, ER+, ID+, HER-2/alvin- (1+ by IHC).   She is clinically well without signs and symptoms suggestive for breast cancer recurrence. She tolerates current aromatase inhibitor therapy well without major intolerable side effects.  Plan is to complete 5 years of adjuvant endocrine therapy.   She does not have any indication for systemic imaging.  I discussed that we will obtain imaging with clinical signs and symptoms concerning for breast cancer recurrence, but not routinely.   She is advised to continue annual screening mammogram, next due in 5/2025.   I recommended continue clinical surveillance with follow up clinical exam in 6 months.  She is advised to call me sooner with any concerns.    #. Bone health   I recommended her to continue calcium (from diet)/vitamin D and weightbearing exercises for bone health.  Recommended DEXA scan in 2025. Will continue to assess the indication for bisphosphonate therapy.    #. Primary hyperparathyroidism   Calcium is graudally rising. I advised her follow up with endo.    The longitudinal plan of care for the diagnosis(es)/condition(s) as documented were addressed during this visit. Due to the added complexity in care, I will continue to support Frida in the subsequent management and with ongoing continuity of care.    Encounter Diagnoses:    Problem List Items Addressed This Visit          Oncology Diagnoses     Invasive ductal carcinoma of breast, female, right (H)    Relevant Medications    anastrozole (ARIMIDEX) 1 MG tablet            CC: Freddy Lubin MD   ______________________________________________________________________________  Diagnosis  4/22/2021-screening mammogram showed focal asymmetry within the right breast at 4 o'clock position.  4/28/2021-diagnostic mammogram and ultrasound showed asymmetry with microlobulated margins and ultrasound confirmed 7 x 9 x 9 mm hypoechoic lesion at the same location.  5/3/2021-ultrasound-guided core needle biopsy of the right breast mass and it showed invasive ductal carcinoma and DCIS.  ER positive> 95%, [place] positive> 95%, HER-2/alvin negative by IHC (1+).  5/26/201- Final path: Invasive ductal carcinoma, grade 1, 15 mm, negative margins.  There is associated DCIS, solid and focal cribriform, no necrosis and no microcalcification, negative margins. pT1c Nx.    Treatment to date  5/26/2021-underwent right breast lumpectomy by Dr. Peng.  6/2021-started adjuvant anastrozole.  Due to drug interaction with her cardiac medicines, she did not switch to tamoxifen.    History of Present Illness    Ms. Angeles Skinnerer here for follow-up.    She has left elbow pain started a few weeks ago.  She was wondering if could be signs of cancer in the bone.  She does not have injury.  Otherwise, she is doing well.  She takes anastrozole regularly.  She does not have any intolerable side effects.    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     Review of systems  Apart from describing in HPI, the remainder of comprehensive ROS was negative.    Past History    Past Medical History:   Diagnosis Date     Arrhythmia     Paroxysmal Atrial fibrillation     Arthritis      Cancer (H)      Diabetes mellitus (H)      Hypercalcemia      Hyperlipidemia      Hypertension      Obesity        Past Surgical History:   Procedure Laterality Date     CATARACT EXTRACTION, BILATERAL       EP  "PACEMAKER INSERT N/A 3/2/2018    Procedure: EP Pacemaker Insertion;  Surgeon: Nahun Mina MD;  Location: [facility];  Service:      HC REMOVE TONSILS/ADENOIDS,<13 Y/O      Description: Tonsillectomy With Adenoidectomy;  Recorded: 09/16/2008;     HC REVISE MEDIAN N/CARPAL TUNNEL SURG      Description: Neuroplasty Decompression Median Nerve At Carpal Tunnel;  Recorded: 11/01/2009;  Comments: Right- 1/99; Left- 4/99     NH MASTECTOMY, PARTIAL Right 5/26/2021    Procedure: Right Lumpectomy after Wire Localizaiton;  Surgeon: Rosanna Peng MD;  Location: [facility];  Service: General     US BREAST CORE BIOPSY RIGHT Right 5/3/2021         Physical Exam    /62   Pulse 70   Temp 99.3  F (37.4  C)   Resp 18   Ht 1.575 m (5' 2\")   Wt 72 kg (158 lb 11.2 oz)   SpO2 94%   BMI 29.03 kg/m      General: alert, awake, not in acute distress  HEENT: Head: Normal, normocephalic, atraumatic.  Eye: Normal external eye, conjunctiva, lids cornea, DIVYA.  Pharynx: Normal buccal mucosa. Normal pharynx.  Neck / Thyroid: Supple, no masses, nodes, nodules or enlargement.  Lymphatics: No abnormally enlarged lymph nodes.  Chest: Normal chest wall and respirations. Clear to auscultation.  Breasts: No discrete palpable abnormalities.  Pendulous breast bilaterally.  Heart: S1 S2 RRR.   Abdomen: abdomen is soft without significant tenderness, masses, organomegaly or guarding  Extremities: normal strength, tone, and muscle mass  Skin: normal. no rash or abnormalities  CNS: non focal.    Lab Results    Recent Results (from the past 168 hour(s))   Cardiac Device Check - In Clinic   Result Value Ref Range    Date Time Interrogation Session [id number]     Implantable Pulse Generator  [employer]     Implantable Pulse Generator Model L331 ACCOLADE MRI EL     Implantable Pulse Generator Serial Number [id number]     Type Interrogation Session In Clinic     Clinic Name [facility]     " Implantable Pulse Generator Type Pacemaker     Implantable Pulse Generator Implant Date 20180302     Implantable Lead  [employer]     Implantable Lead Model 4469 Fineline II Sterox EZ     Implantable Lead Serial Number [id number]     Implantable Lead Implant Date 20180302     Implantable Lead Polarity Type Bipolar Lead     Implantable Lead Location Detail 1 UNKNOWN     Implantable Lead Location Right Atrium     Implantable Lead Connection Status Connected     Implantable Lead  [employer]     Implantable Lead Model 4470 Fineline II Sterox EZ     Implantable Lead Serial Number [id number]     Implantable Lead Implant Date 20180302     Implantable Lead Polarity Type Bipolar Lead     Implantable Lead Location Detail 1 UNKNOWN     Implantable Lead Location Right Ventricle     Implantable Lead Connection Status Connected     Frankie Setting Mode (NBG Code) DDDR     Frankie Setting Lower Rate Limit 70 [beats]/min    [place] Setting Maximum Tracking Rate 130 [beats]/min    [place]ie Setting Maximum Sensor Rate 130 [beats]/min    [place]ie Setting THO Delay Low 250 ms    [place]ie Setting PAV Delay Low 250 ms    [place] Setting PAV Delay High 200 ms    [place]ie Setting THO Delay High 200 ms    [place]ie Setting AT Mode Switch Rate 170 [beats]/min    [place] Setting AT Mode Switch Mode DDIR     Lead Channel Setting Sensing Polarity Bipolar     Lead Channel Setting Sensing Sensitivity 0.25 mV    Lead Channel Setting Sensing Adaptation Mode Adaptive     Lead Channel Setting Sensing Polarity Bipolar     Lead Channel Setting Sensing Sensitivity 1.5 mV    Lead Channel Setting Sensing Adaptation Mode Adaptive     Lead Channel Setting Pacing Polarity Bipolar     Lead Channel Setting Pacing Pulse Width 0.4 ms    Lead Channel Setting Pacing Amplitude 1.8 V    Lead Channel Setting Pacing Capture Mode Fixed Pacing     Lead Channel Setting Pacing Polarity Bipolar     Lead Channel Setting Pacing Pulse Width 0.4 ms    Lead  Channel Setting Pacing Amplitude 1.9A     Lead Channel Setting Pacing Capture Mode Adaptive     Zone Setting Type Category VT     Zone Setting Vendor Type Category VT     Zone Setting Status Monitor     Zone Setting Detection Interval 375 ms    Lead Channel Impedance Value 514 ohm    Lead Channel Sensing Intrinsic Amplitude 4.5 mV    Lead Channel Pacing Threshold Amplitude 0.7 V    Lead Channel Pacing Threshold Pulse Width 0.4 ms    Lead Channel Impedance Value 351 ohm    Lead Channel Sensing Intrinsic Amplitude 20.3 mV    Lead Channel Pacing Threshold Amplitude 1.1 V    Lead Channel Pacing Threshold Pulse Width 0.4 ms    Battery Date Time of Measurements [id number]     Battery Status Middle of Service     Battery Remaining Longevity 90 mo    Battery Remaining Percentage 87 %    Frankie Statistic Date Time Start [id number]     Frankie Statistic Date Time End [id number]     Frankie Statistic RA Percent Paced 99 %    Frankie Statistic RV Percent Paced 0 %    Atrial Tachy Statistic Date Time Start 73232049980567     Atrial Tachy Statistic Date Time End 05629148081452     Atrial Tachy Statistic AT/AF [place] Percent 1 %    Atrial Tachy Statistic Number Of Mode Switches 31     Episode Statistic Recent Count 0     Episode Statistic Type Category Other     Episode Statistic Recent Count 76     Episode Statistic Type Category VT     Episode Statistic Vendor Type Category NSVT     Episode Statistic Recent Count 0     Episode Statistic Type Category VT     Episode Statistic Vendor Type Category VT     Episode Statistic Recent Date Time Start [id number]     Episode Statistic Recent Date Time End [id number]     Episode Statistic Recent Date Time Start [id number]     Episode Statistic Recent Date Time End [id number]     Episode Statistic Recent Date Time Start [id number]     Episode Statistic Recent Date Time End [id number]    ECG 12-LEAD WITH MUSE (LHE)   Result Value Ref Range    Systolic Blood  Pressure  mmHg    Diastolic Blood Pressure  mmHg    Ventricular Rate 70 BPM    Atrial Rate 70 BPM    SC Interval 110 ms    QRS Duration 86 ms     ms    QTc 438 ms    P Axis 37 degrees    R AXIS 50 degrees    T Axis -11 degrees    Interpretation ECG       Electronic atrial pacemaker  Abnormal ECG  When compared with ECG of 06-JUN-2023 14:53,  No significant change was found  Confirmed by ELENI VÁSQUEZ MD LOC:JN ([id number]) on 6/14/2024 3:59:39 PM         Imaging    Cardiac Device Check - In Clinic    Result Date: 6/14/2024  Encounter Type: Patient seen in clinic for annual device evaluation and iterative programming, followed by office visit with Emma Kaplan PA-C. Device: [employer] Accolade (D) Pacemaker Pacing %/Programmed: AP 99% and  <1% in DDDR  bpm mode Lead(s): Stable Battery longevity: Estimated at 7.5 years remaining Presenting rhythm: AP VS at 70 bpm Underlying rhythm: SB 30's bpm Heart rates:  bpm but primarily 70-90 bpm per histograms Atrial High rates: 31 mode switches (most episodes <1 minute), burden of <1%, V-rates >/=120 bpm ~ 50%.  Last episode on 5/25/2024 & longest episode on 12/26/2023 (duration ~ 42 minutes).  Available EGM's suggest AT/AF with V-rates  bpm.  Pt denies symptoms.  Anticoagulant: Coumadin                    Antiarrhythmic: Sotalol Ventricular High rates: 80 VHR episodes- available EGM's suggest RVR during AF and paroxysmal SVT with V-rates 140-201 bpm & longest duration ~ 2 minutes.  Pt stated that she felt her heart beating faster during episodes on 12/26/2023.  Comments: Normal device function.  RA amplitude changed from Trend 1.5V to Fixed 1.8V.  Plan: Next scheduled remote transmission on September 20th, 2024- patient is aware & reminder letter was given to her.  Mellisa Navarro RN Device follow up for the entirety of having the Pacemaker, based on best practices determined by Heart [employer] and in Compliance with Medicare  guidelines. Continue remote device monitoring per patient plan.  I have reviewed and interpreted the device interrogation, settings, programming, and encounter summary. The device is functioning within normal device parameters. I agree with the current findings, assessment and plan.    MA Screen Bilateral w/Albert    Result Date: 5/23/2024  BILATERAL FULL FIELD DIGITAL SCREENING MAMMOGRAM WITH TOMOSYNTHESIS Performed on: 5/23/24 Compared to: 05/18/2023, 05/17/2022, 04/22/2021, and 07/22/2019 Technique:  This study was evaluated with the assistance of Computer-Aided Detection.  Breast Tomosynthesis was used in interpretation. Findings: The breasts are heterogeneously dense, which may obscure small masses.  There are breast conservation changes in the right breast. There is no radiographic evidence of malignancy.     IMPRESSION: ACR BI-RADS Category 2: Benign BREAST CANCER SCREENING RECOMMENDATION: Routine yearly mammography beginning at age 40 or as discussed with your provider. The results and recommendations of this examination will be communicated to the patient. Severo Fenton      30 minutes spent by me on the date of the encounter doing chart review, history and exam, documentation and further activities as noted above.    Signed by: Etelvina Merlos MD      Again, thank you for allowing me to participate in the care of your patient.        Sincerely,        Etelvina Merlos MD

## 2024-06-14 NOTE — PROGRESS NOTES
[facility] Hematology and Oncology Progress Note    Patient: Angeles Can  MRN: [id number]  Date of Service: Jun 14, 2024         Reason for Visit    Chief Complaint   Patient presents with    Oncology Clinic Visit     Malignant neoplasm of lower-inner quadrant of right breast of female, estrogen receptor positive (H)  Invasive ductal carcinoma of breast, female, right (H)  Hyperplastic Polyp Of The Large Intestine         Assessment and Plan     Cancer Staging   Malignant neoplasm of lower-inner quadrant of right breast of female, estrogen receptor positive (H)  Staging form: Breast, AJCC 8th Edition  - Pathologic stage from 6/16/2021: Stage Unknown (pT1c, pNX, cM0, G1, ER+, MT+, HER2-) - Signed by Etelvina Merlos MD on 11/29/2021  - Clinical: No stage assigned - Unsigned      ECOG Performance    0 - Independent     Pain  Pain Score: No Pain (0)    #.  History of pT1c Nx cM0 invasive ductal carcinoma of the lower inner quadrant of the right breast.  G1, ER+, MT+, HER-2/alvin- (1+ by IHC).   She is clinically well without signs and symptoms suggestive for breast cancer recurrence. She tolerates current aromatase inhibitor therapy well without major intolerable side effects.  Plan is to complete 5 years of adjuvant endocrine therapy.   She does not have any indication for systemic imaging.  I discussed that we will obtain imaging with clinical signs and symptoms concerning for breast cancer recurrence, but not routinely.   She is advised to continue annual screening mammogram, next due in 5/2025.   I recommended continue clinical surveillance with follow up clinical exam in 6 months.  She is advised to call me sooner with any concerns.    #. Bone health   I recommended her to continue calcium (from diet)/vitamin D and weightbearing exercises for bone health.  Recommended DEXA scan in 2025. Will continue to assess the indication for bisphosphonate therapy.    #. Primary hyperparathyroidism   Calcium is  bailee hay. I advised her follow up with jossy.    The longitudinal plan of care for the diagnosis(es)/condition(s) as documented were addressed during this visit. Due to the added complexity in care, I will continue to support Frida in the subsequent management and with ongoing continuity of care.    Encounter Diagnoses:    Problem List Items Addressed This Visit          Oncology Diagnoses    Invasive ductal carcinoma of breast, female, right (H)    Relevant Medications    anastrozole (ARIMIDEX) 1 MG tablet            CC: Freddy Lubin MD   ______________________________________________________________________________  Diagnosis  4/22/2021-screening mammogram showed focal asymmetry within the right breast at 4 o'clock position.  4/28/2021-diagnostic mammogram and ultrasound showed asymmetry with microlobulated margins and ultrasound confirmed 7 x 9 x 9 mm hypoechoic lesion at the same location.  5/3/2021-ultrasound-guided core needle biopsy of the right breast mass and it showed invasive ductal carcinoma and DCIS.  ER positive> 95%, [place] positive> 95%, HER-2/alvin negative by IHC (1+).  5/26/201- Final path: Invasive ductal carcinoma, grade 1, 15 mm, negative margins.  There is associated DCIS, solid and focal cribriform, no necrosis and no microcalcification, negative margins. pT1c Nx.    Treatment to date  5/26/2021-underwent right breast lumpectomy by Dr. Peng.  6/2021-started adjuvant anastrozole.  Due to drug interaction with her cardiac medicines, she did not switch to tamoxifen.    History of Present Illness    Ms. Angeles Skinnerer here for follow-up.    She has left elbow pain started a few weeks ago.  She was wondering if could be signs of cancer in the bone.  She does not have injury.  Otherwise, she is doing well.  She takes anastrozole regularly.  She does not have any intolerable side effects.    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     Review of systems  Apart from  "describing in HPI, the remainder of comprehensive ROS was negative.    Past History    Past Medical History:   Diagnosis Date    Arrhythmia     Paroxysmal Atrial fibrillation    Arthritis     Cancer (H)     Diabetes mellitus (H)     Hypercalcemia     Hyperlipidemia     Hypertension     Obesity        Past Surgical History:   Procedure Laterality Date    CATARACT EXTRACTION, BILATERAL      EP PACEMAKER INSERT N/A 3/2/2018    Procedure: EP Pacemaker Insertion;  Surgeon: Nahun Mina MD;  Location: [facility];  Service:     HC REMOVE TONSILS/ADENOIDS,<13 Y/O      Description: Tonsillectomy With Adenoidectomy;  Recorded: 09/16/2008;    HC REVISE MEDIAN N/CARPAL TUNNEL SURG      Description: Neuroplasty Decompression Median Nerve At Carpal Tunnel;  Recorded: 11/01/2009;  Comments: Right- 1/99; Left- 4/99    MO MASTECTOMY, PARTIAL Right 5/26/2021    Procedure: Right Lumpectomy after Wire Localizaiton;  Surgeon: Rosanna Peng MD;  Location: [facility];  Service: General    US BREAST CORE BIOPSY RIGHT Right 5/3/2021         Physical Exam    /62   Pulse 70   Temp 99.3  F (37.4  C)   Resp 18   Ht 1.575 m (5' 2\")   Wt 72 kg (158 lb 11.2 oz)   SpO2 94%   BMI 29.03 kg/m      General: alert, awake, not in acute distress  HEENT: Head: Normal, normocephalic, atraumatic.  Eye: Normal external eye, conjunctiva, lids cornea, DIVYA.  Pharynx: Normal buccal mucosa. Normal pharynx.  Neck / Thyroid: Supple, no masses, nodes, nodules or enlargement.  Lymphatics: No abnormally enlarged lymph nodes.  Chest: Normal chest wall and respirations. Clear to auscultation.  Breasts: No discrete palpable abnormalities.  Pendulous breast bilaterally.  Heart: S1 S2 RRR.   Abdomen: abdomen is soft without significant tenderness, masses, organomegaly or guarding  Extremities: normal strength, tone, and muscle mass  Skin: normal. no rash or abnormalities  CNS: non focal.    Lab Results    Recent Results (from the past " 168 hour(s))   Cardiac Device Check - In Clinic   Result Value Ref Range    Date Time Interrogation Session [id number]     Implantable Pulse Generator  [employer]     Implantable Pulse Generator Model L331 ACCOLADE MRI EL     Implantable Pulse Generator Serial Number [id number]     Type Interrogation Session In Clinic     Clinic Name [facility]     Implantable Pulse Generator Type Pacemaker     Implantable Pulse Generator Implant Date 20180302     Implantable Lead  [employer]     Implantable Lead Model 4469 Fineline II Sterox EZ     Implantable Lead Serial Number [id number]     Implantable Lead Implant Date 20180302     Implantable Lead Polarity Type Bipolar Lead     Implantable Lead Location Detail 1 UNKNOWN     Implantable Lead Location Right Atrium     Implantable Lead Connection Status Connected     Implantable Lead  [employer]     Implantable Lead Model 4470 Fineline II Sterox EZ     Implantable Lead Serial Number [id number]     Implantable Lead Implant Date 20180302     Implantable Lead Polarity Type Bipolar Lead     Implantable Lead Location Detail 1 UNKNOWN     Implantable Lead Location Right Ventricle     Implantable Lead Connection Status Connected     Frankie Setting Mode (NBG Code) DDDR     Frankie Setting Lower Rate Limit 70 [beats]/min    Frankie Setting Maximum Tracking Rate 130 [beats]/min    Frankie Setting Maximum Sensor Rate 130 [beats]/min    Frankie Setting THO Delay Low 250 ms    Frankie Setting PAV Delay Low 250 ms    Frankie Setting PAV Delay High 200 ms    Frankie Setting THO Delay High 200 ms    Frankie Setting AT Mode Switch Rate 170 [beats]/min    Frankie Setting AT Mode Switch Mode DDIR     Lead Channel Setting Sensing Polarity Bipolar     Lead Channel Setting Sensing Sensitivity 0.25 mV    Lead Channel Setting Sensing Adaptation Mode Adaptive     Lead Channel Setting Sensing Polarity Bipolar     Lead Channel Setting Sensing  Sensitivity 1.5 mV    Lead Channel Setting Sensing Adaptation Mode Adaptive     Lead Channel Setting Pacing Polarity Bipolar     Lead Channel Setting Pacing Pulse Width 0.4 ms    Lead Channel Setting Pacing Amplitude 1.8 V    Lead Channel Setting Pacing Capture Mode Fixed Pacing     Lead Channel Setting Pacing Polarity Bipolar     Lead Channel Setting Pacing Pulse Width 0.4 ms    Lead Channel Setting Pacing Amplitude 1.9A     Lead Channel Setting Pacing Capture Mode Adaptive     Zone Setting Type Category VT     Zone Setting Vendor Type Category VT     Zone Setting Status Monitor     Zone Setting Detection Interval 375 ms    Lead Channel Impedance Value 514 ohm    Lead Channel Sensing Intrinsic Amplitude 4.5 mV    Lead Channel Pacing Threshold Amplitude 0.7 V    Lead Channel Pacing Threshold Pulse Width 0.4 ms    Lead Channel Impedance Value 351 ohm    Lead Channel Sensing Intrinsic Amplitude 20.3 mV    Lead Channel Pacing Threshold Amplitude 1.1 V    Lead Channel Pacing Threshold Pulse Width 0.4 ms    Battery Date Time of Measurements [id number]     Battery Status Middle of Service     Battery Remaining Longevity 90 mo    Battery Remaining Percentage 87 %    Frankie Statistic Date Time Start 20230606000000     Frankie Statistic Date Time End 20240614000000     Frankie Statistic RA Percent Paced 99 %    Frankie Statistic RV Percent Paced 0 %    Atrial Tachy Statistic Date Time Start 20230616000000     Atrial Tachy Statistic Date Time End 20240614000000     Atrial Tachy Statistic AT/AF [place] Percent 1 %    Atrial Tachy Statistic Number Of Mode Switches 31     Episode Statistic Recent Count 0     Episode Statistic Type Category Other     Episode Statistic Recent Count 76     Episode Statistic Type Category VT     Episode Statistic Vendor Type Category NSVT     Episode Statistic Recent Count 0     Episode Statistic Type Category VT     Episode Statistic Vendor Type Category VT     Episode Statistic Recent Date Time Start  [id number]     Episode Statistic Recent Date Time End [id number]     Episode Statistic Recent Date Time Start [id number]     Episode Statistic Recent Date Time End [id number]     Episode Statistic Recent Date Time Start 05086435758010     Episode Statistic Recent Date Time End [id number]    ECG 12-LEAD WITH MUSE (LHE)   Result Value Ref Range    Systolic Blood Pressure  mmHg    Diastolic Blood Pressure  mmHg    Ventricular Rate 70 BPM    Atrial Rate 70 BPM    ID Interval 110 ms    QRS Duration 86 ms     ms    QTc 438 ms    P Axis 37 degrees    R AXIS 50 degrees    T Axis -11 degrees    Interpretation ECG       Electronic atrial pacemaker  Abnormal ECG  When compared with ECG of 06-JUN-2023 14:53,  No significant change was found  Confirmed by ELENI VÁSQUEZ MD LOC:JN ([id number]) on 6/14/2024 3:59:39 PM         Imaging    Cardiac Device Check - In Clinic    Result Date: 6/14/2024  Encounter Type: Patient seen in clinic for annual device evaluation and iterative programming, followed by office visit with Emma Kaplan PA-C. Device: [employer] Accolade (D) Pacemaker Pacing %/Programmed: AP 99% and  <1% in DDDR  bpm mode Lead(s): Stable Battery longevity: Estimated at 7.5 years remaining Presenting rhythm: AP VS at 70 bpm Underlying rhythm: SB 30's bpm Heart rates:  bpm but primarily 70-90 bpm per histograms Atrial High rates: 31 mode switches (most episodes <1 minute), burden of <1%, V-rates >/=120 bpm ~ 50%.  Last episode on 5/25/2024 & longest episode on 12/26/2023 (duration ~ 42 minutes).  Available EGM's suggest AT/AF with V-rates  bpm.  Pt denies symptoms.  Anticoagulant: Coumadin                    Antiarrhythmic: Sotalol Ventricular High rates: 80 VHR episodes- available EGM's suggest RVR during AF and paroxysmal SVT with V-rates 140-201 bpm & longest duration ~ 2 minutes.  Pt stated that she felt her heart beating faster during episodes on 12/26/2023.   Comments: Normal device function.  RA amplitude changed from Trend 1.5V to Fixed 1.8V.  Plan: Next scheduled remote transmission on September 20th, 2024- patient is aware & reminder letter was given to her.  Mellisa Navarro RN Device follow up for the entirety of having the Pacemaker, based on best practices determined by Heart Rhythm Society and in Compliance with Medicare guidelines. Continue remote device monitoring per patient plan.  I have reviewed and interpreted the device interrogation, settings, programming, and encounter summary. The device is functioning within normal device parameters. I agree with the current findings, assessment and plan.    MA Screen Bilateral w/Albert    Result Date: 5/23/2024  BILATERAL FULL FIELD DIGITAL SCREENING MAMMOGRAM WITH TOMOSYNTHESIS Performed on: 5/23/24 Compared to: 05/18/2023, 05/17/2022, 04/22/2021, and 07/22/2019 Technique:  This study was evaluated with the assistance of Computer-Aided Detection.  Breast Tomosynthesis was used in interpretation. Findings: The breasts are heterogeneously dense, which may obscure small masses.  There are breast conservation changes in the right breast. There is no radiographic evidence of malignancy.     IMPRESSION: ACR BI-RADS Category 2: Benign BREAST CANCER SCREENING RECOMMENDATION: Routine yearly mammography beginning at age 40 or as discussed with your provider. The results and recommendations of this examination will be communicated to the patient. Severo Fenton      30 minutes spent by me on the date of the encounter doing chart review, history and exam, documentation and further activities as noted above.    Signed by: Etelvina Merlos MD

## 2024-06-14 NOTE — PROGRESS NOTES
"Oncology Rooming Note    June 14, 2024 3:23 PM   Angeles Can is a 83 year old female who presents for:    Chief Complaint   Patient presents with    Oncology Clinic Visit     Malignant neoplasm of lower-inner quadrant of right breast of female, estrogen receptor positive (H)  Invasive ductal carcinoma of breast, female, right (H)  Hyperplastic Polyp Of The Large Intestine       Initial Vitals: /62   Pulse 70   Temp 99.3  F (37.4  C)   Resp 18   Ht 1.575 m (5' 2\")   Wt 72 kg (158 lb 11.2 oz)   SpO2 94%   BMI 29.03 kg/m   Estimated body mass index is 29.03 kg/m  as calculated from the following:    Height as of this encounter: 1.575 m (5' 2\").    Weight as of this encounter: 72 kg (158 lb 11.2 oz). Body surface area is 1.77 meters squared.  No Pain (0) Comment: Data Unavailable   No LMP recorded. Patient is postmenopausal.  Allergies reviewed: Yes  Medications reviewed: Yes    Medications: yes. Yes   Pharmacy name entered into [employer]: [id number] [place] [facility] [address] [facility]    Frailty Screening:   Is the patient here for a new oncology consult visit in cancer care? 2. No      Clinical concerns: How does she know that the pain in the elbow is not cancer. How do you know if you have bone cancer.       Eunice Canchola LPN             "

## 2024-07-05 DIAGNOSIS — I48.0 PAROXYSMAL ATRIAL FIBRILLATION (H): ICD-10-CM

## 2024-07-08 RX ORDER — SOTALOL HYDROCHLORIDE 80 MG/1
TABLET ORAL
Qty: 90 TABLET | Refills: 3 | Status: SHIPPED | OUTPATIENT
Start: 2024-07-08

## 2024-09-20 ENCOUNTER — ANCILLARY PROCEDURE (OUTPATIENT)
Dept: CARDIOLOGY | Facility: CLINIC | Age: 84
End: 2024-09-20
Attending: INTERNAL MEDICINE
Payer: MEDICARE

## 2024-09-20 DIAGNOSIS — I49.5 SICK SINUS SYNDROME (H): ICD-10-CM

## 2024-09-20 DIAGNOSIS — Z95.0 CARDIAC PACEMAKER IN SITU: ICD-10-CM

## 2024-09-20 LAB
MDC_IDC_EPISODE_DTM: NORMAL
MDC_IDC_EPISODE_DURATION: 13 S
MDC_IDC_EPISODE_DURATION: 150 S
MDC_IDC_EPISODE_DURATION: 2 S
MDC_IDC_EPISODE_DURATION: 44 S
MDC_IDC_EPISODE_DURATION: 483 S
MDC_IDC_EPISODE_DURATION: 7 S
MDC_IDC_EPISODE_ID: NORMAL
MDC_IDC_EPISODE_TYPE: NORMAL
MDC_IDC_EPISODE_TYPE_INDUCED: NO
MDC_IDC_LEAD_CONNECTION_STATUS: NORMAL
MDC_IDC_LEAD_CONNECTION_STATUS: NORMAL
MDC_IDC_LEAD_IMPLANT_DT: NORMAL
MDC_IDC_LEAD_IMPLANT_DT: NORMAL
MDC_IDC_LEAD_LOCATION: NORMAL
MDC_IDC_LEAD_LOCATION: NORMAL
MDC_IDC_LEAD_LOCATION_DETAIL_1: NORMAL
MDC_IDC_LEAD_LOCATION_DETAIL_1: NORMAL
MDC_IDC_LEAD_MFG: NORMAL
MDC_IDC_LEAD_MFG: NORMAL
MDC_IDC_LEAD_MODEL: NORMAL
MDC_IDC_LEAD_MODEL: NORMAL
MDC_IDC_LEAD_POLARITY_TYPE: NORMAL
MDC_IDC_LEAD_POLARITY_TYPE: NORMAL
MDC_IDC_LEAD_SERIAL: NORMAL
MDC_IDC_LEAD_SERIAL: NORMAL
MDC_IDC_MSMT_BATTERY_DTM: NORMAL
MDC_IDC_MSMT_BATTERY_REMAINING_LONGEVITY: 84 MO
MDC_IDC_MSMT_BATTERY_REMAINING_PERCENTAGE: 84 %
MDC_IDC_MSMT_BATTERY_STATUS: NORMAL
MDC_IDC_MSMT_LEADCHNL_RA_IMPEDANCE_VALUE: 474 OHM
MDC_IDC_MSMT_LEADCHNL_RV_IMPEDANCE_VALUE: 346 OHM
MDC_IDC_MSMT_LEADCHNL_RV_PACING_THRESHOLD_AMPLITUDE: 1.5 V
MDC_IDC_MSMT_LEADCHNL_RV_PACING_THRESHOLD_PULSEWIDTH: 0.4 MS
MDC_IDC_PG_IMPLANT_DTM: NORMAL
MDC_IDC_PG_MFG: NORMAL
MDC_IDC_PG_MODEL: NORMAL
MDC_IDC_PG_SERIAL: NORMAL
MDC_IDC_PG_TYPE: NORMAL
MDC_IDC_SESS_CLINIC_NAME: NORMAL
MDC_IDC_SESS_DTM: NORMAL
MDC_IDC_SESS_TYPE: NORMAL
MDC_IDC_SET_BRADY_AT_MODE_SWITCH_MODE: NORMAL
MDC_IDC_SET_BRADY_AT_MODE_SWITCH_RATE: 170 {BEATS}/MIN
MDC_IDC_SET_BRADY_LOWRATE: 70 {BEATS}/MIN
MDC_IDC_SET_BRADY_MAX_SENSOR_RATE: 130 {BEATS}/MIN
MDC_IDC_SET_BRADY_MAX_TRACKING_RATE: 130 {BEATS}/MIN
MDC_IDC_SET_BRADY_MODE: NORMAL
MDC_IDC_SET_BRADY_PAV_DELAY_HIGH: 200 MS
MDC_IDC_SET_BRADY_PAV_DELAY_LOW: 250 MS
MDC_IDC_SET_BRADY_SAV_DELAY_HIGH: 200 MS
MDC_IDC_SET_BRADY_SAV_DELAY_LOW: 250 MS
MDC_IDC_SET_LEADCHNL_RA_PACING_AMPLITUDE: 1.8 V
MDC_IDC_SET_LEADCHNL_RA_PACING_CAPTURE_MODE: NORMAL
MDC_IDC_SET_LEADCHNL_RA_PACING_POLARITY: NORMAL
MDC_IDC_SET_LEADCHNL_RA_PACING_PULSEWIDTH: 0.4 MS
MDC_IDC_SET_LEADCHNL_RA_SENSING_ADAPTATION_MODE: NORMAL
MDC_IDC_SET_LEADCHNL_RA_SENSING_POLARITY: NORMAL
MDC_IDC_SET_LEADCHNL_RA_SENSING_SENSITIVITY: 0.25 MV
MDC_IDC_SET_LEADCHNL_RV_PACING_AMPLITUDE: 2.3 V
MDC_IDC_SET_LEADCHNL_RV_PACING_CAPTURE_MODE: NORMAL
MDC_IDC_SET_LEADCHNL_RV_PACING_POLARITY: NORMAL
MDC_IDC_SET_LEADCHNL_RV_PACING_PULSEWIDTH: 0.4 MS
MDC_IDC_SET_LEADCHNL_RV_SENSING_ADAPTATION_MODE: NORMAL
MDC_IDC_SET_LEADCHNL_RV_SENSING_POLARITY: NORMAL
MDC_IDC_SET_LEADCHNL_RV_SENSING_SENSITIVITY: 1.5 MV
MDC_IDC_SET_ZONE_DETECTION_INTERVAL: 375 MS
MDC_IDC_SET_ZONE_STATUS: NORMAL
MDC_IDC_SET_ZONE_TYPE: NORMAL
MDC_IDC_SET_ZONE_VENDOR_TYPE: NORMAL
MDC_IDC_STAT_AT_BURDEN_PERCENT: 1 %
MDC_IDC_STAT_AT_DTM_END: NORMAL
MDC_IDC_STAT_AT_DTM_START: NORMAL
MDC_IDC_STAT_BRADY_DTM_END: NORMAL
MDC_IDC_STAT_BRADY_DTM_START: NORMAL
MDC_IDC_STAT_BRADY_RA_PERCENT_PACED: 100 %
MDC_IDC_STAT_BRADY_RV_PERCENT_PACED: 0 %
MDC_IDC_STAT_EPISODE_RECENT_COUNT: 0
MDC_IDC_STAT_EPISODE_RECENT_COUNT: 0
MDC_IDC_STAT_EPISODE_RECENT_COUNT: 1
MDC_IDC_STAT_EPISODE_RECENT_COUNT: 5
MDC_IDC_STAT_EPISODE_RECENT_COUNT_DTM_END: NORMAL
MDC_IDC_STAT_EPISODE_RECENT_COUNT_DTM_START: NORMAL
MDC_IDC_STAT_EPISODE_TYPE: NORMAL
MDC_IDC_STAT_EPISODE_VENDOR_TYPE: NORMAL

## 2024-09-20 PROCEDURE — 93296 REM INTERROG EVL PM/IDS: CPT | Performed by: INTERNAL MEDICINE

## 2024-09-20 PROCEDURE — 93294 REM INTERROG EVL PM/LDLS PM: CPT | Performed by: INTERNAL MEDICINE

## 2024-09-28 ENCOUNTER — HEALTH MAINTENANCE LETTER (OUTPATIENT)
Age: 84
End: 2024-09-28

## 2024-12-18 ENCOUNTER — ONCOLOGY VISIT (OUTPATIENT)
Dept: ONCOLOGY | Facility: HOSPITAL | Age: 84
End: 2024-12-18
Attending: INTERNAL MEDICINE
Payer: MEDICARE

## 2024-12-18 VITALS
WEIGHT: 165 LBS | HEIGHT: 62 IN | BODY MASS INDEX: 30.36 KG/M2 | SYSTOLIC BLOOD PRESSURE: 150 MMHG | DIASTOLIC BLOOD PRESSURE: 60 MMHG | RESPIRATION RATE: 19 BRPM | TEMPERATURE: 97.4 F | HEART RATE: 72 BPM | OXYGEN SATURATION: 98 %

## 2024-12-18 DIAGNOSIS — Z12.31 VISIT FOR SCREENING MAMMOGRAM: ICD-10-CM

## 2024-12-18 DIAGNOSIS — E21.0 PRIMARY HYPERPARATHYROIDISM (H): ICD-10-CM

## 2024-12-18 DIAGNOSIS — Z79.811 LONG TERM CURRENT USE OF AROMATASE INHIBITOR: ICD-10-CM

## 2024-12-18 DIAGNOSIS — C50.911 INVASIVE DUCTAL CARCINOMA OF BREAST, FEMALE, RIGHT (H): Primary | ICD-10-CM

## 2024-12-18 DIAGNOSIS — M85.80 OSTEOPENIA, UNSPECIFIED LOCATION: ICD-10-CM

## 2024-12-18 PROCEDURE — 99214 OFFICE O/P EST MOD 30 MIN: CPT

## 2024-12-18 PROCEDURE — G0463 HOSPITAL OUTPT CLINIC VISIT: HCPCS

## 2024-12-18 PROCEDURE — G2211 COMPLEX E/M VISIT ADD ON: HCPCS

## 2024-12-18 RX ORDER — CHOLECALCIFEROL (VITAMIN D3) 50 MCG
1 TABLET ORAL DAILY
COMMUNITY

## 2024-12-18 ASSESSMENT — PAIN SCALES - GENERAL: PAINLEVEL_OUTOF10: NO PAIN (0)

## 2024-12-18 NOTE — LETTER
"12/18/2024      Angeles Can  [address] [address]      Dear Colleague,    Thank you for referring your patient, Angeles Can, to the [facility]. Please see a copy of my visit note below.    Oncology Rooming Note    December 18, 2024 11:29 AM   Angeles Can is a 84 year old female who presents for:    Chief Complaint   Patient presents with     Oncology Clinic Visit     Recheck -   Malignant neoplasm of lower-inner quadrant of right breast of female, estrogen receptor positive      Initial Vitals: There were no vitals taken for this visit. Estimated body mass index is 29.03 kg/m  as calculated from the following:    Height as of 6/14/24: 1.575 m (5' 2\").    Weight as of 6/14/24: 72 kg (158 lb 11.2 oz). There is no height or weight on file to calculate BSA.  Data Unavailable Comment: Data Unavailable   No LMP recorded. Patient is postmenopausal.  Allergies reviewed: Yes  Medications reviewed: Yes    Medications: Medication refills not needed today.  Pharmacy name entered into [employer]: [id number] - [place], [place] - [address]    Frailty Screening:   Is the patient here for a new oncology consult visit in cancer care? 2. No      Clinical concerns:   Follow up visit.   Pt reported - Issues with staying asleep. Does not take sleep aid medication.       Justa Berrios MA              [facility] Hematology and Oncology Progress Note    Patient: Angeles Can  MRN: [id number]  Date of Service: Dec 18, 2024         Reason for Visit    Chief Complaint   Patient presents with     Oncology Clinic Visit     Recheck -   Malignant neoplasm of lower-inner quadrant of right breast of female, estrogen receptor positive        Assessment and Plan     Cancer Staging   Malignant neoplasm of lower-inner quadrant of right breast of female, estrogen receptor positive (H)  Staging form: Breast, AJCC 8th Edition  - Pathologic stage from 6/16/2021: Stage " Unknown (pT1c, pNX, cM0, G1, ER+, ND+, HER2-) - Signed by Etelvina Merlos MD on 11/29/2021  - Clinical: No stage assigned - Unsigned      ECOG Performance    0 - Independent     Pain  Pain Score: No Pain (0)    #.  History of pT1c Nx cM0 invasive ductal carcinoma of the lower inner quadrant of the right breast.  G1, ER+, ND+, HER-2/alvin- (1+ by IHC).   She continues on anastrozole since 6/2021.  Tolerating well.  Clinically stable without signs or symptoms of recurrence.  Planning to complete 5 years of treatment on 6/2026.  Last mammogram 5/2024 was normal.  Will continue annual mammograms.   Will have her follow-up in 6 months with Dr. Merlos to continue surveillance.  Encouraged patient to call sooner with concerns.    #. Bone health on aromatase inhibitor  #.  Osteopenia   DEXA scan from 5/18/2023 showed osteopenia.  Encouraged continued calcium and vitamin D supplementation along with weightbearing exercises.  Will repeat DEXA scan in 6 months.  Can consider adding bisphosphonate therapy if DEXA scan is worsening.    #. Primary hyperparathyroidism   Elevated calcium.  Following with endocrine. Looking to have surgery at the end of February for partial parathyroidectomy.     The longitudinal plan of care for the diagnosis(es)/condition(s) as documented were addressed during this visit. Due to the added complexity in care, I will continue to support Frida in the subsequent management and with ongoing continuity of care.    Encounter Diagnoses:    Problem List Items Addressed This Visit    None             CC: Freddy Lubin MD   ______________________________________________________________________________  Diagnosis  4/22/2021-screening mammogram showed focal asymmetry within the right breast at 4 o'clock position.  4/28/2021-diagnostic mammogram and ultrasound showed asymmetry with microlobulated margins and ultrasound confirmed 7 x 9 x 9 mm hypoechoic lesion at the same location.  5/3/2021-ultrasound-guided  core needle biopsy of the right breast mass and it showed invasive ductal carcinoma and DCIS.  ER positive> 95%, [place] positive> 95%, HER-2/alvin negative by IHC (1+).  5/26/201- Final path: Invasive ductal carcinoma, grade 1, 15 mm, negative margins.  There is associated DCIS, solid and focal cribriform, no necrosis and no microcalcification, negative margins. pT1c Nx.    Treatment to date  5/26/2021-underwent right breast lumpectomy by Dr. Peng.  6/2021-started adjuvant anastrozole.  Due to drug interaction with her cardiac medicines, she did not switch to tamoxifen.    History of Present Illness    Ms. Angeles Skinnerer here for follow-up.    She is feeling well.  She is tolerating anastrozole well with no hot flashes, joint aches, vaginal dryness, or mood changes.  She denies any new symptoms of headaches, shortness of breath, chest pain, or leg swelling.  No new bone or back.  No new lumps or bumps.  She continues to have some left elbow pain for the past 6 months that is worse with activity.  It has been overall stable.  She has good energy and appetite.  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    Review of systems  Apart from describing in HPI, the remainder of comprehensive ROS was negative.    Past History    Past Medical History:   Diagnosis Date     Arrhythmia     Paroxysmal Atrial fibrillation     Arthritis      Cancer (H)      Diabetes mellitus (H)      Hypercalcemia      Hyperlipidemia      Hypertension      Obesity        Past Surgical History:   Procedure Laterality Date     CATARACT EXTRACTION, BILATERAL       EP PACEMAKER INSERT N/A 3/2/2018    Procedure: EP Pacemaker Insertion;  Surgeon: Nahun Mina MD;  Location: [facility];  Service:      HC REMOVE TONSILS/ADENOIDS,<13 Y/O      Description: Tonsillectomy With Adenoidectomy;  Recorded: 09/16/2008;     HC REVISE MEDIAN N/CARPAL TUNNEL SURG      Description:  "Neuroplasty Decompression Median Nerve At Carpal Tunnel;  Recorded: 11/01/2009;  Comments: Right- 1/99; Left- 4/99     ND MASTECTOMY, PARTIAL Right 5/26/2021    Procedure: Right Lumpectomy after Wire Localizaiton;  Surgeon: Rosanna Peng MD;  Location: [facility];  Service: General     US BREAST CORE BIOPSY RIGHT Right 5/3/2021         Physical Exam    BP (!) 176/63   Pulse 72   Temp 97.4  F (36.3  C) (Tympanic)   Resp 19   Ht 1.575 m (5' 2\")   Wt 74.8 kg (165 lb)   SpO2 98%   BMI 30.18 kg/m      General: alert, awake, not in acute distress  HEENT: Head: Normal, normocephalic, atraumatic.  Lymphatics: No abnormally enlarged lymph nodes.  Chest: Normal chest wall and respirations. Clear to auscultation.  Breasts: No discrete palpable abnormalities.  No dimpling, nipple inversion, or discharge.  Heart: S1 S2 RRR.   Abdomen: abdomen is soft without significant tenderness  Extremities: normal strength, tone, and muscle mass  Skin: normal. no rash or abnormalities  CNS: non focal.    Lab Results    No results found for this or any previous visit (from the past week).      Imaging    No results found.      Signed by: Jessica Humphreys PA-C    Attestation signed by Rochelle Carcamo APRN CNP at 12/19/2024 11:24 AM:  Rochelle DORANTES APRN CNP, saw this patient and agree with the findings and plan of care as documented in the note. Items personally reviewed/procedural attestation: vitals, labs, and I was present for key portions of the visit and agree with assessment and plan.     Billing  Total time 35 minutes, to include face to face visit, review of EMR, ordering, documentation and coordination of care on date of service. The longitudinal plan of care for the diagnosis(es)/condition(s) as documented were addressed during this visit. Due to the added complexity in care, I will continue to support Frida in the subsequent management and with ongoing continuity of care.      Again, thank you for allowing me to " participate in the care of your patient.        Sincerely,        MISTI Camacho CNP

## 2024-12-18 NOTE — PROGRESS NOTES
"Oncology Rooming Note    December 18, 2024 11:29 AM   Angeles Can is a 84 year old female who presents for:    Chief Complaint   Patient presents with    Oncology Clinic Visit     Recheck -   Malignant neoplasm of lower-inner quadrant of right breast of female, estrogen receptor positive      Initial Vitals: There were no vitals taken for this visit. Estimated body mass index is 29.03 kg/m  as calculated from the following:    Height as of 6/14/24: 1.575 m (5' 2\").    Weight as of 6/14/24: 72 kg (158 lb 11.2 oz). There is no height or weight on file to calculate BSA.  Data Unavailable Comment: Data Unavailable   No LMP recorded. Patient is postmenopausal.  Allergies reviewed: Yes  Medications reviewed: Yes    Medications: Medication refills not needed today.  Pharmacy name entered into "[employer]: [id number] - [place], [place] - [address]    Frailty Screening:   Is the patient here for a new oncology consult visit in cancer care? 2. No      Clinical concerns:   Follow up visit.   Pt reported - Issues with staying asleep. Does not take sleep aid medication.       Justa Berrios MA            "

## 2024-12-18 NOTE — PROGRESS NOTES
[facility] Hematology and Oncology Progress Note    Patient: Angeles Can  MRN: [id number]  Date of Service: Dec 18, 2024         Reason for Visit    Chief Complaint   Patient presents with    Oncology Clinic Visit     Recheck -   Malignant neoplasm of lower-inner quadrant of right breast of female, estrogen receptor positive        Assessment and Plan     Cancer Staging   Malignant neoplasm of lower-inner quadrant of right breast of female, estrogen receptor positive (H)  Staging form: Breast, AJCC 8th Edition  - Pathologic stage from 6/16/2021: Stage Unknown (pT1c, pNX, cM0, G1, ER+, NM+, HER2-) - Signed by Etelvina Merlos MD on 11/29/2021  - Clinical: No stage assigned - Unsigned      ECOG Performance    0 - Independent     Pain  Pain Score: No Pain (0)    #.  History of pT1c Nx cM0 invasive ductal carcinoma of the lower inner quadrant of the right breast.  G1, ER+, NM+, HER-2/alvin- (1+ by IHC).   She continues on anastrozole since 6/2021.  Tolerating well.  Clinically stable without signs or symptoms of recurrence.  Planning to complete 5 years of treatment on 6/2026.  Last mammogram 5/2024 was normal.  Will continue annual mammograms.   Will have her follow-up in 6 months with Dr. Merlos to continue surveillance.  Encouraged patient to call sooner with concerns.    #. Bone health on aromatase inhibitor  #.  Osteopenia   DEXA scan from 5/18/2023 showed osteopenia.  Encouraged continued calcium and vitamin D supplementation along with weightbearing exercises.  Will repeat DEXA scan in 6 months.  Can consider adding bisphosphonate therapy if DEXA scan is worsening.    #. Primary hyperparathyroidism   Elevated calcium.  Following with endocrine. Looking to have surgery at the end of February for partial parathyroidectomy.     The longitudinal plan of care for the diagnosis(es)/condition(s) as documented were addressed during this visit. Due to the added complexity in care, I will continue to support Frida  in the subsequent management and with ongoing continuity of care.    Encounter Diagnoses:    Problem List Items Addressed This Visit    None             CC: Freddy Lubin MD   ______________________________________________________________________________  Diagnosis  4/22/2021-screening mammogram showed focal asymmetry within the right breast at 4 o'clock position.  4/28/2021-diagnostic mammogram and ultrasound showed asymmetry with microlobulated margins and ultrasound confirmed 7 x 9 x 9 mm hypoechoic lesion at the same location.  5/3/2021-ultrasound-guided core needle biopsy of the right breast mass and it showed invasive ductal carcinoma and DCIS.  ER positive> 95%, [place] positive> 95%, HER-2/alvin negative by IHC (1+).  5/26/201- Final path: Invasive ductal carcinoma, grade 1, 15 mm, negative margins.  There is associated DCIS, solid and focal cribriform, no necrosis and no microcalcification, negative margins. pT1c Nx.    Treatment to date  5/26/2021-underwent right breast lumpectomy by Dr. Peng.  6/2021-started adjuvant anastrozole.  Due to drug interaction with her cardiac medicines, she did not switch to tamoxifen.    History of Present Illness    Ms. Angeles Skinnerer here for follow-up.    She is feeling well.  She is tolerating anastrozole well with no hot flashes, joint aches, vaginal dryness, or mood changes.  She denies any new symptoms of headaches, shortness of breath, chest pain, or leg swelling.  No new bone or back.  No new lumps or bumps.  She continues to have some left elbow pain for the past 6 months that is worse with activity.  It has been overall stable.  She has good energy and appetite.  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    Review of systems  Apart from describing in HPI, the remainder of comprehensive ROS was negative.    Past History    Past Medical History:   Diagnosis Date    Arrhythmia     Paroxysmal Atrial  "fibrillation    Arthritis     Cancer (H)     Diabetes mellitus (H)     Hypercalcemia     Hyperlipidemia     Hypertension     Obesity        Past Surgical History:   Procedure Laterality Date    CATARACT EXTRACTION, BILATERAL      EP PACEMAKER INSERT N/A 3/2/2018    Procedure: EP Pacemaker Insertion;  Surgeon: Nahun Mina MD;  Location: [facility];  Service:     HC REMOVE TONSILS/ADENOIDS,<11 Y/O      Description: Tonsillectomy With Adenoidectomy;  Recorded: 09/16/2008;    HC REVISE MEDIAN N/CARPAL TUNNEL SURG      Description: Neuroplasty Decompression Median Nerve At Carpal Tunnel;  Recorded: 11/01/2009;  Comments: Right- 1/99; Left- 4/99    AK MASTECTOMY, PARTIAL Right 5/26/2021    Procedure: Right Lumpectomy after Wire Localizaiton;  Surgeon: Rosanna Peng MD;  Location: [facility];  Service: General    US BREAST CORE BIOPSY RIGHT Right 5/3/2021         Physical Exam    BP (!) 176/63   Pulse 72   Temp 97.4  F (36.3  C) (Tympanic)   Resp 19   Ht 1.575 m (5' 2\")   Wt 74.8 kg (165 lb)   SpO2 98%   BMI 30.18 kg/m      General: alert, awake, not in acute distress  HEENT: Head: Normal, normocephalic, atraumatic.  Lymphatics: No abnormally enlarged lymph nodes.  Chest: Normal chest wall and respirations. Clear to auscultation.  Breasts: No discrete palpable abnormalities.  No dimpling, nipple inversion, or discharge.  Heart: S1 S2 RRR.   Abdomen: abdomen is soft without significant tenderness  Extremities: normal strength, tone, and muscle mass  Skin: normal. no rash or abnormalities  CNS: non focal.    Lab Results    No results found for this or any previous visit (from the past week).      Imaging    No results found.      Signed by: Jessica Humphreys PA-C  "

## 2024-12-19 ENCOUNTER — PATIENT OUTREACH (OUTPATIENT)
Dept: ONCOLOGY | Facility: HOSPITAL | Age: 84
End: 2024-12-19
Payer: MEDICARE

## 2024-12-19 NOTE — PROGRESS NOTES
[facility]: Cancer Care                                                                                          Situation: Patient chart reviewed by care coordinator.    Background: Malignant neoplasm of lower-inner quadrant of right breast of female, estrogen receptor positive, G1, ER+, [place]+, HER2-    Assessment: Patient saw Jessica Humphreys PA-C in follow-up on 12/18/24.  Patient taking and tolerating anastrozole.    Plan/Recommendations: Patient to follow-up in 6 months with DXA and mammo prior, sooner with concerns.  Patient on 6 month recall list.     Dior Rush RN December 19, 2024 8:45 AM

## 2025-01-03 ENCOUNTER — ANCILLARY PROCEDURE (OUTPATIENT)
Dept: CARDIOLOGY | Facility: CLINIC | Age: 85
End: 2025-01-03
Attending: INTERNAL MEDICINE
Payer: MEDICARE

## 2025-01-03 DIAGNOSIS — Z95.0 PACEMAKER: ICD-10-CM

## 2025-01-03 DIAGNOSIS — I48.0 PAROXYSMAL ATRIAL FIBRILLATION (H): ICD-10-CM

## 2025-01-03 DIAGNOSIS — I49.5 SICK SINUS SYNDROME (H): ICD-10-CM

## 2025-01-03 LAB
MDC_IDC_EPISODE_DTM: NORMAL
MDC_IDC_EPISODE_DURATION: 11 S
MDC_IDC_EPISODE_DURATION: 13 S
MDC_IDC_EPISODE_DURATION: 14 S
MDC_IDC_EPISODE_DURATION: 15 S
MDC_IDC_EPISODE_DURATION: 16 S
MDC_IDC_EPISODE_DURATION: 3286 S
MDC_IDC_EPISODE_DURATION: 5 S
MDC_IDC_EPISODE_DURATION: 7 S
MDC_IDC_EPISODE_ID: NORMAL
MDC_IDC_EPISODE_TYPE: NORMAL
MDC_IDC_EPISODE_TYPE_INDUCED: NO
MDC_IDC_LEAD_CONNECTION_STATUS: NORMAL
MDC_IDC_LEAD_CONNECTION_STATUS: NORMAL
MDC_IDC_LEAD_IMPLANT_DT: NORMAL
MDC_IDC_LEAD_IMPLANT_DT: NORMAL
MDC_IDC_LEAD_LOCATION: NORMAL
MDC_IDC_LEAD_LOCATION: NORMAL
MDC_IDC_LEAD_LOCATION_DETAIL_1: NORMAL
MDC_IDC_LEAD_LOCATION_DETAIL_1: NORMAL
MDC_IDC_LEAD_MFG: NORMAL
MDC_IDC_LEAD_MFG: NORMAL
MDC_IDC_LEAD_MODEL: NORMAL
MDC_IDC_LEAD_MODEL: NORMAL
MDC_IDC_LEAD_POLARITY_TYPE: NORMAL
MDC_IDC_LEAD_POLARITY_TYPE: NORMAL
MDC_IDC_LEAD_SERIAL: NORMAL
MDC_IDC_LEAD_SERIAL: NORMAL
MDC_IDC_MSMT_BATTERY_DTM: NORMAL
MDC_IDC_MSMT_BATTERY_REMAINING_LONGEVITY: 84 MO
MDC_IDC_MSMT_BATTERY_REMAINING_PERCENTAGE: 81 %
MDC_IDC_MSMT_BATTERY_STATUS: NORMAL
MDC_IDC_MSMT_LEADCHNL_RA_IMPEDANCE_VALUE: 487 OHM
MDC_IDC_MSMT_LEADCHNL_RV_IMPEDANCE_VALUE: 342 OHM
MDC_IDC_MSMT_LEADCHNL_RV_PACING_THRESHOLD_AMPLITUDE: 1.4 V
MDC_IDC_MSMT_LEADCHNL_RV_PACING_THRESHOLD_PULSEWIDTH: 0.4 MS
MDC_IDC_PG_IMPLANT_DTM: NORMAL
MDC_IDC_PG_MFG: NORMAL
MDC_IDC_PG_MODEL: NORMAL
MDC_IDC_PG_SERIAL: NORMAL
MDC_IDC_PG_TYPE: NORMAL
MDC_IDC_SESS_CLINIC_NAME: NORMAL
MDC_IDC_SESS_DTM: NORMAL
MDC_IDC_SESS_TYPE: NORMAL
MDC_IDC_SET_BRADY_AT_MODE_SWITCH_MODE: NORMAL
MDC_IDC_SET_BRADY_AT_MODE_SWITCH_RATE: 170 {BEATS}/MIN
MDC_IDC_SET_BRADY_LOWRATE: 70 {BEATS}/MIN
MDC_IDC_SET_BRADY_MAX_SENSOR_RATE: 130 {BEATS}/MIN
MDC_IDC_SET_BRADY_MAX_TRACKING_RATE: 130 {BEATS}/MIN
MDC_IDC_SET_BRADY_MODE: NORMAL
MDC_IDC_SET_BRADY_PAV_DELAY_HIGH: 200 MS
MDC_IDC_SET_BRADY_PAV_DELAY_LOW: 250 MS
MDC_IDC_SET_BRADY_SAV_DELAY_HIGH: 200 MS
MDC_IDC_SET_BRADY_SAV_DELAY_LOW: 250 MS
MDC_IDC_SET_LEADCHNL_RA_PACING_AMPLITUDE: 1.8 V
MDC_IDC_SET_LEADCHNL_RA_PACING_CAPTURE_MODE: NORMAL
MDC_IDC_SET_LEADCHNL_RA_PACING_POLARITY: NORMAL
MDC_IDC_SET_LEADCHNL_RA_PACING_PULSEWIDTH: 0.4 MS
MDC_IDC_SET_LEADCHNL_RA_SENSING_ADAPTATION_MODE: NORMAL
MDC_IDC_SET_LEADCHNL_RA_SENSING_POLARITY: NORMAL
MDC_IDC_SET_LEADCHNL_RA_SENSING_SENSITIVITY: 0.25 MV
MDC_IDC_SET_LEADCHNL_RV_PACING_AMPLITUDE: 1.9 V
MDC_IDC_SET_LEADCHNL_RV_PACING_CAPTURE_MODE: NORMAL
MDC_IDC_SET_LEADCHNL_RV_PACING_POLARITY: NORMAL
MDC_IDC_SET_LEADCHNL_RV_PACING_PULSEWIDTH: 0.4 MS
MDC_IDC_SET_LEADCHNL_RV_SENSING_ADAPTATION_MODE: NORMAL
MDC_IDC_SET_LEADCHNL_RV_SENSING_POLARITY: NORMAL
MDC_IDC_SET_LEADCHNL_RV_SENSING_SENSITIVITY: 1.5 MV
MDC_IDC_SET_ZONE_DETECTION_INTERVAL: 375 MS
MDC_IDC_SET_ZONE_STATUS: NORMAL
MDC_IDC_SET_ZONE_TYPE: NORMAL
MDC_IDC_SET_ZONE_VENDOR_TYPE: NORMAL
MDC_IDC_STAT_AT_BURDEN_PERCENT: 1 %
MDC_IDC_STAT_AT_DTM_END: NORMAL
MDC_IDC_STAT_AT_DTM_START: NORMAL
MDC_IDC_STAT_BRADY_DTM_END: NORMAL
MDC_IDC_STAT_BRADY_DTM_START: NORMAL
MDC_IDC_STAT_BRADY_RA_PERCENT_PACED: 100 %
MDC_IDC_STAT_BRADY_RV_PERCENT_PACED: 0 %
MDC_IDC_STAT_EPISODE_RECENT_COUNT: 0
MDC_IDC_STAT_EPISODE_RECENT_COUNT: 0
MDC_IDC_STAT_EPISODE_RECENT_COUNT: 3
MDC_IDC_STAT_EPISODE_RECENT_COUNT: 5
MDC_IDC_STAT_EPISODE_RECENT_COUNT_DTM_END: NORMAL
MDC_IDC_STAT_EPISODE_RECENT_COUNT_DTM_START: NORMAL
MDC_IDC_STAT_EPISODE_TYPE: NORMAL
MDC_IDC_STAT_EPISODE_VENDOR_TYPE: NORMAL

## 2025-01-03 PROCEDURE — 93296 REM INTERROG EVL PM/IDS: CPT | Performed by: INTERNAL MEDICINE

## 2025-01-03 PROCEDURE — 93294 REM INTERROG EVL PM/LDLS PM: CPT | Performed by: INTERNAL MEDICINE

## 2025-03-15 ENCOUNTER — HEALTH MAINTENANCE LETTER (OUTPATIENT)
Age: 85
End: 2025-03-15

## 2025-04-05 ENCOUNTER — HEALTH MAINTENANCE LETTER (OUTPATIENT)
Age: 85
End: 2025-04-05

## 2025-04-06 ENCOUNTER — ANCILLARY PROCEDURE (OUTPATIENT)
Dept: CARDIOLOGY | Facility: CLINIC | Age: 85
End: 2025-04-06
Attending: INTERNAL MEDICINE
Payer: MEDICARE

## 2025-04-06 DIAGNOSIS — Z95.0 PACEMAKER: ICD-10-CM

## 2025-04-06 DIAGNOSIS — I49.5 SICK SINUS SYNDROME (H): ICD-10-CM

## 2025-04-06 DIAGNOSIS — I48.0 PAROXYSMAL ATRIAL FIBRILLATION (H): ICD-10-CM

## 2025-04-07 ENCOUNTER — TELEPHONE (OUTPATIENT)
Dept: CARDIOLOGY | Facility: CLINIC | Age: 85
End: 2025-04-07
Payer: MEDICARE

## 2025-04-07 DIAGNOSIS — I35.0 NONRHEUMATIC AORTIC VALVE STENOSIS: ICD-10-CM

## 2025-04-07 DIAGNOSIS — I47.29 NSVT (NONSUSTAINED VENTRICULAR TACHYCARDIA) (H): Primary | ICD-10-CM

## 2025-04-07 DIAGNOSIS — I35.0 MILD AORTIC STENOSIS: ICD-10-CM

## 2025-04-07 NOTE — TELEPHONE ENCOUNTER
Encounter Type: Routine remote pacemaker transmission and alert for NSVT episode.   Device: BSCI Accolade (D)  Pacing % /Programmed: AP 99%,  0% at DDDR 70/130 bpm.   Lead(s): Stable measurements and trends.   Battery longevity: 6 years, 6 months estimated.   Presenting: APVS 71 bpm.   Atrial high rates: Since 1/3/2025 11 mode switch episodes, EGMs appear to be AT/AF, per trend longest lasting 2.2 hours, v rate >/=120 bpm ~40%, burden <1%.   Anticoagulant: Coumadin.  Ventricular High rates: Since 1/3/2025 14 ventricular high rate episodes, one EGM on 4/5/25 appears to be NSVT 35 beats, duration ~11 seconds, rates 160-232 bpm. The remaining available EGMs appear to be PSVT.   Comments: Normal device function.   Plan: Routed to device RN for review.  Patient due for annual device check in June. Reminder letter sent. Aroldo, Device Specialist    NSVT ~35 beats noted on 4/5/25 at ~8 pm.   Last EF 65-70% per Echo 2022     Call placed to patient to review NSVT episode and assess for any symptoms.  Patient denies any awareness of arrhythmias from Saturday night.  He denies any lightheaded spells or near syncope.  Patient confirms she is taking sotalol 80 mg daily.  Advised to call with any troublesome episodes so we can recheck pacemaker remote.  Patient verbalized understanding.     Will review with Dr Fagan for any new recommendations. Has annual visit in July, but Device check was accidentally omitted - patient requesting to schedule tandem visit.     Wendi Varela RN

## 2025-04-08 LAB
MDC_IDC_EPISODE_DTM: NORMAL
MDC_IDC_EPISODE_DURATION: 1 S
MDC_IDC_EPISODE_DURATION: 10 S
MDC_IDC_EPISODE_DURATION: 103 S
MDC_IDC_EPISODE_DURATION: 1127 S
MDC_IDC_EPISODE_DURATION: 12 S
MDC_IDC_EPISODE_DURATION: 13 S
MDC_IDC_EPISODE_DURATION: 13 S
MDC_IDC_EPISODE_DURATION: 14 S
MDC_IDC_EPISODE_DURATION: 14 S
MDC_IDC_EPISODE_DURATION: 15 S
MDC_IDC_EPISODE_DURATION: 20 S
MDC_IDC_EPISODE_DURATION: 20 S
MDC_IDC_EPISODE_DURATION: 2141 S
MDC_IDC_EPISODE_DURATION: 22 S
MDC_IDC_EPISODE_DURATION: 3588 S
MDC_IDC_EPISODE_DURATION: 5 S
MDC_IDC_EPISODE_DURATION: 7803 S
MDC_IDC_EPISODE_DURATION: 88 S
MDC_IDC_EPISODE_ID: NORMAL
MDC_IDC_EPISODE_TYPE: NORMAL
MDC_IDC_EPISODE_TYPE_INDUCED: NO
MDC_IDC_EPISODE_VENDOR_TYPE: NORMAL
MDC_IDC_LEAD_CONNECTION_STATUS: NORMAL
MDC_IDC_LEAD_CONNECTION_STATUS: NORMAL
MDC_IDC_LEAD_IMPLANT_DT: NORMAL
MDC_IDC_LEAD_IMPLANT_DT: NORMAL
MDC_IDC_LEAD_LOCATION: NORMAL
MDC_IDC_LEAD_LOCATION: NORMAL
MDC_IDC_LEAD_LOCATION_DETAIL_1: NORMAL
MDC_IDC_LEAD_LOCATION_DETAIL_1: NORMAL
MDC_IDC_LEAD_MFG: NORMAL
MDC_IDC_LEAD_MFG: NORMAL
MDC_IDC_LEAD_MODEL: NORMAL
MDC_IDC_LEAD_MODEL: NORMAL
MDC_IDC_LEAD_POLARITY_TYPE: NORMAL
MDC_IDC_LEAD_POLARITY_TYPE: NORMAL
MDC_IDC_LEAD_SERIAL: NORMAL
MDC_IDC_LEAD_SERIAL: NORMAL
MDC_IDC_MSMT_BATTERY_DTM: NORMAL
MDC_IDC_MSMT_BATTERY_REMAINING_LONGEVITY: 78 MO
MDC_IDC_MSMT_BATTERY_REMAINING_PERCENTAGE: 77 %
MDC_IDC_MSMT_BATTERY_STATUS: NORMAL
MDC_IDC_MSMT_LEADCHNL_RA_IMPEDANCE_VALUE: 485 OHM
MDC_IDC_MSMT_LEADCHNL_RV_IMPEDANCE_VALUE: 341 OHM
MDC_IDC_MSMT_LEADCHNL_RV_PACING_THRESHOLD_AMPLITUDE: 1.6 V
MDC_IDC_MSMT_LEADCHNL_RV_PACING_THRESHOLD_PULSEWIDTH: 0.4 MS
MDC_IDC_PG_IMPLANT_DTM: NORMAL
MDC_IDC_PG_MFG: NORMAL
MDC_IDC_PG_MODEL: NORMAL
MDC_IDC_PG_SERIAL: NORMAL
MDC_IDC_PG_TYPE: NORMAL
MDC_IDC_SESS_CLINIC_NAME: NORMAL
MDC_IDC_SESS_DTM: NORMAL
MDC_IDC_SESS_TYPE: NORMAL
MDC_IDC_SET_BRADY_AT_MODE_SWITCH_MODE: NORMAL
MDC_IDC_SET_BRADY_AT_MODE_SWITCH_RATE: 170 {BEATS}/MIN
MDC_IDC_SET_BRADY_LOWRATE: 70 {BEATS}/MIN
MDC_IDC_SET_BRADY_MAX_SENSOR_RATE: 130 {BEATS}/MIN
MDC_IDC_SET_BRADY_MAX_TRACKING_RATE: 130 {BEATS}/MIN
MDC_IDC_SET_BRADY_MODE: NORMAL
MDC_IDC_SET_BRADY_PAV_DELAY_HIGH: 200 MS
MDC_IDC_SET_BRADY_PAV_DELAY_LOW: 250 MS
MDC_IDC_SET_BRADY_SAV_DELAY_HIGH: 200 MS
MDC_IDC_SET_BRADY_SAV_DELAY_LOW: 250 MS
MDC_IDC_SET_LEADCHNL_RA_PACING_AMPLITUDE: 1.8 V
MDC_IDC_SET_LEADCHNL_RA_PACING_CAPTURE_MODE: NORMAL
MDC_IDC_SET_LEADCHNL_RA_PACING_POLARITY: NORMAL
MDC_IDC_SET_LEADCHNL_RA_PACING_PULSEWIDTH: 0.4 MS
MDC_IDC_SET_LEADCHNL_RA_SENSING_ADAPTATION_MODE: NORMAL
MDC_IDC_SET_LEADCHNL_RA_SENSING_POLARITY: NORMAL
MDC_IDC_SET_LEADCHNL_RA_SENSING_SENSITIVITY: 0.25 MV
MDC_IDC_SET_LEADCHNL_RV_PACING_AMPLITUDE: 1.8 V
MDC_IDC_SET_LEADCHNL_RV_PACING_CAPTURE_MODE: NORMAL
MDC_IDC_SET_LEADCHNL_RV_PACING_POLARITY: NORMAL
MDC_IDC_SET_LEADCHNL_RV_PACING_PULSEWIDTH: 0.4 MS
MDC_IDC_SET_LEADCHNL_RV_SENSING_ADAPTATION_MODE: NORMAL
MDC_IDC_SET_LEADCHNL_RV_SENSING_POLARITY: NORMAL
MDC_IDC_SET_LEADCHNL_RV_SENSING_SENSITIVITY: 1.5 MV
MDC_IDC_SET_ZONE_DETECTION_INTERVAL: 375 MS
MDC_IDC_SET_ZONE_STATUS: NORMAL
MDC_IDC_SET_ZONE_TYPE: NORMAL
MDC_IDC_SET_ZONE_VENDOR_TYPE: NORMAL
MDC_IDC_STAT_AT_BURDEN_PERCENT: 1 %
MDC_IDC_STAT_AT_DTM_END: NORMAL
MDC_IDC_STAT_AT_DTM_START: NORMAL
MDC_IDC_STAT_BRADY_DTM_END: NORMAL
MDC_IDC_STAT_BRADY_DTM_START: NORMAL
MDC_IDC_STAT_BRADY_RA_PERCENT_PACED: 99 %
MDC_IDC_STAT_BRADY_RV_PERCENT_PACED: 0 %
MDC_IDC_STAT_EPISODE_RECENT_COUNT: 0
MDC_IDC_STAT_EPISODE_RECENT_COUNT: 1
MDC_IDC_STAT_EPISODE_RECENT_COUNT: 11
MDC_IDC_STAT_EPISODE_RECENT_COUNT: 13
MDC_IDC_STAT_EPISODE_RECENT_COUNT_DTM_END: NORMAL
MDC_IDC_STAT_EPISODE_RECENT_COUNT_DTM_START: NORMAL
MDC_IDC_STAT_EPISODE_TYPE: NORMAL
MDC_IDC_STAT_EPISODE_VENDOR_TYPE: NORMAL

## 2025-04-08 PROCEDURE — 93296 REM INTERROG EVL PM/IDS: CPT | Performed by: INTERNAL MEDICINE

## 2025-04-08 PROCEDURE — 93294 REM INTERROG EVL PM/LDLS PM: CPT | Performed by: INTERNAL MEDICINE

## 2025-04-09 NOTE — PROGRESS NOTES
Echo order placed and message sent to scheduling to arrange prior to follow up 7/14/25.    ----- Message -----   From: Blue Fagan MD   Sent: 4/9/2025   7:24 AM CDT   To: Rosana Pratt RN; Wendi Varela RN     Hi Rosana,   Please see note from the Wendi below regarding episode of NSVT.   Could we arrange for Frida to have an echocardiogram done to reassess left ventricular systolic performance.  Thanks!   ----- Message -----   From: Wendi Varela RN   Sent: 4/7/2025   2:43 PM CDT   To: Blue Fagan MD     ----- Message from Wendi Varela RN sent at 4/7/2025  2:43 PM CDT -----   Dr. Fagan: patient had ~35 beats NSVT on 4/5/25 at ~8 pm, patient states she was asymptomatic. Last EF 65-70% per Echo 2022.     Schedulers: patient is due for annual device check can we add on to [facility] appt in July? Thanks!

## 2025-05-14 ENCOUNTER — HOSPITAL ENCOUNTER (OUTPATIENT)
Dept: CARDIOLOGY | Facility: CLINIC | Age: 85
Discharge: HOME | End: 2025-05-14
Attending: INTERNAL MEDICINE
Payer: MEDICARE

## 2025-05-14 DIAGNOSIS — I47.29 NSVT (NONSUSTAINED VENTRICULAR TACHYCARDIA) (H): ICD-10-CM

## 2025-05-14 DIAGNOSIS — I35.0 MILD AORTIC STENOSIS: ICD-10-CM

## 2025-05-14 DIAGNOSIS — I35.0 NONRHEUMATIC AORTIC VALVE STENOSIS: ICD-10-CM

## 2025-05-14 LAB — LVEF ECHO: NORMAL

## 2025-05-14 PROCEDURE — 93306 TTE W/DOPPLER COMPLETE: CPT | Mod: 26 | Performed by: INTERNAL MEDICINE

## 2025-05-14 PROCEDURE — 93306 TTE W/DOPPLER COMPLETE: CPT

## 2025-05-21 ENCOUNTER — RESULTS FOLLOW-UP (OUTPATIENT)
Dept: CARDIOLOGY | Facility: CLINIC | Age: 85
End: 2025-05-21

## 2025-05-27 ENCOUNTER — ANCILLARY PROCEDURE (OUTPATIENT)
Dept: MAMMOGRAPHY | Facility: CLINIC | Age: 85
End: 2025-05-27
Payer: MEDICARE

## 2025-05-27 ENCOUNTER — ANCILLARY PROCEDURE (OUTPATIENT)
Dept: BONE DENSITY | Facility: CLINIC | Age: 85
End: 2025-05-27
Payer: MEDICARE

## 2025-05-27 DIAGNOSIS — M85.80 OSTEOPENIA, UNSPECIFIED LOCATION: ICD-10-CM

## 2025-05-27 DIAGNOSIS — Z79.811 LONG TERM CURRENT USE OF AROMATASE INHIBITOR: ICD-10-CM

## 2025-05-27 DIAGNOSIS — Z12.31 VISIT FOR SCREENING MAMMOGRAM: ICD-10-CM

## 2025-05-27 PROCEDURE — 77080 DXA BONE DENSITY AXIAL: CPT | Mod: TC | Performed by: PHYSICIAN ASSISTANT

## 2025-05-27 PROCEDURE — 77067 SCR MAMMO BI INCL CAD: CPT | Mod: TC | Performed by: RADIOLOGY

## 2025-05-27 PROCEDURE — 77063 BREAST TOMOSYNTHESIS BI: CPT | Mod: TC | Performed by: RADIOLOGY

## 2025-05-27 PROCEDURE — 77081 DXA BONE DENSITY APPENDICULR: CPT | Mod: TC | Performed by: PHYSICIAN ASSISTANT

## 2025-05-27 PROCEDURE — 77091 TBS TECHL CALCULATION ONLY: CPT | Performed by: PHYSICIAN ASSISTANT

## 2025-06-11 DIAGNOSIS — C50.911 INVASIVE DUCTAL CARCINOMA OF BREAST, FEMALE, RIGHT (H): ICD-10-CM

## 2025-06-11 NOTE — TELEPHONE ENCOUNTER
Therapy started 6/2021    Last Written Prescription Date:  6/14/24  Last Fill Quantity: 90,  # refills: 3   Last office visit provider:  12/18/24 with Jessica Humphreys PA-C, follow up 6/17/25    Requested Prescriptions   Pending Prescriptions Disp Refills    anastrozole (ARIMIDEX) 1 MG tablet [Pharmacy Med Name: Anastrozole Oral Tablet 1 MG] 90 tablet 0     Sig: Take 1 tablet (1 mg) by mouth daily       There is no refill protocol information for this order          Nancy Mendoza RN 06/11/25 12:14 PM

## 2025-06-12 RX ORDER — ANASTROZOLE 1 MG/1
1 TABLET ORAL DAILY
Qty: 90 TABLET | Refills: 1 | Status: SHIPPED | OUTPATIENT
Start: 2025-06-12

## 2025-06-17 ENCOUNTER — ONCOLOGY VISIT (OUTPATIENT)
Dept: ONCOLOGY | Facility: HOSPITAL | Age: 85
End: 2025-06-17
Attending: INTERNAL MEDICINE
Payer: MEDICARE

## 2025-06-17 VITALS
BODY MASS INDEX: 32.02 KG/M2 | TEMPERATURE: 98.9 F | HEIGHT: 62 IN | DIASTOLIC BLOOD PRESSURE: 65 MMHG | OXYGEN SATURATION: 96 % | RESPIRATION RATE: 16 BRPM | WEIGHT: 174 LBS | HEART RATE: 97 BPM | SYSTOLIC BLOOD PRESSURE: 120 MMHG

## 2025-06-17 DIAGNOSIS — C50.911 INVASIVE DUCTAL CARCINOMA OF BREAST, FEMALE, RIGHT (H): ICD-10-CM

## 2025-06-17 DIAGNOSIS — Z79.811 LONG TERM CURRENT USE OF AROMATASE INHIBITOR: ICD-10-CM

## 2025-06-17 DIAGNOSIS — Z17.0 MALIGNANT NEOPLASM OF LOWER-INNER QUADRANT OF RIGHT BREAST OF FEMALE, ESTROGEN RECEPTOR POSITIVE (H): Primary | ICD-10-CM

## 2025-06-17 DIAGNOSIS — C50.311 MALIGNANT NEOPLASM OF LOWER-INNER QUADRANT OF RIGHT BREAST OF FEMALE, ESTROGEN RECEPTOR POSITIVE (H): Primary | ICD-10-CM

## 2025-06-17 PROCEDURE — G0463 HOSPITAL OUTPT CLINIC VISIT: HCPCS | Performed by: INTERNAL MEDICINE

## 2025-06-17 PROCEDURE — G2211 COMPLEX E/M VISIT ADD ON: HCPCS | Performed by: INTERNAL MEDICINE

## 2025-06-17 PROCEDURE — 99214 OFFICE O/P EST MOD 30 MIN: CPT | Performed by: INTERNAL MEDICINE

## 2025-06-17 RX ORDER — OXYCODONE HYDROCHLORIDE 5 MG/1
TABLET ORAL
COMMUNITY
Start: 2025-02-26

## 2025-06-17 RX ORDER — ANASTROZOLE 1 MG/1
1 TABLET ORAL DAILY
Qty: 90 TABLET | Refills: 3 | Status: SHIPPED | OUTPATIENT
Start: 2025-06-17

## 2025-06-17 ASSESSMENT — PAIN SCALES - GENERAL: PAINLEVEL_OUTOF10: NO PAIN (0)

## 2025-06-17 NOTE — PROGRESS NOTES
[facility] Hematology and Oncology Progress Note    Patient: Angeles Can  MRN: [id number]  Date of Service: Jun 17, 2025         Reason for Visit    Chief Complaint   Patient presents with    Oncology Clinic Visit     Malignant neoplasm of lower-inner quadrant of right breast of female, estrogen receptor positive  Invasive ductal carcinoma of breast, female, right       Assessment and Plan     Cancer Staging   Malignant neoplasm of lower-inner quadrant of right breast of female, estrogen receptor positive (H)  Staging form: Breast, AJCC 8th Edition  - Pathologic stage from 6/16/2021: Stage Unknown (pT1c, pNX, cM0, G1, ER+, SD+, HER2-) - Signed by Etelvina Merlos MD on 11/29/2021  - Clinical: No stage assigned - Unsigned      ECOG Performance    0 - Independent     Pain  Pain Score: No Pain (0)    #.  History of pT1c Nx cM0 invasive ductal carcinoma of the lower inner quadrant of the right breast.  G1, ER+, SD+, HER-2/alvin- (1+ by IHC).   She is clinically well without signs and symptoms suggestive for breast cancer recurrence. She tolerates current aromatase inhibitor therapy well without major intolerable side effects.  Plan is to complete 5 years of adjuvant endocrine therapy.   She does not have any indication for systemic imaging.  I discussed that we will obtain imaging with clinical signs and symptoms concerning for breast cancer recurrence, but not routinely.   She is advised to continue annual screening mammogram, next due in 5/2025.   I recommended continue clinical surveillance with follow up clinical exam in 6 months.  She is advised to call me sooner with any concerns.    #. Bone health   I recommended her to continue calcium (from diet)/vitamin D and weightbearing exercises for bone health.  Recommended DEXA scan in 2025. Will continue to assess the indication for bisphosphonate therapy.    #. Primary hyperparathyroidism   Calcium is graudally rising. I advised her follow up with  jossy.      Encounter Diagnoses:    Problem List Items Addressed This Visit          Oncology Diagnoses    Malignant neoplasm of lower-inner quadrant of right breast of female, estrogen receptor positive (H) - Primary       Other    Long term current use of aromatase inhibitor            CC: Freddy Lubin MD   ______________________________________________________________________________  Diagnosis  4/22/2021-screening mammogram showed focal asymmetry within the right breast at 4 o'clock position.  4/28/2021-diagnostic mammogram and ultrasound showed asymmetry with microlobulated margins and ultrasound confirmed 7 x 9 x 9 mm hypoechoic lesion at the same location.  5/3/2021-ultrasound-guided core needle biopsy of the right breast mass and it showed invasive ductal carcinoma and DCIS.  ER positive> 95%, [place] positive> 95%, HER-2/alvin negative by IHC (1+).  5/26/201- Final path: Invasive ductal carcinoma, grade 1, 15 mm, negative margins.  There is associated DCIS, solid and focal cribriform, no necrosis and no microcalcification, negative margins. pT1c Nx.    Treatment to date  5/26/2021-underwent right breast lumpectomy by Dr. Peng.  6/2021-started adjuvant anastrozole.  Due to drug interaction with her cardiac medicines, she did not switch to tamoxifen.    History of Present Illness    Ms. Angeles Skinnerer here for follow-up.    She has left elbow pain started a few weeks ago.  She was wondering if could be signs of cancer in the bone.  She does not have injury.  Otherwise, she is doing well.  She takes anastrozole regularly.  She does not have any intolerable side effects.    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     Review of systems  Apart from describing in HPI, the remainder of comprehensive ROS was negative.    Past History    Past Medical History:   Diagnosis Date    Arrhythmia     Paroxysmal Atrial fibrillation    Arthritis     Cancer (H)     Diabetes mellitus (H)     Hypercalcemia      "Hyperlipidemia     Hypertension     Obesity        Past Surgical History:   Procedure Laterality Date    CATARACT EXTRACTION, BILATERAL      EP PACEMAKER INSERT N/A 3/2/2018    Procedure: EP Pacemaker Insertion;  Surgeon: Nahun Mina MD;  Location: [facility];  Service:     HC REMOVE TONSILS/ADENOIDS,<11 Y/O      Description: Tonsillectomy With Adenoidectomy;  Recorded: 09/16/2008;    HC REVISE MEDIAN N/CARPAL TUNNEL SURG      Description: Neuroplasty Decompression Median Nerve At Carpal Tunnel;  Recorded: 11/01/2009;  Comments: Right- 1/99; Left- 4/99    CO MASTECTOMY, PARTIAL Right 5/26/2021    Procedure: Right Lumpectomy after Wire Localizaiton;  Surgeon: Rosanna Peng MD;  Location: [facility];  Service: General    US BREAST CORE BIOPSY RIGHT Right 5/3/2021         Physical Exam    /65 (BP Location: Left arm, Patient Position: Sitting, Cuff Size: Adult Regular)   Pulse 97   Temp 98.9  F (37.2  C) (Temporal)   Resp 16   Ht 1.575 m (5' 2.01\")   Wt 78.9 kg (174 lb)   SpO2 96%   BMI 31.82 kg/m      General: alert, awake, not in acute distress  HEENT: Head: Normal, normocephalic, atraumatic.  Eye: Normal external eye, conjunctiva, lids cornea, DIVYA.  Pharynx: Normal buccal mucosa. Normal pharynx.  Neck / Thyroid: Supple, no masses, nodes, nodules or enlargement.  Lymphatics: No abnormally enlarged lymph nodes.  Chest: Normal chest wall and respirations. Clear to auscultation.  Breasts: No discrete palpable abnormalities.  Pendulous breast bilaterally.  Heart: S1 S2 RRR.   Abdomen: abdomen is soft without significant tenderness, masses, organomegaly or guarding  Extremities: normal strength, tone, and muscle mass  Skin: normal. no rash or abnormalities  CNS: non focal.    Lab Results    No results found for this or any previous visit (from the past week).      Imaging    DX Bone Density  Result Date: 5/28/2025  EXAM: DX TBS AXIAL and PERIPHERAL LOCATION: [facility] [place] DATE: 5/27/2025 INDICATION: BMD screening, follow-up. DEMOGRAPHICS: Age- 84 years. Gender- Female. Menopausal status- Postmenopausal. COMPARISON: 05/18/2023 TECHNIQUE: Dual-energy x-ray absorptiometry (DXA) performed with routine technique. Forearm DXA performed since the hip and/or spine could not be measured or interpreted.  Trabecular bone score (TBS) analysis performed. FINDINGS: DXA RESULTS -Lumbar Spine: L1-L4: BMD: 1.391 g/cm2. T-score: 1.8. Z-score: 3.7. Degenerative change may artifactually increase BMD. -RIGHT Hip Total: BMD: 0.889 g/cm2. T-score: -0.9. Z-score: 1.4. -RIGHT Hip Femoral neck: BMD: 0.735 g/cm2. T-score: -2.2. Z-score: 0.3. -LEFT Hip Total: BMD: 0.933 g/cm2. T-score: -0.6. Z-score: 1.8. -LEFT Hip Femoral neck: BMD: 0.770 g/cm2. T-score: -1.9. Z-score: 0.5. -LEFT Radius 33%: BMD: 0.558 g/cm2. T-score: -2.1. Z-score: 1.2. WHO T-SCORE CRITERIA -Normal: T score at or above -1 SD -Osteopenia: T score between -1 and -2.5 SD -Osteoporosis: T score at or below -2.5 SD The World Health Organization (WHO) criteria is applicable to perimenopausal females, postmenopausal females, and men aged 50 years or older. TBS RESULTS -Lumbar Spine L1-L4: TBS: 1.165. TBS T-score: -3.2.TBS Z-score: -. The TBS is a DXA derived measurement for fracture risk assessment, and reflects the structural condition of the bone microarchitecture. It can be used to adjust WHO Fracture Risk Assessment Tool (FRAX) probability of fracture in postmenopausal women and older men. The calculated probabilities of fracture have been shown to be more accurate when computed with the TBS. INTERVAL CHANGE -There has been a 0.7% decrease in lumbar spine BMD. -There has been a 2.1% decrease in bilateral hip BMD. -There has been a 15.1% decrease in the left radius 33% BMD. FRACTURE RISK -FRAX Results: The 10 year probability of major osteoporotic fracture is 15.4%, and of hip fracture is 5.1%, based on right femoral neck BMD.  -TBS-adjusted FRAX Results: The 10 year probability of major osteoporotic fracture is 16.8%, and of hip fracture is 5.4%. RECOMMENDATIONS Consider treatment if major osteoporotic fracture score is greater than or equal to 20%, and if the hip fracture score is greater than or equal to 3%.     IMPRESSION: Low bone density (OSTEOPENIA). T score meets the WHO criteria for low bone density (osteopenia) at one or more measured sites. The risk of osteoporotic fracture increases approximately two-fold for each standard deviation decrease in T-score.    MA Screen Bilateral w/Albert  Result Date: 5/27/2025  BILATERAL FULL FIELD DIGITAL SCREENING MAMMOGRAM WITH TOMOSYNTHESIS Performed on: 5/27/25 Compared to: 05/23/2024, 05/18/2023, and 05/17/2022 Technique:  This study was evaluated with the assistance of Computer-Aided Detection.  Breast Tomosynthesis was used in interpretation. Findings: The breasts are heterogeneously dense, which may obscure small masses.  There are breast conservation changes in the right breast. There is no radiographic evidence of malignancy.     IMPRESSION: ACR BI-RADS Category 2: Benign BREAST CANCER SCREENING RECOMMENDATION: Routine yearly mammography beginning at age 40 or as discussed with your provider. The results and recommendations of this examination will be communicated to the patient. Jing Austin MD       30 minutes spent by me on the date of the encounter doing chart review, history and exam, documentation and further activities as noted above.    Signed by: Etelvina Merlos MD

## 2025-06-17 NOTE — Clinical Note
"6/17/2025      Angeles Can  [address] [facility] [address]      Dear Colleague,    Thank you for referring your patient, Angeles Can, to the [facility]. Please see a copy of my visit note below.    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    Weight as of this encounter: 78.9 kg (174 lb). Body surface area is 1.86 meters squared.  No Pain (0) Comment: Data Unavailable   No LMP recorded. Patient is postmenopausal.  Allergies reviewed: Yes  Medications reviewed: Yes    Medications: Medication refills not needed today.  Pharmacy name entered into [employer]: [id number] - [place], [place] - [address]    Frailty Screening:   Is the patient here for a new oncology consult visit in cancer care? 2. No    PHQ9:  Did this patient require a PHQ9?: Yes   If the patient required a PHQ9 assessment, did the results require a follow up with the Provider/Nurse?: No      Clinical concerns: 6 month follow up, review mammogram and Dexa       Rochelle Ahmadi MA              [facility] Hematology and Oncology Progress Note    Patient: Angeles Can  MRN: [id number]  Date of Service: Jun 17, 2025         Reason for Visit    Chief Complaint   Patient presents with     Oncology Clinic Visit     Malignant neoplasm of " lower-inner quadrant of right breast of female, estrogen receptor positive  Invasive ductal carcinoma of breast, female, right       Assessment and Plan     Cancer Staging   Malignant 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  History of pT1c Nx cM0 invasive ductal carcinoma of the lower inner quadrant of the right breast.  G1, ER+, TN+, HER-2/alvin- (1+ by IHC).   She is clinically well without signs and symptoms suggestive for breast cancer recurrence. She tolerates current aromatase inhibitor therapy well without major intolerable side effects.  Plan is to complete 5 years of adjuvant endocrine therapy.   She does not have any indication for systemic imaging.  I discussed that we will obtain imaging with clinical signs and symptoms concerning for breast cancer recurrence, but not routinely.   She is advised to continue annual screening mammogram, next due in 5/2025.   I recommended continue clinical surveillance with follow up clinical exam in 6 months.  She is advised to call me sooner with any concerns.    #. Bone health   I recommended her to continue calcium (from diet)/vitamin D and weightbearing exercises for bone health.  Recommended DEXA scan in 2025. Will continue to assess the indication for bisphosphonate therapy.    #. Primary hyperparathyroidism   Calcium is graudally rising. I advised her follow up with endo.      Encounter Diagnoses:    Problem List Items Addressed This Visit          Oncology Diagnoses    Malignant neoplasm of lower-inner quadrant of right breast of female, estrogen receptor positive (H) - Primary       Other    Long term current use of  aromatase inhibitor            CC: Freddy Lubin MD   ______________________________________________________________________________  Diagnosis  4/22/2021-screening mammogram showed focal asymmetry within the right breast at 4 o'clock position.  4/28/2021-diagnostic mammogram and ultrasound showed asymmetry with microlobulated margins and ultrasound confirmed 7 x 9 x 9 mm hypoechoic lesion at the same location.  5/3/2021-ultrasound-guided core needle biopsy of the right breast mass and it showed invasive ductal carcinoma and DCIS.  ER positive> 95%, NJ positive> 95%, HER-2/alvin negative by IHC (1+).  5/26/201- Final path: Invasive ductal carcinoma, grade 1, 15 mm, negative margins.  There is associated DCIS, solid and focal cribriform, no necrosis and no microcalcification, negative margins. pT1c Nx.    Treatment to date  5/26/2021-underwent right breast lumpectomy by Dr. Peng.  6/2021-started adjuvant anastrozole.  Due to drug interaction with her cardiac medicines, she did not switch to tamoxifen.    History of Present Illness    Ms. Angeles Skinnerer here for follow-up.    She has left elbow pain started a few weeks ago.  She was wondering if could be signs of cancer in the bone.  She does not have injury.  Otherwise, she is doing well.  She takes anastrozole regularly.  She does not have any intolerable side effects.    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     Review of systems  Apart from describing in HPI, the remainder of comprehensive ROS was negative.    Past History    Past Medical History:   Diagnosis Date     Arrhythmia     Paroxysmal Atrial fibrillation     Arthritis      Cancer (H)      Diabetes mellitus (H)      Hypercalcemia      Hyperlipidemia      Hypertension      Obesity        Past Surgical History:   Procedure Laterality Date     CATARACT EXTRACTION, BILATERAL       EP PACEMAKER INSERT N/A 3/2/2018    Procedure: EP Pacemaker Insertion;  Surgeon: Nahun Mina MD;   "Location: [facility];  Service:      HC REMOVE TONSILS/ADENOIDS,<13 Y/O      Description: Tonsillectomy With Adenoidectomy;  Recorded: 09/16/2008;     HC REVISE MEDIAN N/CARPAL TUNNEL SURG      Description: Neuroplasty Decompression Median Nerve At Carpal Tunnel;  Recorded: 11/01/2009;  Comments: Right- 1/99; Left- 4/99     WY MASTECTOMY, PARTIAL Right 5/26/2021    Procedure: Right Lumpectomy after Wire Localizaiton;  Surgeon: Rosanna Peng MD;  Location: [facility];  Service: General     US BREAST CORE BIOPSY RIGHT Right 5/3/2021         Physical Exam    /65 (BP Location: Left arm, Patient Position: Sitting, Cuff Size: Adult Regular)   Pulse 97   Temp 98.9  F (37.2  C) (Temporal)   Resp 16   Ht 1.575 m (5' 2.01\")   Wt 78.9 kg (174 lb)   SpO2 96%   BMI 31.82 kg/m      General: alert, awake, not in acute distress  HEENT: Head: Normal, normocephalic, atraumatic.  Eye: Normal external eye, conjunctiva, lids cornea, DIVYA.  Pharynx: Normal buccal mucosa. Normal pharynx.  Neck / Thyroid: Supple, no masses, nodes, nodules or enlargement.  Lymphatics: No abnormally enlarged lymph nodes.  Chest: Normal chest wall and respirations. Clear to auscultation.  Breasts: No discrete palpable abnormalities.  Pendulous breast bilaterally.  Heart: S1 S2 RRR.   Abdomen: abdomen is soft without significant tenderness, masses, organomegaly or guarding  Extremities: normal strength, tone, and muscle mass  Skin: normal. no rash or abnormalities  CNS: non focal.    Lab Results    No results found for this or any previous visit (from the past week).      Imaging    DX Bone Density  Result Date: 5/28/2025  EXAM: DX TBS AXIAL and PERIPHERAL LOCATION: [facility] DATE: 5/27/2025 INDICATION: BMD screening, follow-up. DEMOGRAPHICS: Age- 84 years. Gender- Female. Menopausal status- Postmenopausal. COMPARISON: 05/18/2023 TECHNIQUE: Dual-energy x-ray absorptiometry (DXA) performed with routine " technique. Forearm DXA performed since the hip and/or spine could not be measured or interpreted.  Trabecular bone score (TBS) analysis performed. FINDINGS: DXA RESULTS -Lumbar Spine: L1-L4: BMD: 1.391 g/cm2. T-score: 1.8. Z-score: 3.7. Degenerative change may artifactually increase BMD. -RIGHT Hip Total: BMD: 0.889 g/cm2. T-score: -0.9. Z-score: 1.4. -RIGHT Hip Femoral neck: BMD: 0.735 g/cm2. T-score: -2.2. Z-score: 0.3. -LEFT Hip Total: BMD: 0.933 g/cm2. T-score: -0.6. Z-score: 1.8. -LEFT Hip Femoral neck: BMD: 0.770 g/cm2. T-score: -1.9. Z-score: 0.5. -LEFT Radius 33%: BMD: 0.558 g/cm2. T-score: -2.1. Z-score: 1.2. WHO T-SCORE CRITERIA -Normal: T score at or above -1 SD -Osteopenia: T score between -1 and -2.5 SD -Osteoporosis: T score at or below -2.5 SD The World Health Organization (WHO) criteria is applicable to perimenopausal females, postmenopausal females, and men aged 50 years or older. TBS RESULTS -Lumbar Spine L1-L4: TBS: 1.165. TBS T-score: -3.2.TBS Z-score: -. The TBS is a DXA derived measurement for fracture risk assessment, and reflects the structural condition of the bone microarchitecture. It can be used to adjust WHO Fracture Risk Assessment Tool (FRAX) probability of fracture in postmenopausal women and older men. The calculated probabilities of fracture have been shown to be more accurate when computed with the TBS. INTERVAL CHANGE -There has been a 0.7% decrease in lumbar spine BMD. -There has been a 2.1% decrease in bilateral hip BMD. -There has been a 15.1% decrease in the left radius 33% BMD. FRACTURE RISK -FRAX Results: The 10 year probability of major osteoporotic fracture is 15.4%, and of hip fracture is 5.1%, based on right femoral neck BMD. -TBS-adjusted FRAX Results: The 10 year probability of major osteoporotic fracture is 16.8%, and of hip fracture is 5.4%. RECOMMENDATIONS Consider treatment if major osteoporotic fracture score is greater than or equal to 20%, and if the hip  fracture score is greater than or equal to 3%.     IMPRESSION: Low bone density (OSTEOPENIA). T score meets the WHO criteria for low bone density (osteopenia) at one or more measured sites. The risk of osteoporotic fracture increases approximately two-fold for each standard deviation decrease in T-score.    MA Screen Bilateral w/Ablert  Result Date: 5/27/2025  BILATERAL FULL FIELD DIGITAL SCREENING MAMMOGRAM WITH TOMOSYNTHESIS Performed on: 5/27/25 Compared to: 05/23/2024, 05/18/2023, and 05/17/2022 Technique:  This study was evaluated with the assistance of Computer-Aided Detection.  Breast Tomosynthesis was used in interpretation. Findings: The breasts are heterogeneously dense, which may obscure small masses.  There are breast conservation changes in the right breast. There is no radiographic evidence of malignancy.     IMPRESSION: ACR BI-RADS Category 2: Benign BREAST CANCER SCREENING RECOMMENDATION: Routine yearly mammography beginning at age 40 or as discussed with your provider. The results and recommendations of this examination will be communicated to the patient. Jing Austin MD       30 minutes spent by me on the date of the encounter doing chart review, history and exam, documentation and further activities as noted above.    Signed by: Etelvina Merlos MD      Again, thank you for allowing me to participate in the care of your patient.        Sincerely,        Etelvina Merlos MD    Electronically signed

## 2025-06-17 NOTE — PROGRESS NOTES
"Oncology Rooming Note    June 17, 2025 1:20 PM   Angeles Can is a 84 year old female who presents for:    Chief Complaint   Patient presents with    Oncology Clinic Visit     Malignant neoplasm of lower-inner quadrant of right breast of female, estrogen receptor positive  Invasive ductal carcinoma of breast, female, right     Initial Vitals: /65 (BP Location: Left arm, Patient Position: Sitting, Cuff Size: Adult Regular)   Pulse 97   Temp 98.9  F (37.2  C) (Temporal)   Resp 16   Ht 1.575 m (5' 2.01\")   Wt 78.9 kg (174 lb)   SpO2 96%   BMI 31.82 kg/m   Estimated body mass index is 31.82 kg/m  as calculated from the following:    Height as of this encounter: 1.575 m (5' 2.01\").    Weight as of this encounter: 78.9 kg (174 lb). Body surface area is 1.86 meters squared.  No Pain (0) Comment: Data Unavailable   No LMP recorded. Patient is postmenopausal.  Allergies reviewed: Yes  Medications reviewed: Yes    Medications: Medication refills not needed today.  Pharmacy name entered into [employer]: [id number] [address]    Frailty Screening:   Is the patient here for a new oncology consult visit in cancer care? 2. No    PHQ9:  Did this patient require a PHQ9?: Yes   If the patient required a PHQ9 assessment, did the results require a follow up with the Provider/Nurse?: No      Clinical concerns: 6 month follow up, review mammogram and Dexa       Rochelle Ahmadi MA            "

## 2025-06-18 ENCOUNTER — PATIENT OUTREACH (OUTPATIENT)
Dept: ONCOLOGY | Facility: HOSPITAL | Age: 85
End: 2025-06-18
Payer: MEDICARE

## 2025-06-18 NOTE — PROGRESS NOTES
[facility]: Cancer Care                                                                                          Situation: Patient chart reviewed by care coordinator.    Background: Patient has history of malignant neoplasm of lower-inner quadrant of right breast of female, estrogen receptor positive, ER+, FL+, HER2-.    Assessment: Patient saw Dr. Merlos in follow-up on 6/17/25. Refer to her note. Patient to continue taking anastrozole.  Anastrozole refill sent by Dr. Merlos on 6/17/25.  Quantity #90. 3 refills. Receipt confirmed by [facility] (6/17/2025  1:47 PM CDT).    Plan/Recommendations: Patient to follow-up in 1 year, sooner with concerns. Patient on 1 year recall list.    Dior Rush RN June 18, 2025 8:30 AM

## 2025-07-07 DIAGNOSIS — I49.5 SICK SINUS SYNDROME (H): ICD-10-CM

## 2025-07-07 DIAGNOSIS — Z95.0 CARDIAC PACEMAKER IN SITU: Primary | ICD-10-CM

## 2025-07-14 ENCOUNTER — ANCILLARY PROCEDURE (OUTPATIENT)
Dept: CARDIOLOGY | Facility: CLINIC | Age: 85
End: 2025-07-14
Attending: INTERNAL MEDICINE
Payer: MEDICARE

## 2025-07-14 ENCOUNTER — OFFICE VISIT (OUTPATIENT)
Dept: CARDIOLOGY | Facility: CLINIC | Age: 85
End: 2025-07-14
Payer: MEDICARE

## 2025-07-14 VITALS
HEART RATE: 71 BPM | BODY MASS INDEX: 31.96 KG/M2 | WEIGHT: 174.8 LBS | DIASTOLIC BLOOD PRESSURE: 80 MMHG | SYSTOLIC BLOOD PRESSURE: 131 MMHG | OXYGEN SATURATION: 95 %

## 2025-07-14 DIAGNOSIS — I47.29 NSVT (NONSUSTAINED VENTRICULAR TACHYCARDIA) (H): ICD-10-CM

## 2025-07-14 DIAGNOSIS — I49.5 SICK SINUS SYNDROME (H): ICD-10-CM

## 2025-07-14 DIAGNOSIS — Z95.0 PACEMAKER: ICD-10-CM

## 2025-07-14 DIAGNOSIS — I48.91 ATRIAL FIBRILLATION, UNSPECIFIED TYPE (H): Primary | ICD-10-CM

## 2025-07-14 DIAGNOSIS — I48.0 PAROXYSMAL ATRIAL FIBRILLATION (H): ICD-10-CM

## 2025-07-14 DIAGNOSIS — E78.5 DYSLIPIDEMIA: ICD-10-CM

## 2025-07-14 DIAGNOSIS — I35.0 NONRHEUMATIC AORTIC VALVE STENOSIS: ICD-10-CM

## 2025-07-14 LAB
MDC_IDC_LEAD_CONNECTION_STATUS: NORMAL
MDC_IDC_LEAD_CONNECTION_STATUS: NORMAL
MDC_IDC_LEAD_IMPLANT_DT: NORMAL
MDC_IDC_LEAD_IMPLANT_DT: NORMAL
MDC_IDC_LEAD_LOCATION: NORMAL
MDC_IDC_LEAD_LOCATION: NORMAL
MDC_IDC_LEAD_LOCATION_DETAIL_1: NORMAL
MDC_IDC_LEAD_LOCATION_DETAIL_1: NORMAL
MDC_IDC_LEAD_MFG: NORMAL
MDC_IDC_LEAD_MFG: NORMAL
MDC_IDC_LEAD_MODEL: NORMAL
MDC_IDC_LEAD_MODEL: NORMAL
MDC_IDC_LEAD_POLARITY_TYPE: NORMAL
MDC_IDC_LEAD_POLARITY_TYPE: NORMAL
MDC_IDC_LEAD_SERIAL: NORMAL
MDC_IDC_LEAD_SERIAL: NORMAL
MDC_IDC_MSMT_BATTERY_DTM: NORMAL
MDC_IDC_MSMT_BATTERY_REMAINING_LONGEVITY: 72 MO
MDC_IDC_MSMT_BATTERY_REMAINING_PERCENTAGE: 71 %
MDC_IDC_MSMT_BATTERY_STATUS: NORMAL
MDC_IDC_MSMT_LEADCHNL_RA_IMPEDANCE_VALUE: 490 OHM
MDC_IDC_MSMT_LEADCHNL_RA_PACING_THRESHOLD_AMPLITUDE: 0.5 V
MDC_IDC_MSMT_LEADCHNL_RA_PACING_THRESHOLD_PULSEWIDTH: 0.4 MS
MDC_IDC_MSMT_LEADCHNL_RA_SENSING_INTR_AMPL: 2.5 MV
MDC_IDC_MSMT_LEADCHNL_RV_IMPEDANCE_VALUE: 353 OHM
MDC_IDC_MSMT_LEADCHNL_RV_PACING_THRESHOLD_AMPLITUDE: 1.1 V
MDC_IDC_MSMT_LEADCHNL_RV_PACING_THRESHOLD_PULSEWIDTH: 0.4 MS
MDC_IDC_MSMT_LEADCHNL_RV_SENSING_INTR_AMPL: 21.3 MV
MDC_IDC_PG_IMPLANT_DTM: NORMAL
MDC_IDC_PG_MFG: NORMAL
MDC_IDC_PG_MODEL: NORMAL
MDC_IDC_PG_SERIAL: NORMAL
MDC_IDC_PG_TYPE: NORMAL
MDC_IDC_SESS_CLINIC_NAME: NORMAL
MDC_IDC_SESS_DTM: NORMAL
MDC_IDC_SESS_TYPE: NORMAL
MDC_IDC_SET_BRADY_AT_MODE_SWITCH_MODE: NORMAL
MDC_IDC_SET_BRADY_AT_MODE_SWITCH_RATE: 170 {BEATS}/MIN
MDC_IDC_SET_BRADY_LOWRATE: 70 {BEATS}/MIN
MDC_IDC_SET_BRADY_MAX_SENSOR_RATE: 130 {BEATS}/MIN
MDC_IDC_SET_BRADY_MAX_TRACKING_RATE: 130 {BEATS}/MIN
MDC_IDC_SET_BRADY_MODE: NORMAL
MDC_IDC_SET_BRADY_PAV_DELAY_HIGH: 200 MS
MDC_IDC_SET_BRADY_PAV_DELAY_LOW: 250 MS
MDC_IDC_SET_BRADY_SAV_DELAY_HIGH: 200 MS
MDC_IDC_SET_BRADY_SAV_DELAY_LOW: 250 MS
MDC_IDC_SET_LEADCHNL_RA_PACING_AMPLITUDE: 2 V
MDC_IDC_SET_LEADCHNL_RA_PACING_CAPTURE_MODE: NORMAL
MDC_IDC_SET_LEADCHNL_RA_PACING_POLARITY: NORMAL
MDC_IDC_SET_LEADCHNL_RA_PACING_PULSEWIDTH: 0.4 MS
MDC_IDC_SET_LEADCHNL_RA_SENSING_ADAPTATION_MODE: NORMAL
MDC_IDC_SET_LEADCHNL_RA_SENSING_POLARITY: NORMAL
MDC_IDC_SET_LEADCHNL_RA_SENSING_SENSITIVITY: 0.25 MV
MDC_IDC_SET_LEADCHNL_RV_PACING_AMPLITUDE: NORMAL
MDC_IDC_SET_LEADCHNL_RV_PACING_CAPTURE_MODE: NORMAL
MDC_IDC_SET_LEADCHNL_RV_PACING_POLARITY: NORMAL
MDC_IDC_SET_LEADCHNL_RV_PACING_PULSEWIDTH: 0.4 MS
MDC_IDC_SET_LEADCHNL_RV_SENSING_ADAPTATION_MODE: NORMAL
MDC_IDC_SET_LEADCHNL_RV_SENSING_POLARITY: NORMAL
MDC_IDC_SET_LEADCHNL_RV_SENSING_SENSITIVITY: 1.5 MV
MDC_IDC_SET_ZONE_DETECTION_INTERVAL: 375 MS
MDC_IDC_SET_ZONE_STATUS: NORMAL
MDC_IDC_SET_ZONE_TYPE: NORMAL
MDC_IDC_SET_ZONE_VENDOR_TYPE: NORMAL
MDC_IDC_STAT_AT_BURDEN_PERCENT: 1 %
MDC_IDC_STAT_AT_DTM_END: NORMAL
MDC_IDC_STAT_AT_DTM_START: NORMAL
MDC_IDC_STAT_AT_MODE_SW_COUNT: 27
MDC_IDC_STAT_BRADY_DTM_END: NORMAL
MDC_IDC_STAT_BRADY_DTM_START: NORMAL
MDC_IDC_STAT_BRADY_RA_PERCENT_PACED: 99 %
MDC_IDC_STAT_BRADY_RV_PERCENT_PACED: 0 %
MDC_IDC_STAT_EPISODE_RECENT_COUNT: 0
MDC_IDC_STAT_EPISODE_RECENT_COUNT: 1
MDC_IDC_STAT_EPISODE_RECENT_COUNT: 28
MDC_IDC_STAT_EPISODE_RECENT_COUNT_DTM_END: NORMAL
MDC_IDC_STAT_EPISODE_RECENT_COUNT_DTM_START: NORMAL
MDC_IDC_STAT_EPISODE_TYPE: NORMAL
MDC_IDC_STAT_EPISODE_VENDOR_TYPE: NORMAL
MDC_IDC_STAT_EPISODE_VENDOR_TYPE: NORMAL

## 2025-07-14 PROCEDURE — 3075F SYST BP GE 130 - 139MM HG: CPT | Performed by: INTERNAL MEDICINE

## 2025-07-14 PROCEDURE — 99214 OFFICE O/P EST MOD 30 MIN: CPT | Performed by: INTERNAL MEDICINE

## 2025-07-14 PROCEDURE — 93280 PM DEVICE PROGR EVAL DUAL: CPT | Performed by: INTERNAL MEDICINE

## 2025-07-14 PROCEDURE — 3079F DIAST BP 80-89 MM HG: CPT | Performed by: INTERNAL MEDICINE

## 2025-07-14 PROCEDURE — 93000 ELECTROCARDIOGRAM COMPLETE: CPT | Performed by: STUDENT IN AN ORGANIZED HEALTH CARE EDUCATION/TRAINING PROGRAM

## 2025-07-14 NOTE — PROGRESS NOTES
"       [facility] HEART [facility]   [address], [place] [address]   [email or web address]   OFFICE: [phone]          Impression and Plan     1.  Atrial fibrillation.   Frida  in 2018 he had presented with atrial fibrillation and findings consistent with tachycardia/bradycardia syndrome.  She underwent permanent pacemaker placement 2 March 2018.  Frida's HANY?DS?-VASc Score  is 7.  Most recent device interrogation continues to reveal low atrial fibrillation burden of less than 1%.  QTc on twelve-lead ECG today is reasonable at 462 ms.  Continue warfarin.  Continue sotalol 80 mg twice daily.     2.  Aortic stenosis.  This was mild on echocardiogram 14 May 2025.     3.  NSVT.  Device interrogation 7 April 2025 revealed an episode of NSVT of 11 seconds.  Echocardiogram 14 May 2025 revealed normal left ventricular systolic performance with ejection fraction of 60-65%. She reports no concerning cardiac symptoms.  Follow-up device interrogation today revealed no recurrent NSVT.    4.  Dyslipidemia.  Lipid profile 7 December 2022 revealed LDL 73 mg/dL and HDL 60 mg/dL.  Continue atorvastatin.     Plan on follow-up in 1 year.   Frida  will be followed through [facility] per protocol as well.    35 minutes spent reviewing prior records (including documentation, laboratory studies, cardiac testing/imaging), interview with patient along with physical exam, planning, and subsequent documentation/crafting of note).     The longitudinal plan of care for atrial fibrillation, aortic stenosis, NSVT, & dyslipidemia was addressed during this visit.?Due to the added complexity in care, I will continue to support Angeles Galicia Pamella in the subsequent management of this condition(s) and with the ongoing continuity of care of this condition(s)\".           History of Present Illness    Once again I would like to thank you again for asking me to participate in the care of your patient, Angeles Galicia " Pamella.  As you know, but to reiterate for my own records, Angeles Can is a 84 year old female with history of sick sinus syndrome.  Patient underwent permanent pacemaker placement 2 March 2018 ([employer] L331 ACCOLADE MRI EL device).     On interview today,  Frida  is without complaint from a cardiac standpoint.  She reports no chest pain or shortness of breath.  She denies any subjective decline in exercise tolerance baseline, palpitations, or lightheadedness.      Further review of systems is otherwise negative/noncontributory (medical record and 13 point review of systems reviewed as well and pertinent positives noted).         Cardiac Diagnostics      Echocardiogram 14 May 2025:  Normal left ventricular size and systolic performance with ejection fraction of 60-65%.  Mild aortic stenosis.  Normal right ventricular size and systolic performance.  Moderate left atrial enlargement.  Right atrium normal dimension.    Echocardiogram 17 August 2022 (personally reviewed):  Normal left ventricular size and systolic performance with a visually estimated ejection fraction of 65-70%.  There is mild concentric increase in left ventricular wall thickness.  There is very mild aortic stenosis.  Normal right ventricular size and systolic performance.  There is mild to moderate left atrial enlargement.  Right ventricular systolic pressure relative to right atrial pressure is mildly increased. The pulmonary artery pressure is estimated to be 35-40 mmHg plus right atrial pressure (the IVC is of normal caliber).  When compared to the prior real-time echocardiogram dated 29 April 2021, there has been no major interval change.    Echocardiogram 29 April 2021 (personally reviewed):  Normal left ventricular size and systolic performance with a visually estimated ejection fraction of 55-60%.   There is mild concentric increase in left ventricular wall thickness.   There is very mild aortic stenosis.   Normal right  ventricular size and systolic performance.   There is mild left atrial enlargement.   When compared to the prior real-time echocardiogram dated 2 March 2018, the pacing electrodes appear to be new.  Otherwise, there has been little appreciable interval change.     Echocardiogram 2 March 2018 (personally reviewed):  Normal left ventricular size and systolic performance with a visually estimated ejection fraction of 65-70%.   There is mild concentric increase in left ventricular wall thickness.   There is mild aortic stenosis.   Normal right ventricular size and systolic performance.   There is mild left atrial enlargement.     Device interrogation 7 April 2025 ([employer] L331 ACCOLADE MRI EL device implanted 2 March 2018):  Encounter Type: Routine remote pacemaker transmission and alert for NSVT episode.   Device: [employer] Accolade (D)  Pacing % /Programmed: AP 99%,  0% at DDDR 70/130 bpm.   Lead(s): Stable measurements and trends.   Battery longevity: 6 years, 6 months estimated.   Presenting: APVS 71 bpm.   Atrial high rates: Since 1/3/2025 11 mode switch episodes, EGMs appear to be AT/AF, per trend longest lasting 2.2 hours, v rate >/=120 bpm ~40%, burden <1%.   Anticoagulant: Coumadin.  Ventricular High rates: Since 1/3/2025 14 ventricular high rate episodes, one EGM on 4/5/25 appears to be NSVT 35 beats, duration ~11 seconds, rates 160-232 bpm. The remaining available EGMs appear to be PSVT.   Comments: Normal device function.   Plan: Routed to device RN for review.  Patient due for annual device check in June. Reminder letter sent. Aroldo, Device Specialist. ADD: NSVT noted, asymptomatic per patient. Last EF 65-70% per Echo 2022. Routed to Dr. Fagan for review. Stephanie ZAPATA     Device interrogation 22 March 2023 ([employer] L331 ACCOLADE MRI EL device implanted 2 March 2018):  Encounter Type: Routine remote pacemaker transmission.   Device: [employer]SC[employer] Accolade.  Pacing % /Programmed: %,  0% at  DDDR 70/130.  Lead(s): Stable.  Battery longevity: 9yrs.  Presenting: AP-VS 70 bpm.  Atrial arrhythmia: since 12 December 2022; two mode switch episodes <1min, appears to be atrial tachy arrhythmia.  Anticoagulant: warfarin.  Ventricular arrhythmia: since December 2022; three ventricular high rate episodes, these appear to be PSVT.  Device/Lead alerts: Accolade device on advisory.   Comments: Normal magnet and pacemaker function.    Device interrogation 5 January 2021 ([employer] L331 ACCOLADE MRI EL device implanted 2 March 2018):  Type: routine remote pacemaker transmission.  Presenting rhythm: AP-VS  pm.  Battery/lead status: stable.  Arrhythmias: since 19 October 2021; 6 mode switch episodes, longest lasting 1hr 14 minutes, available EGMs confirm AF, AF burden <1%. Two ventricular high rate episodes, one episode appears to be RVR during AF, the other appears to be PSVT.  Anticoagulant: warfarin.  Comments: Normal magnet and pacemaker function.     Device interrogation 5 January 2021 ([employer] L331 ACCOLADE MRI EL device implanted 2 March 2018):  Presenting rhythm: AP-VS rate 70 bpm.  Battery/lead status: stable  Arrhythmias: since 5 October 2020, three mode switches, all <1 minute, EGMs confirm atrial tachy arrhythmias. Three nonsustained ventricular high rate  episodes, EGMs suggest PSVT.  Anticoagulant: warfarin  Comments: normal magnet and pacemaker function.     Device interrogation 20 February 2020 ([employer] L331 ACCOLADE MRI EL device implanted 2 March 2018):  Presenting rhythm: AP-VS 70 bpm.  Battery/lead status: stable.  Arrhythmias: since 20 November 2019; three ventricular high rate episodes, two available EGMs show PSVT. One mode switch <1min, appears to be atrial tachy  arrhythmia.  Comments: Normal magnet and pacemaker function.       Twelve-lead ECG (personally reviewed) 14 July 2025: Atrial paced rhythm.  QTc 462 ms.  Twelve-lead ECG (personally reviewed) 14 June  2024: Electronic atrial pacemaker.  QTc 438 ms.     Twelve-lead ECG (personally reviewed) 6 June 2023: Atrial paced rhythm.  QTc 438 ms.         Physical Examination       /80 (BP Location: Right arm, Patient Position: Sitting, Cuff Size: Adult Large)   Pulse 71   Wt 79.3 kg (174 lb 12.8 oz)   SpO2 95%   BMI 31.96 kg/m          Wt Readings from Last 3 Encounters:   07/14/25 79.3 kg (174 lb 12.8 oz)   06/17/25 78.9 kg (174 lb)   12/18/24 74.8 kg (165 lb)       The patient is alert and oriented times three. Sclerae are anicteric. Mucosal membranes are moist. Jugular venous pressure is normal. No significant adenopathy/thyromegally appreciated. Lungs are clear with good expansion. On cardiovascular exam, the patient has a regular S1 and S2. Abdomen is soft and non-tender. Extremities reveal no clubbing, cyanosis, or edema.         Family History/Social History/Risk Factors   Patient does not smoke.  Family history reviewed, and family history includes No Known Problems in her daughter, father, maternal aunt, maternal grandfather, maternal grandmother, mother, paternal aunt, paternal grandfather, paternal grandmother, and sister.          Medical History  Surgical History Family History Social History   Past Medical History:   Diagnosis Date    Arrhythmia     Paroxysmal Atrial fibrillation    Arthritis     Cancer (H)     Diabetes mellitus (H)     Hypercalcemia     Hyperlipidemia     Hypertension     Obesity      Past Surgical History:   Procedure Laterality Date    CATARACT EXTRACTION, BILATERAL      EP PACEMAKER INSERT N/A 3/2/2018    Procedure: EP Pacemaker Insertion;  Surgeon: Nahun Mina MD;  Location: [facility];  Service:     HC REMOVE TONSILS/ADENOIDS,<11 Y/O      Description: Tonsillectomy With Adenoidectomy;  Recorded: 09/16/2008;    HC REVISE MEDIAN N/CARPAL TUNNEL SURG      Description: Neuroplasty Decompression Median Nerve At Carpal Tunnel;  Recorded: 11/01/2009;  Comments: Right-  1/99; Left- 4/99    CT MASTECTOMY, PARTIAL Right 5/26/2021    Procedure: Right Lumpectomy after Wire Localizaiton;  Surgeon: Rosanna Peng MD;  Location: [facility];  Service: General    US BREAST CORE BIOPSY RIGHT Right 5/3/2021     Family History   Problem Relation Age of Onset    No Known Problems Mother     No Known Problems Father     No Known Problems Sister     No Known Problems Daughter     No Known Problems Maternal Grandmother     No Known Problems Maternal Grandfather     No Known Problems Paternal Grandmother     No Known Problems Paternal Grandfather     No Known Problems Maternal Aunt     No Known Problems Paternal Aunt         Social History     Socioeconomic History    Marital status:      Spouse name: Not on file    Number of children: Not on file    Years of education: Not on file    Highest education level: Not on file   Occupational History    Not on file   Tobacco Use    Smoking status: Former    Smokeless tobacco: Never    Tobacco comments:     quit years ago   Vaping Use    Vaping status: Never Used   Substance and Sexual Activity    Alcohol use: No    Drug use: No    Sexual activity: Not Currently   Other Topics Concern    Not on file   Social History Narrative    Not on file     Social Drivers of Health     Financial Resource Strain: Low Risk  (8/13/2024)    [facility]    Financial Resource Strain     Difficulty of Paying Living Expenses: 3     Difficulty of Paying Living Expenses: Not on file   Food Insecurity: No Food Insecurity (8/13/2024)    [facility]    Food Insecurity     Do you worry your food will run out before you are able to buy more?: 1   Transportation Needs: No Transportation Needs (8/13/2024)    Received from [facility]    Transportation Needs     Does lack of transportation keep you from medical appointments?: 1     Does lack of  transportation keep you from work, meetings or getting things that you need?: 1   Physical Activity: Not on file   Stress: Not on file   Social Connections: Socially Integrated (8/13/2024)    Received from [facility]    Social Connections     Do you often feel lonely or isolated from those around you?: 0   Interpersonal Safety: Not on file   Housing Stability: Low Risk  (8/13/2024)    Received from [facility]    Housing Stability     What is your housing situation today?: 1           Medications  Allergies   Current Outpatient Medications   Medication Sig Dispense Refill    allopurinol (ZYLOPRIM) 300 MG tablet [ALLOPURINOL (ZYLOPRIM) 300 MG TABLET] Take 1 tablet by mouth once daily. 90 tablet 3    amLODIPine (NORVASC) 2.5 MG tablet Take 1 tablet (2.5 mg) by mouth daily 90 tablet 3    anastrozole (ARIMIDEX) 1 MG tablet Take 1 tablet (1 mg) by mouth daily. 90 tablet 3    atorvastatin (LIPITOR) 20 MG tablet Take 1 tablet (20 mg) by mouth daily 90 tablet 3    blood glucose test strips [BLOOD GLUCOSE TEST STRIPS] Use 1 each As Directed daily. 100 strip 3    cloNIDine (CATAPRES) 0.1 MG tablet Take 1 tablet (0.1 mg) by mouth 2 times daily 180 tablet 3    furosemide (LASIX) 40 MG tablet Take 1 tablet (40 mg) by mouth daily. 90 tablet 2    losartan (COZAAR) 100 MG tablet Take 1 tablet (100 mg) by mouth daily 90 tablet 3    magnesium oxide (MAG-OX) 400 mg tablet [MAGNESIUM OXIDE (MAG-OX) 400 MG TABLET] Take 400 mg by mouth daily.       metFORMIN (GLUCOPHAGE XR) 500 MG 24 hr tablet Take 1 tablet (500 mg) by mouth daily (with dinner) 90 tablet 3    oxyCODONE (ROXICODONE) 5 MG tablet       peg 400-propylene glycol (SYSTANE) 0.4-0.3 % Drop Place 2 drops into both eyes as needed      sodium chloride (KATALINA 128) 5 % ophthalmic solution 1-2 drops every 3 hours as needed for dry eyes      sotalol (BETAPACE) 80 MG tablet Take 1 tablet (80 mg total) by mouth daily. 90  tablet 1    vitamin D3 (CHOLECALCIFEROL) 50 mcg (2000 units) tablet Take 1 tablet by mouth daily.      warfarin ANTICOAGULANT (COUMADIN) 2.5 MG tablet Take 1/2 tablet (1.25mg) to 1 tablet (2.5mg) by mouth daily, as directed.  Adjust dose based on INR results. 90 tablet 1       Allergies   Allergen Reactions    Metformin Diarrhea    Hydrochlorothiazide Unknown     Annotation: hyperuricemia made worse by HCTZ   Causes pts gout          Lab Results    Chemistry/lipid CBC Cardiac Enzymes/BNP/TSH/INR   Recent Labs   Lab Test 12/07/22  1106   CHOL 164   HDL 60   LDL 73   TRIG 157*     Recent Labs   Lab Test 12/07/22  1106 08/16/22  1834 06/07/22  1031   LDL 73 86 82     Recent Labs   Lab Test 12/07/22  1106      POTASSIUM 4.2   CHLORIDE 101   CO2 27   *   BUN 35.5*   CR 1.07*   GFRESTIMATED 51*   GUILLERMINA 10.5*     Recent Labs   Lab Test 12/07/22  1106 08/18/22  0453 08/16/22  1834   CR 1.07* 0.89 1.36*     Recent Labs   Lab Test 12/07/22  1106 08/16/22  1530 06/07/22  1031   A1C 7.0* 6.8* 6.8*          Recent Labs   Lab Test 08/16/22  1530   WBC 11.3*   HGB 13.6   HCT 40.1   MCV 91        Recent Labs   Lab Test 08/16/22  1530 06/07/22  1031 05/24/21  1303   HGB 13.6 13.0 13.2    Recent Labs   Lab Test 08/16/22  1834   TROPONINI <0.01     Recent Labs   Lab Test 03/09/21  1742   BNP 49     Recent Labs   Lab Test 07/16/18  1538   TSH 3.57     Recent Labs   Lab Test 02/28/23  1138 01/25/23  1138 12/28/22  1138   INR 3.5* 2.1* 2.9*          Medications  Allergies   Current Outpatient Medications   Medication Sig Dispense Refill    allopurinol (ZYLOPRIM) 300 MG tablet [ALLOPURINOL (ZYLOPRIM) 300 MG TABLET] Take 1 tablet by mouth once daily. 90 tablet 3    amLODIPine (NORVASC) 2.5 MG tablet Take 1 tablet (2.5 mg) by mouth daily 90 tablet 3    anastrozole (ARIMIDEX) 1 MG tablet Take 1 tablet (1 mg) by mouth daily. 90 tablet 3    atorvastatin (LIPITOR) 20 MG tablet Take 1 tablet (20 mg) by mouth daily 90 tablet 3     blood glucose test strips [BLOOD GLUCOSE TEST STRIPS] Use 1 each As Directed daily. 100 strip 3    cloNIDine (CATAPRES) 0.1 MG tablet Take 1 tablet (0.1 mg) by mouth 2 times daily 180 tablet 3    furosemide (LASIX) 40 MG tablet Take 1 tablet (40 mg) by mouth daily. 90 tablet 2    losartan (COZAAR) 100 MG tablet Take 1 tablet (100 mg) by mouth daily 90 tablet 3    magnesium oxide (MAG-OX) 400 mg tablet [MAGNESIUM OXIDE (MAG-OX) 400 MG TABLET] Take 400 mg by mouth daily.       metFORMIN (GLUCOPHAGE XR) 500 MG 24 hr tablet Take 1 tablet (500 mg) by mouth daily (with dinner) 90 tablet 3    oxyCODONE (ROXICODONE) 5 MG tablet       peg 400-propylene glycol (SYSTANE) 0.4-0.3 % Drop Place 2 drops into both eyes as needed      sodium chloride (KATALINA 128) 5 % ophthalmic solution 1-2 drops every 3 hours as needed for dry eyes      sotalol (BETAPACE) 80 MG tablet Take 1 tablet (80 mg total) by mouth daily. 90 tablet 1    vitamin D3 (CHOLECALCIFEROL) 50 mcg (2000 units) tablet Take 1 tablet by mouth daily.      warfarin ANTICOAGULANT (COUMADIN) 2.5 MG tablet Take 1/2 tablet (1.25mg) to 1 tablet (2.5mg) by mouth daily, as directed.  Adjust dose based on INR results. 90 tablet 1      Allergies   Allergen Reactions    Metformin Diarrhea    Hydrochlorothiazide Unknown     Annotation: hyperuricemia made worse by HCTZ   Causes pts gout          Lab Results   Lab Results   Component Value Date     12/07/2022    CO2 27 12/07/2022    CO2 26 08/18/2022    BUN 35.5 12/07/2022    BUN 28 08/18/2022     Lab Results   Component Value Date    WBC 11.3 08/16/2022    HGB 13.6 08/16/2022    HCT 40.1 08/16/2022    MCV 91 08/16/2022     08/16/2022     Lab Results   Component Value Date    CHOL 164 12/07/2022    TRIG 157 12/07/2022    HDL 60 12/07/2022     Lab Results   Component Value Date    INR 3.5 02/28/2023    INR 1.84 08/18/2022     Lab Results   Component Value Date    BNP 49 03/09/2021     Lab Results   Component Value Date     TROPONINI <0.01 08/16/2022     Lab Results   Component Value Date    TSH 3.57 07/16/2018

## 2025-07-14 NOTE — LETTER
7/14/2025    Krista Moilna MD  [address]  [facility] [address]    RE: Angeles Can       Dear Colleague,     I had the pleasure of seeing Angeels Can in the [facility].         [facility] [address], [place] [address]   [email or web address]   OFFICE: [phone]          Impression and Plan     1.  Atrial fibrillation.   Frida  in 2018 he had presented with atrial fibrillation and findings consistent with tachycardia/bradycardia syndrome.  She underwent permanent pacemaker placement 2 March 2018.  Frida's [id number] Score  is 7.  Most recent device interrogation continues to reveal low atrial fibrillation burden of less than 1%.  QTc on twelve-lead ECG today is reasonable at 462 ms.  Continue warfarin.  Continue sotalol 80 mg twice daily.     2.  Aortic stenosis.  This was mild on echocardiogram 14 May 2025.     3.  NSVT.  Device interrogation 7 April 2025 revealed an episode of NSVT of 11 seconds.  Echocardiogram 14 May 2025 revealed normal left ventricular systolic performance with ejection fraction of 60-65%. She reports no concerning cardiac symptoms.  Follow-up device interrogation today revealed no recurrent NSVT.    4.  Dyslipidemia.  Lipid profile 7 December 2022 revealed LDL 73 mg/dL and HDL 60 mg/dL.  Continue atorvastatin.     Plan on follow-up in 1 year.   Frida  will be followed through Device Clinic per protocol as well.    35 minutes spent reviewing prior records (including documentation, laboratory studies, cardiac testing/imaging), interview with patient along with physical exam, planning, and subsequent documentation/crafting of note).     The longitudinal plan of care for atrial fibrillation, aortic stenosis, NSVT, & dyslipidemia was addressed during this visit.?Due to the added complexity in care, I will continue to support Angeles Can in the subsequent management of this condition(s) and  "with the ongoing continuity of care of this condition(s)\".           History of Present Illness    Once again I would like to thank you again for asking me to participate in the care of your patient, Angeles Can.  As you know, but to reiterate for my own records, Agneles Can is a 84 year old female with history of sick sinus syndrome.  Patient underwent permanent pacemaker placement 2 March 2018 ([employer] L331 ACCOLADE MRI EL device).     On interview today,  Frida  is without complaint from a cardiac standpoint.  She reports no chest pain or shortness of breath.  She denies any subjective decline in exercise tolerance baseline, palpitations, or lightheadedness.      Further review of systems is otherwise negative/noncontributory (medical record and 13 point review of systems reviewed as well and pertinent positives noted).         Cardiac Diagnostics      Echocardiogram 14 May 2025:  Normal left ventricular size and systolic performance with ejection fraction of 60-65%.  Mild aortic stenosis.  Normal right ventricular size and systolic performance.  Moderate left atrial enlargement.  Right atrium normal dimension.    Echocardiogram 17 August 2022 (personally reviewed):  Normal left ventricular size and systolic performance with a visually estimated ejection fraction of 65-70%.  There is mild concentric increase in left ventricular wall thickness.  There is very mild aortic stenosis.  Normal right ventricular size and systolic performance.  There is mild to moderate left atrial enlargement.  Right ventricular systolic pressure relative to right atrial pressure is mildly increased. The pulmonary artery pressure is estimated to be 35-40 mmHg plus right atrial pressure (the IVC is of normal caliber).  When compared to the prior real-time echocardiogram dated 29 April 2021, there has been no major interval change.    Echocardiogram 29 April 2021 (personally reviewed):  Normal left ventricular " size and systolic performance with a visually estimated ejection fraction of 55-60%.   There is mild concentric increase in left ventricular wall thickness.   There is very mild aortic stenosis.   Normal right ventricular size and systolic performance.   There is mild left atrial enlargement.   When compared to the prior real-time echocardiogram dated 2 March 2018, the pacing electrodes appear to be new.  Otherwise, there has been little appreciable interval change.     Echocardiogram 2 March 2018 (personally reviewed):  Normal left ventricular size and systolic performance with a visually estimated ejection fraction of 65-70%.   There is mild concentric increase in left ventricular wall thickness.   There is mild aortic stenosis.   Normal right ventricular size and systolic performance.   There is mild left atrial enlargement.     Device interrogation 7 April 2025 ([employer] L331 ACCOLADE MRI EL device implanted 2 March 2018):  Encounter Type: Routine remote pacemaker transmission and alert for NSVT episode.   Device: [employer] Accolade (D)  Pacing % /Programmed: AP 99%,  0% at DDDR 70/130 bpm.   Lead(s): Stable measurements and trends.   Battery longevity: 6 years, 6 months estimated.   Presenting: APVS 71 bpm.   Atrial high rates: Since 1/3/2025 11 mode switch episodes, EGMs appear to be AT/AF, per trend longest lasting 2.2 hours, v rate >/=120 bpm ~40%, burden <1%.   Anticoagulant: Coumadin.  Ventricular High rates: Since 1/3/2025 14 ventricular high rate episodes, one EGM on 4/5/25 appears to be NSVT 35 beats, duration ~11 seconds, rates 160-232 bpm. The remaining available EGMs appear to be PSVT.   Comments: Normal device function.   Plan: Routed to device RN for review.  Patient due for annual device check in June. Reminder letter sent. Aroldo, Device Specialist. ADD: NSVT noted, asymptomatic per patient. Last EF 65-70% per Echo 2022. Routed to Dr. Fagan for review. Stephanie ZAPATA     Device  interrogation 22 March 2023 ([employer] L331 ACCOLADE MRI EL device implanted 2 March 2018):  Encounter Type: Routine remote pacemaker transmission.   Device: [employer]SCI Accolade.  Pacing % /Programmed: %,  0% at DDDR 70/130.  Lead(s): Stable.  Battery longevity: 9yrs.  Presenting: AP-VS 70 bpm.  Atrial arrhythmia: since 12 December 2022; two mode switch episodes <1min, appears to be atrial tachy arrhythmia.  Anticoagulant: warfarin.  Ventricular arrhythmia: since December 2022; three ventricular high rate episodes, these appear to be PSVT.  Device/Lead alerts: Accolade device on advisory.   Comments: Normal magnet and pacemaker function.    Device interrogation 5 January 2021 ([employer] L331 ACCOLADE MRI EL device implanted 2 March 2018):  Type: routine remote pacemaker transmission.  Presenting rhythm: AP-VS  pm.  Battery/lead status: stable.  Arrhythmias: since 19 October 2021; 6 mode switch episodes, longest lasting 1hr 14 minutes, available EGMs confirm AF, AF burden <1%. Two ventricular high rate episodes, one episode appears to be RVR during AF, the other appears to be PSVT.  Anticoagulant: warfarin.  Comments: Normal magnet and pacemaker function.     Device interrogation 5 January 2021 ([employer] L331 ACCOLADE MRI EL device implanted 2 March 2018):  Presenting rhythm: AP-VS rate 70 bpm.  Battery/lead status: stable  Arrhythmias: since 5 October 2020, three mode switches, all <1 minute, EGMs confirm atrial tachy arrhythmias. Three nonsustained ventricular high rate  episodes, EGMs suggest PSVT.  Anticoagulant: warfarin  Comments: normal magnet and pacemaker function.     Device interrogation 20 February 2020 ([employer] L331 ACCOLADE MRI EL device implanted 2 March 2018):  Presenting rhythm: AP-VS 70 bpm.  Battery/lead status: stable.  Arrhythmias: since 20 November 2019; three ventricular high rate episodes, two available EGMs show PSVT. One mode switch <1min, appears  to be atrial tachy  arrhythmia.  Comments: Normal magnet and pacemaker function.       Twelve-lead ECG (personally reviewed) 14 July 2025: Atrial paced rhythm.  QTc 462 ms.  Twelve-lead ECG (personally reviewed) 14 June 2024: Electronic atrial pacemaker.  QTc 438 ms.     Twelve-lead ECG (personally reviewed) 6 June 2023: Atrial paced rhythm.  QTc 438 ms.         Physical Examination       /80 (BP Location: Right arm, Patient Position: Sitting, Cuff Size: Adult Large)   Pulse 71   Wt 79.3 kg (174 lb 12.8 oz)   SpO2 95%   BMI 31.96 kg/m          Wt Readings from Last 3 Encounters:   07/14/25 79.3 kg (174 lb 12.8 oz)   06/17/25 78.9 kg (174 lb)   12/18/24 74.8 kg (165 lb)       The patient is alert and oriented times three. Sclerae are anicteric. Mucosal membranes are moist. Jugular venous pressure is normal. No significant adenopathy/thyromegally appreciated. Lungs are clear with good expansion. On cardiovascular exam, the patient has a regular S1 and S2. Abdomen is soft and non-tender. Extremities reveal no clubbing, cyanosis, or edema.         Family History/Social History/Risk Factors   Patient does not smoke.  Family history reviewed, and family history includes No Known Problems in her daughter, father, maternal aunt, maternal grandfather, maternal grandmother, mother, paternal aunt, paternal grandfather, paternal grandmother, and sister.          Medical History  Surgical History Family History Social History   Past Medical History:   Diagnosis Date     Arrhythmia     Paroxysmal Atrial fibrillation     Arthritis      Cancer (H)      Diabetes mellitus (H)      Hypercalcemia      Hyperlipidemia      Hypertension      Obesity      Past Surgical History:   Procedure Laterality Date     CATARACT EXTRACTION, BILATERAL       EP PACEMAKER INSERT N/A 3/2/2018    Procedure: EP Pacemaker Insertion;  Surgeon: Nahun Mina MD;  Location: [facility];  Service:      HC REMOVE TONSILS/ADENOIDS,<13 Y/O       Description: Tonsillectomy With Adenoidectomy;  Recorded: 09/16/2008;     HC REVISE MEDIAN N/CARPAL TUNNEL SURG      Description: Neuroplasty Decompression Median Nerve At Carpal Tunnel;  Recorded: 11/01/2009;  Comments: Right- 1/99; Left- 4/99     UT MASTECTOMY, PARTIAL Right 5/26/2021    Procedure: Right Lumpectomy after Wire Localizaiton;  Surgeon: Rosanna Peng MD;  Location: [facility];  Service: General     US BREAST CORE BIOPSY RIGHT Right 5/3/2021     Family History   Problem Relation Age of Onset     No Known Problems Mother      No Known Problems Father      No Known Problems Sister      No Known Problems Daughter      No Known Problems Maternal Grandmother      No Known Problems Maternal Grandfather      No Known Problems Paternal Grandmother      No Known Problems Paternal Grandfather      No Known Problems Maternal Aunt      No Known Problems Paternal Aunt         Social History     Socioeconomic History     Marital status:      Spouse name: Not on file     Number of children: Not on file     Years of education: Not on file     Highest education level: Not on file   Occupational History     Not on file   Tobacco Use     Smoking status: Former     Smokeless tobacco: Never     Tobacco comments:     quit years ago   Vaping Use     Vaping status: Never Used   Substance and Sexual Activity     Alcohol use: No     Drug use: No     Sexual activity: Not Currently   Other Topics Concern     Not on file   Social History Narrative     Not on file     Social Drivers of Health     Financial Resource Strain: Low Risk  (8/13/2024)    Received from [facility]    Financial Resource Strain      Difficulty of Paying Living Expenses: 3      Difficulty of Paying Living Expenses: Not on file   Food Insecurity: No Food Insecurity (8/13/2024)    Received from [facility]    Food Insecurity      Do you worry your food will run out before you  are able to buy more?: 1   Transportation Needs: No Transportation Needs (8/13/2024)    Received from [facility]    Transportation Needs      Does lack of transportation keep you from medical appointments?: 1      Does lack of transportation keep you from work, meetings or getting things that you need?: 1   Physical Activity: Not on file   Stress: Not on file   Social Connections: Socially Integrated (8/13/2024)    Received from [facility]    Social Connections      Do you often feel lonely or isolated from those around you?: 0   Interpersonal Safety: Not on file   Housing Stability: Low Risk  (8/13/2024)    Received from [facility]    Housing Stability      What is your housing situation today?: 1           Medications  Allergies   Current Outpatient Medications   Medication Sig Dispense Refill     allopurinol (ZYLOPRIM) 300 MG tablet [ALLOPURINOL (ZYLOPRIM) 300 MG TABLET] Take 1 tablet by mouth once daily. 90 tablet 3     amLODIPine (NORVASC) 2.5 MG tablet Take 1 tablet (2.5 mg) by mouth daily 90 tablet 3     anastrozole (ARIMIDEX) 1 MG tablet Take 1 tablet (1 mg) by mouth daily. 90 tablet 3     atorvastatin (LIPITOR) 20 MG tablet Take 1 tablet (20 mg) by mouth daily 90 tablet 3     blood glucose test strips [BLOOD GLUCOSE TEST STRIPS] Use 1 each As Directed daily. 100 strip 3     cloNIDine (CATAPRES) 0.1 MG tablet Take 1 tablet (0.1 mg) by mouth 2 times daily 180 tablet 3     furosemide (LASIX) 40 MG tablet Take 1 tablet (40 mg) by mouth daily. 90 tablet 2     losartan (COZAAR) 100 MG tablet Take 1 tablet (100 mg) by mouth daily 90 tablet 3     magnesium oxide (MAG-OX) 400 mg tablet [MAGNESIUM OXIDE (MAG-OX) 400 MG TABLET] Take 400 mg by mouth daily.        metFORMIN (GLUCOPHAGE XR) 500 MG 24 hr tablet Take 1 tablet (500 mg) by mouth daily (with dinner) 90 tablet 3     oxyCODONE (ROXICODONE) 5 MG tablet         peg 400-propylene glycol (SYSTANE) 0.4-0.3 % Drop Place 2 drops into both eyes as needed       sodium chloride (KATALINA 128) 5 % ophthalmic solution 1-2 drops every 3 hours as needed for dry eyes       sotalol (BETAPACE) 80 MG tablet Take 1 tablet (80 mg total) by mouth daily. 90 tablet 1     vitamin D3 (CHOLECALCIFEROL) 50 mcg (2000 units) tablet Take 1 tablet by mouth daily.       warfarin ANTICOAGULANT (COUMADIN) 2.5 MG tablet Take 1/2 tablet (1.25mg) to 1 tablet (2.5mg) by mouth daily, as directed.  Adjust dose based on INR results. 90 tablet 1       Allergies   Allergen Reactions     Metformin Diarrhea     Hydrochlorothiazide Unknown     Annotation: hyperuricemia made worse by HCTZ   Causes pts gout          Lab Results    Chemistry/lipid CBC Cardiac Enzymes/BNP/TSH/INR   Recent Labs   Lab Test 12/07/22  1106   CHOL 164   HDL 60   LDL 73   TRIG 157*     Recent Labs   Lab Test 12/07/22  1106 08/16/22  1834 06/07/22  1031   LDL 73 86 82     Recent Labs   Lab Test 12/07/22  1106      POTASSIUM 4.2   CHLORIDE 101   CO2 27   *   BUN 35.5*   CR 1.07*   GFRESTIMATED 51*   GUILLERMINA 10.5*     Recent Labs   Lab Test 12/07/22  1106 08/18/22  0453 08/16/22  1834   CR 1.07* 0.89 1.36*     Recent Labs   Lab Test 12/07/22  1106 08/16/22  1530 06/07/22  1031   A1C 7.0* 6.8* 6.8*          Recent Labs   Lab Test 08/16/22  1530   WBC 11.3*   HGB 13.6   HCT 40.1   MCV 91        Recent Labs   Lab Test 08/16/22  1530 06/07/22  1031 05/24/21  1303   HGB 13.6 13.0 13.2    Recent Labs   Lab Test 08/16/22  1834   TROPONINI <0.01     Recent Labs   Lab Test 03/09/21  1742   BNP 49     Recent Labs   Lab Test 07/16/18  1538   TSH 3.57     Recent Labs   Lab Test 02/28/23  1138 01/25/23  1138 12/28/22  1138   INR 3.5* 2.1* 2.9*          Medications  Allergies   Current Outpatient Medications   Medication Sig Dispense Refill     allopurinol (ZYLOPRIM) 300 MG tablet [ALLOPURINOL (ZYLOPRIM) 300 MG TABLET] Take 1 tablet by mouth once  daily. 90 tablet 3     amLODIPine (NORVASC) 2.5 MG tablet Take 1 tablet (2.5 mg) by mouth daily 90 tablet 3     anastrozole (ARIMIDEX) 1 MG tablet Take 1 tablet (1 mg) by mouth daily. 90 tablet 3     atorvastatin (LIPITOR) 20 MG tablet Take 1 tablet (20 mg) by mouth daily 90 tablet 3     blood glucose test strips [BLOOD GLUCOSE TEST STRIPS] Use 1 each As Directed daily. 100 strip 3     cloNIDine (CATAPRES) 0.1 MG tablet Take 1 tablet (0.1 mg) by mouth 2 times daily 180 tablet 3     furosemide (LASIX) 40 MG tablet Take 1 tablet (40 mg) by mouth daily. 90 tablet 2     losartan (COZAAR) 100 MG tablet Take 1 tablet (100 mg) by mouth daily 90 tablet 3     magnesium oxide (MAG-OX) 400 mg tablet [MAGNESIUM OXIDE (MAG-OX) 400 MG TABLET] Take 400 mg by mouth daily.        metFORMIN (GLUCOPHAGE XR) 500 MG 24 hr tablet Take 1 tablet (500 mg) by mouth daily (with dinner) 90 tablet 3     oxyCODONE (ROXICODONE) 5 MG tablet        peg 400-propylene glycol (SYSTANE) 0.4-0.3 % Drop Place 2 drops into both eyes as needed       sodium chloride (KATALINA 128) 5 % ophthalmic solution 1-2 drops every 3 hours as needed for dry eyes       sotalol (BETAPACE) 80 MG tablet Take 1 tablet (80 mg total) by mouth daily. 90 tablet 1     vitamin D3 (CHOLECALCIFEROL) 50 mcg (2000 units) tablet Take 1 tablet by mouth daily.       warfarin ANTICOAGULANT (COUMADIN) 2.5 MG tablet Take 1/2 tablet (1.25mg) to 1 tablet (2.5mg) by mouth daily, as directed.  Adjust dose based on INR results. 90 tablet 1      Allergies   Allergen Reactions     Metformin Diarrhea     Hydrochlorothiazide Unknown     Annotation: hyperuricemia made worse by HCTZ   Causes pts gout          Lab Results   Lab Results   Component Value Date     12/07/2022    CO2 27 12/07/2022    CO2 26 08/18/2022    BUN 35.5 12/07/2022    BUN 28 08/18/2022     Lab Results   Component Value Date    WBC 11.3 08/16/2022    HGB 13.6 08/16/2022    HCT 40.1 08/16/2022    MCV 91 08/16/2022      08/16/2022     Lab Results   Component Value Date    CHOL 164 12/07/2022    TRIG 157 12/07/2022    HDL 60 12/07/2022     Lab Results   Component Value Date    INR 3.5 02/28/2023    INR 1.84 08/18/2022     Lab Results   Component Value Date    BNP 49 03/09/2021     Lab Results   Component Value Date    TROPONINI <0.01 08/16/2022     Lab Results   Component Value Date    TSH 3.57 07/16/2018                    Thank you for allowing me to participate in the care of your patient.      Sincerely,     Blue Fagan MD     [facility]  cc:   Referred Self, MD  No address on file

## 2025-07-15 LAB
ATRIAL RATE - MUSE: 71 BPM
DIASTOLIC BLOOD PRESSURE - MUSE: NORMAL MMHG
INTERPRETATION ECG - MUSE: NORMAL
P AXIS - MUSE: 56 DEGREES
PR INTERVAL - MUSE: 98 MS
QRS DURATION - MUSE: 80 MS
QT - MUSE: 426 MS
QTC - MUSE: 462 MS
R AXIS - MUSE: 59 DEGREES
SYSTOLIC BLOOD PRESSURE - MUSE: NORMAL MMHG
T AXIS - MUSE: -15 DEGREES
VENTRICULAR RATE- MUSE: 71 BPM